# Patient Record
Sex: FEMALE | Race: WHITE | NOT HISPANIC OR LATINO | Employment: OTHER | URBAN - METROPOLITAN AREA
[De-identification: names, ages, dates, MRNs, and addresses within clinical notes are randomized per-mention and may not be internally consistent; named-entity substitution may affect disease eponyms.]

---

## 2017-01-05 ENCOUNTER — LAB (OUTPATIENT)
Dept: LAB | Facility: HOSPITAL | Age: 82
End: 2017-01-05

## 2017-01-05 ENCOUNTER — INFUSION (OUTPATIENT)
Dept: ONCOLOGY | Facility: HOSPITAL | Age: 82
End: 2017-01-05

## 2017-01-05 VITALS — SYSTOLIC BLOOD PRESSURE: 172 MMHG | DIASTOLIC BLOOD PRESSURE: 68 MMHG

## 2017-01-05 DIAGNOSIS — D63.1 ANEMIA OF CHRONIC RENAL FAILURE, STAGE 4 (SEVERE) (HCC): ICD-10-CM

## 2017-01-05 DIAGNOSIS — N18.4 ANEMIA OF CHRONIC RENAL FAILURE, STAGE 4 (SEVERE) (HCC): ICD-10-CM

## 2017-01-05 DIAGNOSIS — D69.6 THROMBOCYTOPENIA (HCC): ICD-10-CM

## 2017-01-05 DIAGNOSIS — D70.8 OTHER NEUTROPENIA (HCC): ICD-10-CM

## 2017-01-05 DIAGNOSIS — N18.4 CHRONIC RENAL DISEASE, STAGE 4, SEVERELY DECREASED GLOMERULAR FILTRATION RATE BETWEEN 15-29 ML/MIN/1.73 SQUARE METER (HCC): ICD-10-CM

## 2017-01-05 DIAGNOSIS — D73.9 SPLENIC PANCYTOPENIA SYNDROME (HCC): ICD-10-CM

## 2017-01-05 DIAGNOSIS — D61.818 SPLENIC PANCYTOPENIA SYNDROME (HCC): ICD-10-CM

## 2017-01-05 DIAGNOSIS — N17.9 ACUTE RENAL FAILURE, UNSPECIFIED ACUTE RENAL FAILURE TYPE (HCC): Primary | ICD-10-CM

## 2017-01-05 DIAGNOSIS — K74.60 CIRRHOSIS OF LIVER WITHOUT ASCITES, UNSPECIFIED HEPATIC CIRRHOSIS TYPE (HCC): ICD-10-CM

## 2017-01-05 DIAGNOSIS — D73.1 HYPERSPLENISM: ICD-10-CM

## 2017-01-05 LAB
BASOPHILS # BLD AUTO: 0.02 10*3/MM3 (ref 0–0.1)
BASOPHILS NFR BLD AUTO: 0.4 % (ref 0–1.1)
DEPRECATED RDW RBC AUTO: 48.4 FL (ref 37–49)
EOSINOPHIL # BLD AUTO: 0.18 10*3/MM3 (ref 0–0.36)
EOSINOPHIL NFR BLD AUTO: 3.8 % (ref 1–5)
ERYTHROCYTE [DISTWIDTH] IN BLOOD BY AUTOMATED COUNT: 14.5 % (ref 11.7–14.5)
HCT VFR BLD AUTO: 29.4 % (ref 34–45)
HGB BLD-MCNC: 9.8 G/DL (ref 11.5–14.9)
IMM GRANULOCYTES # BLD: 0.01 10*3/MM3 (ref 0–0.03)
IMM GRANULOCYTES NFR BLD: 0.2 % (ref 0–0.5)
LYMPHOCYTES # BLD AUTO: 1.36 10*3/MM3 (ref 1–3.5)
LYMPHOCYTES NFR BLD AUTO: 28.8 % (ref 20–49)
MCH RBC QN AUTO: 31.1 PG (ref 27–33)
MCHC RBC AUTO-ENTMCNC: 33.3 G/DL (ref 32–35)
MCV RBC AUTO: 93.3 FL (ref 83–97)
MONOCYTES # BLD AUTO: 0.54 10*3/MM3 (ref 0.25–0.8)
MONOCYTES NFR BLD AUTO: 11.4 % (ref 4–12)
NEUTROPHILS # BLD AUTO: 2.61 10*3/MM3 (ref 1.5–7)
NEUTROPHILS NFR BLD AUTO: 55.4 % (ref 39–75)
NRBC BLD MANUAL-RTO: 0 /100 WBC (ref 0–0)
PLATELET # BLD AUTO: 87 10*3/MM3 (ref 150–375)
PMV BLD AUTO: 10.3 FL (ref 8.9–12.1)
RBC # BLD AUTO: 3.15 10*6/MM3 (ref 3.9–5)
WBC NRBC COR # BLD: 4.72 10*3/MM3 (ref 4–10)

## 2017-01-05 PROCEDURE — 96372 THER/PROPH/DIAG INJ SC/IM: CPT

## 2017-01-05 PROCEDURE — 36416 COLLJ CAPILLARY BLOOD SPEC: CPT

## 2017-01-05 PROCEDURE — 25010000002 EPOETIN ALFA PER 1000 UNITS: Performed by: INTERNAL MEDICINE

## 2017-01-05 PROCEDURE — 85025 COMPLETE CBC W/AUTO DIFF WBC: CPT

## 2017-01-05 RX ADMIN — ERYTHROPOIETIN 20000 UNITS: 20000 INJECTION, SOLUTION INTRAVENOUS; SUBCUTANEOUS at 15:00

## 2017-01-05 NOTE — PROGRESS NOTES
Pt here for procrit. HGB 9.8 today. Pt will receive 20k units. Pt does c/o of dizziness, especially when getting up in the mornings. Checked patient's BP. 172/68. Pt says she takes BP and water pills. Pt states her PCP monitors her BP. I reviewed with Ileana MORALES. I instructed patient to call their PCP and follow up with them about the dizziness she is having. Instructed them to call us if they have any other issues. Copy of labs given to patient's . They v/u

## 2017-01-13 ENCOUNTER — RESULTS ENCOUNTER (OUTPATIENT)
Dept: ONCOLOGY | Facility: CLINIC | Age: 82
End: 2017-01-13

## 2017-01-13 DIAGNOSIS — D61.818 SPLENIC PANCYTOPENIA SYNDROME (HCC): ICD-10-CM

## 2017-01-13 DIAGNOSIS — N18.4 ANEMIA OF CHRONIC RENAL FAILURE, STAGE 4 (SEVERE) (HCC): ICD-10-CM

## 2017-01-13 DIAGNOSIS — K74.60 CIRRHOSIS OF LIVER WITHOUT ASCITES, UNSPECIFIED HEPATIC CIRRHOSIS TYPE (HCC): ICD-10-CM

## 2017-01-13 DIAGNOSIS — N18.4 CHRONIC RENAL DISEASE, STAGE 4, SEVERELY DECREASED GLOMERULAR FILTRATION RATE BETWEEN 15-29 ML/MIN/1.73 SQUARE METER (HCC): ICD-10-CM

## 2017-01-13 DIAGNOSIS — D63.1 ANEMIA OF CHRONIC RENAL FAILURE, STAGE 4 (SEVERE) (HCC): ICD-10-CM

## 2017-01-13 DIAGNOSIS — D73.1 HYPERSPLENISM: ICD-10-CM

## 2017-01-13 DIAGNOSIS — D73.9 SPLENIC PANCYTOPENIA SYNDROME (HCC): ICD-10-CM

## 2017-01-13 DIAGNOSIS — D70.8 OTHER NEUTROPENIA (HCC): ICD-10-CM

## 2017-01-13 DIAGNOSIS — D69.6 THROMBOCYTOPENIA (HCC): ICD-10-CM

## 2017-01-19 ENCOUNTER — LAB (OUTPATIENT)
Dept: LAB | Facility: HOSPITAL | Age: 82
End: 2017-01-19

## 2017-01-19 ENCOUNTER — INFUSION (OUTPATIENT)
Dept: ONCOLOGY | Facility: HOSPITAL | Age: 82
End: 2017-01-19

## 2017-01-19 DIAGNOSIS — D70.8 OTHER NEUTROPENIA (HCC): ICD-10-CM

## 2017-01-19 DIAGNOSIS — K74.60 CIRRHOSIS OF LIVER WITHOUT ASCITES, UNSPECIFIED HEPATIC CIRRHOSIS TYPE (HCC): ICD-10-CM

## 2017-01-19 DIAGNOSIS — D61.818 SPLENIC PANCYTOPENIA SYNDROME (HCC): ICD-10-CM

## 2017-01-19 DIAGNOSIS — D73.1 HYPERSPLENISM: ICD-10-CM

## 2017-01-19 DIAGNOSIS — D73.9 SPLENIC PANCYTOPENIA SYNDROME (HCC): ICD-10-CM

## 2017-01-19 DIAGNOSIS — N18.4 CHRONIC RENAL DISEASE, STAGE 4, SEVERELY DECREASED GLOMERULAR FILTRATION RATE BETWEEN 15-29 ML/MIN/1.73 SQUARE METER (HCC): ICD-10-CM

## 2017-01-19 DIAGNOSIS — D63.1 ANEMIA OF CHRONIC RENAL FAILURE, STAGE 4 (SEVERE) (HCC): ICD-10-CM

## 2017-01-19 DIAGNOSIS — N17.9 ACUTE RENAL FAILURE, UNSPECIFIED ACUTE RENAL FAILURE TYPE (HCC): Primary | ICD-10-CM

## 2017-01-19 DIAGNOSIS — D69.6 THROMBOCYTOPENIA (HCC): ICD-10-CM

## 2017-01-19 DIAGNOSIS — N18.4 ANEMIA OF CHRONIC RENAL FAILURE, STAGE 4 (SEVERE) (HCC): ICD-10-CM

## 2017-01-19 LAB
BASOPHILS # BLD AUTO: 0.01 10*3/MM3 (ref 0–0.1)
BASOPHILS NFR BLD AUTO: 0.2 % (ref 0–1.1)
DEPRECATED RDW RBC AUTO: 50 FL (ref 37–49)
EOSINOPHIL # BLD AUTO: 0.14 10*3/MM3 (ref 0–0.36)
EOSINOPHIL NFR BLD AUTO: 3.3 % (ref 1–5)
ERYTHROCYTE [DISTWIDTH] IN BLOOD BY AUTOMATED COUNT: 14.6 % (ref 11.7–14.5)
HCT VFR BLD AUTO: 28.9 % (ref 34–45)
HGB BLD-MCNC: 9.2 G/DL (ref 11.5–14.9)
IMM GRANULOCYTES # BLD: 0.02 10*3/MM3 (ref 0–0.03)
IMM GRANULOCYTES NFR BLD: 0.5 % (ref 0–0.5)
LYMPHOCYTES # BLD AUTO: 1.17 10*3/MM3 (ref 1–3.5)
LYMPHOCYTES NFR BLD AUTO: 27.5 % (ref 20–49)
MCH RBC QN AUTO: 30.3 PG (ref 27–33)
MCHC RBC AUTO-ENTMCNC: 31.8 G/DL (ref 32–35)
MCV RBC AUTO: 95.1 FL (ref 83–97)
MONOCYTES # BLD AUTO: 0.44 10*3/MM3 (ref 0.25–0.8)
MONOCYTES NFR BLD AUTO: 10.3 % (ref 4–12)
NEUTROPHILS # BLD AUTO: 2.48 10*3/MM3 (ref 1.5–7)
NEUTROPHILS NFR BLD AUTO: 58.2 % (ref 39–75)
NRBC BLD MANUAL-RTO: 0 /100 WBC (ref 0–0)
PLATELET # BLD AUTO: 72 10*3/MM3 (ref 150–375)
PMV BLD AUTO: 9.9 FL (ref 8.9–12.1)
RBC # BLD AUTO: 3.04 10*6/MM3 (ref 3.9–5)
WBC NRBC COR # BLD: 4.26 10*3/MM3 (ref 4–10)

## 2017-01-19 PROCEDURE — 36416 COLLJ CAPILLARY BLOOD SPEC: CPT

## 2017-01-19 PROCEDURE — 25010000002 EPOETIN ALFA PER 1000 UNITS: Performed by: INTERNAL MEDICINE

## 2017-01-19 PROCEDURE — 96372 THER/PROPH/DIAG INJ SC/IM: CPT | Performed by: INTERNAL MEDICINE

## 2017-01-19 PROCEDURE — 85025 COMPLETE CBC W/AUTO DIFF WBC: CPT

## 2017-01-19 RX ADMIN — ERYTHROPOIETIN 20000 UNITS: 20000 INJECTION, SOLUTION INTRAVENOUS; SUBCUTANEOUS at 14:49

## 2017-01-27 ENCOUNTER — RESULTS ENCOUNTER (OUTPATIENT)
Dept: ONCOLOGY | Facility: CLINIC | Age: 82
End: 2017-01-27

## 2017-01-27 DIAGNOSIS — K74.60 CIRRHOSIS OF LIVER WITHOUT ASCITES, UNSPECIFIED HEPATIC CIRRHOSIS TYPE (HCC): ICD-10-CM

## 2017-01-27 DIAGNOSIS — D63.1 ANEMIA OF CHRONIC RENAL FAILURE, STAGE 4 (SEVERE) (HCC): ICD-10-CM

## 2017-01-27 DIAGNOSIS — D73.9 SPLENIC PANCYTOPENIA SYNDROME (HCC): ICD-10-CM

## 2017-01-27 DIAGNOSIS — N18.4 CHRONIC RENAL DISEASE, STAGE 4, SEVERELY DECREASED GLOMERULAR FILTRATION RATE BETWEEN 15-29 ML/MIN/1.73 SQUARE METER (HCC): ICD-10-CM

## 2017-01-27 DIAGNOSIS — D69.6 THROMBOCYTOPENIA (HCC): ICD-10-CM

## 2017-01-27 DIAGNOSIS — D61.818 SPLENIC PANCYTOPENIA SYNDROME (HCC): ICD-10-CM

## 2017-01-27 DIAGNOSIS — N18.4 ANEMIA OF CHRONIC RENAL FAILURE, STAGE 4 (SEVERE) (HCC): ICD-10-CM

## 2017-01-27 DIAGNOSIS — D70.8 OTHER NEUTROPENIA (HCC): ICD-10-CM

## 2017-01-27 DIAGNOSIS — D73.1 HYPERSPLENISM: ICD-10-CM

## 2017-02-02 ENCOUNTER — OFFICE VISIT (OUTPATIENT)
Dept: ONCOLOGY | Facility: CLINIC | Age: 82
End: 2017-02-02

## 2017-02-02 ENCOUNTER — LAB (OUTPATIENT)
Dept: LAB | Facility: HOSPITAL | Age: 82
End: 2017-02-02

## 2017-02-02 ENCOUNTER — INFUSION (OUTPATIENT)
Dept: ONCOLOGY | Facility: HOSPITAL | Age: 82
End: 2017-02-02

## 2017-02-02 VITALS
RESPIRATION RATE: 16 BRPM | BODY MASS INDEX: 32.11 KG/M2 | HEART RATE: 92 BPM | WEIGHT: 181.2 LBS | HEIGHT: 63 IN | SYSTOLIC BLOOD PRESSURE: 132 MMHG | TEMPERATURE: 97.9 F | DIASTOLIC BLOOD PRESSURE: 80 MMHG

## 2017-02-02 DIAGNOSIS — D69.6 THROMBOCYTOPENIA (HCC): ICD-10-CM

## 2017-02-02 DIAGNOSIS — E83.110 HEREDITARY HEMOCHROMATOSIS (HCC): ICD-10-CM

## 2017-02-02 DIAGNOSIS — K74.60 CIRRHOSIS OF LIVER WITHOUT ASCITES, UNSPECIFIED HEPATIC CIRRHOSIS TYPE (HCC): ICD-10-CM

## 2017-02-02 DIAGNOSIS — D73.1 HYPERSPLENISM: ICD-10-CM

## 2017-02-02 DIAGNOSIS — D73.9 SPLENIC PANCYTOPENIA SYNDROME (HCC): ICD-10-CM

## 2017-02-02 DIAGNOSIS — N18.4 CHRONIC RENAL DISEASE, STAGE 4, SEVERELY DECREASED GLOMERULAR FILTRATION RATE BETWEEN 15-29 ML/MIN/1.73 SQUARE METER (HCC): ICD-10-CM

## 2017-02-02 DIAGNOSIS — N17.9 ACUTE RENAL FAILURE, UNSPECIFIED ACUTE RENAL FAILURE TYPE (HCC): Primary | ICD-10-CM

## 2017-02-02 DIAGNOSIS — N18.4 ANEMIA OF CHRONIC RENAL FAILURE, STAGE 4 (SEVERE) (HCC): ICD-10-CM

## 2017-02-02 DIAGNOSIS — D70.8 OTHER NEUTROPENIA (HCC): ICD-10-CM

## 2017-02-02 DIAGNOSIS — D72.818 OTHER DECREASED WHITE BLOOD CELL (WBC) COUNT: ICD-10-CM

## 2017-02-02 DIAGNOSIS — D63.1 ANEMIA OF CHRONIC RENAL FAILURE, STAGE 4 (SEVERE) (HCC): ICD-10-CM

## 2017-02-02 DIAGNOSIS — D61.818 SPLENIC PANCYTOPENIA SYNDROME (HCC): ICD-10-CM

## 2017-02-02 DIAGNOSIS — K74.60 CIRRHOSIS OF LIVER WITHOUT ASCITES, UNSPECIFIED HEPATIC CIRRHOSIS TYPE (HCC): Primary | ICD-10-CM

## 2017-02-02 LAB
BASOPHILS # BLD AUTO: 0.03 10*3/MM3 (ref 0–0.1)
BASOPHILS NFR BLD AUTO: 0.7 % (ref 0–1.1)
DEPRECATED RDW RBC AUTO: 49.5 FL (ref 37–49)
EOSINOPHIL # BLD AUTO: 0.2 10*3/MM3 (ref 0–0.36)
EOSINOPHIL NFR BLD AUTO: 4.4 % (ref 1–5)
ERYTHROCYTE [DISTWIDTH] IN BLOOD BY AUTOMATED COUNT: 14.6 % (ref 11.7–14.5)
HCT VFR BLD AUTO: 29.8 % (ref 34–45)
HGB BLD-MCNC: 9.6 G/DL (ref 11.5–14.9)
IMM GRANULOCYTES # BLD: 0.02 10*3/MM3 (ref 0–0.03)
IMM GRANULOCYTES NFR BLD: 0.4 % (ref 0–0.5)
LYMPHOCYTES # BLD AUTO: 1.3 10*3/MM3 (ref 1–3.5)
LYMPHOCYTES NFR BLD AUTO: 28.5 % (ref 20–49)
MCH RBC QN AUTO: 29.7 PG (ref 27–33)
MCHC RBC AUTO-ENTMCNC: 32.2 G/DL (ref 32–35)
MCV RBC AUTO: 92.3 FL (ref 83–97)
MONOCYTES # BLD AUTO: 0.54 10*3/MM3 (ref 0.25–0.8)
MONOCYTES NFR BLD AUTO: 11.8 % (ref 4–12)
NEUTROPHILS # BLD AUTO: 2.47 10*3/MM3 (ref 1.5–7)
NEUTROPHILS NFR BLD AUTO: 54.2 % (ref 39–75)
NRBC BLD MANUAL-RTO: 0 /100 WBC (ref 0–0)
PLATELET # BLD AUTO: 79 10*3/MM3 (ref 150–375)
PMV BLD AUTO: 10.5 FL (ref 8.9–12.1)
RBC # BLD AUTO: 3.23 10*6/MM3 (ref 3.9–5)
WBC NRBC COR # BLD: 4.56 10*3/MM3 (ref 4–10)

## 2017-02-02 PROCEDURE — 25010000002 EPOETIN ALFA PER 1000 UNITS: Performed by: INTERNAL MEDICINE

## 2017-02-02 PROCEDURE — 96372 THER/PROPH/DIAG INJ SC/IM: CPT | Performed by: INTERNAL MEDICINE

## 2017-02-02 PROCEDURE — 99213 OFFICE O/P EST LOW 20 MIN: CPT | Performed by: INTERNAL MEDICINE

## 2017-02-02 PROCEDURE — 36416 COLLJ CAPILLARY BLOOD SPEC: CPT | Performed by: INTERNAL MEDICINE

## 2017-02-02 PROCEDURE — 85025 COMPLETE CBC W/AUTO DIFF WBC: CPT | Performed by: INTERNAL MEDICINE

## 2017-02-02 RX ORDER — QUETIAPINE FUMARATE 25 MG/1
TABLET, FILM COATED ORAL
COMMUNITY
Start: 2017-01-26 | End: 2017-02-02

## 2017-02-02 RX ORDER — POTASSIUM CHLORIDE 1500 MG/1
10 TABLET, EXTENDED RELEASE ORAL 2 TIMES DAILY
COMMUNITY
Start: 2017-01-03 | End: 2018-01-17 | Stop reason: SDUPTHER

## 2017-02-02 RX ORDER — BUMETANIDE 2 MG/1
3 TABLET ORAL 2 TIMES DAILY
Status: ON HOLD | COMMUNITY
Start: 2017-01-25 | End: 2018-01-01

## 2017-02-02 RX ORDER — CYCLOBENZAPRINE HCL 5 MG
5 TABLET ORAL NIGHTLY
COMMUNITY
Start: 2017-01-31 | End: 2019-01-01

## 2017-02-02 RX ADMIN — ERYTHROPOIETIN 20000 UNITS: 20000 INJECTION, SOLUTION INTRAVENOUS; SUBCUTANEOUS at 15:15

## 2017-02-02 NOTE — PROGRESS NOTES
Subjective     REASON FOR FOLLOW UP  1. Anemia of chronic kidney disease  2. 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    HISTORY OF PRESENT ILLNESS:  The patient is a 82 y.o. year old female who was seen in consultation few weeks ago for the above noted issues.  She had previously been evaluated in our practice several years ago for this same issue.  She had become more anemic which was felt to be in part related to anemia of chronic kidney disease and she was started on Procrit therapy every 2 weeks at a dose of 20,000 units subcutaneous.  She and her  both report that since starting the Procrit she's had more energy.  Her hemoglobin has improved from 8 up to 9.6 currently.  Her white cells and platelets are stable she's not had any obvious bleeding or signs of thrombosis.  Her blood pressure is stable also.      She also is homozygous for the H63D mutation.  Most people with this mutation do not have clinical iron overload and given the patient's degree of anemia and pancytopenia she would not be a candidate for therapeutic phlebotomy in any case.  Her serum ferritin and iron saturation are within normal limits.    History of Present Illness     Past Medical History   Diagnosis Date   • Anxiety    • C. 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 30 tablet 0   • Cholecalciferol (VITAMIN D-3 PO) Take 1,000 Units by mouth daily.     • Cyanocobalamin (VITAMIN B-12 PO) Take 1,250 mcg by mouth daily.     • insulin NPH (humuLIN N,novoLIN N) 100 UNIT/ML injection Inject 25 Units under the skin 2 (Two) Times a Day Before Meals.     • insulin NPH-insulin regular (Novolin 70/30) (70-30) 100 UNIT/ML injection Inject  under the skin 2 (two) times a day with meals.     • isosorbide mononitrate (IMDUR) 30 MG 24 hr tablet Take 1 tablet by mouth Daily. 30 tablet 0   • Magnesium 500 MG tablet Take 1 tablet/day by mouth.     • METOPROLOL TARTRATE PO Take 12.5 mg by mouth 2 (two) times a day.     • Multiple Vitamin (MULTI-VITAMIN PO) Take  by mouth daily.     • Probiotic Product (PROBIOTIC PO) Take  by mouth.     • QUEtiapine Fumarate (SEROQUEL PO) Take 25 mg by mouth daily.     • rifaximin (XIFAXAN) 550 MG tablet Take 550 mg by mouth Every 12 (Twelve) Hours.     • sennosides-docusate sodium (SENOKOT-S) 8.6-50 MG tablet Take 2 tablets by mouth At Night As Needed for constipation. 60 tablet 0   • Sertraline HCl (ZOLOFT PO) Take 25 mg by mouth daily.     • SIMETHICONE PO Take  by mouth as needed.     • temazepam (RESTORIL) 7.5 MG capsule      • TRAMADOL HCL PO Take 25 mg by mouth daily.     • Unable to find 1 each 1 (one) time. ZIFAXAN 550 MG 2 X A DAY       No current facility-administered medications on file prior to visit.         ALLERGIES:    Allergies   Allergen Reactions   • Ciprofloxacin      Itching and red streaks   • Levaquin [Levofloxacin]    • Promethazine Hcl      Itching, red streaks when given IV        Social History     Social History   • Marital status:      Spouse name: Kirby   • Number of children: N/A   • Years of education: N/A     Occupational History   • Retired      Social History Main  "Topics   • Smoking status: Former Smoker     Types: Cigarettes     Quit date: 11/17/1985   • Smokeless tobacco: Never Used      Comment: Heavy in past, but quit   • Alcohol use No   • Drug use: No   • Sexual activity: Defer     Other Topics Concern   • None     Social History Narrative        No family history on file.     Review of Systems   Constitutional: Positive for fatigue. Negative for activity change, appetite change, fever and unexpected weight change.   HENT: Negative for hearing loss, nosebleeds, trouble swallowing and voice change.    Eyes: Negative for visual disturbance.   Respiratory: Negative for cough, shortness of breath and wheezing.    Cardiovascular: Negative for chest pain and palpitations.   Gastrointestinal: Negative for abdominal pain, diarrhea, nausea and vomiting.   Genitourinary: Negative for difficulty urinating, frequency, hematuria and urgency.   Musculoskeletal: Negative for back pain and neck pain.   Skin: Positive for color change. Negative for rash.        Sun damaged skin and senile purpura.   Neurological: Negative for dizziness, seizures, syncope and headaches.   Hematological: Negative for adenopathy. Bruises/bleeds easily.   Psychiatric/Behavioral: Positive for confusion. Negative for behavioral problems. The patient is not nervous/anxious.         Objective     Vitals:    02/02/17 1448   BP: 132/80   Pulse: 92   Resp: 16   Temp: 97.9 °F (36.6 °C)   Weight: 181 lb 3.2 oz (82.2 kg)   Height: 62.99\" (160 cm)   PainSc: 0-No pain     Current Status 2/2/2017   ECOG score 0       Physical Exam   Constitutional: She is oriented to person, place, and time. She appears well-developed and well-nourished. No distress.   HENT:   Head: Normocephalic.   Eyes: Conjunctivae and EOM are normal. Pupils are equal, round, and reactive to light. No scleral icterus.   Neck: Normal range of motion. Neck supple. No JVD present. No thyromegaly present.   Cardiovascular: Normal rate and regular rhythm. "  Exam reveals no gallop and no friction rub.    No murmur heard.  Pulmonary/Chest: Effort normal and breath sounds normal. She has no wheezes. She has no rales.   Abdominal: Soft. She exhibits no distension and no mass. There is no tenderness.   Musculoskeletal: Normal range of motion. She exhibits no edema or deformity.   Lymphadenopathy:     She has no cervical adenopathy.   Neurological: She is alert and oriented to person, place, and time. She has normal reflexes. No cranial nerve deficit.   Skin: Skin is warm and dry. No rash noted. No erythema.   Sun damaged skin and senile purpura   Psychiatric: She has a normal mood and affect. Her behavior is normal. Judgment normal.       RECENT LABS:  Hematology WBC   Date Value Ref Range Status   02/02/2017 4.56 4.00 - 10.00 10*3/mm3 Final   02/26/2016 2.88 (L) 4.50 - 10.70 K/Cumm Final     RBC   Date Value Ref Range Status   02/02/2017 3.23 (L) 3.90 - 5.00 10*6/mm3 Final   02/26/2016 2.81 (L) 3.90 - 5.20 Million Final     HEMOGLOBIN   Date Value Ref Range Status   02/02/2017 9.6 (L) 11.5 - 14.9 g/dL Final   02/26/2016 9.0 (L) 11.9 - 15.5 g/dL Final     HEMATOCRIT   Date Value Ref Range Status   02/02/2017 29.8 (L) 34.0 - 45.0 % Final   02/26/2016 28.1 (L) 35.6 - 45.5 % Final     PLATELETS   Date Value Ref Range Status   02/02/2017 79 (L) 150 - 375 10*3/mm3 Final   02/26/2016 49 (L) 140 - 500 K/Cumm Final              Assessment/Plan     1.Pancytopenia due to underlying cirrhosis of the liver and hypersplenism. This is a chronic process and has been relatively stable for several years.  As noted above, she had a normal appearing bone marrow examination in 2011.  2.  Component of anemia of chronic renal insufficiency stage IV.    3.  Severe fatigue.  Improved after initiation of Procrit therapy.  4.  Homozygous for H63D hemochromatosis gene mutation.  No evidence of iron overload on her laboratory results.    Plan    1.  Procrit therapy initially at a dose of 20,000 units  subcutaneous every 2 weeks for anemia of chronic kidney disease stage IV.  2.  She'll see a doctor again in 12 weeks.   3.  We will continue to monitor her iron studies every 3 months.

## 2017-02-10 ENCOUNTER — RESULTS ENCOUNTER (OUTPATIENT)
Dept: ONCOLOGY | Facility: CLINIC | Age: 82
End: 2017-02-10

## 2017-02-10 DIAGNOSIS — D63.1 ANEMIA OF CHRONIC RENAL FAILURE, STAGE 4 (SEVERE) (HCC): ICD-10-CM

## 2017-02-10 DIAGNOSIS — D73.1 HYPERSPLENISM: ICD-10-CM

## 2017-02-10 DIAGNOSIS — N18.4 CHRONIC RENAL DISEASE, STAGE 4, SEVERELY DECREASED GLOMERULAR FILTRATION RATE BETWEEN 15-29 ML/MIN/1.73 SQUARE METER (HCC): ICD-10-CM

## 2017-02-10 DIAGNOSIS — D61.818 SPLENIC PANCYTOPENIA SYNDROME (HCC): ICD-10-CM

## 2017-02-10 DIAGNOSIS — D69.6 THROMBOCYTOPENIA (HCC): ICD-10-CM

## 2017-02-10 DIAGNOSIS — K74.60 CIRRHOSIS OF LIVER WITHOUT ASCITES, UNSPECIFIED HEPATIC CIRRHOSIS TYPE (HCC): ICD-10-CM

## 2017-02-10 DIAGNOSIS — E83.110 HEREDITARY HEMOCHROMATOSIS (HCC): ICD-10-CM

## 2017-02-10 DIAGNOSIS — N18.4 ANEMIA OF CHRONIC RENAL FAILURE, STAGE 4 (SEVERE) (HCC): ICD-10-CM

## 2017-02-10 DIAGNOSIS — D73.9 SPLENIC PANCYTOPENIA SYNDROME (HCC): ICD-10-CM

## 2017-02-10 DIAGNOSIS — D72.818 OTHER DECREASED WHITE BLOOD CELL (WBC) COUNT: ICD-10-CM

## 2017-02-16 ENCOUNTER — LAB (OUTPATIENT)
Dept: LAB | Facility: HOSPITAL | Age: 82
End: 2017-02-16

## 2017-02-16 ENCOUNTER — INFUSION (OUTPATIENT)
Dept: ONCOLOGY | Facility: HOSPITAL | Age: 82
End: 2017-02-16

## 2017-02-16 DIAGNOSIS — D63.1 ANEMIA OF CHRONIC RENAL FAILURE, STAGE 4 (SEVERE) (HCC): ICD-10-CM

## 2017-02-16 DIAGNOSIS — D72.819 LEUKOPENIA, UNSPECIFIED TYPE: ICD-10-CM

## 2017-02-16 DIAGNOSIS — N17.9 ACUTE RENAL FAILURE, UNSPECIFIED ACUTE RENAL FAILURE TYPE (HCC): Primary | ICD-10-CM

## 2017-02-16 DIAGNOSIS — N18.4 ANEMIA OF CHRONIC RENAL FAILURE, STAGE 4 (SEVERE) (HCC): ICD-10-CM

## 2017-02-16 DIAGNOSIS — D69.6 THROMBOCYTOPENIA (HCC): ICD-10-CM

## 2017-02-16 DIAGNOSIS — E83.119 HEMOCHROMATOSIS, UNSPECIFIED HEMOCHROMATOSIS TYPE: Primary | ICD-10-CM

## 2017-02-16 LAB
BASOPHILS # BLD AUTO: 0.02 10*3/MM3 (ref 0–0.1)
BASOPHILS NFR BLD AUTO: 0.6 % (ref 0–1.1)
DEPRECATED RDW RBC AUTO: 48.4 FL (ref 37–49)
EOSINOPHIL # BLD AUTO: 0.18 10*3/MM3 (ref 0–0.36)
EOSINOPHIL NFR BLD AUTO: 5.5 % (ref 1–5)
ERYTHROCYTE [DISTWIDTH] IN BLOOD BY AUTOMATED COUNT: 14.4 % (ref 11.7–14.5)
HCT VFR BLD AUTO: 29.9 % (ref 34–45)
HGB BLD-MCNC: 9.5 G/DL (ref 11.5–14.9)
IMM GRANULOCYTES # BLD: 0.03 10*3/MM3 (ref 0–0.03)
IMM GRANULOCYTES NFR BLD: 0.9 % (ref 0–0.5)
LYMPHOCYTES # BLD AUTO: 0.86 10*3/MM3 (ref 1–3.5)
LYMPHOCYTES NFR BLD AUTO: 26.3 % (ref 20–49)
MCH RBC QN AUTO: 29.1 PG (ref 27–33)
MCHC RBC AUTO-ENTMCNC: 31.8 G/DL (ref 32–35)
MCV RBC AUTO: 91.4 FL (ref 83–97)
MONOCYTES # BLD AUTO: 0.46 10*3/MM3 (ref 0.25–0.8)
MONOCYTES NFR BLD AUTO: 14.1 % (ref 4–12)
NEUTROPHILS # BLD AUTO: 1.72 10*3/MM3 (ref 1.5–7)
NEUTROPHILS NFR BLD AUTO: 52.6 % (ref 39–75)
NRBC BLD MANUAL-RTO: 0 /100 WBC (ref 0–0)
PLATELET # BLD AUTO: 65 10*3/MM3 (ref 150–375)
PMV BLD AUTO: 10.8 FL (ref 8.9–12.1)
RBC # BLD AUTO: 3.27 10*6/MM3 (ref 3.9–5)
WBC NRBC COR # BLD: 3.27 10*3/MM3 (ref 4–10)

## 2017-02-16 PROCEDURE — 96372 THER/PROPH/DIAG INJ SC/IM: CPT

## 2017-02-16 PROCEDURE — 63510000001 EPOETIN ALFA PER 1000 UNITS: Performed by: INTERNAL MEDICINE

## 2017-02-16 PROCEDURE — 85025 COMPLETE CBC W/AUTO DIFF WBC: CPT | Performed by: INTERNAL MEDICINE

## 2017-02-16 PROCEDURE — 36416 COLLJ CAPILLARY BLOOD SPEC: CPT | Performed by: INTERNAL MEDICINE

## 2017-02-16 RX ADMIN — ERYTHROPOIETIN 20000 UNITS: 20000 INJECTION, SOLUTION INTRAVENOUS; SUBCUTANEOUS at 14:25

## 2017-02-24 ENCOUNTER — RESULTS ENCOUNTER (OUTPATIENT)
Dept: ONCOLOGY | Facility: CLINIC | Age: 82
End: 2017-02-24

## 2017-02-24 DIAGNOSIS — K74.60 CIRRHOSIS OF LIVER WITHOUT ASCITES, UNSPECIFIED HEPATIC CIRRHOSIS TYPE (HCC): ICD-10-CM

## 2017-02-24 DIAGNOSIS — D72.818 OTHER DECREASED WHITE BLOOD CELL (WBC) COUNT: ICD-10-CM

## 2017-02-24 DIAGNOSIS — N18.4 CHRONIC RENAL DISEASE, STAGE 4, SEVERELY DECREASED GLOMERULAR FILTRATION RATE BETWEEN 15-29 ML/MIN/1.73 SQUARE METER (HCC): ICD-10-CM

## 2017-02-24 DIAGNOSIS — D61.818 SPLENIC PANCYTOPENIA SYNDROME (HCC): ICD-10-CM

## 2017-02-24 DIAGNOSIS — N18.4 ANEMIA OF CHRONIC RENAL FAILURE, STAGE 4 (SEVERE) (HCC): ICD-10-CM

## 2017-02-24 DIAGNOSIS — D73.9 SPLENIC PANCYTOPENIA SYNDROME (HCC): ICD-10-CM

## 2017-02-24 DIAGNOSIS — D63.1 ANEMIA OF CHRONIC RENAL FAILURE, STAGE 4 (SEVERE) (HCC): ICD-10-CM

## 2017-02-24 DIAGNOSIS — D73.1 HYPERSPLENISM: ICD-10-CM

## 2017-02-24 DIAGNOSIS — E83.110 HEREDITARY HEMOCHROMATOSIS (HCC): ICD-10-CM

## 2017-02-24 DIAGNOSIS — D69.6 THROMBOCYTOPENIA (HCC): ICD-10-CM

## 2017-03-02 ENCOUNTER — INFUSION (OUTPATIENT)
Dept: ONCOLOGY | Facility: HOSPITAL | Age: 82
End: 2017-03-02

## 2017-03-02 ENCOUNTER — LAB (OUTPATIENT)
Dept: LAB | Facility: HOSPITAL | Age: 82
End: 2017-03-02

## 2017-03-02 DIAGNOSIS — D69.6 THROMBOCYTOPENIA (HCC): ICD-10-CM

## 2017-03-02 DIAGNOSIS — K74.60 CIRRHOSIS OF LIVER WITHOUT ASCITES, UNSPECIFIED HEPATIC CIRRHOSIS TYPE (HCC): ICD-10-CM

## 2017-03-02 DIAGNOSIS — K74.69 OTHER CIRRHOSIS OF LIVER (HCC): Primary | ICD-10-CM

## 2017-03-02 DIAGNOSIS — E83.110 HEREDITARY HEMOCHROMATOSIS (HCC): ICD-10-CM

## 2017-03-02 DIAGNOSIS — D73.9 SPLENIC PANCYTOPENIA SYNDROME (HCC): ICD-10-CM

## 2017-03-02 DIAGNOSIS — N17.9 ACUTE RENAL FAILURE, UNSPECIFIED ACUTE RENAL FAILURE TYPE (HCC): Primary | ICD-10-CM

## 2017-03-02 DIAGNOSIS — D72.818 OTHER DECREASED WHITE BLOOD CELL (WBC) COUNT: ICD-10-CM

## 2017-03-02 DIAGNOSIS — D63.1 ANEMIA OF CHRONIC RENAL FAILURE, STAGE 4 (SEVERE) (HCC): ICD-10-CM

## 2017-03-02 DIAGNOSIS — N18.4 CHRONIC RENAL DISEASE, STAGE 4, SEVERELY DECREASED GLOMERULAR FILTRATION RATE BETWEEN 15-29 ML/MIN/1.73 SQUARE METER (HCC): ICD-10-CM

## 2017-03-02 DIAGNOSIS — D73.1 HYPERSPLENISM: ICD-10-CM

## 2017-03-02 DIAGNOSIS — N18.4 ANEMIA OF CHRONIC RENAL FAILURE, STAGE 4 (SEVERE) (HCC): ICD-10-CM

## 2017-03-02 DIAGNOSIS — D61.818 SPLENIC PANCYTOPENIA SYNDROME (HCC): ICD-10-CM

## 2017-03-02 LAB
BASOPHILS # BLD AUTO: 0.01 10*3/MM3 (ref 0–0.1)
BASOPHILS NFR BLD AUTO: 0.3 % (ref 0–1.1)
DEPRECATED RDW RBC AUTO: 48.9 FL (ref 37–49)
EOSINOPHIL # BLD AUTO: 0.21 10*3/MM3 (ref 0–0.36)
EOSINOPHIL NFR BLD AUTO: 6.1 % (ref 1–5)
ERYTHROCYTE [DISTWIDTH] IN BLOOD BY AUTOMATED COUNT: 14.5 % (ref 11.7–14.5)
FERRITIN SERPL-MCNC: 26.8 NG/ML
HCT VFR BLD AUTO: 27.5 % (ref 34–45)
HGB BLD-MCNC: 8.5 G/DL (ref 11.5–14.9)
IMM GRANULOCYTES # BLD: 0.02 10*3/MM3 (ref 0–0.03)
IMM GRANULOCYTES NFR BLD: 0.6 % (ref 0–0.5)
IRON 24H UR-MRATE: 80 MCG/DL (ref 37–145)
IRON SATN MFR SERPL: 19 % (ref 14–48)
LYMPHOCYTES # BLD AUTO: 0.75 10*3/MM3 (ref 1–3.5)
LYMPHOCYTES NFR BLD AUTO: 21.7 % (ref 20–49)
MCH RBC QN AUTO: 29 PG (ref 27–33)
MCHC RBC AUTO-ENTMCNC: 30.9 G/DL (ref 32–35)
MCV RBC AUTO: 93.9 FL (ref 83–97)
MONOCYTES # BLD AUTO: 0.41 10*3/MM3 (ref 0.25–0.8)
MONOCYTES NFR BLD AUTO: 11.9 % (ref 4–12)
NEUTROPHILS # BLD AUTO: 2.05 10*3/MM3 (ref 1.5–7)
NEUTROPHILS NFR BLD AUTO: 59.4 % (ref 39–75)
NRBC BLD MANUAL-RTO: 0 /100 WBC (ref 0–0)
PLATELET # BLD AUTO: 74 10*3/MM3 (ref 150–375)
PMV BLD AUTO: 10.4 FL (ref 8.9–12.1)
RBC # BLD AUTO: 2.93 10*6/MM3 (ref 3.9–5)
TIBC SERPL-MCNC: 420 MCG/DL (ref 249–505)
TRANSFERRIN SERPL-MCNC: 300 MG/DL (ref 200–360)
WBC NRBC COR # BLD: 3.45 10*3/MM3 (ref 4–10)

## 2017-03-02 PROCEDURE — 84466 ASSAY OF TRANSFERRIN: CPT | Performed by: INTERNAL MEDICINE

## 2017-03-02 PROCEDURE — 83540 ASSAY OF IRON: CPT | Performed by: INTERNAL MEDICINE

## 2017-03-02 PROCEDURE — 82728 ASSAY OF FERRITIN: CPT | Performed by: INTERNAL MEDICINE

## 2017-03-02 PROCEDURE — 96372 THER/PROPH/DIAG INJ SC/IM: CPT

## 2017-03-02 PROCEDURE — 63510000001 EPOETIN ALFA PER 1000 UNITS: Performed by: INTERNAL MEDICINE

## 2017-03-02 PROCEDURE — 85025 COMPLETE CBC W/AUTO DIFF WBC: CPT | Performed by: INTERNAL MEDICINE

## 2017-03-02 PROCEDURE — 36415 COLL VENOUS BLD VENIPUNCTURE: CPT | Performed by: INTERNAL MEDICINE

## 2017-03-02 RX ADMIN — ERYTHROPOIETIN 25000 UNITS: 20000 INJECTION, SOLUTION INTRAVENOUS; SUBCUTANEOUS at 14:48

## 2017-03-10 ENCOUNTER — RESULTS ENCOUNTER (OUTPATIENT)
Dept: ONCOLOGY | Facility: CLINIC | Age: 82
End: 2017-03-10

## 2017-03-10 DIAGNOSIS — D73.9 SPLENIC PANCYTOPENIA SYNDROME (HCC): ICD-10-CM

## 2017-03-10 DIAGNOSIS — D61.818 SPLENIC PANCYTOPENIA SYNDROME (HCC): ICD-10-CM

## 2017-03-10 DIAGNOSIS — N18.4 ANEMIA OF CHRONIC RENAL FAILURE, STAGE 4 (SEVERE) (HCC): ICD-10-CM

## 2017-03-10 DIAGNOSIS — E83.110 HEREDITARY HEMOCHROMATOSIS (HCC): ICD-10-CM

## 2017-03-10 DIAGNOSIS — K74.60 CIRRHOSIS OF LIVER WITHOUT ASCITES, UNSPECIFIED HEPATIC CIRRHOSIS TYPE (HCC): ICD-10-CM

## 2017-03-10 DIAGNOSIS — D73.1 HYPERSPLENISM: ICD-10-CM

## 2017-03-10 DIAGNOSIS — D63.1 ANEMIA OF CHRONIC RENAL FAILURE, STAGE 4 (SEVERE) (HCC): ICD-10-CM

## 2017-03-10 DIAGNOSIS — N18.4 CHRONIC RENAL DISEASE, STAGE 4, SEVERELY DECREASED GLOMERULAR FILTRATION RATE BETWEEN 15-29 ML/MIN/1.73 SQUARE METER (HCC): ICD-10-CM

## 2017-03-10 DIAGNOSIS — D72.818 OTHER DECREASED WHITE BLOOD CELL (WBC) COUNT: ICD-10-CM

## 2017-03-10 DIAGNOSIS — D69.6 THROMBOCYTOPENIA (HCC): ICD-10-CM

## 2017-03-16 ENCOUNTER — LAB (OUTPATIENT)
Dept: LAB | Facility: HOSPITAL | Age: 82
End: 2017-03-16

## 2017-03-16 ENCOUNTER — INFUSION (OUTPATIENT)
Dept: ONCOLOGY | Facility: HOSPITAL | Age: 82
End: 2017-03-16

## 2017-03-16 DIAGNOSIS — N18.4 ANEMIA OF CHRONIC RENAL FAILURE, STAGE 4 (SEVERE) (HCC): ICD-10-CM

## 2017-03-16 DIAGNOSIS — D63.1 ANEMIA OF CHRONIC RENAL FAILURE, STAGE 4 (SEVERE) (HCC): ICD-10-CM

## 2017-03-16 DIAGNOSIS — N18.4 CHRONIC RENAL DISEASE, STAGE 4, SEVERELY DECREASED GLOMERULAR FILTRATION RATE BETWEEN 15-29 ML/MIN/1.73 SQUARE METER (HCC): Primary | ICD-10-CM

## 2017-03-16 DIAGNOSIS — N17.9 ACUTE RENAL FAILURE, UNSPECIFIED ACUTE RENAL FAILURE TYPE (HCC): Primary | ICD-10-CM

## 2017-03-16 LAB
BASOPHILS # BLD AUTO: 0.01 10*3/MM3 (ref 0–0.1)
BASOPHILS NFR BLD AUTO: 0.2 % (ref 0–1.1)
DEPRECATED RDW RBC AUTO: 48.5 FL (ref 37–49)
EOSINOPHIL # BLD AUTO: 0.23 10*3/MM3 (ref 0–0.36)
EOSINOPHIL NFR BLD AUTO: 5.2 % (ref 1–5)
ERYTHROCYTE [DISTWIDTH] IN BLOOD BY AUTOMATED COUNT: 15.2 % (ref 11.7–14.5)
HCT VFR BLD AUTO: 29.5 % (ref 34–45)
HGB BLD-MCNC: 9.3 G/DL (ref 11.5–14.9)
IMM GRANULOCYTES # BLD: 0.01 10*3/MM3 (ref 0–0.03)
IMM GRANULOCYTES NFR BLD: 0.2 % (ref 0–0.5)
LYMPHOCYTES # BLD AUTO: 1.2 10*3/MM3 (ref 1–3.5)
LYMPHOCYTES NFR BLD AUTO: 27 % (ref 20–49)
MCH RBC QN AUTO: 28.4 PG (ref 27–33)
MCHC RBC AUTO-ENTMCNC: 31.5 G/DL (ref 32–35)
MCV RBC AUTO: 89.9 FL (ref 83–97)
MONOCYTES # BLD AUTO: 0.44 10*3/MM3 (ref 0.25–0.8)
MONOCYTES NFR BLD AUTO: 9.9 % (ref 4–12)
NEUTROPHILS # BLD AUTO: 2.55 10*3/MM3 (ref 1.5–7)
NEUTROPHILS NFR BLD AUTO: 57.5 % (ref 39–75)
NRBC BLD MANUAL-RTO: 0 /100 WBC (ref 0–0)
PLATELET # BLD AUTO: 79 10*3/MM3 (ref 150–375)
PMV BLD AUTO: 10.6 FL (ref 8.9–12.1)
RBC # BLD AUTO: 3.28 10*6/MM3 (ref 3.9–5)
WBC NRBC COR # BLD: 4.44 10*3/MM3 (ref 4–10)

## 2017-03-16 PROCEDURE — 96372 THER/PROPH/DIAG INJ SC/IM: CPT

## 2017-03-16 PROCEDURE — 36415 COLL VENOUS BLD VENIPUNCTURE: CPT | Performed by: INTERNAL MEDICINE

## 2017-03-16 PROCEDURE — 63510000001 EPOETIN ALFA PER 1000 UNITS: Performed by: INTERNAL MEDICINE

## 2017-03-16 PROCEDURE — 85025 COMPLETE CBC W/AUTO DIFF WBC: CPT | Performed by: INTERNAL MEDICINE

## 2017-03-16 RX ADMIN — ERYTHROPOIETIN 25000 UNITS: 20000 INJECTION, SOLUTION INTRAVENOUS; SUBCUTANEOUS at 14:17

## 2017-03-24 ENCOUNTER — RESULTS ENCOUNTER (OUTPATIENT)
Dept: ONCOLOGY | Facility: CLINIC | Age: 82
End: 2017-03-24

## 2017-03-24 DIAGNOSIS — N18.4 CHRONIC RENAL DISEASE, STAGE 4, SEVERELY DECREASED GLOMERULAR FILTRATION RATE BETWEEN 15-29 ML/MIN/1.73 SQUARE METER (HCC): ICD-10-CM

## 2017-03-24 DIAGNOSIS — D61.818 SPLENIC PANCYTOPENIA SYNDROME (HCC): ICD-10-CM

## 2017-03-24 DIAGNOSIS — D63.1 ANEMIA OF CHRONIC RENAL FAILURE, STAGE 4 (SEVERE) (HCC): ICD-10-CM

## 2017-03-24 DIAGNOSIS — K74.60 CIRRHOSIS OF LIVER WITHOUT ASCITES, UNSPECIFIED HEPATIC CIRRHOSIS TYPE (HCC): ICD-10-CM

## 2017-03-24 DIAGNOSIS — D73.9 SPLENIC PANCYTOPENIA SYNDROME (HCC): ICD-10-CM

## 2017-03-24 DIAGNOSIS — N18.4 ANEMIA OF CHRONIC RENAL FAILURE, STAGE 4 (SEVERE) (HCC): ICD-10-CM

## 2017-03-24 DIAGNOSIS — E83.110 HEREDITARY HEMOCHROMATOSIS (HCC): ICD-10-CM

## 2017-03-24 DIAGNOSIS — D73.1 HYPERSPLENISM: ICD-10-CM

## 2017-03-24 DIAGNOSIS — D69.6 THROMBOCYTOPENIA (HCC): ICD-10-CM

## 2017-03-24 DIAGNOSIS — D72.818 OTHER DECREASED WHITE BLOOD CELL (WBC) COUNT: ICD-10-CM

## 2017-03-30 ENCOUNTER — INFUSION (OUTPATIENT)
Dept: ONCOLOGY | Facility: HOSPITAL | Age: 82
End: 2017-03-30

## 2017-03-30 ENCOUNTER — LAB (OUTPATIENT)
Dept: LAB | Facility: HOSPITAL | Age: 82
End: 2017-03-30

## 2017-03-30 DIAGNOSIS — D61.818 SPLENIC PANCYTOPENIA SYNDROME (HCC): ICD-10-CM

## 2017-03-30 DIAGNOSIS — N17.9 ACUTE RENAL FAILURE, UNSPECIFIED ACUTE RENAL FAILURE TYPE (HCC): Primary | ICD-10-CM

## 2017-03-30 DIAGNOSIS — D73.9 SPLENIC PANCYTOPENIA SYNDROME (HCC): ICD-10-CM

## 2017-03-30 DIAGNOSIS — E83.119 HEMOCHROMATOSIS, UNSPECIFIED HEMOCHROMATOSIS TYPE: Primary | ICD-10-CM

## 2017-03-30 PROBLEM — E61.1 IRON DEFICIENCY: Status: ACTIVE | Noted: 2017-03-30

## 2017-03-30 LAB
BASOPHILS # BLD AUTO: 0.01 10*3/MM3 (ref 0–0.1)
BASOPHILS NFR BLD AUTO: 0.3 % (ref 0–1.1)
DEPRECATED RDW RBC AUTO: 52.2 FL (ref 37–49)
EOSINOPHIL # BLD AUTO: 0.12 10*3/MM3 (ref 0–0.36)
EOSINOPHIL NFR BLD AUTO: 3.8 % (ref 1–5)
ERYTHROCYTE [DISTWIDTH] IN BLOOD BY AUTOMATED COUNT: 15.9 % (ref 11.7–14.5)
FERRITIN SERPL-MCNC: 30.5 NG/ML
HCT VFR BLD AUTO: 27.3 % (ref 34–45)
HGB BLD-MCNC: 8.7 G/DL (ref 11.5–14.9)
IMM GRANULOCYTES # BLD: 0.01 10*3/MM3 (ref 0–0.03)
IMM GRANULOCYTES NFR BLD: 0.3 % (ref 0–0.5)
IRON 24H UR-MRATE: 38 MCG/DL (ref 37–145)
IRON SATN MFR SERPL: 9 % (ref 14–48)
LYMPHOCYTES # BLD AUTO: 0.81 10*3/MM3 (ref 1–3.5)
LYMPHOCYTES NFR BLD AUTO: 25.6 % (ref 20–49)
MCH RBC QN AUTO: 28.6 PG (ref 27–33)
MCHC RBC AUTO-ENTMCNC: 31.9 G/DL (ref 32–35)
MCV RBC AUTO: 89.8 FL (ref 83–97)
MONOCYTES # BLD AUTO: 0.49 10*3/MM3 (ref 0.25–0.8)
MONOCYTES NFR BLD AUTO: 15.5 % (ref 4–12)
NEUTROPHILS # BLD AUTO: 1.72 10*3/MM3 (ref 1.5–7)
NEUTROPHILS NFR BLD AUTO: 54.5 % (ref 39–75)
NRBC BLD MANUAL-RTO: 0 /100 WBC (ref 0–0)
PLATELET # BLD AUTO: 57 10*3/MM3 (ref 150–375)
PMV BLD AUTO: 11.3 FL (ref 8.9–12.1)
RBC # BLD AUTO: 3.04 10*6/MM3 (ref 3.9–5)
TIBC SERPL-MCNC: 407 MCG/DL (ref 249–505)
TRANSFERRIN SERPL-MCNC: 291 MG/DL (ref 200–360)
WBC NRBC COR # BLD: 3.16 10*3/MM3 (ref 4–10)

## 2017-03-30 PROCEDURE — 36415 COLL VENOUS BLD VENIPUNCTURE: CPT

## 2017-03-30 PROCEDURE — 83540 ASSAY OF IRON: CPT

## 2017-03-30 PROCEDURE — 36416 COLLJ CAPILLARY BLOOD SPEC: CPT

## 2017-03-30 PROCEDURE — 85025 COMPLETE CBC W/AUTO DIFF WBC: CPT

## 2017-03-30 PROCEDURE — 63510000001 EPOETIN ALFA PER 1000 UNITS: Performed by: INTERNAL MEDICINE

## 2017-03-30 PROCEDURE — 84466 ASSAY OF TRANSFERRIN: CPT

## 2017-03-30 PROCEDURE — 96372 THER/PROPH/DIAG INJ SC/IM: CPT | Performed by: INTERNAL MEDICINE

## 2017-03-30 PROCEDURE — 82728 ASSAY OF FERRITIN: CPT

## 2017-03-30 RX ORDER — SODIUM CHLORIDE 9 MG/ML
250 INJECTION, SOLUTION INTRAVENOUS ONCE
Status: CANCELLED | OUTPATIENT
Start: 2017-05-04

## 2017-03-30 RX ORDER — DIPHENHYDRAMINE HCL 25 MG
25 CAPSULE ORAL ONCE
Status: CANCELLED | OUTPATIENT
Start: 2017-04-27

## 2017-03-30 RX ORDER — SODIUM CHLORIDE 9 MG/ML
250 INJECTION, SOLUTION INTRAVENOUS ONCE
Status: CANCELLED | OUTPATIENT
Start: 2017-04-27

## 2017-03-30 RX ORDER — FAMOTIDINE 10 MG/ML
20 INJECTION, SOLUTION INTRAVENOUS ONCE
Status: CANCELLED | OUTPATIENT
Start: 2017-04-27

## 2017-03-30 RX ORDER — DIPHENHYDRAMINE HCL 25 MG
25 CAPSULE ORAL ONCE
Status: CANCELLED | OUTPATIENT
Start: 2017-05-04

## 2017-03-30 RX ADMIN — ERYTHROPOIETIN 25000 UNITS: 20000 INJECTION, SOLUTION INTRAVENOUS; SUBCUTANEOUS at 14:45

## 2017-03-31 ENCOUNTER — TELEPHONE (OUTPATIENT)
Dept: ONCOLOGY | Facility: CLINIC | Age: 82
End: 2017-03-31

## 2017-03-31 ENCOUNTER — TELEPHONE (OUTPATIENT)
Dept: ONCOLOGY | Facility: HOSPITAL | Age: 82
End: 2017-03-31

## 2017-03-31 NOTE — TELEPHONE ENCOUNTER
Patient's  has a lot of concerns about pt having Feraheme. He wants to cancel infusion appts and he will discuss with Dr Art at MD appt. He does not want her to have treatment til he sees him. Message sent to Dr Art and Scheduling. WIll add possible infusion day of MD appt.

## 2017-03-31 NOTE — TELEPHONE ENCOUNTER
----- Message from Vivi Ziegler sent at 3/31/2017 10:03 AM EDT -----  Contact: self   Pt is  is calling because he wants to cancel his wifes appt.      [phone]

## 2017-04-07 ENCOUNTER — RESULTS ENCOUNTER (OUTPATIENT)
Dept: ONCOLOGY | Facility: CLINIC | Age: 82
End: 2017-04-07

## 2017-04-07 DIAGNOSIS — D63.1 ANEMIA OF CHRONIC RENAL FAILURE, STAGE 4 (SEVERE) (HCC): ICD-10-CM

## 2017-04-07 DIAGNOSIS — D73.9 SPLENIC PANCYTOPENIA SYNDROME (HCC): ICD-10-CM

## 2017-04-07 DIAGNOSIS — N18.4 CHRONIC RENAL DISEASE, STAGE 4, SEVERELY DECREASED GLOMERULAR FILTRATION RATE BETWEEN 15-29 ML/MIN/1.73 SQUARE METER (HCC): ICD-10-CM

## 2017-04-07 DIAGNOSIS — D61.818 SPLENIC PANCYTOPENIA SYNDROME (HCC): ICD-10-CM

## 2017-04-07 DIAGNOSIS — E83.110 HEREDITARY HEMOCHROMATOSIS (HCC): ICD-10-CM

## 2017-04-07 DIAGNOSIS — D72.818 OTHER DECREASED WHITE BLOOD CELL (WBC) COUNT: ICD-10-CM

## 2017-04-07 DIAGNOSIS — D69.6 THROMBOCYTOPENIA (HCC): ICD-10-CM

## 2017-04-07 DIAGNOSIS — D73.1 HYPERSPLENISM: ICD-10-CM

## 2017-04-07 DIAGNOSIS — N18.4 ANEMIA OF CHRONIC RENAL FAILURE, STAGE 4 (SEVERE) (HCC): ICD-10-CM

## 2017-04-07 DIAGNOSIS — K74.60 CIRRHOSIS OF LIVER WITHOUT ASCITES, UNSPECIFIED HEPATIC CIRRHOSIS TYPE (HCC): ICD-10-CM

## 2017-04-13 ENCOUNTER — APPOINTMENT (OUTPATIENT)
Dept: LAB | Facility: HOSPITAL | Age: 82
End: 2017-04-13

## 2017-04-13 ENCOUNTER — APPOINTMENT (OUTPATIENT)
Dept: ONCOLOGY | Facility: HOSPITAL | Age: 82
End: 2017-04-13

## 2017-04-21 ENCOUNTER — RESULTS ENCOUNTER (OUTPATIENT)
Dept: ONCOLOGY | Facility: CLINIC | Age: 82
End: 2017-04-21

## 2017-04-21 DIAGNOSIS — E61.1 IRON DEFICIENCY: Primary | ICD-10-CM

## 2017-04-21 DIAGNOSIS — K74.60 CIRRHOSIS OF LIVER WITHOUT ASCITES, UNSPECIFIED HEPATIC CIRRHOSIS TYPE (HCC): ICD-10-CM

## 2017-04-21 DIAGNOSIS — D73.9 SPLENIC PANCYTOPENIA SYNDROME (HCC): ICD-10-CM

## 2017-04-21 DIAGNOSIS — E83.110 HEREDITARY HEMOCHROMATOSIS (HCC): ICD-10-CM

## 2017-04-21 DIAGNOSIS — D73.1 HYPERSPLENISM: ICD-10-CM

## 2017-04-21 DIAGNOSIS — D61.818 SPLENIC PANCYTOPENIA SYNDROME (HCC): ICD-10-CM

## 2017-04-21 DIAGNOSIS — D69.6 THROMBOCYTOPENIA (HCC): ICD-10-CM

## 2017-04-21 DIAGNOSIS — N18.4 ANEMIA OF CHRONIC RENAL FAILURE, STAGE 4 (SEVERE) (HCC): ICD-10-CM

## 2017-04-21 DIAGNOSIS — D72.818 OTHER DECREASED WHITE BLOOD CELL (WBC) COUNT: ICD-10-CM

## 2017-04-21 DIAGNOSIS — D63.1 ANEMIA OF CHRONIC RENAL FAILURE, STAGE 4 (SEVERE) (HCC): ICD-10-CM

## 2017-04-21 DIAGNOSIS — N18.4 CHRONIC RENAL DISEASE, STAGE 4, SEVERELY DECREASED GLOMERULAR FILTRATION RATE BETWEEN 15-29 ML/MIN/1.73 SQUARE METER (HCC): ICD-10-CM

## 2017-04-24 ENCOUNTER — OFFICE (OUTPATIENT)
Dept: URBAN - METROPOLITAN AREA OTHER 6 | Facility: OTHER | Age: 82
End: 2017-04-24
Payer: OTHER GOVERNMENT

## 2017-04-24 VITALS
HEIGHT: 63 IN | DIASTOLIC BLOOD PRESSURE: 72 MMHG | SYSTOLIC BLOOD PRESSURE: 112 MMHG | WEIGHT: 182 LBS | HEART RATE: 80 BPM

## 2017-04-24 DIAGNOSIS — K72.90 HEPATIC FAILURE, UNSPECIFIED WITHOUT COMA: ICD-10-CM

## 2017-04-24 DIAGNOSIS — Z80.9 FAMILY HISTORY OF MALIGNANT NEOPLASM, UNSPECIFIED: ICD-10-CM

## 2017-04-24 DIAGNOSIS — N18.9 CHRONIC KIDNEY DISEASE, UNSPECIFIED: ICD-10-CM

## 2017-04-24 DIAGNOSIS — K74.60 UNSPECIFIED CIRRHOSIS OF LIVER: ICD-10-CM

## 2017-04-24 PROCEDURE — 99212 OFFICE O/P EST SF 10 MIN: CPT | Performed by: INTERNAL MEDICINE

## 2017-04-24 RX ORDER — RIFAXIMIN 550 MG/1
TABLET ORAL
Qty: 60 | Refills: 11 | Status: ACTIVE

## 2017-04-27 ENCOUNTER — INFUSION (OUTPATIENT)
Dept: ONCOLOGY | Facility: HOSPITAL | Age: 82
End: 2017-04-27

## 2017-04-27 ENCOUNTER — LAB (OUTPATIENT)
Dept: LAB | Facility: HOSPITAL | Age: 82
End: 2017-04-27

## 2017-04-27 ENCOUNTER — OFFICE VISIT (OUTPATIENT)
Dept: ONCOLOGY | Facility: CLINIC | Age: 82
End: 2017-04-27

## 2017-04-27 VITALS
HEIGHT: 63 IN | TEMPERATURE: 97.7 F | DIASTOLIC BLOOD PRESSURE: 60 MMHG | BODY MASS INDEX: 34.3 KG/M2 | HEART RATE: 86 BPM | WEIGHT: 193.6 LBS | RESPIRATION RATE: 16 BRPM | SYSTOLIC BLOOD PRESSURE: 132 MMHG

## 2017-04-27 DIAGNOSIS — E61.1 IRON DEFICIENCY: Primary | ICD-10-CM

## 2017-04-27 DIAGNOSIS — D61.818 SPLENIC PANCYTOPENIA SYNDROME (HCC): ICD-10-CM

## 2017-04-27 DIAGNOSIS — E61.1 IRON DEFICIENCY: ICD-10-CM

## 2017-04-27 DIAGNOSIS — N18.4 CHRONIC RENAL DISEASE, STAGE 4, SEVERELY DECREASED GLOMERULAR FILTRATION RATE BETWEEN 15-29 ML/MIN/1.73 SQUARE METER (HCC): ICD-10-CM

## 2017-04-27 DIAGNOSIS — D73.9 SPLENIC PANCYTOPENIA SYNDROME (HCC): ICD-10-CM

## 2017-04-27 DIAGNOSIS — N18.4 ANEMIA OF CHRONIC RENAL FAILURE, STAGE 4 (SEVERE) (HCC): ICD-10-CM

## 2017-04-27 DIAGNOSIS — K74.60 CIRRHOSIS OF LIVER WITHOUT ASCITES, UNSPECIFIED HEPATIC CIRRHOSIS TYPE (HCC): Primary | ICD-10-CM

## 2017-04-27 DIAGNOSIS — D63.1 ANEMIA OF CHRONIC RENAL FAILURE, STAGE 4 (SEVERE) (HCC): ICD-10-CM

## 2017-04-27 LAB
BASOPHILS # BLD AUTO: 0.01 10*3/MM3 (ref 0–0.1)
BASOPHILS NFR BLD AUTO: 0.4 % (ref 0–1.1)
DEPRECATED RDW RBC AUTO: 60 FL (ref 37–49)
EOSINOPHIL # BLD AUTO: 0.09 10*3/MM3 (ref 0–0.36)
EOSINOPHIL NFR BLD AUTO: 3.8 % (ref 1–5)
ERYTHROCYTE [DISTWIDTH] IN BLOOD BY AUTOMATED COUNT: 17.4 % (ref 11.7–14.5)
HCT VFR BLD AUTO: 26.6 % (ref 34–45)
HGB BLD-MCNC: 8.4 G/DL (ref 11.5–14.9)
IMM GRANULOCYTES # BLD: 0.02 10*3/MM3 (ref 0–0.03)
IMM GRANULOCYTES NFR BLD: 0.8 % (ref 0–0.5)
LYMPHOCYTES # BLD AUTO: 0.65 10*3/MM3 (ref 1–3.5)
LYMPHOCYTES NFR BLD AUTO: 27.5 % (ref 20–49)
MCH RBC QN AUTO: 30.2 PG (ref 27–33)
MCHC RBC AUTO-ENTMCNC: 31.6 G/DL (ref 32–35)
MCV RBC AUTO: 95.7 FL (ref 83–97)
MONOCYTES # BLD AUTO: 0.31 10*3/MM3 (ref 0.25–0.8)
MONOCYTES NFR BLD AUTO: 13.1 % (ref 4–12)
NEUTROPHILS # BLD AUTO: 1.28 10*3/MM3 (ref 1.5–7)
NEUTROPHILS NFR BLD AUTO: 54.4 % (ref 39–75)
NRBC BLD MANUAL-RTO: 0 /100 WBC (ref 0–0)
PLATELET # BLD AUTO: 47 10*3/MM3 (ref 150–375)
PMV BLD AUTO: 10.5 FL (ref 8.9–12.1)
RBC # BLD AUTO: 2.78 10*6/MM3 (ref 3.9–5)
WBC NRBC COR # BLD: 2.36 10*3/MM3 (ref 4–10)

## 2017-04-27 PROCEDURE — 96375 TX/PRO/DX INJ NEW DRUG ADDON: CPT | Performed by: INTERNAL MEDICINE

## 2017-04-27 PROCEDURE — 85025 COMPLETE CBC W/AUTO DIFF WBC: CPT | Performed by: INTERNAL MEDICINE

## 2017-04-27 PROCEDURE — 36415 COLL VENOUS BLD VENIPUNCTURE: CPT | Performed by: INTERNAL MEDICINE

## 2017-04-27 PROCEDURE — 96374 THER/PROPH/DIAG INJ IV PUSH: CPT | Performed by: INTERNAL MEDICINE

## 2017-04-27 PROCEDURE — 63710000001 DIPHENHYDRAMINE PER 50 MG: Performed by: INTERNAL MEDICINE

## 2017-04-27 PROCEDURE — 99214 OFFICE O/P EST MOD 30 MIN: CPT | Performed by: INTERNAL MEDICINE

## 2017-04-27 PROCEDURE — 25010000002 FERUMOXYTOL 510 MG/17ML SOLUTION 510 MG VIAL: Performed by: INTERNAL MEDICINE

## 2017-04-27 RX ORDER — SODIUM CHLORIDE 9 MG/ML
250 INJECTION, SOLUTION INTRAVENOUS ONCE
Status: COMPLETED | OUTPATIENT
Start: 2017-04-27 | End: 2017-04-27

## 2017-04-27 RX ORDER — FAMOTIDINE 10 MG/ML
20 INJECTION, SOLUTION INTRAVENOUS ONCE
Status: COMPLETED | OUTPATIENT
Start: 2017-04-27 | End: 2017-04-27

## 2017-04-27 RX ORDER — DIPHENHYDRAMINE HCL 25 MG
25 CAPSULE ORAL ONCE
Status: COMPLETED | OUTPATIENT
Start: 2017-04-27 | End: 2017-04-27

## 2017-04-27 RX ADMIN — FERUMOXYTOL 510 MG: 510 INJECTION INTRAVENOUS at 12:44

## 2017-04-27 RX ADMIN — SODIUM CHLORIDE 250 ML: 900 INJECTION, SOLUTION INTRAVENOUS at 12:14

## 2017-04-27 RX ADMIN — DIPHENHYDRAMINE HYDROCHLORIDE 25 MG: 25 CAPSULE ORAL at 12:14

## 2017-04-27 RX ADMIN — FAMOTIDINE 20 MG: 10 INJECTION, SOLUTION INTRAVENOUS at 12:14

## 2017-04-27 NOTE — PROGRESS NOTES
Subjective     REASON FOR FOLLOW UP  1. Anemia of chronic kidney disease  2. Homozygous H63D hemochromatosis gene mutation.  3. 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    HISTORY OF PRESENT ILLNESS:  The patient is a 82 y.o. year old female who was seen in consultation few weeks ago for the above noted issues.  She had previously been evaluated in our practice several years ago for this same issue.  She had become more anemic which was felt to be in part related to anemia of chronic kidney disease and she was started on Procrit therapy every 2 weeks at a dose of 20,000 units subcutaneous.  She initially responded to Procrit therapy but no longer qualifies for treatment due to diminished iron stores.  We had contacted the patient's  and arranged for outpatient IV Feraheme treatment however he was reluctant for her to undergo ferriheme infusion and wanted to see us to discuss the situation.  Her hemoglobin today is lower at 8.4 g/dL.    She also is homozygous for the H63D mutation.  Most people with this mutation do not have clinical iron overload and given the patient's degree of anemia and pancytopenia she would not be a candidate for therapeutic phlebotomy in any case.     Mr. Bowens is concern regarding the IV iron was mainly due to this history of a hemochromatosis mutation however I did explain to him that she does not have clinical hemochromatosis and her iron stores are quite low with an iron saturation of less than 10% and ferritin less than 50.  I also explained that she will not be able to resume Procrit unless we replace her iron in the quickest way to do so would be with IV Feraheme injections.  At the end of discussion both the patient and her  were on board with the idea of proceeding with iron infusion.    History of Present Illness          Current Outpatient Prescriptions on File Prior to Visit   Medication Sig Dispense Refill   • al mag oxide-diphenhydramine-nystatin (MAGIC  MOUTHWASH) suspension 15 mL by Transmucosal route.     • amLODIPine (NORVASC) 10 MG tablet Take 1 tablet by mouth Daily. 30 tablet 0   • bumetanide (BUMEX) 2 MG tablet      • Cholecalciferol (VITAMIN D-3 PO) Take 1,000 Units by mouth daily.     • Cyanocobalamin (VITAMIN B-12 PO) Take 1,250 mcg by mouth daily.     • cyclobenzaprine (FLEXERIL) 5 MG tablet      • insulin NPH (humuLIN N,novoLIN N) 100 UNIT/ML injection Inject 25 Units under the skin 2 (Two) Times a Day Before Meals.     • insulin NPH-insulin regular (Novolin 70/30) (70-30) 100 UNIT/ML injection Inject  under the skin 2 (two) times a day with meals.     • isosorbide mononitrate (IMDUR) 30 MG 24 hr tablet Take 1 tablet by mouth Daily. 30 tablet 0   • KLOR-CON 20 MEQ CR tablet      • Magnesium 500 MG tablet Take 1 tablet/day by mouth.     • METOPROLOL TARTRATE PO Take 12.5 mg by mouth 2 (two) times a day.     • Multiple Vitamin (MULTI-VITAMIN PO) Take  by mouth daily.     • Probiotic Product (PROBIOTIC PO) Take  by mouth.     • QUEtiapine Fumarate (SEROQUEL PO) Take 25 mg by mouth daily.     • rifaximin (XIFAXAN) 550 MG tablet Take 550 mg by mouth Every 12 (Twelve) Hours.     • sennosides-docusate sodium (SENOKOT-S) 8.6-50 MG tablet Take 2 tablets by mouth At Night As Needed for constipation. 60 tablet 0   • Sertraline HCl (ZOLOFT PO) Take 25 mg by mouth daily.     • SIMETHICONE PO Take  by mouth as needed.     • temazepam (RESTORIL) 7.5 MG capsule      • TRAMADOL HCL PO Take 25 mg by mouth daily.     • Unable to find 1 each 1 (one) time. ZIFAXAN 550 MG 2 X A DAY       No current facility-administered medications on file prior to visit.         ALLERGIES:    Allergies   Allergen Reactions   • Ciprofloxacin      Itching and red streaks   • Levaquin [Levofloxacin]    • Promethazine Hcl      Itching, red streaks when given IV        Review of Systems   Constitutional: Positive for fatigue. Negative for activity change, appetite change, fever and unexpected  "weight change.   HENT: Negative for hearing loss, nosebleeds, trouble swallowing and voice change.    Eyes: Negative for visual disturbance.   Respiratory: Negative for cough, shortness of breath and wheezing.    Cardiovascular: Negative for chest pain and palpitations.   Gastrointestinal: Negative for abdominal pain, diarrhea, nausea and vomiting.   Genitourinary: Negative for difficulty urinating, frequency, hematuria and urgency.   Musculoskeletal: Negative for back pain and neck pain.   Skin: Positive for color change. Negative for rash.        Sun damaged skin and senile purpura.   Neurological: Negative for dizziness, seizures, syncope and headaches.   Hematological: Negative for adenopathy. Bruises/bleeds easily.   Psychiatric/Behavioral: Positive for confusion. Negative for behavioral problems. The patient is not nervous/anxious.         Objective     Vitals:    04/27/17 1113   BP: 132/60   Pulse: 86   Resp: 16   Temp: 97.7 °F (36.5 °C)   Weight: 193 lb 9.6 oz (87.8 kg)   Height: 62.99\" (160 cm)   PainSc: 0-No pain     Current Status 4/27/2017   ECOG score 0       Physical Exam   Constitutional: She is oriented to person, place, and time. She appears well-developed and well-nourished. No distress.   HENT:   Head: Normocephalic.   Eyes: Conjunctivae and EOM are normal. Pupils are equal, round, and reactive to light. No scleral icterus.   Neck: Normal range of motion. Neck supple. No JVD present. No thyromegaly present.   Cardiovascular: Normal rate and regular rhythm.  Exam reveals no gallop and no friction rub.    No murmur heard.  Pulmonary/Chest: Effort normal and breath sounds normal. She has no wheezes. She has no rales.   Abdominal: Soft. She exhibits no distension and no mass. There is no tenderness.   Musculoskeletal: Normal range of motion. She exhibits no edema or deformity.   Lymphadenopathy:     She has no cervical adenopathy.   Neurological: She is alert and oriented to person, place, and time. " She has normal reflexes. No cranial nerve deficit.   Skin: Skin is warm and dry. No rash noted. No erythema.   Sun damaged skin and senile purpura   Psychiatric: She has a normal mood and affect. Her behavior is normal. Judgment normal.       RECENT LABS:  Hematology WBC   Date Value Ref Range Status   04/27/2017 2.36 (L) 4.00 - 10.00 10*3/mm3 Final     RBC   Date Value Ref Range Status   04/27/2017 2.78 (L) 3.90 - 5.00 10*6/mm3 Final     Hemoglobin   Date Value Ref Range Status   04/27/2017 8.4 (L) 11.5 - 14.9 g/dL Final     Hematocrit   Date Value Ref Range Status   04/27/2017 26.6 (L) 34.0 - 45.0 % Final     Platelets   Date Value Ref Range Status   04/27/2017 47 (L) 150 - 375 10*3/mm3 Final          Lab Results   Component Value Date    IRON 38 03/30/2017    TIBC 407 03/30/2017    FERRITIN 30.50 03/30/2017     Iron Saturation 14 - 48 % 9 (L           Assessment/Plan     1.  Pancytopenia due to underlying cirrhosis of the liver and hypersplenism. This is a chronic process and has been relatively stable for several years.  As noted above, she had a normal appearing bone marrow examination in 2011.  2.  Component of anemia of chronic renal insufficiency stage IV.    3.  Severe fatigue.  Improved after initiation of Procrit therapy.  4.  Homozygous for H63D hemochromatosis gene mutation.  No evidence of iron overload on her laboratory results.  5. Iron deficiency with iron saturation less than 10%.  I certainly think this is contributing to her fatigue and also she will not qualify for further Procrit injections unless we replace her iron.  Her hemoglobin is worsening and she is more symptomatic.    Plan    1.  Proceed with 2 doses of IV Feraheme one week apart.  The first dose will be delivered in the office today with premedication.  She'll return next week for the second dose of IV Feraheme.  2.  We will schedule M.D. follow-up in 4 weeks with repeat labs including repeat iron studies and a CBC.  We most likely  will resume Procrit therapy at that time if her iron has been adequately replaced in her hemoglobin is still less than 10.

## 2017-05-04 ENCOUNTER — INFUSION (OUTPATIENT)
Dept: ONCOLOGY | Facility: HOSPITAL | Age: 82
End: 2017-05-04

## 2017-05-04 VITALS
HEART RATE: 82 BPM | WEIGHT: 197 LBS | SYSTOLIC BLOOD PRESSURE: 136 MMHG | BODY MASS INDEX: 34.91 KG/M2 | TEMPERATURE: 97.7 F | DIASTOLIC BLOOD PRESSURE: 68 MMHG

## 2017-05-04 DIAGNOSIS — E61.1 IRON DEFICIENCY: Primary | ICD-10-CM

## 2017-05-04 DIAGNOSIS — D63.1 ANEMIA OF CHRONIC RENAL FAILURE, STAGE 4 (SEVERE) (HCC): ICD-10-CM

## 2017-05-04 DIAGNOSIS — N18.4 ANEMIA OF CHRONIC RENAL FAILURE, STAGE 4 (SEVERE) (HCC): ICD-10-CM

## 2017-05-04 PROCEDURE — 25010000002 FERUMOXYTOL 510 MG/17ML SOLUTION 510 MG VIAL: Performed by: INTERNAL MEDICINE

## 2017-05-04 PROCEDURE — 63710000001 DIPHENHYDRAMINE PER 50 MG: Performed by: INTERNAL MEDICINE

## 2017-05-04 PROCEDURE — 96374 THER/PROPH/DIAG INJ IV PUSH: CPT

## 2017-05-04 RX ORDER — SODIUM CHLORIDE 9 MG/ML
250 INJECTION, SOLUTION INTRAVENOUS ONCE
Status: COMPLETED | OUTPATIENT
Start: 2017-05-04 | End: 2017-05-04

## 2017-05-04 RX ORDER — DIPHENHYDRAMINE HCL 25 MG
25 CAPSULE ORAL ONCE
Status: COMPLETED | OUTPATIENT
Start: 2017-05-04 | End: 2017-05-04

## 2017-05-04 RX ADMIN — DIPHENHYDRAMINE HYDROCHLORIDE 25 MG: 25 CAPSULE ORAL at 14:46

## 2017-05-04 RX ADMIN — SODIUM CHLORIDE 250 ML: 900 INJECTION, SOLUTION INTRAVENOUS at 14:45

## 2017-05-04 RX ADMIN — FERUMOXYTOL 510 MG: 510 INJECTION INTRAVENOUS at 15:07

## 2017-05-25 ENCOUNTER — OFFICE VISIT (OUTPATIENT)
Dept: ONCOLOGY | Facility: CLINIC | Age: 82
End: 2017-05-25

## 2017-05-25 ENCOUNTER — LAB (OUTPATIENT)
Dept: LAB | Facility: HOSPITAL | Age: 82
End: 2017-05-25

## 2017-05-25 VITALS
OXYGEN SATURATION: 95 % | WEIGHT: 195 LBS | HEART RATE: 74 BPM | DIASTOLIC BLOOD PRESSURE: 56 MMHG | TEMPERATURE: 97.8 F | BODY MASS INDEX: 34.55 KG/M2 | HEIGHT: 63 IN | RESPIRATION RATE: 18 BRPM | SYSTOLIC BLOOD PRESSURE: 140 MMHG

## 2017-05-25 DIAGNOSIS — D69.6 THROMBOCYTOPENIA (HCC): ICD-10-CM

## 2017-05-25 DIAGNOSIS — N18.4 CHRONIC RENAL DISEASE, STAGE 4, SEVERELY DECREASED GLOMERULAR FILTRATION RATE BETWEEN 15-29 ML/MIN/1.73 SQUARE METER (HCC): ICD-10-CM

## 2017-05-25 DIAGNOSIS — D61.818 SPLENIC PANCYTOPENIA SYNDROME (HCC): ICD-10-CM

## 2017-05-25 DIAGNOSIS — N18.4 CHRONIC RENAL DISEASE, STAGE 4, SEVERELY DECREASED GLOMERULAR FILTRATION RATE BETWEEN 15-29 ML/MIN/1.73 SQUARE METER (HCC): Primary | ICD-10-CM

## 2017-05-25 DIAGNOSIS — N18.4 ANEMIA OF CHRONIC RENAL FAILURE, STAGE 4 (SEVERE) (HCC): ICD-10-CM

## 2017-05-25 DIAGNOSIS — D63.1 ANEMIA OF CHRONIC RENAL FAILURE, STAGE 4 (SEVERE) (HCC): ICD-10-CM

## 2017-05-25 DIAGNOSIS — D73.9 SPLENIC PANCYTOPENIA SYNDROME (HCC): ICD-10-CM

## 2017-05-25 DIAGNOSIS — E61.1 IRON DEFICIENCY: ICD-10-CM

## 2017-05-25 DIAGNOSIS — E83.119 HEMOCHROMATOSIS, UNSPECIFIED HEMOCHROMATOSIS TYPE: ICD-10-CM

## 2017-05-25 DIAGNOSIS — D73.1 HYPERSPLENISM: ICD-10-CM

## 2017-05-25 DIAGNOSIS — N17.9 ACUTE RENAL FAILURE, UNSPECIFIED ACUTE RENAL FAILURE TYPE (HCC): ICD-10-CM

## 2017-05-25 DIAGNOSIS — K74.60 CIRRHOSIS OF LIVER WITHOUT ASCITES, UNSPECIFIED HEPATIC CIRRHOSIS TYPE (HCC): ICD-10-CM

## 2017-05-25 LAB
BASOPHILS # BLD AUTO: 0.01 10*3/MM3 (ref 0–0.1)
BASOPHILS NFR BLD AUTO: 0.3 % (ref 0–1.1)
DEPRECATED RDW RBC AUTO: 66.6 FL (ref 37–49)
EOSINOPHIL # BLD AUTO: 0.11 10*3/MM3 (ref 0–0.36)
EOSINOPHIL NFR BLD AUTO: 3 % (ref 1–5)
ERYTHROCYTE [DISTWIDTH] IN BLOOD BY AUTOMATED COUNT: 17.9 % (ref 11.7–14.5)
FERRITIN SERPL-MCNC: 295.4 NG/ML
HCT VFR BLD AUTO: 31 % (ref 34–45)
HGB BLD-MCNC: 9.6 G/DL (ref 11.5–14.9)
IMM GRANULOCYTES # BLD: 0.02 10*3/MM3 (ref 0–0.03)
IMM GRANULOCYTES NFR BLD: 0.5 % (ref 0–0.5)
IRON 24H UR-MRATE: 84 MCG/DL (ref 37–145)
IRON SATN MFR SERPL: 33 % (ref 14–48)
LYMPHOCYTES # BLD AUTO: 0.77 10*3/MM3 (ref 1–3.5)
LYMPHOCYTES NFR BLD AUTO: 21.2 % (ref 20–49)
MCH RBC QN AUTO: 31.9 PG (ref 27–33)
MCHC RBC AUTO-ENTMCNC: 31 G/DL (ref 32–35)
MCV RBC AUTO: 103 FL (ref 83–97)
MONOCYTES # BLD AUTO: 0.35 10*3/MM3 (ref 0.25–0.8)
MONOCYTES NFR BLD AUTO: 9.6 % (ref 4–12)
NEUTROPHILS # BLD AUTO: 2.38 10*3/MM3 (ref 1.5–7)
NEUTROPHILS NFR BLD AUTO: 65.4 % (ref 39–75)
NRBC BLD MANUAL-RTO: 0 /100 WBC (ref 0–0)
PLATELET # BLD AUTO: 92 10*3/MM3 (ref 150–375)
PMV BLD AUTO: 8.9 FL (ref 8.9–12.1)
RBC # BLD AUTO: 3.01 10*6/MM3 (ref 3.9–5)
TIBC SERPL-MCNC: 258 MCG/DL (ref 249–505)
TRANSFERRIN SERPL-MCNC: 184 MG/DL (ref 200–360)
WBC NRBC COR # BLD: 3.64 10*3/MM3 (ref 4–10)

## 2017-05-25 PROCEDURE — 84466 ASSAY OF TRANSFERRIN: CPT | Performed by: INTERNAL MEDICINE

## 2017-05-25 PROCEDURE — 83540 ASSAY OF IRON: CPT | Performed by: INTERNAL MEDICINE

## 2017-05-25 PROCEDURE — 99213 OFFICE O/P EST LOW 20 MIN: CPT | Performed by: INTERNAL MEDICINE

## 2017-05-25 PROCEDURE — 82728 ASSAY OF FERRITIN: CPT | Performed by: INTERNAL MEDICINE

## 2017-05-25 PROCEDURE — 85025 COMPLETE CBC W/AUTO DIFF WBC: CPT | Performed by: INTERNAL MEDICINE

## 2017-05-25 PROCEDURE — 36415 COLL VENOUS BLD VENIPUNCTURE: CPT | Performed by: INTERNAL MEDICINE

## 2017-06-02 ENCOUNTER — RESULTS ENCOUNTER (OUTPATIENT)
Dept: ONCOLOGY | Facility: CLINIC | Age: 82
End: 2017-06-02

## 2017-06-02 DIAGNOSIS — D63.1 ANEMIA OF CHRONIC RENAL FAILURE, STAGE 4 (SEVERE) (HCC): ICD-10-CM

## 2017-06-02 DIAGNOSIS — E61.1 IRON DEFICIENCY: ICD-10-CM

## 2017-06-02 DIAGNOSIS — D73.9 SPLENIC PANCYTOPENIA SYNDROME (HCC): ICD-10-CM

## 2017-06-02 DIAGNOSIS — N18.4 CHRONIC RENAL DISEASE, STAGE 4, SEVERELY DECREASED GLOMERULAR FILTRATION RATE BETWEEN 15-29 ML/MIN/1.73 SQUARE METER (HCC): ICD-10-CM

## 2017-06-02 DIAGNOSIS — D73.1 HYPERSPLENISM: ICD-10-CM

## 2017-06-02 DIAGNOSIS — N18.4 ANEMIA OF CHRONIC RENAL FAILURE, STAGE 4 (SEVERE) (HCC): ICD-10-CM

## 2017-06-02 DIAGNOSIS — D61.818 SPLENIC PANCYTOPENIA SYNDROME (HCC): ICD-10-CM

## 2017-06-02 DIAGNOSIS — D69.6 THROMBOCYTOPENIA (HCC): ICD-10-CM

## 2017-06-02 DIAGNOSIS — E83.119 HEMOCHROMATOSIS, UNSPECIFIED HEMOCHROMATOSIS TYPE: ICD-10-CM

## 2017-06-02 DIAGNOSIS — N17.9 ACUTE RENAL FAILURE, UNSPECIFIED ACUTE RENAL FAILURE TYPE (HCC): ICD-10-CM

## 2017-06-08 ENCOUNTER — INFUSION (OUTPATIENT)
Dept: ONCOLOGY | Facility: HOSPITAL | Age: 82
End: 2017-06-08

## 2017-06-08 ENCOUNTER — LAB (OUTPATIENT)
Dept: LAB | Facility: HOSPITAL | Age: 82
End: 2017-06-08

## 2017-06-08 DIAGNOSIS — D73.1 HYPERSPLENISM: ICD-10-CM

## 2017-06-08 DIAGNOSIS — N17.9 ACUTE RENAL FAILURE, UNSPECIFIED ACUTE RENAL FAILURE TYPE (HCC): ICD-10-CM

## 2017-06-08 DIAGNOSIS — D73.9 SPLENIC PANCYTOPENIA SYNDROME (HCC): ICD-10-CM

## 2017-06-08 DIAGNOSIS — N18.4 CHRONIC RENAL DISEASE, STAGE 4, SEVERELY DECREASED GLOMERULAR FILTRATION RATE BETWEEN 15-29 ML/MIN/1.73 SQUARE METER (HCC): ICD-10-CM

## 2017-06-08 DIAGNOSIS — N18.4 ANEMIA OF CHRONIC RENAL FAILURE, STAGE 4 (SEVERE) (HCC): ICD-10-CM

## 2017-06-08 DIAGNOSIS — E83.119 HEMOCHROMATOSIS, UNSPECIFIED HEMOCHROMATOSIS TYPE: ICD-10-CM

## 2017-06-08 DIAGNOSIS — D69.6 THROMBOCYTOPENIA (HCC): ICD-10-CM

## 2017-06-08 DIAGNOSIS — D61.818 SPLENIC PANCYTOPENIA SYNDROME (HCC): ICD-10-CM

## 2017-06-08 DIAGNOSIS — D63.1 ANEMIA OF CHRONIC RENAL FAILURE, STAGE 4 (SEVERE) (HCC): ICD-10-CM

## 2017-06-08 DIAGNOSIS — E61.1 IRON DEFICIENCY: ICD-10-CM

## 2017-06-08 LAB
BASOPHILS # BLD AUTO: 0.02 10*3/MM3 (ref 0–0.1)
BASOPHILS NFR BLD AUTO: 0.5 % (ref 0–1.1)
DEPRECATED RDW RBC AUTO: 57.9 FL (ref 37–49)
EOSINOPHIL # BLD AUTO: 0.13 10*3/MM3 (ref 0–0.36)
EOSINOPHIL NFR BLD AUTO: 3.2 % (ref 1–5)
ERYTHROCYTE [DISTWIDTH] IN BLOOD BY AUTOMATED COUNT: 16.1 % (ref 11.7–14.5)
HCT VFR BLD AUTO: 30.7 % (ref 34–45)
HGB BLD-MCNC: 10.4 G/DL (ref 11.5–14.9)
IMM GRANULOCYTES # BLD: 0.03 10*3/MM3 (ref 0–0.03)
IMM GRANULOCYTES NFR BLD: 0.7 % (ref 0–0.5)
LYMPHOCYTES # BLD AUTO: 0.99 10*3/MM3 (ref 1–3.5)
LYMPHOCYTES NFR BLD AUTO: 24.3 % (ref 20–49)
MCH RBC QN AUTO: 33.3 PG (ref 27–33)
MCHC RBC AUTO-ENTMCNC: 33.9 G/DL (ref 32–35)
MCV RBC AUTO: 98.4 FL (ref 83–97)
MONOCYTES # BLD AUTO: 0.46 10*3/MM3 (ref 0.25–0.8)
MONOCYTES NFR BLD AUTO: 11.3 % (ref 4–12)
NEUTROPHILS # BLD AUTO: 2.45 10*3/MM3 (ref 1.5–7)
NEUTROPHILS NFR BLD AUTO: 60 % (ref 39–75)
NRBC BLD MANUAL-RTO: 0 /100 WBC (ref 0–0)
PLATELET # BLD AUTO: 72 10*3/MM3 (ref 150–375)
PMV BLD AUTO: 10.4 FL (ref 8.9–12.1)
RBC # BLD AUTO: 3.12 10*6/MM3 (ref 3.9–5)
WBC NRBC COR # BLD: 4.08 10*3/MM3 (ref 4–10)

## 2017-06-08 PROCEDURE — 85025 COMPLETE CBC W/AUTO DIFF WBC: CPT | Performed by: INTERNAL MEDICINE

## 2017-06-08 PROCEDURE — 36416 COLLJ CAPILLARY BLOOD SPEC: CPT | Performed by: INTERNAL MEDICINE

## 2017-06-08 NOTE — PROGRESS NOTES
hgb 10.4  Procrit held per Dr Art note 5/25/17   No c/o noted pt to return in 2 weeks for CBC/poss Procrit    Copy of labs to pt

## 2017-06-16 ENCOUNTER — RESULTS ENCOUNTER (OUTPATIENT)
Dept: ONCOLOGY | Facility: CLINIC | Age: 82
End: 2017-06-16

## 2017-06-16 DIAGNOSIS — D73.1 HYPERSPLENISM: ICD-10-CM

## 2017-06-16 DIAGNOSIS — N17.9 ACUTE RENAL FAILURE, UNSPECIFIED ACUTE RENAL FAILURE TYPE (HCC): ICD-10-CM

## 2017-06-16 DIAGNOSIS — E83.119 HEMOCHROMATOSIS, UNSPECIFIED HEMOCHROMATOSIS TYPE: ICD-10-CM

## 2017-06-16 DIAGNOSIS — D69.6 THROMBOCYTOPENIA (HCC): ICD-10-CM

## 2017-06-16 DIAGNOSIS — D63.1 ANEMIA OF CHRONIC RENAL FAILURE, STAGE 4 (SEVERE) (HCC): ICD-10-CM

## 2017-06-16 DIAGNOSIS — E61.1 IRON DEFICIENCY: ICD-10-CM

## 2017-06-16 DIAGNOSIS — D61.818 SPLENIC PANCYTOPENIA SYNDROME (HCC): ICD-10-CM

## 2017-06-16 DIAGNOSIS — D73.9 SPLENIC PANCYTOPENIA SYNDROME (HCC): ICD-10-CM

## 2017-06-16 DIAGNOSIS — N18.4 ANEMIA OF CHRONIC RENAL FAILURE, STAGE 4 (SEVERE) (HCC): ICD-10-CM

## 2017-06-16 DIAGNOSIS — N18.4 CHRONIC RENAL DISEASE, STAGE 4, SEVERELY DECREASED GLOMERULAR FILTRATION RATE BETWEEN 15-29 ML/MIN/1.73 SQUARE METER (HCC): ICD-10-CM

## 2017-06-22 ENCOUNTER — INFUSION (OUTPATIENT)
Dept: ONCOLOGY | Facility: HOSPITAL | Age: 82
End: 2017-06-22

## 2017-06-22 ENCOUNTER — LAB (OUTPATIENT)
Dept: LAB | Facility: HOSPITAL | Age: 82
End: 2017-06-22

## 2017-06-22 VITALS — BODY MASS INDEX: 34.98 KG/M2 | WEIGHT: 197.4 LBS

## 2017-06-22 DIAGNOSIS — E83.119 HEMOCHROMATOSIS, UNSPECIFIED HEMOCHROMATOSIS TYPE: ICD-10-CM

## 2017-06-22 DIAGNOSIS — N18.4 CHRONIC RENAL DISEASE, STAGE 4, SEVERELY DECREASED GLOMERULAR FILTRATION RATE BETWEEN 15-29 ML/MIN/1.73 SQUARE METER (HCC): ICD-10-CM

## 2017-06-22 DIAGNOSIS — N18.4 ANEMIA OF CHRONIC RENAL FAILURE, STAGE 4 (SEVERE) (HCC): ICD-10-CM

## 2017-06-22 DIAGNOSIS — D69.6 THROMBOCYTOPENIA (HCC): ICD-10-CM

## 2017-06-22 DIAGNOSIS — D73.1 HYPERSPLENISM: ICD-10-CM

## 2017-06-22 DIAGNOSIS — D73.9 SPLENIC PANCYTOPENIA SYNDROME (HCC): ICD-10-CM

## 2017-06-22 DIAGNOSIS — D61.818 SPLENIC PANCYTOPENIA SYNDROME (HCC): ICD-10-CM

## 2017-06-22 DIAGNOSIS — E61.1 IRON DEFICIENCY: ICD-10-CM

## 2017-06-22 DIAGNOSIS — D63.1 ANEMIA OF CHRONIC RENAL FAILURE, STAGE 4 (SEVERE) (HCC): ICD-10-CM

## 2017-06-22 DIAGNOSIS — N17.9 ACUTE RENAL FAILURE, UNSPECIFIED ACUTE RENAL FAILURE TYPE (HCC): ICD-10-CM

## 2017-06-22 LAB
BASOPHILS # BLD AUTO: 0.01 10*3/MM3 (ref 0–0.1)
BASOPHILS NFR BLD AUTO: 0.2 % (ref 0–1.1)
DEPRECATED RDW RBC AUTO: 52.7 FL (ref 37–49)
EOSINOPHIL # BLD AUTO: 0.15 10*3/MM3 (ref 0–0.36)
EOSINOPHIL NFR BLD AUTO: 3.6 % (ref 1–5)
ERYTHROCYTE [DISTWIDTH] IN BLOOD BY AUTOMATED COUNT: 14.6 % (ref 11.7–14.5)
HCT VFR BLD AUTO: 30.9 % (ref 34–45)
HGB BLD-MCNC: 10.6 G/DL (ref 11.5–14.9)
IMM GRANULOCYTES # BLD: 0.02 10*3/MM3 (ref 0–0.03)
IMM GRANULOCYTES NFR BLD: 0.5 % (ref 0–0.5)
LYMPHOCYTES # BLD AUTO: 1.14 10*3/MM3 (ref 1–3.5)
LYMPHOCYTES NFR BLD AUTO: 27.7 % (ref 20–49)
MCH RBC QN AUTO: 33.8 PG (ref 27–33)
MCHC RBC AUTO-ENTMCNC: 34.3 G/DL (ref 32–35)
MCV RBC AUTO: 98.4 FL (ref 83–97)
MONOCYTES # BLD AUTO: 0.46 10*3/MM3 (ref 0.25–0.8)
MONOCYTES NFR BLD AUTO: 11.2 % (ref 4–12)
NEUTROPHILS # BLD AUTO: 2.33 10*3/MM3 (ref 1.5–7)
NEUTROPHILS NFR BLD AUTO: 56.8 % (ref 39–75)
NRBC BLD MANUAL-RTO: 0 /100 WBC (ref 0–0)
PLATELET # BLD AUTO: 67 10*3/MM3 (ref 150–375)
PMV BLD AUTO: 9.9 FL (ref 8.9–12.1)
RBC # BLD AUTO: 3.14 10*6/MM3 (ref 3.9–5)
WBC NRBC COR # BLD: 4.11 10*3/MM3 (ref 4–10)

## 2017-06-22 PROCEDURE — 85025 COMPLETE CBC W/AUTO DIFF WBC: CPT | Performed by: INTERNAL MEDICINE

## 2017-06-22 PROCEDURE — 36416 COLLJ CAPILLARY BLOOD SPEC: CPT | Performed by: INTERNAL MEDICINE

## 2017-06-22 NOTE — PROGRESS NOTES
CBC reviewed with pt and  and copy given to pt. Hgb today 10.6.  Per MD note, give procrit if hgb less than 10.  No procrit today. Pt voiced no complaints.  Pt has bruising but not new for pt and plts are 67 today.  Instructed pt and  on s/sx of bleeding and bleeding precautions and both v/u.  Reviewed next appt with pt and pt to return in 2 weeks.

## 2017-06-30 ENCOUNTER — RESULTS ENCOUNTER (OUTPATIENT)
Dept: ONCOLOGY | Facility: CLINIC | Age: 82
End: 2017-06-30

## 2017-06-30 DIAGNOSIS — N17.9 ACUTE RENAL FAILURE, UNSPECIFIED ACUTE RENAL FAILURE TYPE (HCC): ICD-10-CM

## 2017-06-30 DIAGNOSIS — E83.119 HEMOCHROMATOSIS, UNSPECIFIED HEMOCHROMATOSIS TYPE: ICD-10-CM

## 2017-06-30 DIAGNOSIS — N18.4 ANEMIA OF CHRONIC RENAL FAILURE, STAGE 4 (SEVERE) (HCC): ICD-10-CM

## 2017-06-30 DIAGNOSIS — N18.4 CHRONIC RENAL DISEASE, STAGE 4, SEVERELY DECREASED GLOMERULAR FILTRATION RATE BETWEEN 15-29 ML/MIN/1.73 SQUARE METER (HCC): ICD-10-CM

## 2017-06-30 DIAGNOSIS — D69.6 THROMBOCYTOPENIA (HCC): ICD-10-CM

## 2017-06-30 DIAGNOSIS — D63.1 ANEMIA OF CHRONIC RENAL FAILURE, STAGE 4 (SEVERE) (HCC): ICD-10-CM

## 2017-06-30 DIAGNOSIS — D73.9 SPLENIC PANCYTOPENIA SYNDROME (HCC): ICD-10-CM

## 2017-06-30 DIAGNOSIS — E61.1 IRON DEFICIENCY: ICD-10-CM

## 2017-06-30 DIAGNOSIS — D73.1 HYPERSPLENISM: ICD-10-CM

## 2017-06-30 DIAGNOSIS — D61.818 SPLENIC PANCYTOPENIA SYNDROME (HCC): ICD-10-CM

## 2017-07-06 ENCOUNTER — LAB (OUTPATIENT)
Dept: LAB | Facility: HOSPITAL | Age: 82
End: 2017-07-06

## 2017-07-06 ENCOUNTER — INFUSION (OUTPATIENT)
Dept: ONCOLOGY | Facility: HOSPITAL | Age: 82
End: 2017-07-06

## 2017-07-06 DIAGNOSIS — N18.4 CHRONIC RENAL DISEASE, STAGE 4, SEVERELY DECREASED GLOMERULAR FILTRATION RATE BETWEEN 15-29 ML/MIN/1.73 SQUARE METER (HCC): ICD-10-CM

## 2017-07-06 DIAGNOSIS — D73.1 HYPERSPLENISM: ICD-10-CM

## 2017-07-06 DIAGNOSIS — D63.1 ANEMIA OF CHRONIC RENAL FAILURE, STAGE 4 (SEVERE) (HCC): ICD-10-CM

## 2017-07-06 DIAGNOSIS — D61.818 SPLENIC PANCYTOPENIA SYNDROME (HCC): ICD-10-CM

## 2017-07-06 DIAGNOSIS — D73.9 SPLENIC PANCYTOPENIA SYNDROME (HCC): ICD-10-CM

## 2017-07-06 DIAGNOSIS — E83.119 HEMOCHROMATOSIS, UNSPECIFIED HEMOCHROMATOSIS TYPE: ICD-10-CM

## 2017-07-06 DIAGNOSIS — N18.4 ANEMIA OF CHRONIC RENAL FAILURE, STAGE 4 (SEVERE) (HCC): ICD-10-CM

## 2017-07-06 DIAGNOSIS — D69.6 THROMBOCYTOPENIA (HCC): ICD-10-CM

## 2017-07-06 DIAGNOSIS — N17.9 ACUTE RENAL FAILURE, UNSPECIFIED ACUTE RENAL FAILURE TYPE (HCC): ICD-10-CM

## 2017-07-06 DIAGNOSIS — E61.1 IRON DEFICIENCY: ICD-10-CM

## 2017-07-06 LAB
BASOPHILS # BLD AUTO: 0.01 10*3/MM3 (ref 0–0.1)
BASOPHILS NFR BLD AUTO: 0.3 % (ref 0–1.1)
DEPRECATED RDW RBC AUTO: 50.4 FL (ref 37–49)
EOSINOPHIL # BLD AUTO: 0.16 10*3/MM3 (ref 0–0.36)
EOSINOPHIL NFR BLD AUTO: 4.7 % (ref 1–5)
ERYTHROCYTE [DISTWIDTH] IN BLOOD BY AUTOMATED COUNT: 14 % (ref 11.7–14.5)
HCT VFR BLD AUTO: 30.7 % (ref 34–45)
HGB BLD-MCNC: 10.4 G/DL (ref 11.5–14.9)
IMM GRANULOCYTES # BLD: 0.02 10*3/MM3 (ref 0–0.03)
IMM GRANULOCYTES NFR BLD: 0.6 % (ref 0–0.5)
LYMPHOCYTES # BLD AUTO: 0.91 10*3/MM3 (ref 1–3.5)
LYMPHOCYTES NFR BLD AUTO: 26.6 % (ref 20–49)
MCH RBC QN AUTO: 33.4 PG (ref 27–33)
MCHC RBC AUTO-ENTMCNC: 33.9 G/DL (ref 32–35)
MCV RBC AUTO: 98.7 FL (ref 83–97)
MONOCYTES # BLD AUTO: 0.48 10*3/MM3 (ref 0.25–0.8)
MONOCYTES NFR BLD AUTO: 14 % (ref 4–12)
NEUTROPHILS # BLD AUTO: 1.84 10*3/MM3 (ref 1.5–7)
NEUTROPHILS NFR BLD AUTO: 53.8 % (ref 39–75)
NRBC BLD MANUAL-RTO: 0 /100 WBC (ref 0–0)
PLATELET # BLD AUTO: 63 10*3/MM3 (ref 150–375)
PMV BLD AUTO: 9.9 FL (ref 8.9–12.1)
RBC # BLD AUTO: 3.11 10*6/MM3 (ref 3.9–5)
WBC NRBC COR # BLD: 3.42 10*3/MM3 (ref 4–10)

## 2017-07-06 PROCEDURE — 85025 COMPLETE CBC W/AUTO DIFF WBC: CPT

## 2017-07-06 PROCEDURE — 36416 COLLJ CAPILLARY BLOOD SPEC: CPT

## 2017-07-06 NOTE — PROGRESS NOTES
Procrit held today; hgb 10.4. Pt denies complaints. Pt to return in two weeks. Will restart procrit when hgb drops below 10.

## 2017-07-14 ENCOUNTER — RESULTS ENCOUNTER (OUTPATIENT)
Dept: ONCOLOGY | Facility: CLINIC | Age: 82
End: 2017-07-14

## 2017-07-14 DIAGNOSIS — N18.4 CHRONIC RENAL DISEASE, STAGE 4, SEVERELY DECREASED GLOMERULAR FILTRATION RATE BETWEEN 15-29 ML/MIN/1.73 SQUARE METER (HCC): ICD-10-CM

## 2017-07-14 DIAGNOSIS — D63.1 ANEMIA OF CHRONIC RENAL FAILURE, STAGE 4 (SEVERE) (HCC): ICD-10-CM

## 2017-07-14 DIAGNOSIS — N18.4 ANEMIA OF CHRONIC RENAL FAILURE, STAGE 4 (SEVERE) (HCC): ICD-10-CM

## 2017-07-14 DIAGNOSIS — D73.1 HYPERSPLENISM: ICD-10-CM

## 2017-07-14 DIAGNOSIS — D61.818 SPLENIC PANCYTOPENIA SYNDROME (HCC): ICD-10-CM

## 2017-07-14 DIAGNOSIS — E61.1 IRON DEFICIENCY: ICD-10-CM

## 2017-07-14 DIAGNOSIS — D73.9 SPLENIC PANCYTOPENIA SYNDROME (HCC): ICD-10-CM

## 2017-07-14 DIAGNOSIS — N17.9 ACUTE RENAL FAILURE, UNSPECIFIED ACUTE RENAL FAILURE TYPE (HCC): ICD-10-CM

## 2017-07-14 DIAGNOSIS — E83.119 HEMOCHROMATOSIS, UNSPECIFIED HEMOCHROMATOSIS TYPE: ICD-10-CM

## 2017-07-14 DIAGNOSIS — D69.6 THROMBOCYTOPENIA (HCC): ICD-10-CM

## 2017-07-20 ENCOUNTER — LAB (OUTPATIENT)
Dept: LAB | Facility: HOSPITAL | Age: 82
End: 2017-07-20

## 2017-07-20 ENCOUNTER — INFUSION (OUTPATIENT)
Dept: ONCOLOGY | Facility: HOSPITAL | Age: 82
End: 2017-07-20

## 2017-07-20 DIAGNOSIS — D63.1 ANEMIA OF CHRONIC RENAL FAILURE, STAGE 4 (SEVERE) (HCC): ICD-10-CM

## 2017-07-20 DIAGNOSIS — N17.9 ACUTE RENAL FAILURE, UNSPECIFIED ACUTE RENAL FAILURE TYPE (HCC): ICD-10-CM

## 2017-07-20 DIAGNOSIS — E61.1 IRON DEFICIENCY: ICD-10-CM

## 2017-07-20 DIAGNOSIS — D73.1 HYPERSPLENISM: ICD-10-CM

## 2017-07-20 DIAGNOSIS — D69.6 THROMBOCYTOPENIA (HCC): ICD-10-CM

## 2017-07-20 DIAGNOSIS — D73.9 SPLENIC PANCYTOPENIA SYNDROME (HCC): ICD-10-CM

## 2017-07-20 DIAGNOSIS — N18.4 ANEMIA OF CHRONIC RENAL FAILURE, STAGE 4 (SEVERE) (HCC): ICD-10-CM

## 2017-07-20 DIAGNOSIS — N18.4 CHRONIC RENAL DISEASE, STAGE 4, SEVERELY DECREASED GLOMERULAR FILTRATION RATE BETWEEN 15-29 ML/MIN/1.73 SQUARE METER (HCC): ICD-10-CM

## 2017-07-20 DIAGNOSIS — D61.818 SPLENIC PANCYTOPENIA SYNDROME (HCC): ICD-10-CM

## 2017-07-20 DIAGNOSIS — E83.119 HEMOCHROMATOSIS, UNSPECIFIED HEMOCHROMATOSIS TYPE: ICD-10-CM

## 2017-07-20 LAB
BASOPHILS # BLD AUTO: 0.02 10*3/MM3 (ref 0–0.1)
BASOPHILS NFR BLD AUTO: 0.5 % (ref 0–1.1)
DEPRECATED RDW RBC AUTO: 49.1 FL (ref 37–49)
EOSINOPHIL # BLD AUTO: 0.17 10*3/MM3 (ref 0–0.36)
EOSINOPHIL NFR BLD AUTO: 4.2 % (ref 1–5)
ERYTHROCYTE [DISTWIDTH] IN BLOOD BY AUTOMATED COUNT: 13.8 % (ref 11.7–14.5)
HCT VFR BLD AUTO: 32.1 % (ref 34–45)
HGB BLD-MCNC: 10.9 G/DL (ref 11.5–14.9)
IMM GRANULOCYTES # BLD: 0.02 10*3/MM3 (ref 0–0.03)
IMM GRANULOCYTES NFR BLD: 0.5 % (ref 0–0.5)
LYMPHOCYTES # BLD AUTO: 1.09 10*3/MM3 (ref 1–3.5)
LYMPHOCYTES NFR BLD AUTO: 26.8 % (ref 20–49)
MCH RBC QN AUTO: 33.9 PG (ref 27–33)
MCHC RBC AUTO-ENTMCNC: 34 G/DL (ref 32–35)
MCV RBC AUTO: 99.7 FL (ref 83–97)
MONOCYTES # BLD AUTO: 0.55 10*3/MM3 (ref 0.25–0.8)
MONOCYTES NFR BLD AUTO: 13.5 % (ref 4–12)
NEUTROPHILS # BLD AUTO: 2.22 10*3/MM3 (ref 1.5–7)
NEUTROPHILS NFR BLD AUTO: 54.5 % (ref 39–75)
NRBC BLD MANUAL-RTO: 0 /100 WBC (ref 0–0)
PLATELET # BLD AUTO: 66 10*3/MM3 (ref 150–375)
PMV BLD AUTO: 9.8 FL (ref 8.9–12.1)
RBC # BLD AUTO: 3.22 10*6/MM3 (ref 3.9–5)
WBC NRBC COR # BLD: 4.07 10*3/MM3 (ref 4–10)

## 2017-07-20 PROCEDURE — 85025 COMPLETE CBC W/AUTO DIFF WBC: CPT | Performed by: INTERNAL MEDICINE

## 2017-07-20 PROCEDURE — 36416 COLLJ CAPILLARY BLOOD SPEC: CPT | Performed by: INTERNAL MEDICINE

## 2017-07-26 ENCOUNTER — HOSPITAL ENCOUNTER (OUTPATIENT)
Dept: HOSPITAL 23 - SRAD | Age: 82
Discharge: HOME | End: 2017-07-26
Attending: INTERNAL MEDICINE
Payer: MEDICARE

## 2017-07-26 DIAGNOSIS — M25.571: Primary | ICD-10-CM

## 2017-07-26 DIAGNOSIS — M77.31: ICD-10-CM

## 2017-07-26 DIAGNOSIS — M79.671: ICD-10-CM

## 2017-07-26 DIAGNOSIS — M19.071: ICD-10-CM

## 2017-07-28 ENCOUNTER — RESULTS ENCOUNTER (OUTPATIENT)
Dept: ONCOLOGY | Facility: CLINIC | Age: 82
End: 2017-07-28

## 2017-07-28 DIAGNOSIS — D63.1 ANEMIA OF CHRONIC RENAL FAILURE, STAGE 4 (SEVERE) (HCC): ICD-10-CM

## 2017-07-28 DIAGNOSIS — N18.4 CHRONIC RENAL DISEASE, STAGE 4, SEVERELY DECREASED GLOMERULAR FILTRATION RATE BETWEEN 15-29 ML/MIN/1.73 SQUARE METER (HCC): ICD-10-CM

## 2017-07-28 DIAGNOSIS — D73.1 HYPERSPLENISM: ICD-10-CM

## 2017-07-28 DIAGNOSIS — E83.119 HEMOCHROMATOSIS, UNSPECIFIED HEMOCHROMATOSIS TYPE: ICD-10-CM

## 2017-07-28 DIAGNOSIS — N17.9 ACUTE RENAL FAILURE, UNSPECIFIED ACUTE RENAL FAILURE TYPE (HCC): ICD-10-CM

## 2017-07-28 DIAGNOSIS — E61.1 IRON DEFICIENCY: ICD-10-CM

## 2017-07-28 DIAGNOSIS — D73.9 SPLENIC PANCYTOPENIA SYNDROME (HCC): ICD-10-CM

## 2017-07-28 DIAGNOSIS — D61.818 SPLENIC PANCYTOPENIA SYNDROME (HCC): ICD-10-CM

## 2017-07-28 DIAGNOSIS — D69.6 THROMBOCYTOPENIA (HCC): ICD-10-CM

## 2017-07-28 DIAGNOSIS — N18.4 ANEMIA OF CHRONIC RENAL FAILURE, STAGE 4 (SEVERE) (HCC): ICD-10-CM

## 2017-08-03 ENCOUNTER — INFUSION (OUTPATIENT)
Dept: ONCOLOGY | Facility: HOSPITAL | Age: 82
End: 2017-08-03

## 2017-08-03 ENCOUNTER — LAB (OUTPATIENT)
Dept: LAB | Facility: HOSPITAL | Age: 82
End: 2017-08-03

## 2017-08-03 DIAGNOSIS — D73.1 HYPERSPLENISM: ICD-10-CM

## 2017-08-03 DIAGNOSIS — D61.818 SPLENIC PANCYTOPENIA SYNDROME (HCC): ICD-10-CM

## 2017-08-03 DIAGNOSIS — D69.6 THROMBOCYTOPENIA (HCC): ICD-10-CM

## 2017-08-03 DIAGNOSIS — N18.4 ANEMIA OF CHRONIC RENAL FAILURE, STAGE 4 (SEVERE) (HCC): ICD-10-CM

## 2017-08-03 DIAGNOSIS — E83.119 HEMOCHROMATOSIS, UNSPECIFIED HEMOCHROMATOSIS TYPE: ICD-10-CM

## 2017-08-03 DIAGNOSIS — N18.4 CHRONIC RENAL DISEASE, STAGE 4, SEVERELY DECREASED GLOMERULAR FILTRATION RATE BETWEEN 15-29 ML/MIN/1.73 SQUARE METER (HCC): ICD-10-CM

## 2017-08-03 DIAGNOSIS — D63.1 ANEMIA OF CHRONIC RENAL FAILURE, STAGE 4 (SEVERE) (HCC): ICD-10-CM

## 2017-08-03 DIAGNOSIS — N17.9 ACUTE RENAL FAILURE, UNSPECIFIED ACUTE RENAL FAILURE TYPE (HCC): ICD-10-CM

## 2017-08-03 DIAGNOSIS — D73.9 SPLENIC PANCYTOPENIA SYNDROME (HCC): ICD-10-CM

## 2017-08-03 DIAGNOSIS — E61.1 IRON DEFICIENCY: ICD-10-CM

## 2017-08-03 LAB
ALBUMIN SERPL-MCNC: 3.1 G/DL (ref 3.5–5.2)
ALBUMIN/GLOB SERPL: 0.7 G/DL (ref 1.1–2.4)
ALP SERPL-CCNC: 182 U/L (ref 38–116)
ALT SERPL W P-5'-P-CCNC: 17 U/L (ref 0–33)
ANION GAP SERPL CALCULATED.3IONS-SCNC: 16 MMOL/L
AST SERPL-CCNC: 41 U/L (ref 0–32)
BASOPHILS # BLD AUTO: 0.01 10*3/MM3 (ref 0–0.1)
BASOPHILS NFR BLD AUTO: 0.3 % (ref 0–1.1)
BILIRUB SERPL-MCNC: 1 MG/DL (ref 0.1–1.2)
BUN BLD-MCNC: 51 MG/DL (ref 6–20)
BUN/CREAT SERPL: 21.8 (ref 7.3–30)
CALCIUM SPEC-SCNC: 9 MG/DL (ref 8.5–10.2)
CHLORIDE SERPL-SCNC: 99 MMOL/L (ref 98–107)
CO2 SERPL-SCNC: 26 MMOL/L (ref 22–29)
CREAT BLD-MCNC: 2.34 MG/DL (ref 0.6–1.1)
DEPRECATED RDW RBC AUTO: 50.4 FL (ref 37–49)
EOSINOPHIL # BLD AUTO: 0.16 10*3/MM3 (ref 0–0.36)
EOSINOPHIL NFR BLD AUTO: 5.2 % (ref 1–5)
ERYTHROCYTE [DISTWIDTH] IN BLOOD BY AUTOMATED COUNT: 13.6 % (ref 11.7–14.5)
FERRITIN SERPL-MCNC: 112.5 NG/ML
GFR SERPL CREATININE-BSD FRML MDRD: 20 ML/MIN/1.73
GLOBULIN UR ELPH-MCNC: 4.2 GM/DL (ref 1.8–3.5)
GLUCOSE BLD-MCNC: 256 MG/DL (ref 74–124)
HCT VFR BLD AUTO: 33.2 % (ref 34–45)
HGB BLD-MCNC: 11 G/DL (ref 11.5–14.9)
IMM GRANULOCYTES # BLD: 0.01 10*3/MM3 (ref 0–0.03)
IMM GRANULOCYTES NFR BLD: 0.3 % (ref 0–0.5)
IRON 24H UR-MRATE: 117 MCG/DL (ref 37–145)
IRON SATN MFR SERPL: 34 % (ref 14–48)
LYMPHOCYTES # BLD AUTO: 0.76 10*3/MM3 (ref 1–3.5)
LYMPHOCYTES NFR BLD AUTO: 24.7 % (ref 20–49)
MCH RBC QN AUTO: 33.4 PG (ref 27–33)
MCHC RBC AUTO-ENTMCNC: 33.1 G/DL (ref 32–35)
MCV RBC AUTO: 100.9 FL (ref 83–97)
MONOCYTES # BLD AUTO: 0.31 10*3/MM3 (ref 0.25–0.8)
MONOCYTES NFR BLD AUTO: 10.1 % (ref 4–12)
NEUTROPHILS # BLD AUTO: 1.83 10*3/MM3 (ref 1.5–7)
NEUTROPHILS NFR BLD AUTO: 59.4 % (ref 39–75)
NRBC BLD MANUAL-RTO: 0 /100 WBC (ref 0–0)
PLATELET # BLD AUTO: 69 10*3/MM3 (ref 150–375)
PMV BLD AUTO: 9.5 FL (ref 8.9–12.1)
POTASSIUM BLD-SCNC: 3.8 MMOL/L (ref 3.5–4.7)
PROT SERPL-MCNC: 7.3 G/DL (ref 6.3–8)
RBC # BLD AUTO: 3.29 10*6/MM3 (ref 3.9–5)
SODIUM BLD-SCNC: 141 MMOL/L (ref 134–145)
TIBC SERPL-MCNC: 342 MCG/DL (ref 249–505)
TRANSFERRIN SERPL-MCNC: 244 MG/DL (ref 200–360)
WBC NRBC COR # BLD: 3.08 10*3/MM3 (ref 4–10)

## 2017-08-03 PROCEDURE — 82728 ASSAY OF FERRITIN: CPT | Performed by: INTERNAL MEDICINE

## 2017-08-03 PROCEDURE — 36415 COLL VENOUS BLD VENIPUNCTURE: CPT | Performed by: INTERNAL MEDICINE

## 2017-08-03 PROCEDURE — 83540 ASSAY OF IRON: CPT | Performed by: INTERNAL MEDICINE

## 2017-08-03 PROCEDURE — 85025 COMPLETE CBC W/AUTO DIFF WBC: CPT | Performed by: INTERNAL MEDICINE

## 2017-08-03 PROCEDURE — 84466 ASSAY OF TRANSFERRIN: CPT | Performed by: INTERNAL MEDICINE

## 2017-08-03 PROCEDURE — 80053 COMPREHEN METABOLIC PANEL: CPT | Performed by: INTERNAL MEDICINE

## 2017-08-07 ENCOUNTER — OFFICE (OUTPATIENT)
Dept: URBAN - METROPOLITAN AREA OTHER 6 | Facility: OTHER | Age: 82
End: 2017-08-07
Payer: OTHER GOVERNMENT

## 2017-08-07 VITALS
DIASTOLIC BLOOD PRESSURE: 60 MMHG | HEART RATE: 80 BPM | SYSTOLIC BLOOD PRESSURE: 110 MMHG | WEIGHT: 217 LBS | HEIGHT: 63 IN

## 2017-08-07 DIAGNOSIS — Z80.9 FAMILY HISTORY OF MALIGNANT NEOPLASM, UNSPECIFIED: ICD-10-CM

## 2017-08-07 DIAGNOSIS — K74.60 UNSPECIFIED CIRRHOSIS OF LIVER: ICD-10-CM

## 2017-08-07 DIAGNOSIS — K72.90 HEPATIC FAILURE, UNSPECIFIED WITHOUT COMA: ICD-10-CM

## 2017-08-07 DIAGNOSIS — N18.9 CHRONIC KIDNEY DISEASE, UNSPECIFIED: ICD-10-CM

## 2017-08-07 PROCEDURE — 99213 OFFICE O/P EST LOW 20 MIN: CPT | Performed by: INTERNAL MEDICINE

## 2017-08-11 ENCOUNTER — RESULTS ENCOUNTER (OUTPATIENT)
Dept: ONCOLOGY | Facility: CLINIC | Age: 82
End: 2017-08-11

## 2017-08-11 DIAGNOSIS — N17.9 ACUTE RENAL FAILURE, UNSPECIFIED ACUTE RENAL FAILURE TYPE (HCC): ICD-10-CM

## 2017-08-11 DIAGNOSIS — D63.1 ANEMIA OF CHRONIC RENAL FAILURE, STAGE 4 (SEVERE) (HCC): ICD-10-CM

## 2017-08-11 DIAGNOSIS — D69.6 THROMBOCYTOPENIA (HCC): ICD-10-CM

## 2017-08-11 DIAGNOSIS — N18.4 CHRONIC RENAL DISEASE, STAGE 4, SEVERELY DECREASED GLOMERULAR FILTRATION RATE BETWEEN 15-29 ML/MIN/1.73 SQUARE METER (HCC): ICD-10-CM

## 2017-08-11 DIAGNOSIS — E61.1 IRON DEFICIENCY: ICD-10-CM

## 2017-08-11 DIAGNOSIS — D61.818 SPLENIC PANCYTOPENIA SYNDROME (HCC): ICD-10-CM

## 2017-08-11 DIAGNOSIS — N18.4 ANEMIA OF CHRONIC RENAL FAILURE, STAGE 4 (SEVERE) (HCC): ICD-10-CM

## 2017-08-11 DIAGNOSIS — D73.1 HYPERSPLENISM: ICD-10-CM

## 2017-08-11 DIAGNOSIS — E83.119 HEMOCHROMATOSIS, UNSPECIFIED HEMOCHROMATOSIS TYPE: ICD-10-CM

## 2017-08-11 DIAGNOSIS — D73.9 SPLENIC PANCYTOPENIA SYNDROME (HCC): ICD-10-CM

## 2017-08-17 ENCOUNTER — LAB (OUTPATIENT)
Dept: LAB | Facility: HOSPITAL | Age: 82
End: 2017-08-17

## 2017-08-17 ENCOUNTER — INFUSION (OUTPATIENT)
Dept: ONCOLOGY | Facility: HOSPITAL | Age: 82
End: 2017-08-17

## 2017-08-17 VITALS — HEART RATE: 66 BPM | SYSTOLIC BLOOD PRESSURE: 143 MMHG | DIASTOLIC BLOOD PRESSURE: 68 MMHG

## 2017-08-17 DIAGNOSIS — D63.1 ANEMIA OF CHRONIC RENAL FAILURE, STAGE 4 (SEVERE) (HCC): ICD-10-CM

## 2017-08-17 DIAGNOSIS — E83.119 HEMOCHROMATOSIS, UNSPECIFIED HEMOCHROMATOSIS TYPE: ICD-10-CM

## 2017-08-17 DIAGNOSIS — N18.4 CHRONIC RENAL DISEASE, STAGE 4, SEVERELY DECREASED GLOMERULAR FILTRATION RATE BETWEEN 15-29 ML/MIN/1.73 SQUARE METER (HCC): ICD-10-CM

## 2017-08-17 DIAGNOSIS — E61.1 IRON DEFICIENCY: ICD-10-CM

## 2017-08-17 DIAGNOSIS — D73.1 HYPERSPLENISM: ICD-10-CM

## 2017-08-17 DIAGNOSIS — N17.9 ACUTE RENAL FAILURE, UNSPECIFIED ACUTE RENAL FAILURE TYPE (HCC): ICD-10-CM

## 2017-08-17 DIAGNOSIS — D69.6 THROMBOCYTOPENIA (HCC): ICD-10-CM

## 2017-08-17 DIAGNOSIS — D73.9 SPLENIC PANCYTOPENIA SYNDROME (HCC): ICD-10-CM

## 2017-08-17 DIAGNOSIS — D61.818 SPLENIC PANCYTOPENIA SYNDROME (HCC): ICD-10-CM

## 2017-08-17 DIAGNOSIS — N18.4 ANEMIA OF CHRONIC RENAL FAILURE, STAGE 4 (SEVERE) (HCC): ICD-10-CM

## 2017-08-17 LAB
BASOPHILS # BLD AUTO: 0.02 10*3/MM3 (ref 0–0.1)
BASOPHILS NFR BLD AUTO: 0.4 % (ref 0–1.1)
DEPRECATED RDW RBC AUTO: 48.7 FL (ref 37–49)
EOSINOPHIL # BLD AUTO: 0.17 10*3/MM3 (ref 0–0.36)
EOSINOPHIL NFR BLD AUTO: 3.5 % (ref 1–5)
ERYTHROCYTE [DISTWIDTH] IN BLOOD BY AUTOMATED COUNT: 13.7 % (ref 11.7–14.5)
HCT VFR BLD AUTO: 33.9 % (ref 34–45)
HGB BLD-MCNC: 11.4 G/DL (ref 11.5–14.9)
IMM GRANULOCYTES # BLD: 0.03 10*3/MM3 (ref 0–0.03)
IMM GRANULOCYTES NFR BLD: 0.6 % (ref 0–0.5)
LYMPHOCYTES # BLD AUTO: 1.5 10*3/MM3 (ref 1–3.5)
LYMPHOCYTES NFR BLD AUTO: 30.8 % (ref 20–49)
MCH RBC QN AUTO: 32.8 PG (ref 27–33)
MCHC RBC AUTO-ENTMCNC: 33.6 G/DL (ref 32–35)
MCV RBC AUTO: 97.4 FL (ref 83–97)
MONOCYTES # BLD AUTO: 0.63 10*3/MM3 (ref 0.25–0.8)
MONOCYTES NFR BLD AUTO: 12.9 % (ref 4–12)
NEUTROPHILS # BLD AUTO: 2.52 10*3/MM3 (ref 1.5–7)
NEUTROPHILS NFR BLD AUTO: 51.8 % (ref 39–75)
NRBC BLD MANUAL-RTO: 0 /100 WBC (ref 0–0)
PLATELET # BLD AUTO: 77 10*3/MM3 (ref 150–375)
PMV BLD AUTO: 9.6 FL (ref 8.9–12.1)
RBC # BLD AUTO: 3.48 10*6/MM3 (ref 3.9–5)
WBC NRBC COR # BLD: 4.87 10*3/MM3 (ref 4–10)

## 2017-08-17 PROCEDURE — 85025 COMPLETE CBC W/AUTO DIFF WBC: CPT | Performed by: INTERNAL MEDICINE

## 2017-08-17 PROCEDURE — 36416 COLLJ CAPILLARY BLOOD SPEC: CPT | Performed by: INTERNAL MEDICINE

## 2017-08-17 NOTE — PROGRESS NOTES
Procrit held today due to hgb 11.4. Pt denies complaints. Pt to return in two weeks as scheduled to see MD.

## 2017-08-30 DIAGNOSIS — N18.4 ANEMIA OF CHRONIC RENAL FAILURE, STAGE 4 (SEVERE) (HCC): Primary | ICD-10-CM

## 2017-08-30 DIAGNOSIS — D63.1 ANEMIA OF CHRONIC RENAL FAILURE, STAGE 4 (SEVERE) (HCC): Primary | ICD-10-CM

## 2017-09-01 ENCOUNTER — LAB (OUTPATIENT)
Dept: LAB | Facility: HOSPITAL | Age: 82
End: 2017-09-01

## 2017-09-01 ENCOUNTER — APPOINTMENT (OUTPATIENT)
Dept: ONCOLOGY | Facility: HOSPITAL | Age: 82
End: 2017-09-01

## 2017-09-01 ENCOUNTER — OFFICE VISIT (OUTPATIENT)
Dept: ONCOLOGY | Facility: CLINIC | Age: 82
End: 2017-09-01

## 2017-09-01 VITALS
BODY MASS INDEX: 38.05 KG/M2 | HEART RATE: 54 BPM | HEIGHT: 62 IN | WEIGHT: 206.8 LBS | TEMPERATURE: 98.2 F | DIASTOLIC BLOOD PRESSURE: 72 MMHG | OXYGEN SATURATION: 95 % | SYSTOLIC BLOOD PRESSURE: 156 MMHG | RESPIRATION RATE: 16 BRPM

## 2017-09-01 DIAGNOSIS — E61.1 IRON DEFICIENCY: ICD-10-CM

## 2017-09-01 DIAGNOSIS — N18.4 ANEMIA OF CHRONIC RENAL FAILURE, STAGE 4 (SEVERE) (HCC): ICD-10-CM

## 2017-09-01 DIAGNOSIS — D73.1 HYPERSPLENISM: Primary | ICD-10-CM

## 2017-09-01 DIAGNOSIS — D69.6 THROMBOCYTOPENIA (HCC): ICD-10-CM

## 2017-09-01 DIAGNOSIS — D63.1 ANEMIA OF CHRONIC RENAL FAILURE, STAGE 4 (SEVERE) (HCC): ICD-10-CM

## 2017-09-01 LAB
BASOPHILS # BLD AUTO: 0.02 10*3/MM3 (ref 0–0.1)
BASOPHILS NFR BLD AUTO: 0.4 % (ref 0–1.1)
DEPRECATED RDW RBC AUTO: 48 FL (ref 37–49)
EOSINOPHIL # BLD AUTO: 0.19 10*3/MM3 (ref 0–0.36)
EOSINOPHIL NFR BLD AUTO: 3.7 % (ref 1–5)
ERYTHROCYTE [DISTWIDTH] IN BLOOD BY AUTOMATED COUNT: 13.6 % (ref 11.7–14.5)
HCT VFR BLD AUTO: 35 % (ref 34–45)
HGB BLD-MCNC: 12.1 G/DL (ref 11.5–14.9)
IMM GRANULOCYTES # BLD: 0.03 10*3/MM3 (ref 0–0.03)
IMM GRANULOCYTES NFR BLD: 0.6 % (ref 0–0.5)
LYMPHOCYTES # BLD AUTO: 1.37 10*3/MM3 (ref 1–3.5)
LYMPHOCYTES NFR BLD AUTO: 26.5 % (ref 20–49)
MCH RBC QN AUTO: 33.5 PG (ref 27–33)
MCHC RBC AUTO-ENTMCNC: 34.6 G/DL (ref 32–35)
MCV RBC AUTO: 97 FL (ref 83–97)
MONOCYTES # BLD AUTO: 0.69 10*3/MM3 (ref 0.25–0.8)
MONOCYTES NFR BLD AUTO: 13.3 % (ref 4–12)
NEUTROPHILS # BLD AUTO: 2.87 10*3/MM3 (ref 1.5–7)
NEUTROPHILS NFR BLD AUTO: 55.5 % (ref 39–75)
NRBC BLD MANUAL-RTO: 0 /100 WBC (ref 0–0)
PLATELET # BLD AUTO: 81 10*3/MM3 (ref 150–375)
PMV BLD AUTO: 9.8 FL (ref 8.9–12.1)
RBC # BLD AUTO: 3.61 10*6/MM3 (ref 3.9–5)
WBC NRBC COR # BLD: 5.17 10*3/MM3 (ref 4–10)

## 2017-09-01 PROCEDURE — 99213 OFFICE O/P EST LOW 20 MIN: CPT | Performed by: INTERNAL MEDICINE

## 2017-09-01 PROCEDURE — 36416 COLLJ CAPILLARY BLOOD SPEC: CPT | Performed by: INTERNAL MEDICINE

## 2017-09-01 PROCEDURE — 85025 COMPLETE CBC W/AUTO DIFF WBC: CPT | Performed by: INTERNAL MEDICINE

## 2017-09-01 NOTE — PROGRESS NOTES
Subjective     REASON FOR FOLLOW UP  1. Anemia of chronic kidney disease  2. Homozygous H63D hemochromatosis gene mutation.  3. 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    HISTORY OF PRESENT ILLNESS:  The patient is a 82 y.o. year old female who was seen in consultation  for the above noted issues.  She had previously been evaluated in our practice several years ago for this same issue.  She had become more anemic which was felt to be in part related to anemia of chronic kidney disease and she was started on Procrit therapy every 2 weeks at a dose of 20,000 units subcutaneous.  She initially responded to Procrit therapy but no longer qualifies for treatment due to diminished iron stores.  We had contacted the patient's  and arranged for outpatient IV Feraheme treatment however he was reluctant for her to undergo ferriheme infusion and wanted to see us to discuss the situation.  Her hemoglobin today is lower at 8.4 g/dL.    She also is homozygous for the H63D mutation.  Most people with this mutation do not have clinical iron overload and given the patient's degree of anemia and pancytopenia she would not be a candidate for therapeutic phlebotomy in any case.     She received 2 doses of IV Feraheme as an outpatient 510 mg each on 4/27/2017 and 5/4/2017..  She is here today to assess response and possibly resume Procrit.  Her hemoglobin has improved to 12.1 g/dL.  She reports she is feeling better.  Her repeat iron studies from 8/3/2017 were normal.     History of Present Illness          Current Outpatient Prescriptions on File Prior to Visit   Medication Sig Dispense Refill   • acetaminophen (TYLENOL) 650 MG 8 hr tablet Take 650 mg by mouth Every 8 (Eight) Hours As Needed for Mild Pain (1-3).     • bumetanide (BUMEX) 2 MG tablet      • Cholecalciferol (VITAMIN D-3 PO) Take 1,000 Units by mouth daily.     • Cyanocobalamin (VITAMIN B-12 PO) Take 1,250 mcg by mouth daily.     • cyclobenzaprine  (FLEXERIL) 5 MG tablet      • insulin NPH-insulin regular (Novolin 70/30) (70-30) 100 UNIT/ML injection Inject  under the skin 2 (two) times a day with meals.     • KLOR-CON 20 MEQ CR tablet      • Lactulose powder      • Magnesium 500 MG tablet Take 1 tablet/day by mouth.     • METOPROLOL TARTRATE PO Take 12.5 mg by mouth Daily.     • Multiple Vitamin (MULTI-VITAMIN PO) Take  by mouth daily.     • Probiotic Product (PROBIOTIC PO) Take  by mouth.     • QUEtiapine Fumarate (SEROQUEL PO) Take 25 mg by mouth daily.     • rifaximin (XIFAXAN) 550 MG tablet Take 550 mg by mouth Every 12 (Twelve) Hours.     • SIMETHICONE PO Take  by mouth as needed.     • temazepam (RESTORIL) 7.5 MG capsule      • TRAMADOL HCL PO Take 25 mg by mouth daily.       No current facility-administered medications on file prior to visit.         ALLERGIES:    Allergies   Allergen Reactions   • Ciprofloxacin      Itching and red streaks   • Levaquin [Levofloxacin]    • Promethazine Hcl      Itching, red streaks when given IV        Review of Systems   Constitutional: Positive for fatigue. Negative for activity change, appetite change, fever and unexpected weight change.   HENT: Negative for hearing loss, nosebleeds, trouble swallowing and voice change.    Eyes: Negative for visual disturbance.   Respiratory: Negative for cough, shortness of breath and wheezing.    Cardiovascular: Negative for chest pain and palpitations.   Gastrointestinal: Negative for abdominal pain, diarrhea, nausea and vomiting.   Genitourinary: Negative for difficulty urinating, frequency, hematuria and urgency.   Musculoskeletal: Negative for back pain and neck pain.   Skin: Positive for color change. Negative for rash.        Sun damaged skin and senile purpura.   Neurological: Negative for dizziness, seizures, syncope and headaches.   Hematological: Negative for adenopathy. Bruises/bleeds easily.   Psychiatric/Behavioral: Positive for confusion. Negative for behavioral  "problems. The patient is not nervous/anxious.         Objective     Vitals:    09/01/17 1301   BP: 156/72   Pulse: 54   Resp: 16   Temp: 98.2 °F (36.8 °C)   TempSrc: Oral   SpO2: 95%   Weight: 206 lb 12.8 oz (93.8 kg)   Height: 62.4\" (158.5 cm)  Comment: new height   PainSc: 0-No pain     Current Status 9/1/2017   ECOG score 1       Physical Exam   Constitutional: She is oriented to person, place, and time. She appears well-developed and well-nourished. No distress.   HENT:   Head: Normocephalic.   Eyes: Conjunctivae and EOM are normal. Pupils are equal, round, and reactive to light. No scleral icterus.   Neck: Normal range of motion. Neck supple. No JVD present. No thyromegaly present.   Cardiovascular: Normal rate and regular rhythm.  Exam reveals no gallop and no friction rub.    No murmur heard.  Pulmonary/Chest: Effort normal and breath sounds normal. She has no wheezes. She has no rales.   Abdominal: Soft. She exhibits no distension and no mass. There is no tenderness.   Musculoskeletal: Normal range of motion. She exhibits no edema or deformity.   Lymphadenopathy:     She has no cervical adenopathy.   Neurological: She is alert and oriented to person, place, and time. She has normal reflexes. No cranial nerve deficit.   Skin: Skin is warm and dry. No rash noted. No erythema.   Sun damaged skin and senile purpura   Psychiatric: She has a normal mood and affect. Her behavior is normal. Judgment normal.       RECENT LABS:  Hematology WBC   Date Value Ref Range Status   09/01/2017 5.17 4.00 - 10.00 10*3/mm3 Final     RBC   Date Value Ref Range Status   09/01/2017 3.61 (L) 3.90 - 5.00 10*6/mm3 Final     Hemoglobin   Date Value Ref Range Status   09/01/2017 12.1 11.5 - 14.9 g/dL Final     Hematocrit   Date Value Ref Range Status   09/01/2017 35.0 34.0 - 45.0 % Final     Platelets   Date Value Ref Range Status   09/01/2017 81 (L) 150 - 375 10*3/mm3 Final          Lab Results   Component Value Date    IRON 117 " 08/03/2017    TIBC 342 08/03/2017    FERRITIN 112.50 08/03/2017       Assessment/Plan     1.  Pancytopenia due to underlying cirrhosis of the liver and hypersplenism. This is a chronic process and has been relatively stable for several years.  As noted above, she had a normal appearing bone marrow examination in 2011.  2.  Component of anemia of chronic renal insufficiency stage IV.    3.  Iron deficiency with iron saturation less than 10%. She received 2 doses of IV Feraheme first on 4/27/2017 and the second on 5/4/2017 with no adverse reactions.    Plan    1.  At this point the patient is doing very well following her iron infusions and it appears that she will not require any Procrit.  2.  We therefore we will see the patient back for routine follow-up in 4 months and at that time repeat labs to be performed including a CBC and repeat iron studies.

## 2017-12-11 ENCOUNTER — APPOINTMENT (OUTPATIENT)
Dept: GENERAL RADIOLOGY | Facility: HOSPITAL | Age: 82
End: 2017-12-11

## 2017-12-11 ENCOUNTER — HOSPITAL ENCOUNTER (EMERGENCY)
Facility: HOSPITAL | Age: 82
Discharge: HOME | End: 2017-12-11
Attending: EMERGENCY MEDICINE | Admitting: EMERGENCY MEDICINE

## 2017-12-11 VITALS
WEIGHT: 198 LBS | TEMPERATURE: 98.1 F | HEIGHT: 63 IN | DIASTOLIC BLOOD PRESSURE: 55 MMHG | RESPIRATION RATE: 16 BRPM | SYSTOLIC BLOOD PRESSURE: 169 MMHG | BODY MASS INDEX: 35.08 KG/M2 | OXYGEN SATURATION: 95 % | HEART RATE: 76 BPM

## 2017-12-11 DIAGNOSIS — S93.492A SPRAIN OF ANTERIOR TALOFIBULAR LIGAMENT OF LEFT ANKLE, INITIAL ENCOUNTER: Primary | ICD-10-CM

## 2017-12-11 PROCEDURE — 99283 EMERGENCY DEPT VISIT LOW MDM: CPT

## 2017-12-11 PROCEDURE — 73610 X-RAY EXAM OF ANKLE: CPT

## 2017-12-11 RX ORDER — LIDOCAINE 50 MG/G
1 PATCH TOPICAL AS NEEDED
COMMUNITY

## 2017-12-11 RX ORDER — GUAIFENESIN 600 MG/1
600 TABLET, EXTENDED RELEASE ORAL NIGHTLY
COMMUNITY

## 2017-12-11 NOTE — ED PROVIDER NOTES
EMERGENCY DEPARTMENT ENCOUNTER    CHIEF COMPLAINT  Chief Complaint: Ankle pain  History given by: patient   History limited by: n/a  Room Number: 37/37  PMD: Chayito Paredes MD      HPI:  Pt is a 82 y.o. female who presents complaining of left ankle pain that began 3 days ago. Pt states that pain is present with movement, and makes ambulation difficult. She denies know injury or swelling. She denies fever, CP, or SOA. Pt states that she recently had similar pain in her right ankle, but the pain has now resolved.     Duration:  3 days   Onset: gradual  Timing: constant   Location: left ankle  Intensity/Severity: moderate  Progression: worsening   Associated Symptoms: none  Aggravating Factors: movement, ambulation  Alleviating Factors: none  Previous Episodes: recently has similar sx in the right ankle with unknown etiology.   Treatment before arrival: none    PAST MEDICAL HISTORY  Active Ambulatory Problems     Diagnosis Date Noted   • Cirrhosis of liver 08/15/2016   • Hypersplenism 08/15/2016   • Splenic pancytopenia syndrome 08/15/2016   • Chronic renal disease, stage 4, severely decreased glomerular filtration rate between 15-29 mL/min/1.73 square meter 08/15/2016   • Anemia of chronic renal failure, stage 4 (severe) 09/08/2016   • Hepatic encephalopathy 11/16/2016   • Thrombocytopenia 11/17/2016   • Leukopenia 11/17/2016   • Acute kidney failure 11/17/2016   • Acute hepatic encephalopathy 11/28/2016   • DM2 (diabetes mellitus, type 2) 11/29/2016   • Iron deficiency 03/30/2017     Resolved Ambulatory Problems     Diagnosis Date Noted   • Hemochromatosis 08/15/2016   • CKD (chronic kidney disease) stage 3, GFR 30-59 ml/min 11/17/2016     Past Medical History:   Diagnosis Date   • Anxiety    • Arthritis    • C. difficile colitis    • CHF (congestive heart failure)    • Chronic kidney disease    • Dementia    • Diabetes mellitus    • Diskitis 11/2015   • Hemochromatosis    • History of anemia    • History of blood  transfusion 2015   • History of pneumonia 2016   • History of sepsis 2015   • Hypertension    • Liver disease    • HOANG (nonalcoholic steatohepatitis)    • Pancytopenia    • Spinal stenosis    • Splenomegaly    • Thrombocytopenia, chronic    • TIA (transient ischemic attack)        PAST SURGICAL HISTORY  Past Surgical History:   Procedure Laterality Date   • ABDOMINAL SURGERY     • KNEE SURGERY     • RENAL BIOPSY  10/2015   • TONSILLECTOMY     • WISDOM TOOTH EXTRACTION         FAMILY HISTORY  Family History   Problem Relation Age of Onset   • Heart disease Mother    • Diabetes Mother    • Heart disease Father    • Breast cancer Sister 45   • Hypertension Sister    • Diabetes Sister    • Colon cancer Brother 50   • Diabetes Brother        SOCIAL HISTORY  Social History     Social History   • Marital status:      Spouse name: Kirby   • Number of children: 2   • Years of education: High School     Occupational History   •  Retired     Social History Main Topics   • Smoking status: Former Smoker     Types: Cigarettes     Quit date: 11/17/1985   • Smokeless tobacco: Never Used      Comment: Heavy in past, but quit   • Alcohol use No   • Drug use: No   • Sexual activity: Defer     Other Topics Concern   • Not on file     Social History Narrative       ALLERGIES  Ciprofloxacin; Levaquin [levofloxacin]; and Promethazine hcl    REVIEW OF SYSTEMS  Review of Systems   Constitutional: Negative for chills and fever.   HENT: Negative for sore throat.    Respiratory: Negative for cough.    Gastrointestinal: Negative for nausea and vomiting.   Genitourinary: Negative for dysuria.   Musculoskeletal: Positive for arthralgias (left ankle). Negative for back pain.       PHYSICAL EXAM  ED Triage Vitals   Temp Heart Rate Resp BP SpO2   -- 12/11/17 0532 12/11/17 0532 12/11/17 0532 12/11/17 0532    75 16 192/60 97 %      Temp src Heart Rate Source Patient Position BP Location FiO2 (%)   -- 12/11/17 0532 12/11/17 0532 12/11/17 0532  --    Monitor Sitting Right arm        Physical Exam   Constitutional: She is oriented to person, place, and time and well-developed, well-nourished, and in no distress.   HENT:   Head: Normocephalic and atraumatic.   Eyes: EOM are normal.   Neck: Normal range of motion.   Cardiovascular: Normal rate and regular rhythm.    Pulmonary/Chest: Effort normal and breath sounds normal. No respiratory distress.   Musculoskeletal: She exhibits edema (1+ edema to bilateral feet and ankles. ).   Left ankle: tenderness to palpation along the lateral malleolus. Pain is increased by eversion of the left ankle   Neurological: She is alert and oriented to person, place, and time. She has normal sensation and normal strength.   Skin: Skin is warm and dry.   Psychiatric: Affect normal.   Nursing note and vitals reviewed.    RADIOLOGY  XR Ankle 3+ View Left   Final Result   Tibiotalar alignment and joint width within normal limits. No  fracture or dislocation nor bone lesion. There is minimal amount of  periarticular bone hypertrophy along the posterior aspect of the ankle  joint. There is soft tissue swelling about the ankle. Hypertrophic bone  formation at the Achilles insertion, there is an inferior calcaneal  spur. There is talonavicular joint degeneration of a moderate degree.  The remainder is unremarkable.      I ordered the above noted radiological studies. Interpreted by radiologist.  Reviewed by me in PACS.       PROCEDURES  Procedures      PROGRESS AND CONSULTS  ED Course     05;45  BP- (!) 192/60 HR- 75 O2 sat- 97%  Advised pt of the plan for XR. Pt understands and agrees with the plan, all questions answered.    05:48  XR left ankle ordered.     06:16  BP- 169/55 HR- 76 Temp- 98.1 °F (36.7 °C) (Tympanic) O2 sat- 95%  Rechecked the patient who is in NAD and is resting comfortably. Advised pt that the XR shows arthritis, but no acute fracture. Plan to place pt in an air cast. Pt states that she has a walker and crutches at  home. Pt advised to weight bear as tolerated. Pt will be discharged and is to f/u with ortho. Pt understands and agrees with the plan, all questions answered.    MEDICAL DECISION MAKING  Results were reviewed/discussed with the patient and they were also made aware of online access. Pt also made aware that some labs, such as cultures, will not be resulted during ER visit and follow up with PMD is necessary.     MDM  Number of Diagnoses or Management Options     Amount and/or Complexity of Data Reviewed  Tests in the radiology section of CPT®: ordered and reviewed (XR left ankle shows Tibiotalar alignment and joint width within normal limits. No fracture or dislocation nor bone lesion. There is minimal amount of periarticular bone hypertrophy along the posterior aspect of the ankle joint. There is soft tissue swelling about the ankle. Hypertrophic bone formation at the Achilles insertion, there is an inferior calcaneal spur. There is talonavicular joint degeneration of a moderate degree. The remainder is unremarkable.)           DIAGNOSIS  Final diagnoses:   Sprain of anterior talofibular ligament of left ankle, initial encounter       DISPOSITION  DISCHARGE    Patient discharged in stable condition.    Reviewed implications of results, diagnosis, meds, responsibility to follow up, warning signs and symptoms of possible worsening, potential complications and reasons to return to ER.    Patient/Family voiced understanding of above instructions.    Discussed plan for discharge, as there is no emergent indication for admission.  Pt/family is agreeable and understands need for follow up and repeat testing.  Pt is aware that discharge does not mean that nothing is wrong but it indicates no emergency is present that requires admission and they must continue care with follow-up as given below or physician of their choice.     FOLLOW-UP  Madhu Wilson MD  [facility]  [facility]  [address]  [phone]      Call for Appointment         Medication List      Notice     No changes were made to your prescriptions during this visit.        Latest Documented Vital Signs:  As of 6:23 AM  BP- 169/55 HR- 76 Temp- 98.1 °F (36.7 °C) (Tympanic) O2 sat- 95%    --  Documentation assistance provided by colby Hussein for Dr Joradn.  Information recorded by the antoniettaibdane was done at my direction and has been verified and validated by me.        Leila Hussein  12/11/17 0623       Guillermo Jordan MD  12/11/17 0636

## 2017-12-21 ENCOUNTER — LAB (OUTPATIENT)
Dept: LAB | Facility: HOSPITAL | Age: 82
End: 2017-12-21

## 2017-12-21 ENCOUNTER — OFFICE VISIT (OUTPATIENT)
Dept: ONCOLOGY | Facility: CLINIC | Age: 82
End: 2017-12-21

## 2017-12-21 VITALS
DIASTOLIC BLOOD PRESSURE: 84 MMHG | HEIGHT: 62 IN | OXYGEN SATURATION: 98 % | SYSTOLIC BLOOD PRESSURE: 152 MMHG | HEART RATE: 72 BPM | WEIGHT: 209.4 LBS | TEMPERATURE: 97.7 F | BODY MASS INDEX: 38.53 KG/M2 | RESPIRATION RATE: 12 BRPM

## 2017-12-21 DIAGNOSIS — N18.4 ANEMIA OF CHRONIC RENAL FAILURE, STAGE 4 (SEVERE) (HCC): ICD-10-CM

## 2017-12-21 DIAGNOSIS — D61.818 SPLENIC PANCYTOPENIA SYNDROME (HCC): ICD-10-CM

## 2017-12-21 DIAGNOSIS — D63.1 ANEMIA OF CHRONIC RENAL FAILURE, STAGE 4 (SEVERE) (HCC): ICD-10-CM

## 2017-12-21 DIAGNOSIS — E61.1 IRON DEFICIENCY: ICD-10-CM

## 2017-12-21 DIAGNOSIS — D73.1 HYPERSPLENISM: ICD-10-CM

## 2017-12-21 DIAGNOSIS — E61.1 IRON DEFICIENCY: Primary | ICD-10-CM

## 2017-12-21 DIAGNOSIS — D73.9 SPLENIC PANCYTOPENIA SYNDROME (HCC): ICD-10-CM

## 2017-12-21 DIAGNOSIS — D69.6 THROMBOCYTOPENIA (HCC): ICD-10-CM

## 2017-12-21 LAB
BASOPHILS # BLD AUTO: 0.01 10*3/MM3 (ref 0–0.1)
BASOPHILS NFR BLD AUTO: 0.2 % (ref 0–1.1)
DEPRECATED RDW RBC AUTO: 51.6 FL (ref 37–49)
EOSINOPHIL # BLD AUTO: 0.15 10*3/MM3 (ref 0–0.36)
EOSINOPHIL NFR BLD AUTO: 3.6 % (ref 1–5)
ERYTHROCYTE [DISTWIDTH] IN BLOOD BY AUTOMATED COUNT: 13.6 % (ref 11.7–14.5)
FERRITIN SERPL-MCNC: 81.9 NG/ML
HCT VFR BLD AUTO: 31 % (ref 34–45)
HGB BLD-MCNC: 10.2 G/DL (ref 11.5–14.9)
IMM GRANULOCYTES # BLD: 0.01 10*3/MM3 (ref 0–0.03)
IMM GRANULOCYTES NFR BLD: 0.2 % (ref 0–0.5)
IRON 24H UR-MRATE: 105 MCG/DL (ref 37–145)
IRON SATN MFR SERPL: 31 % (ref 14–48)
LYMPHOCYTES # BLD AUTO: 0.89 10*3/MM3 (ref 1–3.5)
LYMPHOCYTES NFR BLD AUTO: 21.4 % (ref 20–49)
MCH RBC QN AUTO: 33.7 PG (ref 27–33)
MCHC RBC AUTO-ENTMCNC: 32.9 G/DL (ref 32–35)
MCV RBC AUTO: 102.3 FL (ref 83–97)
MONOCYTES # BLD AUTO: 0.47 10*3/MM3 (ref 0.25–0.8)
MONOCYTES NFR BLD AUTO: 11.3 % (ref 4–12)
NEUTROPHILS # BLD AUTO: 2.63 10*3/MM3 (ref 1.5–7)
NEUTROPHILS NFR BLD AUTO: 63.3 % (ref 39–75)
NRBC BLD MANUAL-RTO: 0 /100 WBC (ref 0–0)
PLATELET # BLD AUTO: 76 10*3/MM3 (ref 150–375)
PMV BLD AUTO: 9.8 FL (ref 8.9–12.1)
RBC # BLD AUTO: 3.03 10*6/MM3 (ref 3.9–5)
TIBC SERPL-MCNC: 340 MCG/DL (ref 249–505)
TRANSFERRIN SERPL-MCNC: 243 MG/DL (ref 200–360)
WBC NRBC COR # BLD: 4.16 10*3/MM3 (ref 4–10)

## 2017-12-21 PROCEDURE — 84466 ASSAY OF TRANSFERRIN: CPT | Performed by: INTERNAL MEDICINE

## 2017-12-21 PROCEDURE — 99213 OFFICE O/P EST LOW 20 MIN: CPT | Performed by: INTERNAL MEDICINE

## 2017-12-21 PROCEDURE — 82728 ASSAY OF FERRITIN: CPT | Performed by: INTERNAL MEDICINE

## 2017-12-21 PROCEDURE — 85025 COMPLETE CBC W/AUTO DIFF WBC: CPT | Performed by: INTERNAL MEDICINE

## 2017-12-21 PROCEDURE — 36415 COLL VENOUS BLD VENIPUNCTURE: CPT | Performed by: INTERNAL MEDICINE

## 2017-12-21 PROCEDURE — 83540 ASSAY OF IRON: CPT | Performed by: INTERNAL MEDICINE

## 2017-12-21 NOTE — PROGRESS NOTES
Subjective     REASON FOR FOLLOW UP  1. Anemia of chronic kidney disease  2. Homozygous H63D hemochromatosis gene mutation.  3. 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    HISTORY OF PRESENT ILLNESS:  The patient is a 82 y.o. year old female who was seen in consultation  for the above noted issues.  She had previously been evaluated in our practice several years ago for this same issue.  She had become more anemic which was felt to be in part related to anemia of chronic kidney disease and she was started on Procrit therapy every 2 weeks at a dose of 20,000 units subcutaneous.  She initially responded to Procrit therapy but no longer qualifies for treatment due to diminished iron stores.     She also is homozygous for the H63D mutation.  Most people with this mutation do not have clinical iron overload and given the patient's degree of anemia and pancytopenia she would not be a candidate for therapeutic phlebotomy in any case.     She received 2 doses of IV Feraheme as an outpatient 510 mg each on 4/27/2017 and 5/4/2017..  She is here today for four-month follow-up visit.  Her hemoglobin has dropped from 12-10.2.  Her repeat iron studies are pending.  She reports that she is feeling more tired and I suspect she may need some additional IV iron scheduled.  History of Present Illness          Current Outpatient Prescriptions on File Prior to Visit   Medication Sig Dispense Refill   • acetaminophen (TYLENOL) 650 MG 8 hr tablet Take 650 mg by mouth Every 8 (Eight) Hours As Needed for Mild Pain (1-3).     • bumetanide (BUMEX) 2 MG tablet Daily With Breakfast & Lunch.     • Cholecalciferol (VITAMIN D-3 PO) Take 1,000 Units by mouth daily.     • Cyanocobalamin (VITAMIN B-12 PO) Take 1,250 mcg by mouth daily.     • cyclobenzaprine (FLEXERIL) 5 MG tablet Take 5 mg by mouth Every Night.     • guaiFENesin (MUCINEX) 600 MG 12 hr tablet Take 1,200 mg by mouth Every Night.     • insulin NPH-insulin regular (Novolin  70/30) (70-30) 100 UNIT/ML injection Inject 45 Units under the skin Daily With Breakfast & Lunch.     • KLOR-CON 20 MEQ CR tablet Take 10 mEq by mouth 2 (Two) Times a Day.     • Lactulose powder Take 40 mL by mouth Daily.     • lidocaine (LIDODERM) 5 % Place 1 patch on the skin Daily. Remove & Discard patch within 12 hours or as directed by MD     • Magnesium 500 MG tablet Take 1 tablet/day by mouth.     • METOPROLOL TARTRATE PO Take 12.5 mg by mouth Every Night.     • Multiple Vitamin (MULTI-VITAMIN PO) Take  by mouth daily.     • Probiotic Product (PROBIOTIC PO) Take 1 tablet by mouth Daily With Lunch & Dinner.     • QUEtiapine Fumarate (SEROQUEL PO) Take 25 mg by mouth Every Night.     • rifaximin (XIFAXAN) 550 MG tablet Take 550 mg by mouth Daily With Lunch & Dinner.     • SIMETHICONE PO Take  by mouth as needed.     • temazepam (RESTORIL) 7.5 MG capsule Take 7.5 mg by mouth Every Night.     • TRAMADOL HCL PO Take 50 mg by mouth Every Night.       No current facility-administered medications on file prior to visit.         ALLERGIES:    Allergies   Allergen Reactions   • Ciprofloxacin      Itching and red streaks   • Levaquin [Levofloxacin]    • Promethazine Hcl      Itching, red streaks when given IV        Review of Systems   Constitutional: Positive for fatigue. Negative for activity change, appetite change, fever and unexpected weight change.   HENT: Negative for hearing loss, nosebleeds, trouble swallowing and voice change.    Eyes: Negative for visual disturbance.   Respiratory: Negative for cough, shortness of breath and wheezing.    Cardiovascular: Negative for chest pain and palpitations.   Gastrointestinal: Negative for abdominal pain, diarrhea, nausea and vomiting.   Genitourinary: Negative for difficulty urinating, frequency, hematuria and urgency.   Musculoskeletal: Negative for back pain and neck pain.   Skin: Positive for color change. Negative for rash.        Sun damaged skin and senile purpura.  "  Neurological: Negative for dizziness, seizures, syncope and headaches.   Hematological: Negative for adenopathy. Bruises/bleeds easily.   Psychiatric/Behavioral: Positive for confusion. Negative for behavioral problems. The patient is not nervous/anxious.         Objective     Vitals:    12/21/17 1037   BP: 152/84   Pulse: 72   Resp: 12   Temp: 97.7 °F (36.5 °C)   TempSrc: Oral   SpO2: 98%   Weight: 95 kg (209 lb 6.4 oz)   Height: 158.5 cm (62.4\")   PainSc: 0-No pain     Current Status 12/21/2017   ECOG score 1       Physical Exam   Constitutional: She is oriented to person, place, and time. She appears well-developed and well-nourished. No distress.   HENT:   Head: Normocephalic.   Eyes: Conjunctivae and EOM are normal. Pupils are equal, round, and reactive to light. No scleral icterus.   Neck: Normal range of motion. Neck supple. No JVD present. No thyromegaly present.   Cardiovascular: Normal rate and regular rhythm.  Exam reveals no gallop and no friction rub.    No murmur heard.  Pulmonary/Chest: Effort normal and breath sounds normal. She has no wheezes. She has no rales.   Abdominal: Soft. She exhibits no distension and no mass. There is no tenderness.   Musculoskeletal: Normal range of motion. She exhibits no edema or deformity.   Lymphadenopathy:     She has no cervical adenopathy.   Neurological: She is alert and oriented to person, place, and time. She has normal reflexes. No cranial nerve deficit.   Skin: Skin is warm and dry. No rash noted. No erythema.   Sun damaged skin and senile purpura   Psychiatric: She has a normal mood and affect. Her behavior is normal. Judgment normal.       RECENT LABS:  Hematology WBC   Date Value Ref Range Status   12/21/2017 4.16 4.00 - 10.00 10*3/mm3 Final     RBC   Date Value Ref Range Status   12/21/2017 3.03 (L) 3.90 - 5.00 10*6/mm3 Final     Hemoglobin   Date Value Ref Range Status   12/21/2017 10.2 (L) 11.5 - 14.9 g/dL Final     Hematocrit   Date Value Ref Range " Status   12/21/2017 31.0 (L) 34.0 - 45.0 % Final     Platelets   Date Value Ref Range Status   12/21/2017 76 (L) 150 - 375 10*3/mm3 Final          Lab Results   Component Value Date    IRON 117 08/03/2017    TIBC 342 08/03/2017    FERRITIN 112.50 08/03/2017       Assessment/Plan     1.  Pancytopenia due to underlying cirrhosis of the liver and hypersplenism. This is a chronic process and has been relatively stable for several years.  As noted above, she had a normal appearing bone marrow examination in 2011.  2.  Component of anemia of chronic renal insufficiency stage IV.    3.  Iron deficiency Previously with excellent response to IV Feraheme.  Her hemoglobin is down a bit today and we suspect she may need more iron but her iron studies from today are still pending.    Plan  1.  We will review the results of her iron panel and ferritin when they become available.  If she is again becoming iron deficient, we will contact her to schedule to doses of IV Feraheme as an outpatient.  2.  We will schedule an M.D. follow-up in 4 months and we'll ask that she return for labs one week prior to that visit to include a CBC and iron studies.

## 2018-01-01 ENCOUNTER — OFFICE VISIT (OUTPATIENT)
Dept: ONCOLOGY | Facility: CLINIC | Age: 83
End: 2018-01-01

## 2018-01-01 ENCOUNTER — HOSPITAL ENCOUNTER (EMERGENCY)
Facility: HOSPITAL | Age: 83
Discharge: HOME | End: 2018-11-04
Attending: EMERGENCY MEDICINE | Admitting: EMERGENCY MEDICINE

## 2018-01-01 ENCOUNTER — OFFICE (OUTPATIENT)
Dept: URBAN - METROPOLITAN AREA CLINIC 66 | Facility: CLINIC | Age: 83
End: 2018-01-01
Payer: OTHER GOVERNMENT

## 2018-01-01 ENCOUNTER — HOSPITAL ENCOUNTER (INPATIENT)
Facility: HOSPITAL | Age: 83
LOS: 4 days | Discharge: HOME HEALTH | End: 2018-08-06
Attending: EMERGENCY MEDICINE | Admitting: INTERNAL MEDICINE

## 2018-01-01 ENCOUNTER — READMISSION MANAGEMENT (OUTPATIENT)
Dept: CALL CENTER | Facility: HOSPITAL | Age: 83
End: 2018-01-01

## 2018-01-01 ENCOUNTER — APPOINTMENT (OUTPATIENT)
Dept: CT IMAGING | Facility: HOSPITAL | Age: 83
End: 2018-01-01

## 2018-01-01 ENCOUNTER — EPISODE CHANGES (OUTPATIENT)
Dept: CASE MANAGEMENT | Facility: OTHER | Age: 83
End: 2018-01-01

## 2018-01-01 ENCOUNTER — INPATIENT HOSPITAL (OUTPATIENT)
Dept: URBAN - METROPOLITAN AREA HOSPITAL 113 | Facility: HOSPITAL | Age: 83
End: 2018-01-01
Payer: OTHER GOVERNMENT

## 2018-01-01 ENCOUNTER — LAB (OUTPATIENT)
Dept: LAB | Facility: HOSPITAL | Age: 83
End: 2018-01-01

## 2018-01-01 ENCOUNTER — APPOINTMENT (OUTPATIENT)
Dept: LAB | Facility: HOSPITAL | Age: 83
End: 2018-01-01

## 2018-01-01 ENCOUNTER — APPOINTMENT (OUTPATIENT)
Dept: GENERAL RADIOLOGY | Facility: HOSPITAL | Age: 83
End: 2018-01-01

## 2018-01-01 ENCOUNTER — HOSPITAL ENCOUNTER (EMERGENCY)
Facility: HOSPITAL | Age: 83
Discharge: LEFT BEFORE DISCHARGE | End: 2018-07-26
Attending: EMERGENCY MEDICINE | Admitting: EMERGENCY MEDICINE

## 2018-01-01 VITALS
SYSTOLIC BLOOD PRESSURE: 165 MMHG | HEIGHT: 63 IN | HEART RATE: 83 BPM | WEIGHT: 211.9 LBS | TEMPERATURE: 98.7 F | DIASTOLIC BLOOD PRESSURE: 84 MMHG | BODY MASS INDEX: 37.55 KG/M2 | RESPIRATION RATE: 17 BRPM | OXYGEN SATURATION: 93 %

## 2018-01-01 VITALS
SYSTOLIC BLOOD PRESSURE: 120 MMHG | HEIGHT: 63 IN | DIASTOLIC BLOOD PRESSURE: 68 MMHG | WEIGHT: 214 LBS | HEART RATE: 88 BPM

## 2018-01-01 VITALS
HEART RATE: 76 BPM | BODY MASS INDEX: 35.44 KG/M2 | RESPIRATION RATE: 16 BRPM | DIASTOLIC BLOOD PRESSURE: 56 MMHG | SYSTOLIC BLOOD PRESSURE: 160 MMHG | WEIGHT: 200 LBS | TEMPERATURE: 98.5 F | OXYGEN SATURATION: 96 % | HEIGHT: 63 IN

## 2018-01-01 VITALS
DIASTOLIC BLOOD PRESSURE: 64 MMHG | TEMPERATURE: 98.2 F | HEIGHT: 63 IN | SYSTOLIC BLOOD PRESSURE: 156 MMHG | RESPIRATION RATE: 14 BRPM | BODY MASS INDEX: 37.99 KG/M2 | HEART RATE: 80 BPM | OXYGEN SATURATION: 97 % | WEIGHT: 214.4 LBS

## 2018-01-01 VITALS
SYSTOLIC BLOOD PRESSURE: 132 MMHG | OXYGEN SATURATION: 99 % | TEMPERATURE: 98 F | HEART RATE: 72 BPM | HEIGHT: 63 IN | DIASTOLIC BLOOD PRESSURE: 58 MMHG | RESPIRATION RATE: 12 BRPM

## 2018-01-01 VITALS
HEIGHT: 65 IN | OXYGEN SATURATION: 93 % | TEMPERATURE: 98.2 F | DIASTOLIC BLOOD PRESSURE: 84 MMHG | BODY MASS INDEX: 36.25 KG/M2 | HEART RATE: 86 BPM | RESPIRATION RATE: 18 BRPM | WEIGHT: 217.59 LBS | SYSTOLIC BLOOD PRESSURE: 147 MMHG

## 2018-01-01 DIAGNOSIS — K74.60 CIRRHOSIS OF LIVER WITHOUT ASCITES, UNSPECIFIED HEPATIC CIRRHOSIS TYPE (HCC): ICD-10-CM

## 2018-01-01 DIAGNOSIS — D73.9 SPLENIC PANCYTOPENIA SYNDROME (HCC): ICD-10-CM

## 2018-01-01 DIAGNOSIS — D72.819 LEUKOPENIA, UNSPECIFIED TYPE: ICD-10-CM

## 2018-01-01 DIAGNOSIS — E61.1 IRON DEFICIENCY: ICD-10-CM

## 2018-01-01 DIAGNOSIS — N18.4 ANEMIA OF CHRONIC RENAL FAILURE, STAGE 4 (SEVERE) (HCC): ICD-10-CM

## 2018-01-01 DIAGNOSIS — K74.60 UNSPECIFIED CIRRHOSIS OF LIVER: ICD-10-CM

## 2018-01-01 DIAGNOSIS — K76.82 HEPATIC ENCEPHALOPATHY (HCC): Primary | ICD-10-CM

## 2018-01-01 DIAGNOSIS — D61.818 SPLENIC PANCYTOPENIA SYNDROME (HCC): ICD-10-CM

## 2018-01-01 DIAGNOSIS — N17.9 AKI (ACUTE KIDNEY INJURY) (HCC): ICD-10-CM

## 2018-01-01 DIAGNOSIS — F03.90 DEMENTIA WITHOUT BEHAVIORAL DISTURBANCE, UNSPECIFIED DEMENTIA TYPE: Primary | ICD-10-CM

## 2018-01-01 DIAGNOSIS — S91.302A OPEN WOUND OF LEFT HEEL, INITIAL ENCOUNTER: ICD-10-CM

## 2018-01-01 DIAGNOSIS — E87.20 LACTIC ACIDOSIS: ICD-10-CM

## 2018-01-01 DIAGNOSIS — D63.1 ANEMIA OF CHRONIC RENAL FAILURE, STAGE 4 (SEVERE) (HCC): ICD-10-CM

## 2018-01-01 DIAGNOSIS — N18.9 CHRONIC KIDNEY DISEASE, UNSPECIFIED CKD STAGE: ICD-10-CM

## 2018-01-01 DIAGNOSIS — N18.4 ANEMIA OF CHRONIC RENAL FAILURE, STAGE 4 (SEVERE) (HCC): Primary | ICD-10-CM

## 2018-01-01 DIAGNOSIS — K74.69 OTHER CIRRHOSIS OF LIVER: ICD-10-CM

## 2018-01-01 DIAGNOSIS — D69.6 THROMBOCYTOPENIA (HCC): ICD-10-CM

## 2018-01-01 DIAGNOSIS — K75.81 NONALCOHOLIC STEATOHEPATITIS (NASH): ICD-10-CM

## 2018-01-01 DIAGNOSIS — D63.1 ANEMIA OF CHRONIC RENAL FAILURE, STAGE 4 (SEVERE) (HCC): Primary | ICD-10-CM

## 2018-01-01 DIAGNOSIS — D69.6 THROMBOCYTOPENIA (HCC): Primary | ICD-10-CM

## 2018-01-01 DIAGNOSIS — K72.90 HEPATIC FAILURE, UNSPECIFIED WITHOUT COMA: ICD-10-CM

## 2018-01-01 DIAGNOSIS — K76.82 HEPATIC ENCEPHALOPATHY (HCC): ICD-10-CM

## 2018-01-01 DIAGNOSIS — Z74.09 IMPAIRED FUNCTIONAL MOBILITY AND ACTIVITY TOLERANCE: ICD-10-CM

## 2018-01-01 DIAGNOSIS — R41.0 CONFUSION: ICD-10-CM

## 2018-01-01 DIAGNOSIS — N39.0 ACUTE UTI: Primary | ICD-10-CM

## 2018-01-01 DIAGNOSIS — E72.20 HYPERAMMONEMIA (HCC): ICD-10-CM

## 2018-01-01 LAB
ALBUMIN SERPL-MCNC: 2.7 G/DL (ref 3.5–5.2)
ALBUMIN SERPL-MCNC: 2.9 G/DL (ref 3.5–5.2)
ALBUMIN SERPL-MCNC: 3.2 G/DL (ref 3.5–5.2)
ALBUMIN/GLOB SERPL: 0.6 G/DL
ALBUMIN/GLOB SERPL: 0.8 G/DL
ALBUMIN/GLOB SERPL: 0.9 G/DL
ALP SERPL-CCNC: 104 U/L (ref 39–117)
ALP SERPL-CCNC: 133 U/L (ref 39–117)
ALP SERPL-CCNC: 141 U/L (ref 39–117)
ALP SERPL-CCNC: 188 U/L (ref 39–117)
ALP SERPL-CCNC: 90 U/L (ref 39–117)
ALT SERPL W P-5'-P-CCNC: 17 U/L (ref 1–33)
ALT SERPL W P-5'-P-CCNC: 18 U/L (ref 1–33)
ALT SERPL W P-5'-P-CCNC: 20 U/L (ref 1–33)
ALT SERPL W P-5'-P-CCNC: 20 U/L (ref 1–33)
ALT SERPL W P-5'-P-CCNC: 22 U/L (ref 1–33)
AMMONIA BLD-SCNC: 106 UMOL/L (ref 11–51)
AMMONIA BLD-SCNC: 114 UMOL/L (ref 11–51)
AMMONIA BLD-SCNC: 45 UMOL/L (ref 11–51)
AMMONIA BLD-SCNC: 75 UMOL/L (ref 11–51)
AMMONIA BLD-SCNC: 77 UMOL/L (ref 11–51)
ANION GAP SERPL CALCULATED.3IONS-SCNC: 12.7 MMOL/L
ANION GAP SERPL CALCULATED.3IONS-SCNC: 14.3 MMOL/L
ANION GAP SERPL CALCULATED.3IONS-SCNC: 14.4 MMOL/L
ANION GAP SERPL CALCULATED.3IONS-SCNC: 14.6 MMOL/L
ANION GAP SERPL CALCULATED.3IONS-SCNC: 14.7 MMOL/L
ANION GAP SERPL CALCULATED.3IONS-SCNC: 15.8 MMOL/L
ANION GAP SERPL CALCULATED.3IONS-SCNC: 17 MMOL/L
ANION GAP SERPL CALCULATED.3IONS-SCNC: 18.2 MMOL/L
APTT PPP: 34 SECONDS (ref 22.7–35.4)
AST SERPL-CCNC: 33 U/L (ref 1–32)
AST SERPL-CCNC: 38 U/L (ref 1–32)
AST SERPL-CCNC: 39 U/L (ref 1–32)
AST SERPL-CCNC: 41 U/L (ref 1–32)
AST SERPL-CCNC: 42 U/L (ref 1–32)
BACTERIA SPEC AEROBE CULT: ABNORMAL
BACTERIA SPEC AEROBE CULT: NO GROWTH
BACTERIA SPEC AEROBE CULT: NO GROWTH
BACTERIA UR QL AUTO: ABNORMAL /HPF
BACTERIA UR QL AUTO: NORMAL /HPF
BACTERIA UR QL AUTO: NORMAL /HPF
BASOPHILS # BLD AUTO: 0.01 10*3/MM3 (ref 0–0.2)
BASOPHILS # BLD AUTO: 0.02 10*3/MM3 (ref 0–0.1)
BASOPHILS NFR BLD AUTO: 0.2 % (ref 0–1.5)
BASOPHILS NFR BLD AUTO: 0.2 % (ref 0–1.5)
BASOPHILS NFR BLD AUTO: 0.3 % (ref 0–1.5)
BASOPHILS NFR BLD AUTO: 0.3 % (ref 0–1.5)
BASOPHILS NFR BLD AUTO: 0.4 % (ref 0–1.1)
BILIRUB SERPL-MCNC: 1.1 MG/DL (ref 0.1–1.2)
BILIRUB SERPL-MCNC: 1.1 MG/DL (ref 0.1–1.2)
BILIRUB SERPL-MCNC: 1.2 MG/DL (ref 0.1–1.2)
BILIRUB SERPL-MCNC: 1.2 MG/DL (ref 0.1–1.2)
BILIRUB SERPL-MCNC: 1.3 MG/DL (ref 0.1–1.2)
BILIRUB UR QL STRIP: NEGATIVE
BUN BLD-MCNC: 33 MG/DL (ref 8–23)
BUN BLD-MCNC: 34 MG/DL (ref 8–23)
BUN BLD-MCNC: 37 MG/DL (ref 8–23)
BUN BLD-MCNC: 42 MG/DL (ref 8–23)
BUN BLD-MCNC: 53 MG/DL (ref 8–23)
BUN BLD-MCNC: 55 MG/DL (ref 8–23)
BUN BLD-MCNC: 58 MG/DL (ref 8–23)
BUN BLD-MCNC: 58 MG/DL (ref 8–23)
BUN/CREAT SERPL: 12.9 (ref 7–25)
BUN/CREAT SERPL: 13.8 (ref 7–25)
BUN/CREAT SERPL: 14.6 (ref 7–25)
BUN/CREAT SERPL: 14.6 (ref 7–25)
BUN/CREAT SERPL: 15 (ref 7–25)
BUN/CREAT SERPL: 15.3 (ref 7–25)
BUN/CREAT SERPL: 15.9 (ref 7–25)
BUN/CREAT SERPL: 9.5 (ref 7–25)
CALCIUM SPEC-SCNC: 8.1 MG/DL (ref 8.6–10.5)
CALCIUM SPEC-SCNC: 8.5 MG/DL (ref 8.6–10.5)
CALCIUM SPEC-SCNC: 8.6 MG/DL (ref 8.6–10.5)
CALCIUM SPEC-SCNC: 8.8 MG/DL (ref 8.6–10.5)
CALCIUM SPEC-SCNC: 8.9 MG/DL (ref 8.6–10.5)
CALCIUM SPEC-SCNC: 9.5 MG/DL (ref 8.6–10.5)
CALCIUM SPEC-SCNC: 9.5 MG/DL (ref 8.6–10.5)
CALCIUM SPEC-SCNC: 9.6 MG/DL (ref 8.6–10.5)
CHLORIDE SERPL-SCNC: 101 MMOL/L (ref 98–107)
CHLORIDE SERPL-SCNC: 103 MMOL/L (ref 98–107)
CHLORIDE SERPL-SCNC: 104 MMOL/L (ref 98–107)
CHLORIDE SERPL-SCNC: 105 MMOL/L (ref 98–107)
CHLORIDE SERPL-SCNC: 105 MMOL/L (ref 98–107)
CHLORIDE SERPL-SCNC: 106 MMOL/L (ref 98–107)
CHLORIDE SERPL-SCNC: 106 MMOL/L (ref 98–107)
CHLORIDE SERPL-SCNC: 112 MMOL/L (ref 98–107)
CHLORIDE UR-SCNC: 39 MMOL/L
CLARITY UR: ABNORMAL
CLARITY UR: CLEAR
CLARITY UR: CLEAR
CO2 SERPL-SCNC: 14.8 MMOL/L (ref 22–29)
CO2 SERPL-SCNC: 18.3 MMOL/L (ref 22–29)
CO2 SERPL-SCNC: 20 MMOL/L (ref 22–29)
CO2 SERPL-SCNC: 20.4 MMOL/L (ref 22–29)
CO2 SERPL-SCNC: 20.7 MMOL/L (ref 22–29)
CO2 SERPL-SCNC: 22.2 MMOL/L (ref 22–29)
CO2 SERPL-SCNC: 23.3 MMOL/L (ref 22–29)
CO2 SERPL-SCNC: 24.6 MMOL/L (ref 22–29)
COLOR UR: ABNORMAL
COLOR UR: YELLOW
COLOR UR: YELLOW
CREAT BLD-MCNC: 2.54 MG/DL (ref 0.57–1)
CREAT BLD-MCNC: 2.63 MG/DL (ref 0.57–1)
CREAT BLD-MCNC: 3.05 MG/DL (ref 0.57–1)
CREAT BLD-MCNC: 3.47 MG/DL (ref 0.57–1)
CREAT BLD-MCNC: 3.48 MG/DL (ref 0.57–1)
CREAT BLD-MCNC: 3.63 MG/DL (ref 0.57–1)
CREAT BLD-MCNC: 3.79 MG/DL (ref 0.57–1)
CREAT BLD-MCNC: 3.87 MG/DL (ref 0.57–1)
CREAT UR-MCNC: 58.3 MG/DL
D-LACTATE SERPL-SCNC: 1.8 MMOL/L (ref 0.5–2)
D-LACTATE SERPL-SCNC: 2.2 MMOL/L (ref 0.5–2)
D-LACTATE SERPL-SCNC: 2.4 MMOL/L (ref 0.5–2)
D-LACTATE SERPL-SCNC: 2.4 MMOL/L (ref 0.5–2)
D-LACTATE SERPL-SCNC: 2.5 MMOL/L (ref 0.5–2)
D-LACTATE SERPL-SCNC: 2.7 MMOL/L (ref 0.5–2)
D-LACTATE SERPL-SCNC: 2.7 MMOL/L (ref 0.5–2)
D-LACTATE SERPL-SCNC: 2.9 MMOL/L (ref 0.5–2)
DEPRECATED RDW RBC AUTO: 51.5 FL (ref 37–54)
DEPRECATED RDW RBC AUTO: 54.2 FL (ref 37–49)
DEPRECATED RDW RBC AUTO: 55.5 FL (ref 37–54)
DEPRECATED RDW RBC AUTO: 56.1 FL (ref 37–54)
DEPRECATED RDW RBC AUTO: 56.3 FL (ref 37–54)
DEPRECATED RDW RBC AUTO: 56.3 FL (ref 37–54)
DEPRECATED RDW RBC AUTO: 56.9 FL (ref 37–54)
DEPRECATED RDW RBC AUTO: 57.5 FL (ref 37–49)
EOSINOPHIL # BLD AUTO: 0.18 10*3/MM3 (ref 0–0.7)
EOSINOPHIL # BLD AUTO: 0.19 10*3/MM3 (ref 0–0.7)
EOSINOPHIL # BLD AUTO: 0.22 10*3/MM3 (ref 0–0.7)
EOSINOPHIL # BLD AUTO: 0.28 10*3/MM3 (ref 0–0.7)
EOSINOPHIL # BLD AUTO: 0.3 10*3/MM3 (ref 0–0.36)
EOSINOPHIL NFR BLD AUTO: 4.5 % (ref 0.3–6.2)
EOSINOPHIL NFR BLD AUTO: 4.7 % (ref 0.3–6.2)
EOSINOPHIL NFR BLD AUTO: 6.3 % (ref 1–5)
EOSINOPHIL NFR BLD AUTO: 7.2 % (ref 0.3–6.2)
EOSINOPHIL NFR BLD AUTO: 7.2 % (ref 0.3–6.2)
ERYTHROCYTE [DISTWIDTH] IN BLOOD BY AUTOMATED COUNT: 13.7 % (ref 11.7–13)
ERYTHROCYTE [DISTWIDTH] IN BLOOD BY AUTOMATED COUNT: 14.5 % (ref 11.7–14.5)
ERYTHROCYTE [DISTWIDTH] IN BLOOD BY AUTOMATED COUNT: 14.7 % (ref 11.7–13)
ERYTHROCYTE [DISTWIDTH] IN BLOOD BY AUTOMATED COUNT: 14.8 % (ref 11.7–13)
ERYTHROCYTE [DISTWIDTH] IN BLOOD BY AUTOMATED COUNT: 15 % (ref 11.7–13)
ERYTHROCYTE [DISTWIDTH] IN BLOOD BY AUTOMATED COUNT: 15.1 % (ref 11.7–14.5)
FERRITIN SERPL-MCNC: 136 NG/ML
FERRITIN SERPL-MCNC: 76.4 NG/ML
GFR SERPL CREATININE-BSD FRML MDRD: 11 ML/MIN/1.73
GFR SERPL CREATININE-BSD FRML MDRD: 11 ML/MIN/1.73
GFR SERPL CREATININE-BSD FRML MDRD: 12 ML/MIN/1.73
GFR SERPL CREATININE-BSD FRML MDRD: 13 ML/MIN/1.73
GFR SERPL CREATININE-BSD FRML MDRD: 13 ML/MIN/1.73
GFR SERPL CREATININE-BSD FRML MDRD: 15 ML/MIN/1.73
GFR SERPL CREATININE-BSD FRML MDRD: 17 ML/MIN/1.73
GFR SERPL CREATININE-BSD FRML MDRD: 18 ML/MIN/1.73
GFR SERPL CREATININE-BSD FRML MDRD: ABNORMAL ML/MIN/1.73
GLOBULIN UR ELPH-MCNC: 3.2 GM/DL
GLOBULIN UR ELPH-MCNC: 3.4 GM/DL
GLOBULIN UR ELPH-MCNC: 3.6 GM/DL
GLOBULIN UR ELPH-MCNC: 3.8 GM/DL
GLOBULIN UR ELPH-MCNC: 4.6 GM/DL
GLUCOSE BLD-MCNC: 190 MG/DL (ref 65–99)
GLUCOSE BLD-MCNC: 204 MG/DL (ref 65–99)
GLUCOSE BLD-MCNC: 209 MG/DL (ref 65–99)
GLUCOSE BLD-MCNC: 218 MG/DL (ref 65–99)
GLUCOSE BLD-MCNC: 223 MG/DL (ref 65–99)
GLUCOSE BLD-MCNC: 231 MG/DL (ref 65–99)
GLUCOSE BLD-MCNC: 243 MG/DL (ref 65–99)
GLUCOSE BLD-MCNC: 270 MG/DL (ref 65–99)
GLUCOSE BLDC GLUCOMTR-MCNC: 210 MG/DL (ref 70–130)
GLUCOSE BLDC GLUCOMTR-MCNC: 214 MG/DL (ref 70–130)
GLUCOSE BLDC GLUCOMTR-MCNC: 227 MG/DL (ref 70–130)
GLUCOSE BLDC GLUCOMTR-MCNC: 233 MG/DL (ref 70–130)
GLUCOSE BLDC GLUCOMTR-MCNC: 239 MG/DL (ref 70–130)
GLUCOSE BLDC GLUCOMTR-MCNC: 243 MG/DL (ref 70–130)
GLUCOSE BLDC GLUCOMTR-MCNC: 247 MG/DL (ref 70–130)
GLUCOSE BLDC GLUCOMTR-MCNC: 247 MG/DL (ref 70–130)
GLUCOSE BLDC GLUCOMTR-MCNC: 249 MG/DL (ref 70–130)
GLUCOSE BLDC GLUCOMTR-MCNC: 256 MG/DL (ref 70–130)
GLUCOSE BLDC GLUCOMTR-MCNC: 259 MG/DL (ref 70–130)
GLUCOSE BLDC GLUCOMTR-MCNC: 267 MG/DL (ref 70–130)
GLUCOSE BLDC GLUCOMTR-MCNC: 286 MG/DL (ref 70–130)
GLUCOSE BLDC GLUCOMTR-MCNC: 301 MG/DL (ref 70–130)
GLUCOSE BLDC GLUCOMTR-MCNC: 302 MG/DL (ref 70–130)
GLUCOSE BLDC GLUCOMTR-MCNC: 302 MG/DL (ref 70–130)
GLUCOSE BLDC GLUCOMTR-MCNC: 310 MG/DL (ref 70–130)
GLUCOSE BLDC GLUCOMTR-MCNC: 336 MG/DL (ref 70–130)
GLUCOSE BLDC GLUCOMTR-MCNC: 352 MG/DL (ref 70–130)
GLUCOSE BLDC GLUCOMTR-MCNC: 354 MG/DL (ref 70–130)
GLUCOSE UR STRIP-MCNC: ABNORMAL MG/DL
GLUCOSE UR STRIP-MCNC: NEGATIVE MG/DL
GLUCOSE UR STRIP-MCNC: NEGATIVE MG/DL
HBA1C MFR BLD: 8.6 % (ref 4.8–5.6)
HCT VFR BLD AUTO: 24.9 % (ref 35.6–45.5)
HCT VFR BLD AUTO: 25.4 % (ref 35.6–45.5)
HCT VFR BLD AUTO: 28.2 % (ref 34–45)
HCT VFR BLD AUTO: 28.7 % (ref 34–45)
HCT VFR BLD AUTO: 29.4 % (ref 35.6–45.5)
HCT VFR BLD AUTO: 30.5 % (ref 35.6–45.5)
HCT VFR BLD AUTO: 31.1 % (ref 35.6–45.5)
HCT VFR BLD AUTO: 32.2 % (ref 35.6–45.5)
HGB BLD-MCNC: 10.4 G/DL (ref 11.9–15.5)
HGB BLD-MCNC: 10.5 G/DL (ref 11.9–15.5)
HGB BLD-MCNC: 10.8 G/DL (ref 11.9–15.5)
HGB BLD-MCNC: 8.2 G/DL (ref 11.9–15.5)
HGB BLD-MCNC: 8.3 G/DL (ref 11.9–15.5)
HGB BLD-MCNC: 9.1 G/DL (ref 11.5–14.9)
HGB BLD-MCNC: 9.3 G/DL (ref 11.5–14.9)
HGB BLD-MCNC: 9.6 G/DL (ref 11.9–15.5)
HGB UR QL STRIP.AUTO: ABNORMAL
HOLD SPECIMEN: NORMAL
HYALINE CASTS UR QL AUTO: ABNORMAL /LPF
HYALINE CASTS UR QL AUTO: NORMAL /LPF
HYALINE CASTS UR QL AUTO: NORMAL /LPF
IMM GRANULOCYTES # BLD: 0.01 10*3/MM3 (ref 0–0.03)
IMM GRANULOCYTES # BLD: 0.02 10*3/MM3 (ref 0–0.03)
IMM GRANULOCYTES NFR BLD: 0.2 % (ref 0–0.5)
IMM GRANULOCYTES NFR BLD: 0.2 % (ref 0–0.5)
IMM GRANULOCYTES NFR BLD: 0.3 % (ref 0–0.5)
IMM GRANULOCYTES NFR BLD: 0.3 % (ref 0–0.5)
IMM GRANULOCYTES NFR BLD: 0.4 % (ref 0–0.5)
INR PPP: 1.27 (ref 0.9–1.1)
INR PPP: 1.41 (ref 0.9–1.1)
IRON 24H UR-MRATE: 74 MCG/DL (ref 37–145)
IRON 24H UR-MRATE: 94 MCG/DL (ref 37–145)
IRON SATN MFR SERPL: 23 % (ref 14–48)
IRON SATN MFR SERPL: 32 % (ref 14–48)
KETONES UR QL STRIP: NEGATIVE
LEUKOCYTE ESTERASE UR QL STRIP.AUTO: ABNORMAL
LEUKOCYTE ESTERASE UR QL STRIP.AUTO: NEGATIVE
LEUKOCYTE ESTERASE UR QL STRIP.AUTO: NEGATIVE
LYMPHOCYTES # BLD AUTO: 0.52 10*3/MM3 (ref 0.9–4.8)
LYMPHOCYTES # BLD AUTO: 0.98 10*3/MM3 (ref 0.9–4.8)
LYMPHOCYTES # BLD AUTO: 1 10*3/MM3 (ref 0.9–4.8)
LYMPHOCYTES # BLD AUTO: 1 10*3/MM3 (ref 1–3.5)
LYMPHOCYTES # BLD AUTO: 1.11 10*3/MM3 (ref 0.9–4.8)
LYMPHOCYTES NFR BLD AUTO: 17 % (ref 19.6–45.3)
LYMPHOCYTES NFR BLD AUTO: 21.1 % (ref 20–49)
LYMPHOCYTES NFR BLD AUTO: 24.3 % (ref 19.6–45.3)
LYMPHOCYTES NFR BLD AUTO: 25.8 % (ref 19.6–45.3)
LYMPHOCYTES NFR BLD AUTO: 27.3 % (ref 19.6–45.3)
MAGNESIUM SERPL-MCNC: 2.4 MG/DL (ref 1.6–2.4)
MAGNESIUM SERPL-MCNC: 2.7 MG/DL (ref 1.6–2.4)
MCH RBC QN AUTO: 33.2 PG (ref 27–33)
MCH RBC QN AUTO: 34.3 PG (ref 26.9–32)
MCH RBC QN AUTO: 34.3 PG (ref 27–33)
MCH RBC QN AUTO: 34.4 PG (ref 26.9–32)
MCH RBC QN AUTO: 34.5 PG (ref 26.9–32)
MCH RBC QN AUTO: 34.5 PG (ref 26.9–32)
MCH RBC QN AUTO: 35.1 PG (ref 26.9–32)
MCH RBC QN AUTO: 35.5 PG (ref 26.9–32)
MCHC RBC AUTO-ENTMCNC: 32.3 G/DL (ref 32–35)
MCHC RBC AUTO-ENTMCNC: 32.4 G/DL (ref 32–35)
MCHC RBC AUTO-ENTMCNC: 32.7 G/DL (ref 32.4–36.3)
MCHC RBC AUTO-ENTMCNC: 32.7 G/DL (ref 32.4–36.3)
MCHC RBC AUTO-ENTMCNC: 32.9 G/DL (ref 32.4–36.3)
MCHC RBC AUTO-ENTMCNC: 33.5 G/DL (ref 32.4–36.3)
MCHC RBC AUTO-ENTMCNC: 33.8 G/DL (ref 32.4–36.3)
MCHC RBC AUTO-ENTMCNC: 34.1 G/DL (ref 32.4–36.3)
MCV RBC AUTO: 102.3 FL (ref 80.5–98.2)
MCV RBC AUTO: 102.9 FL (ref 83–97)
MCV RBC AUTO: 104.1 FL (ref 80.5–98.2)
MCV RBC AUTO: 104.5 FL (ref 80.5–98.2)
MCV RBC AUTO: 104.6 FL (ref 80.5–98.2)
MCV RBC AUTO: 105 FL (ref 80.5–98.2)
MCV RBC AUTO: 105.4 FL (ref 80.5–98.2)
MCV RBC AUTO: 105.9 FL (ref 83–97)
MONOCYTES # BLD AUTO: 0.31 10*3/MM3 (ref 0.2–1.2)
MONOCYTES # BLD AUTO: 0.4 10*3/MM3 (ref 0.2–1.2)
MONOCYTES # BLD AUTO: 0.43 10*3/MM3 (ref 0.2–1.2)
MONOCYTES # BLD AUTO: 0.45 10*3/MM3 (ref 0.2–1.2)
MONOCYTES # BLD AUTO: 0.47 10*3/MM3 (ref 0.25–0.8)
MONOCYTES NFR BLD AUTO: 10.3 % (ref 5–12)
MONOCYTES NFR BLD AUTO: 11.1 % (ref 5–12)
MONOCYTES NFR BLD AUTO: 14.1 % (ref 5–12)
MONOCYTES NFR BLD AUTO: 7.7 % (ref 5–12)
MONOCYTES NFR BLD AUTO: 9.9 % (ref 4–12)
NEUTROPHILS # BLD AUTO: 1.87 10*3/MM3 (ref 1.9–8.1)
NEUTROPHILS # BLD AUTO: 2.19 10*3/MM3 (ref 1.9–8.1)
NEUTROPHILS # BLD AUTO: 2.3 10*3/MM3 (ref 1.9–8.1)
NEUTROPHILS # BLD AUTO: 2.56 10*3/MM3 (ref 1.9–8.1)
NEUTROPHILS # BLD AUTO: 2.93 10*3/MM3 (ref 1.5–7)
NEUTROPHILS NFR BLD AUTO: 56.4 % (ref 42.7–76)
NEUTROPHILS NFR BLD AUTO: 56.7 % (ref 42.7–76)
NEUTROPHILS NFR BLD AUTO: 61.4 % (ref 42.7–76)
NEUTROPHILS NFR BLD AUTO: 61.9 % (ref 39–75)
NEUTROPHILS NFR BLD AUTO: 63.3 % (ref 42.7–76)
NITRITE UR QL STRIP: NEGATIVE
NRBC BLD MANUAL-RTO: 0 /100 WBC (ref 0–0)
NT-PROBNP SERPL-MCNC: 691.4 PG/ML (ref 0–1800)
PH UR STRIP.AUTO: 5.5 [PH] (ref 5–8)
PH UR STRIP.AUTO: 6.5 [PH] (ref 5–8)
PH UR STRIP.AUTO: 7.5 [PH] (ref 5–8)
PHOSPHATE SERPL-MCNC: 3 MG/DL (ref 2.5–4.5)
PLATELET # BLD AUTO: 48 10*3/MM3 (ref 140–500)
PLATELET # BLD AUTO: 52 10*3/MM3 (ref 140–500)
PLATELET # BLD AUTO: 56 10*3/MM3 (ref 140–500)
PLATELET # BLD AUTO: 58 10*3/MM3 (ref 140–500)
PLATELET # BLD AUTO: 63 10*3/MM3 (ref 150–375)
PLATELET # BLD AUTO: 64 10*3/MM3 (ref 140–500)
PLATELET # BLD AUTO: 65 10*3/MM3 (ref 150–375)
PLATELET # BLD AUTO: 87 10*3/MM3 (ref 140–500)
PMV BLD AUTO: 10 FL (ref 6–12)
PMV BLD AUTO: 10 FL (ref 6–12)
PMV BLD AUTO: 10 FL (ref 8.9–12.1)
PMV BLD AUTO: 10.3 FL (ref 6–12)
PMV BLD AUTO: 10.3 FL (ref 6–12)
PMV BLD AUTO: 10.6 FL (ref 6–12)
PMV BLD AUTO: 10.9 FL (ref 6–12)
PMV BLD AUTO: 9.1 FL (ref 8.9–12.1)
POTASSIUM BLD-SCNC: 3.5 MMOL/L (ref 3.5–5.2)
POTASSIUM BLD-SCNC: 3.6 MMOL/L (ref 3.5–5.2)
POTASSIUM BLD-SCNC: 3.8 MMOL/L (ref 3.5–5.2)
POTASSIUM BLD-SCNC: 4.2 MMOL/L (ref 3.5–5.2)
POTASSIUM BLD-SCNC: 4.2 MMOL/L (ref 3.5–5.2)
POTASSIUM BLD-SCNC: 4.4 MMOL/L (ref 3.5–5.2)
POTASSIUM BLD-SCNC: 4.8 MMOL/L (ref 3.5–5.2)
POTASSIUM BLD-SCNC: 5 MMOL/L (ref 3.5–5.2)
PROCALCITONIN SERPL-MCNC: 0.24 NG/ML (ref 0.1–0.25)
PROT SERPL-MCNC: 5.9 G/DL (ref 6–8.5)
PROT SERPL-MCNC: 6.3 G/DL (ref 6–8.5)
PROT SERPL-MCNC: 6.5 G/DL (ref 6–8.5)
PROT SERPL-MCNC: 7 G/DL (ref 6–8.5)
PROT SERPL-MCNC: 7.5 G/DL (ref 6–8.5)
PROT UR QL STRIP: ABNORMAL
PROT UR-MCNC: 72 MG/DL
PROT/CREAT UR: 1235 MG/G CREA (ref 0–200)
PROTHROMBIN TIME: 15.7 SECONDS (ref 11.7–14.2)
PROTHROMBIN TIME: 17 SECONDS (ref 11.7–14.2)
RBC # BLD AUTO: 2.38 10*6/MM3 (ref 3.9–5.2)
RBC # BLD AUTO: 2.42 10*6/MM3 (ref 3.9–5.2)
RBC # BLD AUTO: 2.71 10*6/MM3 (ref 3.9–5)
RBC # BLD AUTO: 2.74 10*6/MM3 (ref 3.9–5)
RBC # BLD AUTO: 2.79 10*6/MM3 (ref 3.9–5.2)
RBC # BLD AUTO: 2.93 10*6/MM3 (ref 3.9–5.2)
RBC # BLD AUTO: 3.04 10*6/MM3 (ref 3.9–5.2)
RBC # BLD AUTO: 3.08 10*6/MM3 (ref 3.9–5.2)
RBC # UR: ABNORMAL /HPF
RBC # UR: NORMAL /HPF
RBC # UR: NORMAL /HPF
REF LAB TEST METHOD: ABNORMAL
REF LAB TEST METHOD: NORMAL
REF LAB TEST METHOD: NORMAL
SODIUM BLD-SCNC: 139 MMOL/L (ref 136–145)
SODIUM BLD-SCNC: 140 MMOL/L (ref 136–145)
SODIUM BLD-SCNC: 140 MMOL/L (ref 136–145)
SODIUM BLD-SCNC: 141 MMOL/L (ref 136–145)
SODIUM BLD-SCNC: 141 MMOL/L (ref 136–145)
SODIUM BLD-SCNC: 142 MMOL/L (ref 136–145)
SODIUM BLD-SCNC: 142 MMOL/L (ref 136–145)
SODIUM BLD-SCNC: 143 MMOL/L (ref 136–145)
SODIUM UR-SCNC: 54 MMOL/L
SP GR UR STRIP: 1.01 (ref 1–1.03)
SQUAMOUS #/AREA URNS HPF: ABNORMAL /HPF
SQUAMOUS #/AREA URNS HPF: NORMAL /HPF
SQUAMOUS #/AREA URNS HPF: NORMAL /HPF
TIBC SERPL-MCNC: 290 MCG/DL (ref 249–505)
TIBC SERPL-MCNC: 328 MCG/DL (ref 249–505)
TRANSFERRIN SERPL-MCNC: 207 MG/DL (ref 200–360)
TRANSFERRIN SERPL-MCNC: 234 MG/DL (ref 200–360)
TROPONIN T SERPL-MCNC: 0.08 NG/ML (ref 0–0.03)
TROPONIN T SERPL-MCNC: 0.12 NG/ML (ref 0–0.03)
URATE SERPL-MCNC: 5.2 MG/DL (ref 2.4–5.7)
UROBILINOGEN UR QL STRIP: ABNORMAL
WBC NRBC COR # BLD: 2.41 10*3/MM3 (ref 4.5–10.7)
WBC NRBC COR # BLD: 3.05 10*3/MM3 (ref 4.5–10.7)
WBC NRBC COR # BLD: 3.29 10*3/MM3 (ref 4.5–10.7)
WBC NRBC COR # BLD: 3.88 10*3/MM3 (ref 4.5–10.7)
WBC NRBC COR # BLD: 4.04 10*3/MM3 (ref 4.5–10.7)
WBC NRBC COR # BLD: 4.06 10*3/MM3 (ref 4.5–10.7)
WBC NRBC COR # BLD: 4.09 10*3/MM3 (ref 4–10)
WBC NRBC COR # BLD: 4.74 10*3/MM3 (ref 4–10)
WBC UR QL AUTO: ABNORMAL /HPF
WBC UR QL AUTO: NORMAL /HPF
WBC UR QL AUTO: NORMAL /HPF
WHOLE BLOOD HOLD SPECIMEN: NORMAL
WHOLE BLOOD HOLD SPECIMEN: NORMAL

## 2018-01-01 PROCEDURE — 25010000002 CEFTRIAXONE PER 250 MG: Performed by: INTERNAL MEDICINE

## 2018-01-01 PROCEDURE — 85610 PROTHROMBIN TIME: CPT | Performed by: NURSE PRACTITIONER

## 2018-01-01 PROCEDURE — 83540 ASSAY OF IRON: CPT | Performed by: INTERNAL MEDICINE

## 2018-01-01 PROCEDURE — 83605 ASSAY OF LACTIC ACID: CPT | Performed by: INTERNAL MEDICINE

## 2018-01-01 PROCEDURE — 82962 GLUCOSE BLOOD TEST: CPT

## 2018-01-01 PROCEDURE — 85730 THROMBOPLASTIN TIME PARTIAL: CPT | Performed by: NURSE PRACTITIONER

## 2018-01-01 PROCEDURE — 93010 ELECTROCARDIOGRAM REPORT: CPT | Performed by: INTERNAL MEDICINE

## 2018-01-01 PROCEDURE — 80053 COMPREHEN METABOLIC PANEL: CPT | Performed by: NURSE PRACTITIONER

## 2018-01-01 PROCEDURE — 80053 COMPREHEN METABOLIC PANEL: CPT | Performed by: EMERGENCY MEDICINE

## 2018-01-01 PROCEDURE — 70450 CT HEAD/BRAIN W/O DYE: CPT

## 2018-01-01 PROCEDURE — 82140 ASSAY OF AMMONIA: CPT | Performed by: EMERGENCY MEDICINE

## 2018-01-01 PROCEDURE — 85025 COMPLETE CBC W/AUTO DIFF WBC: CPT | Performed by: INTERNAL MEDICINE

## 2018-01-01 PROCEDURE — 99285 EMERGENCY DEPT VISIT HI MDM: CPT

## 2018-01-01 PROCEDURE — 87086 URINE CULTURE/COLONY COUNT: CPT | Performed by: EMERGENCY MEDICINE

## 2018-01-01 PROCEDURE — 80048 BASIC METABOLIC PNL TOTAL CA: CPT | Performed by: INTERNAL MEDICINE

## 2018-01-01 PROCEDURE — 82140 ASSAY OF AMMONIA: CPT | Performed by: INTERNAL MEDICINE

## 2018-01-01 PROCEDURE — 85027 COMPLETE CBC AUTOMATED: CPT | Performed by: INTERNAL MEDICINE

## 2018-01-01 PROCEDURE — 83605 ASSAY OF LACTIC ACID: CPT | Performed by: EMERGENCY MEDICINE

## 2018-01-01 PROCEDURE — 84100 ASSAY OF PHOSPHORUS: CPT | Performed by: INTERNAL MEDICINE

## 2018-01-01 PROCEDURE — 84484 ASSAY OF TROPONIN QUANT: CPT | Performed by: EMERGENCY MEDICINE

## 2018-01-01 PROCEDURE — 97161 PT EVAL LOW COMPLEX 20 MIN: CPT

## 2018-01-01 PROCEDURE — 36415 COLL VENOUS BLD VENIPUNCTURE: CPT | Performed by: INTERNAL MEDICINE

## 2018-01-01 PROCEDURE — 97110 THERAPEUTIC EXERCISES: CPT

## 2018-01-01 PROCEDURE — 97166 OT EVAL MOD COMPLEX 45 MIN: CPT

## 2018-01-01 PROCEDURE — 99213 OFFICE O/P EST LOW 20 MIN: CPT | Performed by: INTERNAL MEDICINE

## 2018-01-01 PROCEDURE — 82570 ASSAY OF URINE CREATININE: CPT | Performed by: INTERNAL MEDICINE

## 2018-01-01 PROCEDURE — 83605 ASSAY OF LACTIC ACID: CPT | Performed by: NURSE PRACTITIONER

## 2018-01-01 PROCEDURE — 84156 ASSAY OF PROTEIN URINE: CPT | Performed by: INTERNAL MEDICINE

## 2018-01-01 PROCEDURE — 85025 COMPLETE CBC W/AUTO DIFF WBC: CPT | Performed by: EMERGENCY MEDICINE

## 2018-01-01 PROCEDURE — 99284 EMERGENCY DEPT VISIT MOD MDM: CPT

## 2018-01-01 PROCEDURE — 94799 UNLISTED PULMONARY SVC/PX: CPT

## 2018-01-01 PROCEDURE — 87077 CULTURE AEROBIC IDENTIFY: CPT | Performed by: INTERNAL MEDICINE

## 2018-01-01 PROCEDURE — 82728 ASSAY OF FERRITIN: CPT | Performed by: INTERNAL MEDICINE

## 2018-01-01 PROCEDURE — 97530 THERAPEUTIC ACTIVITIES: CPT

## 2018-01-01 PROCEDURE — 63710000001 INSULIN ASPART PER 5 UNITS: Performed by: INTERNAL MEDICINE

## 2018-01-01 PROCEDURE — 85610 PROTHROMBIN TIME: CPT | Performed by: INTERNAL MEDICINE

## 2018-01-01 PROCEDURE — 83735 ASSAY OF MAGNESIUM: CPT | Performed by: NURSE PRACTITIONER

## 2018-01-01 PROCEDURE — 84145 PROCALCITONIN (PCT): CPT | Performed by: NURSE PRACTITIONER

## 2018-01-01 PROCEDURE — 93005 ELECTROCARDIOGRAM TRACING: CPT | Performed by: EMERGENCY MEDICINE

## 2018-01-01 PROCEDURE — 82436 ASSAY OF URINE CHLORIDE: CPT | Performed by: INTERNAL MEDICINE

## 2018-01-01 PROCEDURE — 80053 COMPREHEN METABOLIC PANEL: CPT | Performed by: INTERNAL MEDICINE

## 2018-01-01 PROCEDURE — 83880 ASSAY OF NATRIURETIC PEPTIDE: CPT | Performed by: NURSE PRACTITIONER

## 2018-01-01 PROCEDURE — 84466 ASSAY OF TRANSFERRIN: CPT | Performed by: INTERNAL MEDICINE

## 2018-01-01 PROCEDURE — 87186 SC STD MICRODIL/AGAR DIL: CPT | Performed by: INTERNAL MEDICINE

## 2018-01-01 PROCEDURE — 83036 HEMOGLOBIN GLYCOSYLATED A1C: CPT | Performed by: INTERNAL MEDICINE

## 2018-01-01 PROCEDURE — G0463 HOSPITAL OUTPT CLINIC VISIT: HCPCS | Performed by: INTERNAL MEDICINE

## 2018-01-01 PROCEDURE — 83735 ASSAY OF MAGNESIUM: CPT | Performed by: INTERNAL MEDICINE

## 2018-01-01 PROCEDURE — 84300 ASSAY OF URINE SODIUM: CPT | Performed by: INTERNAL MEDICINE

## 2018-01-01 PROCEDURE — 93005 ELECTROCARDIOGRAM TRACING: CPT | Performed by: NURSE PRACTITIONER

## 2018-01-01 PROCEDURE — 51702 INSERT TEMP BLADDER CATH: CPT

## 2018-01-01 PROCEDURE — 99222 1ST HOSP IP/OBS MODERATE 55: CPT | Performed by: INTERNAL MEDICINE

## 2018-01-01 PROCEDURE — 85025 COMPLETE CBC W/AUTO DIFF WBC: CPT | Performed by: NURSE PRACTITIONER

## 2018-01-01 PROCEDURE — 81001 URINALYSIS AUTO W/SCOPE: CPT | Performed by: EMERGENCY MEDICINE

## 2018-01-01 PROCEDURE — 87086 URINE CULTURE/COLONY COUNT: CPT | Performed by: INTERNAL MEDICINE

## 2018-01-01 PROCEDURE — 82140 ASSAY OF AMMONIA: CPT | Performed by: NURSE PRACTITIONER

## 2018-01-01 PROCEDURE — 99214 OFFICE O/P EST MOD 30 MIN: CPT | Performed by: INTERNAL MEDICINE

## 2018-01-01 PROCEDURE — 81001 URINALYSIS AUTO W/SCOPE: CPT | Performed by: NURSE PRACTITIONER

## 2018-01-01 PROCEDURE — 71045 X-RAY EXAM CHEST 1 VIEW: CPT

## 2018-01-01 PROCEDURE — 84550 ASSAY OF BLOOD/URIC ACID: CPT | Performed by: INTERNAL MEDICINE

## 2018-01-01 PROCEDURE — 84484 ASSAY OF TROPONIN QUANT: CPT | Performed by: NURSE PRACTITIONER

## 2018-01-01 PROCEDURE — 36415 COLL VENOUS BLD VENIPUNCTURE: CPT | Performed by: EMERGENCY MEDICINE

## 2018-01-01 PROCEDURE — 99212 OFFICE O/P EST SF 10 MIN: CPT | Performed by: INTERNAL MEDICINE

## 2018-01-01 PROCEDURE — 25010000002 CEFTRIAXONE PER 250 MG: Performed by: EMERGENCY MEDICINE

## 2018-01-01 RX ORDER — SULFAMETHOXAZOLE AND TRIMETHOPRIM 800; 160 MG/1; MG/1
1 TABLET ORAL EVERY 12 HOURS SCHEDULED
Status: DISCONTINUED | OUTPATIENT
Start: 2018-01-01 | End: 2018-01-01 | Stop reason: HOSPADM

## 2018-01-01 RX ORDER — ACETAMINOPHEN 325 MG/1
325 TABLET ORAL EVERY 8 HOURS PRN
Status: DISCONTINUED | OUTPATIENT
Start: 2018-01-01 | End: 2018-01-01 | Stop reason: HOSPADM

## 2018-01-01 RX ORDER — FERROUS SULFATE 325(65) MG
325 TABLET ORAL
Status: DISCONTINUED | OUTPATIENT
Start: 2018-01-01 | End: 2018-01-01 | Stop reason: HOSPADM

## 2018-01-01 RX ORDER — CYCLOBENZAPRINE HCL 10 MG
5 TABLET ORAL NIGHTLY
Status: DISCONTINUED | OUTPATIENT
Start: 2018-01-01 | End: 2018-01-01 | Stop reason: HOSPADM

## 2018-01-01 RX ORDER — ONDANSETRON 4 MG/1
4 TABLET, FILM COATED ORAL EVERY 4 HOURS PRN
Status: DISCONTINUED | OUTPATIENT
Start: 2018-01-01 | End: 2018-01-01 | Stop reason: HOSPADM

## 2018-01-01 RX ORDER — QUETIAPINE FUMARATE 50 MG/1
50 TABLET, FILM COATED ORAL NIGHTLY
Status: DISCONTINUED | OUTPATIENT
Start: 2018-01-01 | End: 2018-01-01 | Stop reason: HOSPADM

## 2018-01-01 RX ORDER — LACTULOSE 10 G/15ML
30 SOLUTION ORAL DAILY
Status: DISCONTINUED | OUTPATIENT
Start: 2018-01-01 | End: 2018-01-01

## 2018-01-01 RX ORDER — LANOLIN ALCOHOL/MO/W.PET/CERES
325 CREAM (GRAM) TOPICAL
Status: COMPLETED | OUTPATIENT
Start: 2018-01-01 | End: 2018-01-01

## 2018-01-01 RX ORDER — LACTULOSE 10 G/15ML
30 SOLUTION ORAL 2 TIMES DAILY
Status: DISCONTINUED | OUTPATIENT
Start: 2018-01-01 | End: 2018-01-01 | Stop reason: HOSPADM

## 2018-01-01 RX ORDER — DIPHENOXYLATE HYDROCHLORIDE AND ATROPINE SULFATE 2.5; .025 MG/1; MG/1
1 TABLET ORAL DAILY
Status: DISCONTINUED | OUTPATIENT
Start: 2018-01-01 | End: 2018-01-01 | Stop reason: HOSPADM

## 2018-01-01 RX ORDER — CEFTRIAXONE SODIUM 1 G/50ML
1 INJECTION, SOLUTION INTRAVENOUS ONCE
Status: COMPLETED | OUTPATIENT
Start: 2018-01-01 | End: 2018-01-01

## 2018-01-01 RX ORDER — SODIUM CHLORIDE 0.9 % (FLUSH) 0.9 %
10 SYRINGE (ML) INJECTION AS NEEDED
Status: DISCONTINUED | OUTPATIENT
Start: 2018-01-01 | End: 2018-01-01 | Stop reason: HOSPADM

## 2018-01-01 RX ORDER — DEXTROSE MONOHYDRATE 25 G/50ML
25 INJECTION, SOLUTION INTRAVENOUS
Status: DISCONTINUED | OUTPATIENT
Start: 2018-01-01 | End: 2018-01-01 | Stop reason: HOSPADM

## 2018-01-01 RX ORDER — ALLOPURINOL 300 MG/1
150 TABLET ORAL DAILY
Qty: 15 TABLET | Refills: 0 | Status: SHIPPED | OUTPATIENT
Start: 2018-01-01 | End: 2019-01-01 | Stop reason: DRUGHIGH

## 2018-01-01 RX ORDER — SODIUM CHLORIDE 9 MG/ML
125 INJECTION, SOLUTION INTRAVENOUS CONTINUOUS
Status: DISCONTINUED | OUTPATIENT
Start: 2018-01-01 | End: 2018-01-01

## 2018-01-01 RX ORDER — NICOTINE POLACRILEX 4 MG
15 LOZENGE BUCCAL
Status: DISCONTINUED | OUTPATIENT
Start: 2018-01-01 | End: 2018-01-01 | Stop reason: HOSPADM

## 2018-01-01 RX ORDER — CEFTRIAXONE SODIUM 1 G/50ML
1 INJECTION, SOLUTION INTRAVENOUS EVERY 24 HOURS
Status: DISCONTINUED | OUTPATIENT
Start: 2018-01-01 | End: 2018-01-01

## 2018-01-01 RX ORDER — PNV NO.95/FERROUS FUM/FOLIC AC 28MG-0.8MG
TABLET ORAL DAILY
COMMUNITY
End: 2019-01-01

## 2018-01-01 RX ORDER — MELATONIN
1000 DAILY
Status: DISCONTINUED | OUTPATIENT
Start: 2018-01-01 | End: 2018-01-01 | Stop reason: HOSPADM

## 2018-01-01 RX ORDER — BUMETANIDE 2 MG/1
2 TABLET ORAL 2 TIMES DAILY
Status: DISCONTINUED | OUTPATIENT
Start: 2018-01-01 | End: 2018-01-01 | Stop reason: HOSPADM

## 2018-01-01 RX ORDER — SODIUM CHLORIDE 0.9 % (FLUSH) 0.9 %
1-10 SYRINGE (ML) INJECTION AS NEEDED
Status: DISCONTINUED | OUTPATIENT
Start: 2018-01-01 | End: 2018-01-01 | Stop reason: HOSPADM

## 2018-01-01 RX ORDER — ALLOPURINOL 300 MG/1
150 TABLET ORAL DAILY
Status: DISCONTINUED | OUTPATIENT
Start: 2018-01-01 | End: 2018-01-01 | Stop reason: HOSPADM

## 2018-01-01 RX ORDER — QUETIAPINE FUMARATE 25 MG/1
25 TABLET, FILM COATED ORAL NIGHTLY
Status: DISCONTINUED | OUTPATIENT
Start: 2018-01-01 | End: 2018-01-01

## 2018-01-01 RX ORDER — POTASSIUM CHLORIDE 750 MG/1
20 CAPSULE, EXTENDED RELEASE ORAL ONCE
Status: COMPLETED | OUTPATIENT
Start: 2018-01-01 | End: 2018-01-01

## 2018-01-01 RX ORDER — ONDANSETRON 4 MG/1
4 TABLET, FILM COATED ORAL AS NEEDED
COMMUNITY
End: 2019-01-01

## 2018-01-01 RX ORDER — OLANZAPINE 5 MG/1
5 TABLET ORAL 2 TIMES DAILY PRN
Status: DISCONTINUED | OUTPATIENT
Start: 2018-01-01 | End: 2018-01-01

## 2018-01-01 RX ORDER — POTASSIUM CHLORIDE 750 MG/1
10 CAPSULE, EXTENDED RELEASE ORAL
Status: DISCONTINUED | OUTPATIENT
Start: 2018-01-01 | End: 2018-01-01 | Stop reason: HOSPADM

## 2018-01-01 RX ORDER — ALLOPURINOL 100 MG/1
100 TABLET ORAL 2 TIMES DAILY
Status: ON HOLD | COMMUNITY
End: 2018-01-01

## 2018-01-01 RX ORDER — SULFAMETHOXAZOLE AND TRIMETHOPRIM 800; 160 MG/1; MG/1
1 TABLET ORAL EVERY 12 HOURS SCHEDULED
Qty: 6 TABLET | Refills: 0 | Status: SHIPPED | OUTPATIENT
Start: 2018-01-01 | End: 2018-01-01

## 2018-01-01 RX ORDER — BUMETANIDE 2 MG/1
2 TABLET ORAL 2 TIMES DAILY
Qty: 60 TABLET | Refills: 0 | Status: SHIPPED | OUTPATIENT
Start: 2018-01-01

## 2018-01-01 RX ORDER — LORAZEPAM 1 MG/1
1 TABLET ORAL EVERY 6 HOURS PRN
Status: DISCONTINUED | OUTPATIENT
Start: 2018-01-01 | End: 2018-01-01 | Stop reason: HOSPADM

## 2018-01-01 RX ORDER — FERROUS SULFATE 325(65) MG
325 TABLET ORAL
Status: DISCONTINUED | OUTPATIENT
Start: 2018-01-01 | End: 2018-01-01

## 2018-01-01 RX ADMIN — SODIUM CHLORIDE 500 ML: 9 INJECTION, SOLUTION INTRAVENOUS at 13:32

## 2018-01-01 RX ADMIN — QUETIAPINE FUMARATE 50 MG: 50 TABLET, FILM COATED ORAL at 21:20

## 2018-01-01 RX ADMIN — BUMETANIDE 2 MG: 2 TABLET ORAL at 09:04

## 2018-01-01 RX ADMIN — CEFTRIAXONE SODIUM 1 G: 1 INJECTION, SOLUTION INTRAVENOUS at 11:40

## 2018-01-01 RX ADMIN — METOPROLOL TARTRATE 12.5 MG: 25 TABLET ORAL at 21:43

## 2018-01-01 RX ADMIN — FERROUS SULFATE TAB EC 325 MG (65 MG FE EQUIVALENT) 325 MG: 325 (65 FE) TABLET DELAYED RESPONSE at 08:50

## 2018-01-01 RX ADMIN — RIFAXIMIN 550 MG: 550 TABLET ORAL at 11:52

## 2018-01-01 RX ADMIN — CEFTRIAXONE SODIUM 1 G: 1 INJECTION, SOLUTION INTRAVENOUS at 11:39

## 2018-01-01 RX ADMIN — ALLOPURINOL 150 MG: 300 TABLET ORAL at 17:00

## 2018-01-01 RX ADMIN — CYCLOBENZAPRINE 5 MG: 10 TABLET, FILM COATED ORAL at 20:54

## 2018-01-01 RX ADMIN — INSULIN ASPART 4 UNITS: 100 INJECTION, SOLUTION INTRAVENOUS; SUBCUTANEOUS at 11:40

## 2018-01-01 RX ADMIN — ALLOPURINOL 150 MG: 300 TABLET ORAL at 09:05

## 2018-01-01 RX ADMIN — INSULIN ASPART 5 UNITS: 100 INJECTION, SOLUTION INTRAVENOUS; SUBCUTANEOUS at 11:52

## 2018-01-01 RX ADMIN — Medication 1 TABLET: at 08:20

## 2018-01-01 RX ADMIN — INSULIN ASPART 6 UNITS: 100 INJECTION, SOLUTION INTRAVENOUS; SUBCUTANEOUS at 12:24

## 2018-01-01 RX ADMIN — ALLOPURINOL 150 MG: 300 TABLET ORAL at 08:50

## 2018-01-01 RX ADMIN — POTASSIUM CHLORIDE 10 MEQ: 750 CAPSULE, EXTENDED RELEASE ORAL at 13:00

## 2018-01-01 RX ADMIN — CEFTRIAXONE SODIUM 1 G: 1 INJECTION, SOLUTION INTRAVENOUS at 12:13

## 2018-01-01 RX ADMIN — INSULIN ASPART 4 UNITS: 100 INJECTION, SOLUTION INTRAVENOUS; SUBCUTANEOUS at 09:04

## 2018-01-01 RX ADMIN — POTASSIUM CHLORIDE 20 MEQ: 750 CAPSULE, EXTENDED RELEASE ORAL at 12:35

## 2018-01-01 RX ADMIN — RIFAXIMIN 550 MG: 550 TABLET ORAL at 11:40

## 2018-01-01 RX ADMIN — ALLOPURINOL 150 MG: 300 TABLET ORAL at 08:19

## 2018-01-01 RX ADMIN — METOPROLOL TARTRATE 12.5 MG: 25 TABLET ORAL at 20:54

## 2018-01-01 RX ADMIN — POTASSIUM CHLORIDE 10 MEQ: 750 CAPSULE, EXTENDED RELEASE ORAL at 17:38

## 2018-01-01 RX ADMIN — SULFAMETHOXAZOLE AND TRIMETHOPRIM 160 MG: 800; 160 TABLET ORAL at 21:17

## 2018-01-01 RX ADMIN — VITAMIN D, TAB 1000IU (100/BT) 1000 UNITS: 25 TAB at 08:50

## 2018-01-01 RX ADMIN — CYCLOBENZAPRINE 5 MG: 10 TABLET, FILM COATED ORAL at 21:20

## 2018-01-01 RX ADMIN — QUETIAPINE FUMARATE 25 MG: 25 TABLET, FILM COATED ORAL at 21:43

## 2018-01-01 RX ADMIN — SODIUM CHLORIDE 500 ML: 9 INJECTION, SOLUTION INTRAVENOUS at 19:37

## 2018-01-01 RX ADMIN — LACTULOSE 30 G: 10 SOLUTION ORAL at 21:20

## 2018-01-01 RX ADMIN — BUMETANIDE 2 MG: 2 TABLET ORAL at 21:18

## 2018-01-01 RX ADMIN — INSULIN ASPART 4 UNITS: 100 INJECTION, SOLUTION INTRAVENOUS; SUBCUTANEOUS at 17:23

## 2018-01-01 RX ADMIN — CEFTRIAXONE SODIUM 1 G: 1 INJECTION, SOLUTION INTRAVENOUS at 11:52

## 2018-01-01 RX ADMIN — RIFAXIMIN 550 MG: 550 TABLET ORAL at 17:38

## 2018-01-01 RX ADMIN — LACTULOSE 30 G: 10 SOLUTION ORAL at 08:50

## 2018-01-01 RX ADMIN — ALLOPURINOL 150 MG: 300 TABLET ORAL at 08:21

## 2018-01-01 RX ADMIN — VITAMIN D, TAB 1000IU (100/BT) 1000 UNITS: 25 TAB at 08:20

## 2018-01-01 RX ADMIN — FERROUS SULFATE TAB EC 325 MG (65 MG FE EQUIVALENT) 325 MG: 325 (65 FE) TABLET DELAYED RESPONSE at 17:23

## 2018-01-01 RX ADMIN — LACTULOSE 30 G: 10 SOLUTION ORAL at 08:20

## 2018-01-01 RX ADMIN — METOPROLOL TARTRATE 12.5 MG: 25 TABLET ORAL at 21:18

## 2018-01-01 RX ADMIN — BUMETANIDE 2 MG: 2 TABLET ORAL at 13:00

## 2018-01-01 RX ADMIN — POTASSIUM CHLORIDE 10 MEQ: 750 CAPSULE, EXTENDED RELEASE ORAL at 09:05

## 2018-01-01 RX ADMIN — RIFAXIMIN 550 MG: 550 TABLET ORAL at 12:24

## 2018-01-01 RX ADMIN — INSULIN ASPART 3 UNITS: 100 INJECTION, SOLUTION INTRAVENOUS; SUBCUTANEOUS at 08:21

## 2018-01-01 RX ADMIN — INSULIN ASPART 3 UNITS: 100 INJECTION, SOLUTION INTRAVENOUS; SUBCUTANEOUS at 17:00

## 2018-01-01 RX ADMIN — INSULIN ASPART 3 UNITS: 100 INJECTION, SOLUTION INTRAVENOUS; SUBCUTANEOUS at 21:20

## 2018-01-01 RX ADMIN — RIFAXIMIN 550 MG: 550 TABLET ORAL at 17:23

## 2018-01-01 RX ADMIN — INSULIN ASPART 3 UNITS: 100 INJECTION, SOLUTION INTRAVENOUS; SUBCUTANEOUS at 08:19

## 2018-01-01 RX ADMIN — INSULIN ASPART 3 UNITS: 100 INJECTION, SOLUTION INTRAVENOUS; SUBCUTANEOUS at 17:57

## 2018-01-01 RX ADMIN — INSULIN ASPART 6 UNITS: 100 INJECTION, SOLUTION INTRAVENOUS; SUBCUTANEOUS at 21:17

## 2018-01-01 RX ADMIN — SODIUM CHLORIDE 100 ML/HR: 9 INJECTION, SOLUTION INTRAVENOUS at 16:59

## 2018-01-01 RX ADMIN — VITAMIN D, TAB 1000IU (100/BT) 1000 UNITS: 25 TAB at 09:04

## 2018-01-01 RX ADMIN — INSULIN ASPART 3 UNITS: 100 INJECTION, SOLUTION INTRAVENOUS; SUBCUTANEOUS at 21:32

## 2018-01-01 RX ADMIN — SODIUM CHLORIDE 1000 ML: 9 INJECTION, SOLUTION INTRAVENOUS at 12:11

## 2018-01-01 RX ADMIN — METOPROLOL TARTRATE 12.5 MG: 25 TABLET ORAL at 21:20

## 2018-01-01 RX ADMIN — Medication 1 TABLET: at 08:50

## 2018-01-01 RX ADMIN — SODIUM CHLORIDE 500 ML: 9 INJECTION, SOLUTION INTRAVENOUS at 13:31

## 2018-01-01 RX ADMIN — INSULIN ASPART 5 UNITS: 100 INJECTION, SOLUTION INTRAVENOUS; SUBCUTANEOUS at 21:44

## 2018-01-01 RX ADMIN — INSULIN ASPART 3 UNITS: 100 INJECTION, SOLUTION INTRAVENOUS; SUBCUTANEOUS at 08:50

## 2018-01-01 RX ADMIN — CYCLOBENZAPRINE 5 MG: 10 TABLET, FILM COATED ORAL at 21:17

## 2018-01-01 RX ADMIN — QUETIAPINE FUMARATE 50 MG: 50 TABLET, FILM COATED ORAL at 21:18

## 2018-01-01 RX ADMIN — LACTULOSE 30 G: 10 SOLUTION ORAL at 09:04

## 2018-01-01 RX ADMIN — SULFAMETHOXAZOLE AND TRIMETHOPRIM 160 MG: 800; 160 TABLET ORAL at 09:04

## 2018-01-01 RX ADMIN — LACTULOSE 30 G: 10 SOLUTION ORAL at 16:59

## 2018-01-01 RX ADMIN — CYCLOBENZAPRINE 5 MG: 10 TABLET, FILM COATED ORAL at 21:43

## 2018-01-01 RX ADMIN — POTASSIUM CHLORIDE 10 MEQ: 750 CAPSULE, EXTENDED RELEASE ORAL at 12:24

## 2018-01-01 RX ADMIN — LACTULOSE 30 G: 10 SOLUTION ORAL at 08:19

## 2018-01-01 RX ADMIN — Medication 1 TABLET: at 09:04

## 2018-01-01 RX ADMIN — RIFAXIMIN 550 MG: 550 TABLET ORAL at 11:39

## 2018-01-01 RX ADMIN — RIFAXIMIN 550 MG: 550 TABLET ORAL at 17:57

## 2018-01-01 RX ADMIN — VITAMIN D, TAB 1000IU (100/BT) 1000 UNITS: 25 TAB at 11:52

## 2018-01-01 RX ADMIN — INSULIN ASPART 5 UNITS: 100 INJECTION, SOLUTION INTRAVENOUS; SUBCUTANEOUS at 11:39

## 2018-01-01 RX ADMIN — LORAZEPAM 1 MG: 1 TABLET ORAL at 15:57

## 2018-01-01 RX ADMIN — QUETIAPINE FUMARATE 25 MG: 25 TABLET, FILM COATED ORAL at 20:54

## 2018-01-01 RX ADMIN — FERROUS SULFATE TAB 325 MG (65 MG ELEMENTAL FE) 325 MG: 325 (65 FE) TAB at 09:05

## 2018-01-01 RX ADMIN — INSULIN ASPART 5 UNITS: 100 INJECTION, SOLUTION INTRAVENOUS; SUBCUTANEOUS at 17:38

## 2018-01-01 RX ADMIN — FERROUS SULFATE TAB 325 MG (65 MG ELEMENTAL FE) 325 MG: 325 (65 FE) TAB at 08:00

## 2018-01-01 RX ADMIN — RIFAXIMIN 550 MG: 550 TABLET ORAL at 17:00

## 2018-01-01 RX ADMIN — Medication 1 TABLET: at 08:19

## 2018-01-01 RX ADMIN — LACTULOSE 30 G: 10 SOLUTION ORAL at 21:17

## 2018-01-17 ENCOUNTER — APPOINTMENT (OUTPATIENT)
Dept: GENERAL RADIOLOGY | Facility: HOSPITAL | Age: 83
End: 2018-01-17

## 2018-01-17 ENCOUNTER — HOSPITAL ENCOUNTER (OUTPATIENT)
Facility: HOSPITAL | Age: 83
Setting detail: OBSERVATION
LOS: 1 days | Discharge: HOME | End: 2018-01-18
Attending: FAMILY MEDICINE | Admitting: INTERNAL MEDICINE

## 2018-01-17 DIAGNOSIS — M10.071 ACUTE IDIOPATHIC GOUT OF RIGHT ANKLE: Primary | ICD-10-CM

## 2018-01-17 DIAGNOSIS — R52 INTRACTABLE PAIN: ICD-10-CM

## 2018-01-17 PROBLEM — R26.89 DECREASED MOBILITY: Status: ACTIVE | Noted: 2018-01-17
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
## 2018-01-17 LAB
ALBUMIN SERPL-MCNC: 3.2 G/DL (ref 3.5–5.2)
ALBUMIN/GLOB SERPL: 0.6 G/DL
ALP SERPL-CCNC: 124 U/L (ref 39–117)
ALT SERPL W P-5'-P-CCNC: 22 U/L (ref 1–33)
AMMONIA BLD-SCNC: 80 UMOL/L (ref 11–51)
ANION GAP SERPL CALCULATED.3IONS-SCNC: 15.4 MMOL/L
AST SERPL-CCNC: 49 U/L (ref 1–32)
BASOPHILS # BLD AUTO: 0.01 10*3/MM3 (ref 0–0.2)
BASOPHILS NFR BLD AUTO: 0.2 % (ref 0–1.5)
BILIRUB SERPL-MCNC: 2 MG/DL (ref 0.1–1.2)
BUN BLD-MCNC: 37 MG/DL (ref 8–23)
BUN/CREAT SERPL: 14.2 (ref 7–25)
CALCIUM SPEC-SCNC: 9.2 MG/DL (ref 8.6–10.5)
CHLORIDE SERPL-SCNC: 102 MMOL/L (ref 98–107)
CO2 SERPL-SCNC: 24.6 MMOL/L (ref 22–29)
CREAT BLD-MCNC: 2.61 MG/DL (ref 0.57–1)
CRP SERPL-MCNC: 4.21 MG/DL (ref 0–0.5)
DEPRECATED RDW RBC AUTO: 51.8 FL (ref 37–54)
EOSINOPHIL # BLD AUTO: 0.09 10*3/MM3 (ref 0–0.7)
EOSINOPHIL NFR BLD AUTO: 1.5 % (ref 0.3–6.2)
ERYTHROCYTE [DISTWIDTH] IN BLOOD BY AUTOMATED COUNT: 14 % (ref 11.7–13)
ERYTHROCYTE [SEDIMENTATION RATE] IN BLOOD: 49 MM/HR (ref 0–30)
GFR SERPL CREATININE-BSD FRML MDRD: 18 ML/MIN/1.73
GLOBULIN UR ELPH-MCNC: 5 GM/DL
GLUCOSE BLD-MCNC: 169 MG/DL (ref 65–99)
GLUCOSE BLDC GLUCOMTR-MCNC: 143 MG/DL (ref 70–130)
GLUCOSE BLDC GLUCOMTR-MCNC: 345 MG/DL (ref 70–130)
HCT VFR BLD AUTO: 37.7 % (ref 35.6–45.5)
HGB BLD-MCNC: 12.4 G/DL (ref 11.9–15.5)
IMM GRANULOCYTES # BLD: 0 10*3/MM3 (ref 0–0.03)
IMM GRANULOCYTES NFR BLD: 0 % (ref 0–0.5)
LYMPHOCYTES # BLD AUTO: 0.89 10*3/MM3 (ref 0.9–4.8)
LYMPHOCYTES NFR BLD AUTO: 14.8 % (ref 19.6–45.3)
MCH RBC QN AUTO: 33.4 PG (ref 26.9–32)
MCHC RBC AUTO-ENTMCNC: 32.9 G/DL (ref 32.4–36.3)
MCV RBC AUTO: 101.6 FL (ref 80.5–98.2)
MONOCYTES # BLD AUTO: 0.95 10*3/MM3 (ref 0.2–1.2)
MONOCYTES NFR BLD AUTO: 15.8 % (ref 5–12)
NEUTROPHILS # BLD AUTO: 4.07 10*3/MM3 (ref 1.9–8.1)
NEUTROPHILS NFR BLD AUTO: 67.7 % (ref 42.7–76)
PLATELET # BLD AUTO: 56 10*3/MM3 (ref 140–500)
PMV BLD AUTO: 10.7 FL (ref 6–12)
POTASSIUM BLD-SCNC: 4.2 MMOL/L (ref 3.5–5.2)
PROCALCITONIN SERPL-MCNC: 0.17 NG/ML (ref 0.1–0.25)
PROT SERPL-MCNC: 8.2 G/DL (ref 6–8.5)
RBC # BLD AUTO: 3.71 10*6/MM3 (ref 3.9–5.2)
SODIUM BLD-SCNC: 142 MMOL/L (ref 136–145)
URATE SERPL-MCNC: 12.8 MG/DL (ref 2.4–5.7)
WBC NRBC COR # BLD: 6.01 10*3/MM3 (ref 4.5–10.7)

## 2018-01-17 PROCEDURE — 82962 GLUCOSE BLOOD TEST: CPT

## 2018-01-17 PROCEDURE — 84550 ASSAY OF BLOOD/URIC ACID: CPT | Performed by: FAMILY MEDICINE

## 2018-01-17 PROCEDURE — 85025 COMPLETE CBC W/AUTO DIFF WBC: CPT | Performed by: FAMILY MEDICINE

## 2018-01-17 PROCEDURE — 63710000001 INSULIN ASPART PER 5 UNITS: Performed by: INTERNAL MEDICINE

## 2018-01-17 PROCEDURE — 80053 COMPREHEN METABOLIC PANEL: CPT | Performed by: FAMILY MEDICINE

## 2018-01-17 PROCEDURE — 25010000002 METHYLPREDNISOLONE PER 125 MG: Performed by: FAMILY MEDICINE

## 2018-01-17 PROCEDURE — 85652 RBC SED RATE AUTOMATED: CPT | Performed by: FAMILY MEDICINE

## 2018-01-17 PROCEDURE — 84145 PROCALCITONIN (PCT): CPT | Performed by: FAMILY MEDICINE

## 2018-01-17 PROCEDURE — 25010000002 ENOXAPARIN PER 10 MG: Performed by: INTERNAL MEDICINE

## 2018-01-17 PROCEDURE — 96374 THER/PROPH/DIAG INJ IV PUSH: CPT

## 2018-01-17 PROCEDURE — 96372 THER/PROPH/DIAG INJ SC/IM: CPT

## 2018-01-17 PROCEDURE — 86140 C-REACTIVE PROTEIN: CPT | Performed by: FAMILY MEDICINE

## 2018-01-17 PROCEDURE — 82140 ASSAY OF AMMONIA: CPT | Performed by: FAMILY MEDICINE

## 2018-01-17 PROCEDURE — 73610 X-RAY EXAM OF ANKLE: CPT

## 2018-01-17 PROCEDURE — 99283 EMERGENCY DEPT VISIT LOW MDM: CPT

## 2018-01-17 RX ORDER — MELATONIN
1000 DAILY
COMMUNITY

## 2018-01-17 RX ORDER — TEMAZEPAM 7.5 MG/1
7.5 CAPSULE ORAL NIGHTLY
Status: DISCONTINUED | OUTPATIENT
Start: 2018-01-17 | End: 2018-01-18 | Stop reason: HOSPADM

## 2018-01-17 RX ORDER — DEXTROSE MONOHYDRATE 25 G/50ML
25 INJECTION, SOLUTION INTRAVENOUS
Status: DISCONTINUED | OUTPATIENT
Start: 2018-01-17 | End: 2018-01-18 | Stop reason: HOSPADM

## 2018-01-17 RX ORDER — INSULIN ASPART 100 [IU]/ML
45 INJECTION, SUSPENSION SUBCUTANEOUS 2 TIMES DAILY WITH MEALS
Status: DISCONTINUED | OUTPATIENT
Start: 2018-01-17 | End: 2018-01-18 | Stop reason: HOSPADM

## 2018-01-17 RX ORDER — METHYLPREDNISOLONE SODIUM SUCCINATE 125 MG/2ML
80 INJECTION, POWDER, LYOPHILIZED, FOR SOLUTION INTRAMUSCULAR; INTRAVENOUS ONCE
Status: COMPLETED | OUTPATIENT
Start: 2018-01-17 | End: 2018-01-17

## 2018-01-17 RX ORDER — PREDNISONE 50 MG/1
50 TABLET ORAL
Status: DISCONTINUED | OUTPATIENT
Start: 2018-01-18 | End: 2018-01-18 | Stop reason: HOSPADM

## 2018-01-17 RX ORDER — NICOTINE POLACRILEX 4 MG
15 LOZENGE BUCCAL
Status: DISCONTINUED | OUTPATIENT
Start: 2018-01-17 | End: 2018-01-18 | Stop reason: HOSPADM

## 2018-01-17 RX ORDER — LACTULOSE 10 G/15ML
30 SOLUTION ORAL DAILY
Status: DISCONTINUED | OUTPATIENT
Start: 2018-01-17 | End: 2018-01-18 | Stop reason: HOSPADM

## 2018-01-17 RX ORDER — LIDOCAINE 50 MG/G
1 PATCH TOPICAL EVERY 24 HOURS
Status: DISCONTINUED | OUTPATIENT
Start: 2018-01-17 | End: 2018-01-18 | Stop reason: HOSPADM

## 2018-01-17 RX ORDER — ONDANSETRON 2 MG/ML
4 INJECTION INTRAMUSCULAR; INTRAVENOUS EVERY 6 HOURS PRN
Status: DISCONTINUED | OUTPATIENT
Start: 2018-01-17 | End: 2018-01-18 | Stop reason: HOSPADM

## 2018-01-17 RX ORDER — ACETAMINOPHEN 325 MG/1
650 TABLET ORAL EVERY 6 HOURS PRN
Status: DISCONTINUED | OUTPATIENT
Start: 2018-01-17 | End: 2018-01-18 | Stop reason: HOSPADM

## 2018-01-17 RX ORDER — CYCLOBENZAPRINE HCL 5 MG
5 TABLET ORAL NIGHTLY
Status: DISCONTINUED | OUTPATIENT
Start: 2018-01-17 | End: 2018-01-18 | Stop reason: HOSPADM

## 2018-01-17 RX ORDER — DIPHENOXYLATE HYDROCHLORIDE AND ATROPINE SULFATE 2.5; .025 MG/1; MG/1
1 TABLET ORAL DAILY
Status: DISCONTINUED | OUTPATIENT
Start: 2018-01-17 | End: 2018-01-18 | Stop reason: HOSPADM

## 2018-01-17 RX ORDER — NALOXONE HCL 0.4 MG/ML
0.4 VIAL (ML) INJECTION
Status: DISCONTINUED | OUTPATIENT
Start: 2018-01-17 | End: 2018-01-18 | Stop reason: HOSPADM

## 2018-01-17 RX ORDER — TRAMADOL HYDROCHLORIDE 50 MG/1
50 TABLET ORAL NIGHTLY
COMMUNITY

## 2018-01-17 RX ORDER — SODIUM CHLORIDE 0.9 % (FLUSH) 0.9 %
1-10 SYRINGE (ML) INJECTION AS NEEDED
Status: DISCONTINUED | OUTPATIENT
Start: 2018-01-17 | End: 2018-01-18 | Stop reason: HOSPADM

## 2018-01-17 RX ORDER — SIMETHICONE 125 MG
125 TABLET,CHEWABLE ORAL AS NEEDED
COMMUNITY
End: 2019-01-01

## 2018-01-17 RX ORDER — BUMETANIDE 2 MG/1
2 TABLET ORAL
Status: DISCONTINUED | OUTPATIENT
Start: 2018-01-18 | End: 2018-01-18 | Stop reason: HOSPADM

## 2018-01-17 RX ORDER — CALCIUM CARBONATE 200(500)MG
1 TABLET,CHEWABLE ORAL 2 TIMES DAILY PRN
Status: DISCONTINUED | OUTPATIENT
Start: 2018-01-17 | End: 2018-01-18 | Stop reason: HOSPADM

## 2018-01-17 RX ORDER — L.ACID,PARA/B.BIFIDUM/S.THERM 8B CELL
1 CAPSULE ORAL
Status: DISCONTINUED | OUTPATIENT
Start: 2018-01-17 | End: 2018-01-18 | Stop reason: HOSPADM

## 2018-01-17 RX ORDER — MELATONIN
1000 DAILY
Status: DISCONTINUED | OUTPATIENT
Start: 2018-01-17 | End: 2018-01-18 | Stop reason: HOSPADM

## 2018-01-17 RX ORDER — TRAMADOL HYDROCHLORIDE 50 MG/1
50 TABLET ORAL NIGHTLY
Status: DISCONTINUED | OUTPATIENT
Start: 2018-01-17 | End: 2018-01-18 | Stop reason: HOSPADM

## 2018-01-17 RX ORDER — THIAMINE HCL 100 MG
1000 TABLET ORAL DAILY
COMMUNITY
End: 2019-01-01

## 2018-01-17 RX ORDER — COLCHICINE 0.6 MG/1
0.3 TABLET ORAL EVERY 12 HOURS SCHEDULED
Status: DISCONTINUED | OUTPATIENT
Start: 2018-01-17 | End: 2018-01-18 | Stop reason: HOSPADM

## 2018-01-17 RX ORDER — BISACODYL 10 MG
10 SUPPOSITORY, RECTAL RECTAL DAILY PRN
Status: DISCONTINUED | OUTPATIENT
Start: 2018-01-17 | End: 2018-01-18 | Stop reason: HOSPADM

## 2018-01-17 RX ORDER — CHOLECALCIFEROL (VITAMIN D3) 125 MCG
1000 CAPSULE ORAL DAILY
Status: DISCONTINUED | OUTPATIENT
Start: 2018-01-17 | End: 2018-01-18 | Stop reason: HOSPADM

## 2018-01-17 RX ORDER — POTASSIUM CHLORIDE 20 MEQ/1
10 TABLET, EXTENDED RELEASE ORAL DAILY
COMMUNITY
End: 2019-01-01

## 2018-01-17 RX ORDER — GUAIFENESIN 600 MG/1
600 TABLET, EXTENDED RELEASE ORAL NIGHTLY
Status: DISCONTINUED | OUTPATIENT
Start: 2018-01-17 | End: 2018-01-18 | Stop reason: HOSPADM

## 2018-01-17 RX ORDER — LACTULOSE 10 G/15ML
30 SOLUTION ORAL DAILY
COMMUNITY

## 2018-01-17 RX ORDER — SIMETHICONE 125 MG
125 TABLET,CHEWABLE ORAL AS NEEDED
Status: DISCONTINUED | OUTPATIENT
Start: 2018-01-17 | End: 2018-01-18 | Stop reason: HOSPADM

## 2018-01-17 RX ORDER — HYDROMORPHONE HYDROCHLORIDE 1 MG/ML
0.25 INJECTION, SOLUTION INTRAMUSCULAR; INTRAVENOUS; SUBCUTANEOUS
Status: DISCONTINUED | OUTPATIENT
Start: 2018-01-17 | End: 2018-01-18 | Stop reason: HOSPADM

## 2018-01-17 RX ORDER — POTASSIUM CHLORIDE 750 MG/1
10 CAPSULE, EXTENDED RELEASE ORAL DAILY
Status: DISCONTINUED | OUTPATIENT
Start: 2018-01-17 | End: 2018-01-18 | Stop reason: HOSPADM

## 2018-01-17 RX ORDER — QUETIAPINE FUMARATE 25 MG/1
25 TABLET, FILM COATED ORAL NIGHTLY
Status: DISCONTINUED | OUTPATIENT
Start: 2018-01-17 | End: 2018-01-18 | Stop reason: HOSPADM

## 2018-01-17 RX ORDER — QUETIAPINE FUMARATE 25 MG/1
50 TABLET, FILM COATED ORAL NIGHTLY
COMMUNITY
End: 2019-01-01

## 2018-01-17 RX ADMIN — Medication 1 CAPSULE: at 18:34

## 2018-01-17 RX ADMIN — COLCHICINE 0.3 MG: 0.6 TABLET, FILM COATED ORAL at 15:21

## 2018-01-17 RX ADMIN — INSULIN ASPART 7 UNITS: 100 INJECTION, SOLUTION INTRAVENOUS; SUBCUTANEOUS at 21:51

## 2018-01-17 RX ADMIN — METOPROLOL TARTRATE 12.5 MG: 25 TABLET ORAL at 21:57

## 2018-01-17 RX ADMIN — RIFAXIMIN 550 MG: 550 TABLET ORAL at 18:34

## 2018-01-17 RX ADMIN — COLCHICINE 0.3 MG: 0.6 TABLET, FILM COATED ORAL at 21:59

## 2018-01-17 RX ADMIN — TEMAZEPAM 7.5 MG: 7.5 CAPSULE ORAL at 21:50

## 2018-01-17 RX ADMIN — GUAIFENESIN 600 MG: 600 TABLET, EXTENDED RELEASE ORAL at 21:57

## 2018-01-17 RX ADMIN — QUETIAPINE FUMARATE 25 MG: 25 TABLET, FILM COATED ORAL at 21:58

## 2018-01-17 RX ADMIN — POTASSIUM CHLORIDE 10 MEQ: 750 CAPSULE, EXTENDED RELEASE ORAL at 17:36

## 2018-01-17 RX ADMIN — INSULIN ASPART 45 UNITS: 100 INJECTION, SUSPENSION SUBCUTANEOUS at 21:51

## 2018-01-17 RX ADMIN — METHYLPREDNISOLONE SODIUM SUCCINATE 80 MG: 125 INJECTION, POWDER, FOR SOLUTION INTRAMUSCULAR; INTRAVENOUS at 14:41

## 2018-01-17 RX ADMIN — TRAMADOL HYDROCHLORIDE 50 MG: 50 TABLET, FILM COATED ORAL at 21:51

## 2018-01-17 RX ADMIN — ENOXAPARIN SODIUM 30 MG: 30 INJECTION SUBCUTANEOUS at 17:36

## 2018-01-17 NOTE — PROGRESS NOTES
Clinical Pharmacy Services: Medication History    Charu Bowens is a 82 y.o. female presenting to [facility] for   Chief Complaint   Patient presents with   • Ankle Pain     fall sunday       She  has a past medical history of Anxiety; Arthritis; C. difficile colitis; CHF (congestive heart failure); Chronic kidney disease; Dementia; Diabetes mellitus; Diskitis (11/2015); Hemochromatosis; History of anemia; History of blood transfusion (2015); History of pneumonia (2016); History of sepsis (2015); Hypertension; Liver disease; HOANG (nonalcoholic steatohepatitis); Pancytopenia; Spinal stenosis; Splenomegaly; Thrombocytopenia, chronic; and TIA (transient ischemic attack).    Allergies as of 01/17/2018 - Deondre as Reviewed 01/17/2018   Allergen Reaction Noted   • Ciprofloxacin  11/28/2016   • Levaquin [levofloxacin]  11/16/2016   • Promethazine hcl  11/28/2016       Medication information was obtained from: Spouse, medication list  Pharmacy and Phone Number: [facility] [phone]    Prior to Admission Medications     Prescriptions Last Dose Informant Patient Reported? Taking?    acetaminophen (TYLENOL) 650 MG 8 hr tablet  Spouse/Significant Other Yes Yes    Take 650 mg by mouth Every 8 (Eight) Hours As Needed for Mild Pain (1-3).    bumetanide (BUMEX) 2 MG tablet  Spouse/Significant Other Yes Yes    Take 2 mg by mouth Daily With Breakfast & Lunch.    cholecalciferol (VITAMIN D3) 1000 units tablet  Spouse/Significant Other Yes Yes    Take 1,000 Units by mouth Daily.    cyclobenzaprine (FLEXERIL) 5 MG tablet  Spouse/Significant Other Yes Yes    Take 5 mg by mouth Every Night.    guaiFENesin (MUCINEX) 600 MG 12 hr tablet  Spouse/Significant Other Yes Yes    Take 600 mg by mouth Every Night.    insulin NPH-insulin regular (Novolin 70/30) (70-30) 100 UNIT/ML injection  Spouse/Significant Other Yes Yes    Inject 45 Units under the skin Daily With Breakfast & Lunch.    lactulose (CHRONULAC) 10 GM/15ML solution   Spouse/Significant Other Yes Yes    Take 30 g by mouth Daily As Needed.    lidocaine (LIDODERM) 5 %  Spouse/Significant Other Yes Yes    Place 1 patch on the skin Daily. Remove & Discard patch within 12 hours or as directed by MD    metoprolol tartrate (LOPRESSOR) 25 MG tablet  Spouse/Significant Other Yes Yes    Take 12.5 mg by mouth Every Night.    Multiple Vitamin (MULTI-VITAMIN PO)  Spouse/Significant Other Yes Yes    Take 1 tablet by mouth Daily.    potassium chloride (KLOR-CON) 20 MEQ CR tablet  Spouse/Significant Other Yes Yes    Take 10 mEq by mouth 2 (Two) Times a Day.    Probiotic Product (PROBIOTIC PO)  Spouse/Significant Other Yes Yes    Take 1 tablet by mouth Daily With Lunch & Dinner.    QUEtiapine (SEROquel) 25 MG tablet  Spouse/Significant Other Yes Yes    Take 25 mg by mouth Every Night.    rifaximin (XIFAXAN) 550 MG tablet  Spouse/Significant Other Yes Yes    Take 550 mg by mouth Daily With Lunch & Dinner.    simethicone (MYLICON) 125 MG chewable tablet  Spouse/Significant Other Yes Yes    Chew 125 mg As Needed for Flatulence.    temazepam (RESTORIL) 7.5 MG capsule  Spouse/Significant Other Yes Yes    Take 7.5 mg by mouth Every Night.    traMADol (ULTRAM) 50 MG tablet  Spouse/Significant Other Yes Yes    Take 50 mg by mouth Every Night.    vitamin B-12 (CYANOCOBALAMIN) 2500 MCG sublingual tablet tablet  Spouse/Significant Other Yes Yes    Place 1,250 mcg under the tongue Daily.            Medication notes: Removed Magnesium, D/C by provider    This medication list is complete to the best of my knowledge as of 1/17/2018    Please call if questions.    Karyna Connelly, Medication History Technician  1/17/2018 3:31 PM

## 2018-01-17 NOTE — ED NOTES
Attempted to call report. Nurse will call back when availible     Imelda Lundberg, ROSALIE  01/17/18 9920

## 2018-01-17 NOTE — ED PROVIDER NOTES
EMERGENCY DEPARTMENT ENCOUNTER    CHIEF COMPLAINT  Chief Complaint: R ankle pain  History given by: pt  History limited by: nothing  Room Number: P680/1  PMD: Chayito Paredes MD      HPI:  Pt is a 82 y.o. female who presents complaining of R ankle pain which began about one week ago. The pt denies recent trauma. The pt states that her pain is worse with movement and that she in unable to ambulate. The pt denies history of gout. The pt has CKD and cirrhosis.    Duration:  One week  Onset: gradual  Timing: constant  Location: R ankle pain  Radiation: none  Quality: pain  Intensity/Severity: moderate  Progression: unchanged  Associated Symptoms: none  Aggravating Factors: movement and ambulation  Alleviating Factors: none  Previous Episodes: none  Treatment before arrival: none    PAST MEDICAL HISTORY  Active Ambulatory Problems     Diagnosis Date Noted   • Cirrhosis of liver 08/15/2016   • Hypersplenism 08/15/2016   • Splenic pancytopenia syndrome 08/15/2016   • Chronic renal disease, stage 4, severely decreased glomerular filtration rate between 15-29 mL/min/1.73 square meter 08/15/2016   • Anemia of chronic renal failure, stage 4 (severe) 09/08/2016   • Hepatic encephalopathy 11/16/2016   • Thrombocytopenia 11/17/2016   • Leukopenia 11/17/2016   • Acute kidney failure 11/17/2016   • Acute hepatic encephalopathy 11/28/2016   • DM2 (diabetes mellitus, type 2) 11/29/2016   • Iron deficiency 03/30/2017     Resolved Ambulatory Problems     Diagnosis Date Noted   • Hemochromatosis 08/15/2016   • CKD (chronic kidney disease) stage 3, GFR 30-59 ml/min 11/17/2016     Past Medical History:   Diagnosis Date   • Anxiety    • Arthritis    • C. difficile colitis    • CHF (congestive heart failure)    • Chronic kidney disease    • Dementia    • Diabetes mellitus    • Diskitis 11/2015   • Hemochromatosis    • History of anemia    • History of blood transfusion 2015   • History of pneumonia 2016   • History of sepsis 2015   •  Hypertension    • Liver disease    • HOANG (nonalcoholic steatohepatitis)    • Pancytopenia    • Spinal stenosis    • Splenomegaly    • Thrombocytopenia, chronic    • TIA (transient ischemic attack)        PAST SURGICAL HISTORY  Past Surgical History:   Procedure Laterality Date   • ABDOMINAL SURGERY     • KNEE SURGERY     • RENAL BIOPSY  10/2015   • TONSILLECTOMY     • WISDOM TOOTH EXTRACTION         FAMILY HISTORY  Family History   Problem Relation Age of Onset   • Heart disease Mother    • Diabetes Mother    • Heart disease Father    • Breast cancer Sister 45   • Hypertension Sister    • Diabetes Sister    • Colon cancer Brother 50   • Diabetes Brother        SOCIAL HISTORY  Social History     Social History   • Marital status:      Spouse name: Kirby   • Number of children: 2   • Years of education: High School     Occupational History   •  Retired     Social History Main Topics   • Smoking status: Former Smoker     Types: Cigarettes     Quit date: 11/17/1985   • Smokeless tobacco: Never Used      Comment: Heavy in past, but quit   • Alcohol use No   • Drug use: No   • Sexual activity: Defer     Other Topics Concern   • Not on file     Social History Narrative       ALLERGIES  Ciprofloxacin; Levaquin [levofloxacin]; and Promethazine hcl    REVIEW OF SYSTEMS  Review of Systems   Constitutional: Negative for fever.   HENT: Negative for sore throat.    Eyes: Negative.    Respiratory: Negative for cough and shortness of breath.    Cardiovascular: Negative for chest pain.   Gastrointestinal: Negative for abdominal pain, diarrhea and vomiting.   Genitourinary: Negative for dysuria.   Musculoskeletal: Negative for neck pain.        R ankle pain   Skin: Negative for rash.   Allergic/Immunologic: Negative.    Neurological: Negative for weakness, numbness and headaches.   Hematological: Negative.    Psychiatric/Behavioral: Negative.    All other systems reviewed and are negative.      PHYSICAL EXAM  ED Triage  Vitals   Temp Heart Rate Resp BP SpO2   01/17/18 1128 01/17/18 1121 01/17/18 1121 01/17/18 1121 01/17/18 1128   99 °F (37.2 °C) 88 16 164/78 94 %      Temp src Heart Rate Source Patient Position BP Location FiO2 (%)   -- 01/17/18 1121 -- -- --    Monitor          Physical Exam   Constitutional: She is oriented to person, place, and time and well-developed, well-nourished, and in no distress.   HENT:   Head: Normocephalic and atraumatic.   Eyes: EOM are normal.   Neck: Normal range of motion.   Cardiovascular: Normal rate and regular rhythm.    Pulmonary/Chest: Effort normal and breath sounds normal. No respiratory distress. She has no wheezes. She has no rales.   Musculoskeletal:   Marked swelling of medial malleolus of R foot, with central redness and marked tenderness. Marked painful ROM of R ankle.    Neurological: She is alert and oriented to person, place, and time. She has normal sensation and normal strength.   Skin: Skin is warm and dry.   Psychiatric: Affect normal.   Nursing note and vitals reviewed.      LAB RESULTS  Lab Results (last 24 hours)     Procedure Component Value Units Date/Time    CBC & Differential [[id number]] Collected:  01/17/18 1215    Specimen:  Blood Updated:  01/17/18 1231    Narrative:       The following orders were created for panel order CBC & Differential.  Procedure                               Abnormality         Status                     ---------                               -----------         ------                     CBC Auto Differential[[id number]]        Abnormal            Final result                 Please view results for these tests on the individual orders.    Sedimentation Rate [[id number]]  (Abnormal) Collected:  01/17/18 1215    Specimen:  Blood Updated:  01/17/18 1319     Sed Rate 49 (H) mm/hr     CBC Auto Differential [[id number]]  (Abnormal) Collected:  01/17/18 1215    Specimen:  Blood Updated:  01/17/18 1231     WBC 6.01 10*3/mm3      RBC 3.71 (L) 10*6/mm3       Hemoglobin 12.4 g/dL      Hematocrit 37.7 %      .6 (H) fL      MCH 33.4 (H) pg      MCHC 32.9 g/dL      RDW 14.0 (H) %      RDW-SD 51.8 fl      MPV 10.7 fL      Platelets 56 (L) 10*3/mm3      Neutrophil % 67.7 %      Lymphocyte % 14.8 (L) %      Monocyte % 15.8 (H) %      Eosinophil % 1.5 %      Basophil % 0.2 %      Immature Grans % 0.0 %      Neutrophils, Absolute 4.07 10*3/mm3      Lymphocytes, Absolute 0.89 (L) 10*3/mm3      Monocytes, Absolute 0.95 10*3/mm3      Eosinophils, Absolute 0.09 10*3/mm3      Basophils, Absolute 0.01 10*3/mm3      Immature Grans, Absolute 0.00 10*3/mm3     Ammonia [[id number]]  (Abnormal) Collected:  01/17/18 1215    Specimen:  Blood Updated:  01/17/18 1239     Ammonia 80 (H) umol/L     Comprehensive Metabolic Panel [[id number]]  (Abnormal) Collected:  01/17/18 1234    Specimen:  Blood Updated:  01/17/18 1313     Glucose 169 (H) mg/dL      BUN 37 (H) mg/dL      Creatinine 2.61 (H) mg/dL      Sodium 142 mmol/L      Potassium 4.2 mmol/L      Chloride 102 mmol/L      CO2 24.6 mmol/L      Calcium 9.2 mg/dL      Total Protein 8.2 g/dL      Albumin 3.20 (L) g/dL      ALT (SGPT) 22 U/L      AST (SGOT) 49 (H) U/L      Alkaline Phosphatase 124 (H) U/L      Total Bilirubin 2.0 (H) mg/dL      eGFR Non African Amer 18 (L) mL/min/1.73      Globulin 5.0 gm/dL      A/G Ratio 0.6 g/dL      BUN/Creatinine Ratio 14.2     Anion Gap 15.4 mmol/L     Narrative:       The MDRD GFR formula is only valid for adults with stable renal function between ages 18 and 70.    Uric Acid [[id number]]  (Abnormal) Collected:  01/17/18 1234    Specimen:  Blood Updated:  01/17/18 1313     Uric Acid 12.8 (H) mg/dL     Procalcitonin [[id number]]  (Normal) Collected:  01/17/18 1234    Specimen:  Blood Updated:  01/17/18 1316     Procalcitonin 0.17 ng/mL     Narrative:       As a Marker for Sepsis (Non-Neonates):   1. <0.5 ng/mL represents a low risk of severe sepsis and/or septic shock.  1. >2 ng/mL represents a high  "risk of severe sepsis and/or septic shock.    As a Marker for Lower Respiratory Tract Infections that require antibiotic therapy:  PCT on Admission     Antibiotic Therapy             6-12 Hrs later  > 0.5                Strongly Recommended            >0.25 - <0.5         Recommended  0.1 - 0.25           Discouraged                   Remeasure/reassess PCT  <0.1                 Strongly Discouraged          Remeasure/reassess PCT      As 28 day mortality risk marker: \"Change in Procalcitonin Result\" (> 80 % or <=80 %) if Day 0 (or Day 1) and Day 4 values are available. Refer to http://www.[facility]-pct-calculator.com/   Change in PCT <=80 %   A decrease of PCT levels below or equal to 80 % defines a positive change in PCT test result representing a higher risk for 28-day all-cause mortality of patients diagnosed with severe sepsis or septic shock.  Change in PCT > 80 %   A decrease of PCT levels of more than 80 % defines a negative change in PCT result representing a lower risk for 28-day all-cause mortality of patients diagnosed with severe sepsis or septic shock.                C-reactive Protein [[id number]]  (Abnormal) Collected:  01/17/18 1234    Specimen:  Blood Updated:  01/17/18 1313     C-Reactive Protein 4.21 (H) mg/dL     POC Glucose Once [[id number]]  (Abnormal) Collected:  01/17/18 1729    Specimen:  Blood Updated:  01/17/18 1732     Glucose 143 (H) mg/dL     Narrative:       Meter: [id number] : [id number] Eusebia Tejeda          I ordered the above labs and reviewed the results    RADIOLOGY  XR Ankle 3+ View Right   Final Result       Soft tissue swelling. No acute fracture or erosion is identified. If   there is clinical suspicion for radiographically occult osteomyelitis,   further evaluation with bone scan or MRI could be considered.       This report was finalized on 1/17/2018 12:06 PM by Dr. Madhu Aguila MD.               I ordered the above noted radiological studies. Interpreted by " radiologist.  Reviewed by me in PACS.       PROCEDURES  Procedures      PROGRESS AND CONSULTS  ED Course   Value Comment By Time   Glucose: (!) 169 (Reviewed) Artemio Paz MD 01/17 1333     1148: Ordered XR R ankle, and labs for further evaluation.     1140: Ordered labs for further evaluation.     1347: Rechecked pt, she was resting comfortably. Discussed imaging results which showed nothing acute. Discussed possibility of the pt having gout. The pt states that she is a diabetic and is currently on insulin.  She is also on lactulose requiring regular trips to the  and today went to the floor (without apparent trauma) when trying to transfer to a  due to ankle pain.  The pt's  believes that the pt has recently received a steroid shot for similar L ankle pain a month ago with some relief. The pt is followed by Dr. Hung (nephrology) and Dr. Astorga (gastroenterologist) and Dr. Turner (podiartrist). Discussed plan to consult the pt's physicians.The pt agrees with plan.    1356: Placed call to A and nephrology.     1414: Discussed pt's case with Dr. Aguilar (A)     1415: Discussed pt's case with Dr. Hung (nephrology). With liver and kidney disease the pt will not be given antiinflammatories. Will give pt half dose of colchicine twice a day. Pt will also be given steroids. Will monitor pt's blood sugar.     1418: Ordered colchicine tablet for R ankle pain.     1420: Ordered solu-medrol.      MEDICAL DECISION MAKING  Results were reviewed/discussed with the patient and they were also made aware of online access. Pt also made aware that some labs, such as cultures, will not be resulted during ER visit and follow up with PMD is necessary.     MDM  Number of Diagnoses or Management Options     Amount and/or Complexity of Data Reviewed  Clinical lab tests: ordered and reviewed (BUN: 37, Creatinine: 2.61, AST: 49, uric acid: 12.8, C reactive protein 4.21)  Tests in the radiology section of CPT®: ordered and  reviewed (XR R ankle: no acute fracture)  Decide to obtain previous medical records or to obtain history from someone other than the patient: yes  Review and summarize past medical records: yes (The pt is followed for pancytopenia. History of cirrhosis and hypersplenism.)  Discuss the patient with other providers: yes (Dr. Hung ([facility]) and Dr. Aguilar ([facility]))    Patient Progress  Patient progress: stable         DIAGNOSIS  Final diagnoses:   Acute idiopathic gout of right ankle   Intractable pain       DISPOSITION  ADMISSION    Discussed treatment plan and reason for admission with pt/family and admitting physician.  Pt/family voiced understanding of the plan for admission for further testing/treatment as needed.         Latest Documented Vital Signs:  As of 5:45 PM  BP- 165/77 HR- 88 Temp- 97.5 °F (36.4 °C) (Oral) O2 sat- 97%    --  Documentation assistance provided by colby Contreras for Dr. Paz.  Information recorded by the scribe was done at my direction and has been verified and validated by me.     Lakia Contreras  01/17/18 0760       Artemio Paz MD  01/17/18 6905

## 2018-01-17 NOTE — ED NOTES
Pt c/o right foot/ankle pain that started on Sunday. Pt states that she got out of bed when she twisted it. Denies falling. Was able to ambulate on it on Sunday. Today she was getting out of bed when she twisted it again. States unable to bear weight today. Pt has swelling area. Pedal pulse present     Imelda Lundberg, ROSALIE  01/17/18 6023

## 2018-01-17 NOTE — CONSULTS
Referring Provider: Dr. Chanel Aguilar  Reason for Consultation: CKD4    Subjective     Chief complaint   Chief Complaint   Patient presents with   • Ankle Pain     fall sunday       History of present illness:  83 yo WF with stage 4 chronic kidney disease (baseline SCr ~2-2.5), whom I follow in clinic.  PMH is outlined below and is pertinent for chronic CHF, cirrhosis from HOANG, and periodic hepatic encephalopathy.  She has a history of long-standing diabetes mellitus, with renal bx (10/2015) finding primarily diabetic nephropathy.  Has had worsening pain in right foot for past few days.  Fell earlier today when no longer able to bear wt on it.  Had similar pain one month ago again in right foot that cleared after steroid injection.  Suspicion for gout on arrival today, with elevated uric acid and imaging negative for fx.  Pred and colchicine underway; pain with mvmnt or wt bearing.  No F; appetite ok; chr D from lactulose. No urinary c/o.      Past Medical History:   Diagnosis Date   • Anxiety    • Arthritis    • C. 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   • HOANG (nonalcoholic steatohepatitis)    • Pancytopenia    • Spinal stenosis    • Splenomegaly    • Thrombocytopenia, chronic    • TIA (transient ischemic attack)      Past Surgical History:   Procedure Laterality Date   • ABDOMINAL SURGERY     • KNEE SURGERY     • RENAL BIOPSY  10/2015   • TONSILLECTOMY     • WISDOM TOOTH EXTRACTION       Family History   Problem Relation Age of Onset   • Heart disease Mother    • Diabetes Mother    • Heart disease Father    • Breast cancer Sister 45   • Hypertension  Sister    • Diabetes Sister    • Colon cancer Brother 50   • Diabetes Brother      Social History   Substance Use Topics   • Smoking status: Former Smoker     Types: Cigarettes     Quit date: 11/17/1985   • Smokeless tobacco: Never Used      Comment: Heavy in past, but quit   • Alcohol use No     Prescriptions Prior to Admission   Medication Sig Dispense Refill Last Dose   • acetaminophen (TYLENOL) 650 MG 8 hr tablet Take 650 mg by mouth Every 8 (Eight) Hours As Needed for Mild Pain (1-3).   Taking   • bumetanide (BUMEX) 2 MG tablet Take 2 mg by mouth Daily With Breakfast & Lunch.   Taking   • cholecalciferol (VITAMIN D3) 1000 units tablet Take 1,000 Units by mouth Daily.      • cyclobenzaprine (FLEXERIL) 5 MG tablet Take 5 mg by mouth Every Night.   Taking   • guaiFENesin (MUCINEX) 600 MG 12 hr tablet Take 600 mg by mouth Every Night.   Taking   • insulin NPH-insulin regular (Novolin 70/30) (70-30) 100 UNIT/ML injection Inject 45 Units under the skin Daily With Breakfast & Lunch.   Taking   • lactulose (CHRONULAC) 10 GM/15ML solution Take 30 g by mouth Daily As Needed.      • lidocaine (LIDODERM) 5 % Place 1 patch on the skin Daily. Remove & Discard patch within 12 hours or as directed by MD   Taking   • metoprolol tartrate (LOPRESSOR) 25 MG tablet Take 12.5 mg by mouth Every Night.      • Multiple Vitamin (MULTI-VITAMIN PO) Take 1 tablet by mouth Daily.   Taking   • potassium chloride (KLOR-CON) 20 MEQ CR tablet Take 10 mEq by mouth 2 (Two) Times a Day.      • Probiotic Product (PROBIOTIC PO) Take 1 tablet by mouth Daily With Lunch & Dinner.   Taking   • QUEtiapine (SEROquel) 25 MG tablet Take 25 mg by mouth Every Night.      • rifaximin (XIFAXAN) 550 MG tablet Take 550 mg by mouth Daily With Lunch & Dinner.   Taking   • simethicone (MYLICON) 125 MG chewable tablet Chew 125 mg As Needed for Flatulence.      • temazepam (RESTORIL) 7.5 MG capsule Take 7.5 mg by mouth Every Night.   Taking   • traMADol (ULTRAM) 50 MG  "tablet Take 50 mg by mouth Every Night.      • vitamin B-12 (CYANOCOBALAMIN) 2500 MCG sublingual tablet tablet Place 1,250 mcg under the tongue Daily.        Allergies:  Ciprofloxacin; Levaquin [levofloxacin]; and Promethazine hcl    Review of Systems  14-point ROS performed and all negative except for pertinent +/-'s detailed in HPI.     Objective     Vital Signs  Temp:  [97.5 °F (36.4 °C)-99 °F (37.2 °C)] 97.5 °F (36.4 °C)  Heart Rate:  [88] 88  Resp:  [16-18] 18  BP: (164-176)/(64-78) 165/77    Flowsheet Rows         First Filed Value    Admission Height  162.6 cm (64\") Documented at 01/17/2018 1126    Admission Weight  94.8 kg (209 lb) Documented at 01/17/2018 1126                 No intake or output data in the 24 hours ending 01/17/18 1823    Physical Exam:  NAD; pleasant; oriented to month but not year; looks stated age  MMM; AT/NC  No eye d/c; no scleral icterus  No JVD; no carotid bruits  CTA bilat; not labored  RRR, no rub  Soft, NT, +D, BS+  Trace edema, right greater than left  No clubbing  No asterixis  Moves all extremities, tho decreased ROM right foot  Right foot with swelling, erythema, and tenderness even to light touch    Results Review:    Results from last 7 days  Lab Units 01/17/18  1234   SODIUM mmol/L 142   POTASSIUM mmol/L 4.2   CHLORIDE mmol/L 102   CO2 mmol/L 24.6   BUN mg/dL 37*   CREATININE mg/dL 2.61*   CALCIUM mg/dL 9.2   BILIRUBIN mg/dL 2.0*   ALK PHOS U/L 124*   ALT (SGPT) U/L 22   AST (SGOT) U/L 49*   GLUCOSE mg/dL 169*       Estimated Creatinine Clearance: 18.9 mL/min (by C-G formula based on Cr of 2.61).            Results from last 7 days  Lab Units 01/17/18  1215   WBC 10*3/mm3 6.01   HEMOGLOBIN g/dL 12.4   PLATELETS 10*3/mm3 56*             Active Medications    [START ON 1/18/2018] bumetanide 2 mg Oral Daily With Breakfast & Lunch   cholecalciferol 1,000 Units Oral Daily   colchicine 0.3 mg Oral Q12H   cyclobenzaprine 5 mg Oral Nightly   enoxaparin 30 mg Subcutaneous Q24H "   guaiFENesin 600 mg Oral Nightly   insulin aspart 0-9 Units Subcutaneous 4x Daily With Meals & Nightly   insulin aspart prot-insulin aspart 45 Units Subcutaneous BID With Meals   lactobacillus acidophilus 1 capsule Oral Daily With Lunch & Dinner   lactulose 30 g Oral Daily   lidocaine 1 patch Transdermal Q24H   metoprolol tartrate 12.5 mg Oral Nightly   multivitamin 1 tablet Oral Daily   potassium chloride 10 mEq Oral Daily   [START ON 1/18/2018] predniSONE 50 mg Oral Daily With Breakfast   QUEtiapine 25 mg Oral Nightly   rifaximin 550 mg Oral Daily With Lunch & Dinner   temazepam 7.5 mg Oral Nightly   traMADol 50 mg Oral Nightly   vitamin B-12 1,000 mcg Oral Daily          Assessment/Plan   Assessment  1.  CKD4, stable.  Central vol fine, and lytes reasonably well compensated  2.  Right foot pain and hyperuricemia  3.  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  HOANG cirrhosis  5.  Chronic CHF, compensated    Active Problems:    Acute idiopathic gout of right ankle      Plan  1.  Prednisone and modest-dose colchicine  2.  Once flare has subsided, will introduce low-dose allopurinol  3.  Lactulose  4.  Surveillance labs    I discussed the patient's findings and my recommendations with pt, her , and her son    Navarro Mejiasashley Hung MD  01/17/18  6:23 PM

## 2018-01-17 NOTE — PLAN OF CARE
Problem: Patient Care Overview (Adult)  Goal: Plan of Care Review  Outcome: Ongoing (interventions implemented as appropriate)   01/17/18 2510   Coping/Psychosocial Response Interventions   Plan Of Care Reviewed With patient;spouse   Patient Care Overview   Progress no change   Outcome Evaluation   Outcome Summary/Follow up Plan Patient admitted from ER with c/o right ankle pain following 2 falls at home. Right ankle is edematous and reddened. Consult placed for nephrology & orthopedic surgery. Patient A&O x3, disoriented to time. VSS. Resume home medications and started on NCS/renal diet. Incontinent, brief changed. Accu-check WNL, no sliding scale insulin given.     Goal: Adult Individualization and Mutuality  Outcome: Ongoing (interventions implemented as appropriate)    Goal: Discharge Needs Assessment  Outcome: Ongoing (interventions implemented as appropriate)      Problem: Pain, Acute (Adult)  Goal: Identify Related Risk Factors and Signs and Symptoms  Outcome: Outcome(s) achieved Date Met: 01/17/18    Goal: Acceptable Pain Control/Comfort Level  Outcome: Ongoing (interventions implemented as appropriate)      Problem: Fall Risk (Adult)  Goal: Identify Related Risk Factors and Signs and Symptoms  Outcome: Outcome(s) achieved Date Met: 01/17/18    Goal: Absence of Falls  Outcome: Ongoing (interventions implemented as appropriate)      Problem: Pressure Ulcer Risk (Se Scale) (Adult,Obstetrics,Pediatric)  Goal: Identify Related Risk Factors and Signs and Symptoms  Outcome: Outcome(s) achieved Date Met: 01/17/18    Goal: Skin Integrity  Outcome: Ongoing (interventions implemented as appropriate)

## 2018-01-18 VITALS
BODY MASS INDEX: 35.68 KG/M2 | RESPIRATION RATE: 16 BRPM | HEIGHT: 64 IN | DIASTOLIC BLOOD PRESSURE: 62 MMHG | HEART RATE: 80 BPM | WEIGHT: 209 LBS | OXYGEN SATURATION: 96 % | SYSTOLIC BLOOD PRESSURE: 145 MMHG | TEMPERATURE: 97 F

## 2018-01-18 LAB
ALBUMIN SERPL-MCNC: 2.9 G/DL (ref 3.5–5.2)
ANION GAP SERPL CALCULATED.3IONS-SCNC: 13.8 MMOL/L
ANISOCYTOSIS BLD QL: NORMAL
BASOPHILS # BLD AUTO: 0 10*3/MM3 (ref 0–0.2)
BASOPHILS NFR BLD AUTO: 0 % (ref 0–1.5)
BUN BLD-MCNC: 44 MG/DL (ref 8–23)
BUN/CREAT SERPL: 15.9 (ref 7–25)
CALCIUM SPEC-SCNC: 8.7 MG/DL (ref 8.6–10.5)
CHLORIDE SERPL-SCNC: 102 MMOL/L (ref 98–107)
CO2 SERPL-SCNC: 23.2 MMOL/L (ref 22–29)
CREAT BLD-MCNC: 2.76 MG/DL (ref 0.57–1)
DACRYOCYTES BLD QL SMEAR: NORMAL
DEPRECATED RDW RBC AUTO: 48.7 FL (ref 37–54)
EOSINOPHIL # BLD AUTO: 0 10*3/MM3 (ref 0–0.7)
EOSINOPHIL NFR BLD AUTO: 0 % (ref 0.3–6.2)
ERYTHROCYTE [DISTWIDTH] IN BLOOD BY AUTOMATED COUNT: 13.5 % (ref 11.7–13)
GFR SERPL CREATININE-BSD FRML MDRD: 16 ML/MIN/1.73
GLUCOSE BLD-MCNC: 234 MG/DL (ref 65–99)
GLUCOSE BLDC GLUCOMTR-MCNC: 222 MG/DL (ref 70–130)
HCT VFR BLD AUTO: 31 % (ref 35.6–45.5)
HGB BLD-MCNC: 10.2 G/DL (ref 11.9–15.5)
IMM GRANULOCYTES # BLD: 0 10*3/MM3 (ref 0–0.03)
IMM GRANULOCYTES NFR BLD: 0 % (ref 0–0.5)
LYMPHOCYTES # BLD AUTO: 0.54 10*3/MM3 (ref 0.9–4.8)
LYMPHOCYTES NFR BLD AUTO: 13.9 % (ref 19.6–45.3)
MCH RBC QN AUTO: 32.7 PG (ref 26.9–32)
MCHC RBC AUTO-ENTMCNC: 32.9 G/DL (ref 32.4–36.3)
MCV RBC AUTO: 99.4 FL (ref 80.5–98.2)
MONOCYTES # BLD AUTO: 0.41 10*3/MM3 (ref 0.2–1.2)
MONOCYTES NFR BLD AUTO: 10.5 % (ref 5–12)
NEUTROPHILS # BLD AUTO: 2.94 10*3/MM3 (ref 1.9–8.1)
NEUTROPHILS NFR BLD AUTO: 75.6 % (ref 42.7–76)
PHOSPHATE SERPL-MCNC: 2.3 MG/DL (ref 2.5–4.5)
PLAT MORPH BLD: NORMAL
PLATELET # BLD AUTO: 48 10*3/MM3 (ref 140–500)
PMV BLD AUTO: 10.6 FL (ref 6–12)
POTASSIUM BLD-SCNC: 4.3 MMOL/L (ref 3.5–5.2)
RBC # BLD AUTO: 3.12 10*6/MM3 (ref 3.9–5.2)
SODIUM BLD-SCNC: 139 MMOL/L (ref 136–145)
URATE SERPL-MCNC: 13.7 MG/DL (ref 2.4–5.7)
WBC MORPH BLD: NORMAL
WBC NRBC COR # BLD: 3.89 10*3/MM3 (ref 4.5–10.7)

## 2018-01-18 PROCEDURE — 63710000001 PREDNISONE PER 5 MG: Performed by: INTERNAL MEDICINE

## 2018-01-18 PROCEDURE — 80069 RENAL FUNCTION PANEL: CPT | Performed by: INTERNAL MEDICINE

## 2018-01-18 PROCEDURE — 85007 BL SMEAR W/DIFF WBC COUNT: CPT | Performed by: INTERNAL MEDICINE

## 2018-01-18 PROCEDURE — 84550 ASSAY OF BLOOD/URIC ACID: CPT | Performed by: INTERNAL MEDICINE

## 2018-01-18 PROCEDURE — 63710000001 INSULIN ASPART PER 5 UNITS: Performed by: INTERNAL MEDICINE

## 2018-01-18 PROCEDURE — G0378 HOSPITAL OBSERVATION PER HR: HCPCS

## 2018-01-18 PROCEDURE — 82962 GLUCOSE BLOOD TEST: CPT

## 2018-01-18 PROCEDURE — 85025 COMPLETE CBC W/AUTO DIFF WBC: CPT | Performed by: INTERNAL MEDICINE

## 2018-01-18 RX ORDER — PREDNISONE 20 MG/1
TABLET ORAL
Qty: 10 TABLET | Refills: 0 | Status: SHIPPED | OUTPATIENT
Start: 2018-01-18 | End: 2018-01-01 | Stop reason: HOSPADM

## 2018-01-18 RX ADMIN — BUMETANIDE 2 MG: 2 TABLET ORAL at 10:04

## 2018-01-18 RX ADMIN — INSULIN ASPART 45 UNITS: 100 INJECTION, SUSPENSION SUBCUTANEOUS at 10:05

## 2018-01-18 RX ADMIN — POTASSIUM CHLORIDE 10 MEQ: 750 CAPSULE, EXTENDED RELEASE ORAL at 10:04

## 2018-01-18 RX ADMIN — Medication 1 TABLET: at 10:04

## 2018-01-18 RX ADMIN — CYANOCOBALAMIN TAB 500 MCG 1000 MCG: 500 TAB at 10:04

## 2018-01-18 RX ADMIN — INSULIN ASPART 4 UNITS: 100 INJECTION, SOLUTION INTRAVENOUS; SUBCUTANEOUS at 10:05

## 2018-01-18 RX ADMIN — VITAMIN D, TAB 1000IU (100/BT) 1000 UNITS: 25 TAB at 10:04

## 2018-01-18 RX ADMIN — COLCHICINE 0.3 MG: 0.6 TABLET, FILM COATED ORAL at 10:05

## 2018-01-18 RX ADMIN — PREDNISONE 50 MG: 50 TABLET ORAL at 10:04

## 2018-01-18 NOTE — CONSULTS
Orthopedic Consult      Patient: Charu Bowens  YOB: 1935     Date of Admission: 1/17/2018 11:30 AM    Medical Record Number: [id number]    Attending Physician: Chanel Aguilar MD    Consulting Physician: Carissa Dhillon MD    Reason for Consult: RIGHT ankle pain and swelling, suspected gout, possible arthrocentesis, injection of the ankle    History of Present Illness: 82 y.o. female admitted to [facility] with Acute idiopathic gout of right ankle [M10.071].     The patient is a 82-year-old female who has been admitted to [facility] with complaints of RIGHT ankle pain and inability to walk.  She is accompanied by her  to the hospital visit.  Her history is significant for RIGHT ankle pain, worsening over the past several days.  Initially the pain started, but she also recalls some injury to the RIGHT ankle.  She has been evaluated with x-rays and her serum uric acid was found to be elevated on admission. As I Dr Dhillon was on call for the emergency room and was consulted for further evaluation and treatment.   The patient has a similar history of pain in the LEFT ankle 1 month back, which responded to the LEFT ankle injection according to the .  She denies any history of fevers.  She has a history of chronic kidney disease, dementia.  She has been started on IV and oral steroids on admission.    Patient denies any history of: DVT/PE, MRSA, COPD,  CAD, or Atrial fibrillation, .   Patient has a history of : Chronic kidney disease, congestive heart failure, diabetes mellitus type 2, gout, liver cirrhosis, TIA  Patient is not taking anticoagulants.     Past medical history, Past surgical history, family history, Social history, current medications, home medications Have been reviewed by me.    Past Medical History:   Diagnosis Date   • Anxiety    • Arthritis    • C. difficile colitis    • CHF (congestive heart failure)    • Chronic kidney disease     Stage III    • Dementia    • Diabetes mellitus     Type 2   • Diskitis 11/2015    L4-L5 w/abscess formation   • Hemochromatosis    • History of anemia    • History of blood transfusion 2015   • History of pneumonia 2016   • History of sepsis 2015   • Hypertension    • Liver disease     Cirrhosis likely secondary to HOANG   • HOANG (nonalcoholic steatohepatitis)    • Pancytopenia    • Spinal stenosis    • Splenomegaly    • Thrombocytopenia, chronic    • TIA (transient ischemic attack)      Past Surgical History:   Procedure Laterality Date   • ABDOMINAL SURGERY     • KNEE SURGERY     • RENAL BIOPSY  10/2015   • TONSILLECTOMY     • WISDOM TOOTH EXTRACTION       Social History     Occupational History   •  Retired     Social History Main Topics   • Smoking status: Former Smoker     Types: Cigarettes     Quit date: 11/17/1985   • Smokeless tobacco: Never Used      Comment: Heavy in past, but quit   • Alcohol use No   • Drug use: No   • Sexual activity: Defer    Social History     Social History Narrative     Family History   Problem Relation Age of Onset   • Heart disease Mother    • Diabetes Mother    • Heart disease Father    • Breast cancer Sister 45   • Hypertension Sister    • Diabetes Sister    • Colon cancer Brother 50   • Diabetes Brother           Allergies   Allergen Reactions   • Ciprofloxacin      Itching and red streaks   • Levaquin [Levofloxacin]    • Promethazine Hcl      Itching, red streaks when given IV        Home Medications:  Prescriptions Prior to Admission   Medication Sig Dispense Refill Last Dose   • acetaminophen (TYLENOL) 650 MG 8 hr tablet Take 650 mg by mouth Every 8 (Eight) Hours As Needed for Mild Pain (1-3).   Taking   • bumetanide (BUMEX) 2 MG tablet Take 2 mg by mouth Daily With Breakfast & Lunch.   Taking   • cholecalciferol (VITAMIN D3) 1000 units tablet Take 1,000 Units by mouth Daily.      • cyclobenzaprine (FLEXERIL) 5 MG tablet Take 5 mg by mouth Every Night.   Taking   • guaiFENesin (MUCINEX)  600 MG 12 hr tablet Take 600 mg by mouth Every Night.   Taking   • insulin NPH-insulin regular (Novolin 70/30) (70-30) 100 UNIT/ML injection Inject 45 Units under the skin Daily With Breakfast & Lunch.   Taking   • lactulose (CHRONULAC) 10 GM/15ML solution Take 30 g by mouth Daily As Needed.      • lidocaine (LIDODERM) 5 % Place 1 patch on the skin Daily. Remove & Discard patch within 12 hours or as directed by MD   Taking   • metoprolol tartrate (LOPRESSOR) 25 MG tablet Take 12.5 mg by mouth Every Night.      • Multiple Vitamin (MULTI-VITAMIN PO) Take 1 tablet by mouth Daily.   Taking   • potassium chloride (KLOR-CON) 20 MEQ CR tablet Take 10 mEq by mouth 2 (Two) Times a Day.      • Probiotic Product (PROBIOTIC PO) Take 1 tablet by mouth Daily With Lunch & Dinner.   Taking   • QUEtiapine (SEROquel) 25 MG tablet Take 25 mg by mouth Every Night.      • rifaximin (XIFAXAN) 550 MG tablet Take 550 mg by mouth Daily With Lunch & Dinner.   Taking   • simethicone (MYLICON) 125 MG chewable tablet Chew 125 mg As Needed for Flatulence.      • temazepam (RESTORIL) 7.5 MG capsule Take 7.5 mg by mouth Every Night.   Taking   • traMADol (ULTRAM) 50 MG tablet Take 50 mg by mouth Every Night.      • vitamin B-12 (CYANOCOBALAMIN) 2500 MCG sublingual tablet tablet Place 1,250 mcg under the tongue Daily.          Current Medications:  Scheduled Meds:  [START ON 1/18/2018] bumetanide 2 mg Oral Daily With Breakfast & Lunch   cholecalciferol 1,000 Units Oral Daily   colchicine 0.3 mg Oral Q12H   cyclobenzaprine 5 mg Oral Nightly   enoxaparin 30 mg Subcutaneous Q24H   guaiFENesin 600 mg Oral Nightly   insulin aspart 0-9 Units Subcutaneous 4x Daily With Meals & Nightly   insulin aspart prot-insulin aspart 45 Units Subcutaneous BID With Meals   lactobacillus acidophilus 1 capsule Oral Daily With Lunch & Dinner   lactulose 30 g Oral Daily   lidocaine 1 patch Transdermal Q24H   metoprolol tartrate 12.5 mg Oral Nightly   multivitamin 1 tablet  Oral Daily   potassium chloride 10 mEq Oral Daily   [START ON 1/18/2018] predniSONE 50 mg Oral Daily With Breakfast   QUEtiapine 25 mg Oral Nightly   rifaximin 550 mg Oral Daily With Lunch & Dinner   temazepam 7.5 mg Oral Nightly   traMADol 50 mg Oral Nightly   vitamin B-12 1,000 mcg Oral Daily     Continuous Infusions:   PRN Meds:.•  acetaminophen  •  bisacodyl  •  calcium carbonate  •  dextrose  •  dextrose  •  glucagon (human recombinant)  •  HYDROmorphone **AND** naloxone  •  magnesium hydroxide  •  ondansetron  •  simethicone  •  sodium chloride    Review of Systems:   A 12 point system review was reviewed with the patient and from the chart  and is negative except as mentioned in history of present illness.      Physical Exam: 82 y.o. female                    Vitals:    01/17/18 1530 01/17/18 1531 01/17/18 1621 01/17/18 1950   BP:  176/64 165/77 158/70   BP Location:   Right arm Right arm   Patient Position:   Lying Lying   Pulse: 88  88 91   Resp: 18 18 18   Temp:   97.5 °F (36.4 °C) 99.2 °F (37.3 °C)   TempSrc:   Oral Oral   SpO2: 97% 97% 97% 94%   Weight:       Height:            Gait: Not evaluated.     Mental/HEENT/cardio/skin: The patient's general appearance was well-nourished, well-hydrated, no acute distress.  Orientation was alert and oriented ×3.  The patient's mood was normal.   Pulmonary exam shows normal late exchange, no labored breathing, or shortness of breath.    The  skin exam showed normal temperature and  Mild redness in the area of examination.    Extremities: RIGHT ankle.  Positive mild swelling over the posteromedial aspect of the ankle.  Redness over the posteromedial aspect over the tibialis posterior tendon distribution.  Positive tenderness in this area.  She is able.  Excellent range of motion of the ankle and good to moments.  There is positive hypersensitivity over the skin.  There is  positive pitting edema over the skin.  No sign of infection, induration, or abscess  formation.    Pulses:  Pulses palpable and equal bilaterally    Diagnostic Tests:      Results from last 7 days  Lab Units 01/17/18  1215   WBC 10*3/mm3 6.01   HEMOGLOBIN g/dL 12.4   HEMATOCRIT % 37.7   PLATELETS 10*3/mm3 56*       Results from last 7 days  Lab Units 01/17/18  1234   SODIUM mmol/L 142   POTASSIUM mmol/L 4.2   CHLORIDE mmol/L 102   CO2 mmol/L 24.6   BUN mg/dL 37*   CREATININE mg/dL 2.61*   GLUCOSE mg/dL 169*   CALCIUM mg/dL 9.2         No results found for: CRYSTAL]  No results found for: CULTURE]  Uric Acid   Date Value Ref Range Status   01/17/2018 12.8 (H) 2.4 - 5.7 mg/dL Final   ]  )Xr Ankle 3+ View Right    Result Date: 1/17/2018  Narrative: XR ANKLE 3+ VW RIGHT-  INDICATIONS:     Right ankle pain and swelling, no injury  TECHNIQUE: 3 VIEWS OF THE RIGHT ANKLE  COMPARISON: None available  FINDINGS:   No acute fracture is identified. Moderate calcaneal spurring is seen. No erosions are noted. Mortise appears preserved. Soft tissue swelling is seen medially, could be evidence of formation, infection, injury.      Impression:  Soft tissue swelling. No acute fracture or erosion is identified. If there is clinical suspicion for radiographically occult osteomyelitis, further evaluation with bone scan or MRI could be considered.  This report was finalized on 1/17/2018 12:06 PM by Dr. Madhu Aguila MD.        Assessment: RIGHT ankle gout    Patient Active Problem List   Diagnosis   • Cirrhosis of liver   • Hypersplenism   • Splenic pancytopenia syndrome   • Chronic renal disease, stage 4, severely decreased glomerular filtration rate between 15-29 mL/min/1.73 square meter   • Anemia of chronic renal failure, stage 4 (severe)   • Hepatic encephalopathy   • Thrombocytopenia   • Leukopenia   • Acute kidney failure   • Acute hepatic encephalopathy   • DM2 (diabetes mellitus, type 2)   • Iron deficiency   • Acute idiopathic gout of right ankle   • Decreased mobility   • Fall       Plan:    Options and  alternatives were discussed in detail with the patient and   family. The patient is indicated for Medical management.   I have reviewed the x-rays.  There is no evidence of any fractures.  She has an elevated serum uric acid 12.8, CRP 4.21, ESR 49.  She can be permitted for out of bed for bathroom privileges with assistance.  She can be discharged once she is response to the steroid for managing that.  Acute ankle pain.  She will need management of her gout medically.  No orthopedic intervention needed for the ankle.  I will sign off but will be available.    Date: 1/17/2018    Carissa Dhillon MD     CC: Chayito Paredes MD; MD Chanel Jack MD

## 2018-01-18 NOTE — SIGNIFICANT NOTE
01/18/18 1139   Rehab Treatment   Discipline occupational therapist   Rehab Evaluation   Evaluation Not Performed other (see comments)  (Spoke with RN, pt d/c home and not requring skilled OT at this time. Will sign off, please f/u with any changes.)

## 2018-01-18 NOTE — H&P
PCP: Chayito Paredes MD    Chief complaint   Chief Complaint   Patient presents with   • Ankle Pain     fall sunday       HPI  Patient is a 82 y.o. female presents with Cirrhosis and CKD for who presents to the ER with right ankle swelling and pain.  Patient came to the ER about 1 month ago where she had left ankle pain and swelling and they went to a foot doctor and got an injection and it got better.  She now comes in with her right ankle being swollen and painful.  She says that her ankle was sore, but then she fell a few days ago and got wedged between a couch in a chair and it continued to get worse.  She got to the point now that she is unable to bear weight and she fell this morning.  The right ankle is swollen on the inside, slightly red, moderate pain, worse with movement, no radiation, tender to palpation.      PAST MEDICAL HISTORY  Past Medical History:   Diagnosis Date   • Anxiety    • Arthritis    • C. 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   • HOANG (nonalcoholic steatohepatitis)    • Pancytopenia    • Spinal stenosis    • Splenomegaly    • Thrombocytopenia, chronic    • TIA (transient ischemic attack)        PAST SURGICAL HISTORY  Past Surgical History:   Procedure Laterality Date   • ABDOMINAL SURGERY     • KNEE SURGERY     • RENAL BIOPSY  10/2015   • TONSILLECTOMY     • WISDOM TOOTH EXTRACTION         FAMILY HISTORY  Family History   Problem Relation Age of Onset   • Heart disease Mother    • Diabetes Mother    • Heart disease Father    • Breast cancer Sister 45   • Hypertension Sister    • Diabetes Sister    • Colon cancer Brother 50   •  Diabetes Brother        SOCIAL HISTORY  Social History   Substance Use Topics   • Smoking status: Former Smoker     Types: Cigarettes     Quit date: 11/17/1985   • Smokeless tobacco: Never Used      Comment: Heavy in past, but quit   • Alcohol use No       MEDICATIONS:  Prescriptions Prior to Admission   Medication Sig Dispense Refill Last Dose   • acetaminophen (TYLENOL) 650 MG 8 hr tablet Take 650 mg by mouth Every 8 (Eight) Hours As Needed for Mild Pain (1-3).   Taking   • bumetanide (BUMEX) 2 MG tablet Take 2 mg by mouth Daily With Breakfast & Lunch.   Taking   • cholecalciferol (VITAMIN D3) 1000 units tablet Take 1,000 Units by mouth Daily.      • cyclobenzaprine (FLEXERIL) 5 MG tablet Take 5 mg by mouth Every Night.   Taking   • guaiFENesin (MUCINEX) 600 MG 12 hr tablet Take 600 mg by mouth Every Night.   Taking   • insulin NPH-insulin regular (Novolin 70/30) (70-30) 100 UNIT/ML injection Inject 45 Units under the skin Daily With Breakfast & Lunch.   Taking   • lactulose (CHRONULAC) 10 GM/15ML solution Take 30 g by mouth Daily As Needed.      • lidocaine (LIDODERM) 5 % Place 1 patch on the skin Daily. Remove & Discard patch within 12 hours or as directed by MD   Taking   • metoprolol tartrate (LOPRESSOR) 25 MG tablet Take 12.5 mg by mouth Every Night.      • Multiple Vitamin (MULTI-VITAMIN PO) Take 1 tablet by mouth Daily.   Taking   • potassium chloride (KLOR-CON) 20 MEQ CR tablet Take 10 mEq by mouth 2 (Two) Times a Day.      • Probiotic Product (PROBIOTIC PO) Take 1 tablet by mouth Daily With Lunch & Dinner.   Taking   • QUEtiapine (SEROquel) 25 MG tablet Take 25 mg by mouth Every Night.      • rifaximin (XIFAXAN) 550 MG tablet Take 550 mg by mouth Daily With Lunch & Dinner.   Taking   • simethicone (MYLICON) 125 MG chewable tablet Chew 125 mg As Needed for Flatulence.      • temazepam (RESTORIL) 7.5 MG capsule Take 7.5 mg by mouth Every Night.   Taking   • traMADol (ULTRAM) 50 MG tablet Take 50 mg by mouth  "Every Night.      • vitamin B-12 (CYANOCOBALAMIN) 2500 MCG sublingual tablet tablet Place 1,250 mcg under the tongue Daily.          Allergies:  Ciprofloxacin; Levaquin [levofloxacin]; and Promethazine hcl    Review of Systems:  fatigue , generalized weakness, occasional bruising, a walker, occasional arthritis pain  Negative for following (except as per HPI):  Constitution: chills, fevers,   Eyes: change of vision, loss of vision and discharge  ENT: ear drainage, ear ringing and facial trauma  Respiratory: cough, pleuritic pain, shortness of air  Cardiovascular: chest pressure, pain, lower extremity edema, palpitations  Gastrointestinal: constipation, diarrhea, nausea, vomiting, pain    Integument: rash and wound  Hematologic / Lymphatic: excessive bleeding and easy bruising  Musculoskeletal:  and muscle pain  Neurological: headaches, numbness, seizures and tremors  Behavioral / Psych: anxiety, depression and hallucinations         Vital Signs  Temp:  [97.5 °F (36.4 °C)-99 °F (37.2 °C)] 97.5 °F (36.4 °C)  Heart Rate:  [88] 88  Resp:  [16-18] 18  BP: (164-176)/(64-78) 165/77  Flowsheet Rows         First Filed Value    Admission Height  162.6 cm (64\") Documented at 01/17/2018 1126    Admission Weight  94.8 kg (209 lb) Documented at 01/17/2018 1126           Physical Exam:  General Appearance:    Alert, cooperative, in no acute distress, elderly, chronically ill-appearing    Head:    Normocephalic, without obvious abnormality, atraumatic   Eyes:         conjunctivae and sclerae normal, no icterus, PERRLA   ENT:    Ears grossly intact, oral mucosa moist,   Neck:   No adenopathy, supple, trachea midline,    Back:     Normal to inspection, range of motion normal   Lungs:     Clear to auscultation,respirations regular, even and                   unlabored    Heart:    Regular rhythm and normal rate,  no murmur, normal S1 and S2,   Abdomen:     Normal bowel sounds, no masses,  soft non-tender, non-distended, Overweight  "   Extremities:   Moves all extremities well except right ankle, no cyanosis, +edema over medial malleolus on right, mild erythema, + tender to palpation or movement, warm to touch,             Pulses:   Pulses palpable and equal bilaterally   Skin:   No bleeding, rash,   Occasional bruising    Neurologic:    Psych:   Cranial nerves 2 - 12 grossly intact, sensation intact,     Moves all extremities well, equal bilateral strength    Alert , Normal Affect   LABS:  Admission on 01/17/2018   Component Date Value Ref Range Status   • Glucose 01/17/2018 169* 65 - 99 mg/dL Final   • BUN 01/17/2018 37* 8 - 23 mg/dL Final   • Creatinine 01/17/2018 2.61* 0.57 - 1.00 mg/dL Final   • Sodium 01/17/2018 142  136 - 145 mmol/L Final   • Potassium 01/17/2018 4.2  3.5 - 5.2 mmol/L Final   • Chloride 01/17/2018 102  98 - 107 mmol/L Final   • CO2 01/17/2018 24.6  22.0 - 29.0 mmol/L Final   • Calcium 01/17/2018 9.2  8.6 - 10.5 mg/dL Final   • Total Protein 01/17/2018 8.2  6.0 - 8.5 g/dL Final   • Albumin 01/17/2018 3.20* 3.50 - 5.20 g/dL Final   • ALT (SGPT) 01/17/2018 22  1 - 33 U/L Final   • AST (SGOT) 01/17/2018 49* 1 - 32 U/L Final   • Alkaline Phosphatase 01/17/2018 124* 39 - 117 U/L Final   • Total Bilirubin 01/17/2018 2.0* 0.1 - 1.2 mg/dL Final   • eGFR Non African Amer 01/17/2018 18* >60 mL/min/1.73 Final   • Globulin 01/17/2018 5.0  gm/dL Final   • A/G Ratio 01/17/2018 0.6  g/dL Final   • BUN/Creatinine Ratio 01/17/2018 14.2  7.0 - 25.0 Final   • Anion Gap 01/17/2018 15.4  mmol/L Final   • Uric Acid 01/17/2018 12.8* 2.4 - 5.7 mg/dL Final   • Procalcitonin 01/17/2018 0.17  0.10 - 0.25 ng/mL Final   • Sed Rate 01/17/2018 49* 0 - 30 mm/hr Final   • C-Reactive Protein 01/17/2018 4.21* 0.00 - 0.50 mg/dL Final   • WBC 01/17/2018 6.01  4.50 - 10.70 10*3/mm3 Final   • RBC 01/17/2018 3.71* 3.90 - 5.20 10*6/mm3 Final   • Hemoglobin 01/17/2018 12.4  11.9 - 15.5 g/dL Final   • Hematocrit 01/17/2018 37.7  35.6 - 45.5 % Final   • MCV  01/17/2018 101.6* 80.5 - 98.2 fL Final   • MCH 01/17/2018 33.4* 26.9 - 32.0 pg Final   • MCHC 01/17/2018 32.9  32.4 - 36.3 g/dL Final   • RDW 01/17/2018 14.0* 11.7 - 13.0 % Final   • RDW-SD 01/17/2018 51.8  37.0 - 54.0 fl Final   • MPV 01/17/2018 10.7  6.0 - 12.0 fL Final   • Platelets 01/17/2018 56* 140 - 500 10*3/mm3 Final   • Neutrophil % 01/17/2018 67.7  42.7 - 76.0 % Final   • Lymphocyte % 01/17/2018 14.8* 19.6 - 45.3 % Final   • Monocyte % 01/17/2018 15.8* 5.0 - 12.0 % Final   • Eosinophil % 01/17/2018 1.5  0.3 - 6.2 % Final   • Basophil % 01/17/2018 0.2  0.0 - 1.5 % Final   • Immature Grans % 01/17/2018 0.0  0.0 - 0.5 % Final   • Neutrophils, Absolute 01/17/2018 4.07  1.90 - 8.10 10*3/mm3 Final   • Lymphocytes, Absolute 01/17/2018 0.89* 0.90 - 4.80 10*3/mm3 Final   • Monocytes, Absolute 01/17/2018 0.95  0.20 - 1.20 10*3/mm3 Final   • Eosinophils, Absolute 01/17/2018 0.09  0.00 - 0.70 10*3/mm3 Final   • Basophils, Absolute 01/17/2018 0.01  0.00 - 0.20 10*3/mm3 Final   • Immature Grans, Absolute 01/17/2018 0.00  0.00 - 0.03 10*3/mm3 Final   • Ammonia 01/17/2018 80* 11 - 51 umol/L Final   • Glucose 01/17/2018 143* 70 - 130 mg/dL Final       DIAGNOSTICS:  Xr Ankle 3+ View Right    Result Date: 1/17/2018  Narrative: XR ANKLE 3+ VW RIGHT-  INDICATIONS:     Right ankle pain and swelling, no injury  TECHNIQUE: 3 VIEWS OF THE RIGHT ANKLE  COMPARISON: None available  FINDINGS:   No acute fracture is identified. Moderate calcaneal spurring is seen. No erosions are noted. Mortise appears preserved. Soft tissue swelling is seen medially, could be evidence of formation, infection, injury.      Impression:  Soft tissue swelling. No acute fracture or erosion is identified. If there is clinical suspicion for radiographically occult osteomyelitis, further evaluation with bone scan or MRI could be considered.  This report was finalized on 1/17/2018 12:06 PM by Dr. Madhu Aguila MD.           Results Review:   I reviewed  the patient's new clinical results.  Discussed with ER physician  I personally viewed and interpreted the patient's Telemetry data- sinus   Old records reviewed previous creatinine was 2.34 and gave her GFR 20    ASSESSMENT AND PLAN     + Acute idiopathic gout of right ankle / Decreased mobility  /  Fall  -iv pain med  -iv steroid and then PO- till symptoms subside  -started on lower dose colchicine due to CKD/cirrhosis  -We will consult orthopedics to see if can do an arthrocentesis to send off for crystals as well as since patient is a poor candidate for NSAIDs and steroids may make her diabetes significantly worse that they could do an intra-articular injection of steroids  -PT and OT to evaluate and treat due to decreased mobility, fall, generalized weakness    +  Cirrhosis of liver  -Stable, continue medical management  -Daily lactulose     + Chronic renal disease, stage 4,   - renal following  -Stable continue medical management    +  DM2 (diabetes mellitus, type 2)    -home Regimen is insulin 70/30   45 units twice a day   -We'll also add an sliding scale insulin to cover outliers since she will be on steroids- she may need to be on a increased home regimen while on steroids      + DVT prophylaxis Lovenox 30  + Medical decision maker  Ezra      I discussed the patients findings and my recommendations with the patient and/or family.  Please reference all orders placed.    Chanel Aguilar MD  01/17/18  7:08 PM

## 2018-01-18 NOTE — PLAN OF CARE
"Problem: Patient Care Overview (Adult)  Goal: Plan of Care Review  Outcome: Ongoing (interventions implemented as appropriate)   01/18/18 0651   Coping/Psychosocial Response Interventions   Plan Of Care Reviewed With patient   Patient Care Overview   Progress no change   Outcome Evaluation   Outcome Summary/Follow up Plan confused but cooperative till 0600, then yelling, threatening to call police and \"punch us in the face\" because we are holding her captive, called family-no response, sitter ordered. rt ankle red and swollen, accucheck high     Goal: Adult Individualization and Mutuality  Outcome: Ongoing (interventions implemented as appropriate)    Goal: Discharge Needs Assessment  Outcome: Ongoing (interventions implemented as appropriate)      Problem: Pain, Acute (Adult)  Goal: Acceptable Pain Control/Comfort Level  Outcome: Ongoing (interventions implemented as appropriate)      Problem: Fall Risk (Adult)  Goal: Absence of Falls  Outcome: Ongoing (interventions implemented as appropriate)      Problem: Pressure Ulcer Risk (Se Scale) (Adult,Obstetrics,Pediatric)  Goal: Skin Integrity  Outcome: Ongoing (interventions implemented as appropriate)        "

## 2018-01-18 NOTE — NURSING NOTE
"Pt became agitated, yelling, that we are holding her prisoner, trying to call police threatening to \"punch staff in the face\" called  who hung up phone and left it off the hook, called dtr and left message on machine.  Pt pulled SL half out and bleeding from site and refuses to let us correct situation.  States \"good, I'll die from bleeding and you'll be responsible\"  "

## 2018-01-18 NOTE — PROGRESS NOTES
Continued Stay Note  [facility]     Patient Name: Charu Bowens  MRN: [id number]  Today's Date: 1/18/2018    Admit Date: 1/17/2018          Discharge Plan       01/18/18 1050    Case Management/Social Work Plan    Additional Comments Pt has been changed to observation and discharged.Pt's nurse reported to me that pt is ambulating fine and wanted to be discharged.              Discharge Codes       01/18/18 1055    Discharge Codes    Discharge Codes 01  Discharge to home        Expected Discharge Date and Time     Expected Discharge Date Expected Discharge Time    Jan 18, 2018             Guerline Mckeon RN

## 2018-01-18 NOTE — DISCHARGE SUMMARY
"                                                                   PHYSICIAN DISCHARGE SUMMARY                                                                        [facility]    Patient Identification:  Name: Charu Bowens  Age: 82 y.o.  Sex: female  :  1935  MRN: [id number]  Primary Care Physician: Chayito Praedes MD    Admit date: 2018  Discharge date and time: 2018     Discharged Condition: good    Discharge Diagnoses:Principal Problem:    Acute idiopathic gout of right ankle  Active Problems:    Cirrhosis of liver    Chronic renal disease, stage 4, severely decreased glomerular filtration rate between 15-29 mL/min/1.73 square meter    DM2 (diabetes mellitus, type 2)    Decreased mobility    Fall         Hospital Course:  Pleasant 82-year-old female with a history of gout as well as multiple comorbidities.  She just had a flareup on her lt ankle which was treated with steroid injection.  She presents with a flare up on her right ankle.  Please H&P for full details.  No fever sweats or chills associated with this.  No leukocytosis.  Initially she could not weight-bear.  She was admitted started on colchicine and steroids.  She became very diffuse and agitated during the night which her  states is typical for her when she is in the hospital.  This morning her ankle is markedly improved and she is ambulatory again and not complaining of any pain.  Both the patient and the  would very much like to return home and I think it would be very reasonable at this point.  6 she has both significant impairment of her renal function and liver function am going to discontinue the colchicine.  I did discuss with them that the steroids will increase her blood glucose readings and she may need to touch base with her primary care physician for further advice if this gets out of hand.  She states she has an appointment with her \"foot doctor\" on Monday.  I presume this would be either " podiatrist or orthopedics.  This be very reasonable to keep that appointment.  She does have a very high uric acid level and with her comorbidities if she continues to have issues with gouty flares a rheumatologic referral would be very reasonable also.      Consults:     Consults     Date and Time Order Name Status Description    1/17/2018 1704 Inpatient Consult to Orthopedic Surgery Completed     1/17/2018 1704 Inpatient Consult to Nephrology Completed     1/17/2018 1356 IP General Consult (Use specialty-specific consult if known) Completed     1/17/2018 1356 [facility] (on-call MD unless specified) Completed             Discharge Exam:  Physical Exam   Afebrile vital signs stable.  Well-developed well-nourished female no apparent distress.  Lungs clear to auscultation good air movement.  Heart regular rate and rhythm.  Extremities with cyanosis or edema.  there are still some palpable swelling in the medial aspect of the right ankle and it is faintly erythematous.   On palpation it appears that this could just as easy be a tenosynovitis as the effusion however.  The area is nontender.  She can ambulate with no difficulty  Alert and oriented to person and place.  Conversant cooperative.       Disposition:  Home    Patient Instructions:    Charu Bowens   Home Medication Instructions RACQUEL:[id number]    Printed on:01/18/18 1028   Medication Information                      acetaminophen (TYLENOL) 650 MG 8 hr tablet  Take 650 mg by mouth Every 8 (Eight) Hours As Needed for Mild Pain (1-3).             bumetanide (BUMEX) 2 MG tablet  Take 2 mg by mouth Daily With Breakfast & Lunch.             cholecalciferol (VITAMIN D3) 1000 units tablet  Take 1,000 Units by mouth Daily.             cyclobenzaprine (FLEXERIL) 5 MG tablet  Take 5 mg by mouth Every Night.             guaiFENesin (MUCINEX) 600 MG 12 hr tablet  Take 600 mg by mouth Every Night.             insulin NPH-insulin regular (Novolin 70/30) (70-30) 100 UNIT/ML  injection  Inject 45 Units under the skin Daily With Breakfast & Lunch.             lactulose (CHRONULAC) 10 GM/15ML solution  Take 30 g by mouth Daily As Needed.             lidocaine (LIDODERM) 5 %  Place 1 patch on the skin Daily. Remove & Discard patch within 12 hours or as directed by MD             metoprolol tartrate (LOPRESSOR) 25 MG tablet  Take 12.5 mg by mouth Every Night.             Multiple Vitamin (MULTI-VITAMIN PO)  Take 1 tablet by mouth Daily.             potassium chloride (KLOR-CON) 20 MEQ CR tablet  Take 10 mEq by mouth 2 (Two) Times a Day.             predniSONE (DELTASONE) 20 MG tablet  2 po daily for  3 days the 1 po daily for 4 days             Probiotic Product (PROBIOTIC PO)  Take 1 tablet by mouth Daily With Lunch & Dinner.             QUEtiapine (SEROquel) 25 MG tablet  Take 25 mg by mouth Every Night.             rifaximin (XIFAXAN) 550 MG tablet  Take 550 mg by mouth Daily With Lunch & Dinner.             simethicone (MYLICON) 125 MG chewable tablet  Chew 125 mg As Needed for Flatulence.             temazepam (RESTORIL) 7.5 MG capsule  Take 7.5 mg by mouth Every Night.             traMADol (ULTRAM) 50 MG tablet  Take 50 mg by mouth Every Night.             vitamin B-12 (CYANOCOBALAMIN) 2500 MCG sublingual tablet tablet  Place 1,250 mcg under the tongue Daily.               Future Appointments  Date Time Provider Department Center   4/20/2018 9:30 AM LAB CHAIR 5 [facility]  [facility]   4/27/2018 10:00 AM VITALS ONLY [facility]  [facility]   4/27/2018 10:20 AM Dano Art MD [facility]     Additional Instructions for the Follow-ups that You Need to Schedule     Discharge Follow-up with PCP    As directed    Follow Up Details:  1 week                 Follow-up Information     Follow up with Chayito Paredes MD .    Specialty:  Internal Medicine    Why:  1 week    Contact information:    [address], [facility]. [address] [place] [id number] [phone]           Discharge Order     Start     Ordered    01/18/18 1023  Discharge patient  Once     Expected Discharge Date:  01/18/18    Discharge Disposition:  Home or Self Care        01/18/18 1026            Total time spent discharging patient including evaluation,post hospitalization follow up,  medication and post hospitalization instructions and education total time exceeds 30 minutes.    Signed:  Colby Malave MD  1/18/2018  10:28 AM    EMR Dragon/Transcription disclaimer:   Much of this encounter note is an electronic transcription/translation of spoken language to printed text. The electronic translation of spoken language may permit erroneous, or at times, nonsensical words or phrases to be inadvertently transcribed; Although I have reviewed the note for such errors, some may still exist.

## 2018-02-12 ENCOUNTER — OFFICE (OUTPATIENT)
Dept: URBAN - METROPOLITAN AREA OTHER 6 | Facility: OTHER | Age: 83
End: 2018-02-12
Payer: OTHER GOVERNMENT

## 2018-02-12 VITALS
SYSTOLIC BLOOD PRESSURE: 128 MMHG | WEIGHT: 216 LBS | HEIGHT: 63 IN | HEART RATE: 86 BPM | DIASTOLIC BLOOD PRESSURE: 60 MMHG

## 2018-02-12 DIAGNOSIS — K72.90 HEPATIC FAILURE, UNSPECIFIED WITHOUT COMA: ICD-10-CM

## 2018-02-12 DIAGNOSIS — N18.9 CHRONIC KIDNEY DISEASE, UNSPECIFIED: ICD-10-CM

## 2018-02-12 DIAGNOSIS — K74.60 UNSPECIFIED CIRRHOSIS OF LIVER: ICD-10-CM

## 2018-02-12 DIAGNOSIS — K59.01 SLOW TRANSIT CONSTIPATION: ICD-10-CM

## 2018-02-12 PROCEDURE — 99213 OFFICE O/P EST LOW 20 MIN: CPT | Performed by: INTERNAL MEDICINE

## 2018-04-25 NOTE — PROGRESS NOTES
Subjective     REASON FOR FOLLOW UP  1. Anemia of chronic kidney disease  2. Homozygous H63D hemochromatosis gene mutation.  3. 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  4.  History of iron deficiency    HISTORY OF PRESENT ILLNESS:  The patient is a 83 y.o. year old female who was seen in consultation  for the above noted issues.  She had previously been evaluated in our practice several years ago for this same issue.  She had become more anemic which was felt to be in part related to anemia of chronic kidney disease and she was started on Procrit therapy every 2 weeks at a dose of 20,000 units subcutaneous.  She initially responded to Procrit therapy but after iron replacement she has not been requiring Procrit routinely.    She also is homozygous for the H63D mutation.  Most people with this mutation do not have clinical iron overload and given the patient's degree of anemia and pancytopenia she would not be a candidate for therapeutic phlebotomy in any case.     She received 2 doses of IV Feraheme as an outpatient 510 mg each on 4/27/2017 and 5/4/2017..  She is here today for four-month follow-up visit.  She had labs performed last week on 4/20/2018 show her iron levels are normal including normal ferritin level.  In spite of this her hemoglobin was a little lower at 9.1 we suspect this is primarily anemia of chronic kidney disease at this point.  History of Present Illness        ALLERGIES:    Allergies   Allergen Reactions   • Ciprofloxacin      Itching and red streaks   • Levaquin [Levofloxacin]    • Promethazine Hcl      Itching, red streaks when given IV        Review of Systems   Constitutional: Positive for fatigue. Negative for activity change, appetite change, fever and unexpected weight change.   HENT: Negative for hearing loss, nosebleeds, trouble swallowing and voice change.    Eyes: Negative for visual disturbance.   Respiratory: Negative for cough, shortness of breath and wheezing.   "  Cardiovascular: Negative for chest pain and palpitations.   Gastrointestinal: Negative for abdominal pain, diarrhea, nausea and vomiting.   Genitourinary: Negative for difficulty urinating, frequency, hematuria and urgency.   Musculoskeletal: Negative for back pain and neck pain.   Skin: Positive for color change. Negative for rash.        Sun damaged skin and senile purpura.   Neurological: Negative for dizziness, seizures, syncope and headaches.   Hematological: Negative for adenopathy. Bruises/bleeds easily.   Psychiatric/Behavioral: Positive for confusion. Negative for behavioral problems. The patient is not nervous/anxious.         Objective     Vitals:    04/25/18 1007   BP: 156/64   Pulse: 80   Resp: 14   Temp: 98.2 °F (36.8 °C)   TempSrc: Oral   SpO2: 97%   Weight: 97.3 kg (214 lb 6.4 oz)   Height: 160.5 cm (63.19\")   PainSc: 1  Comment: back pain     Current Status 4/25/2018   ECOG score 1       Physical Exam   Constitutional: She is oriented to person, place, and time. She appears well-developed and well-nourished. No distress.   HENT:   Head: Normocephalic.   Eyes: Conjunctivae and EOM are normal. Pupils are equal, round, and reactive to light. No scleral icterus.   Neck: Normal range of motion. Neck supple. No JVD present. No thyromegaly present.   Cardiovascular: Normal rate and regular rhythm.  Exam reveals no gallop and no friction rub.    No murmur heard.  Pulmonary/Chest: Effort normal and breath sounds normal. She has no wheezes. She has no rales.   Abdominal: Soft. She exhibits no distension and no mass. There is no tenderness.   Musculoskeletal: Normal range of motion. She exhibits no edema or deformity.   Lymphadenopathy:     She has no cervical adenopathy.   Neurological: She is alert and oriented to person, place, and time. She has normal reflexes. No cranial nerve deficit.   Skin: Skin is warm and dry. No rash noted. No erythema.   Sun damaged skin and senile purpura   Psychiatric: She has " a normal mood and affect. Her behavior is normal. Judgment normal.       RECENT LABS:  Hematology WBC   Date Value Ref Range Status   04/20/2018 4.09 4.00 - 10.00 10*3/mm3 Final     RBC   Date Value Ref Range Status   04/20/2018 2.74 (L) 3.90 - 5.00 10*6/mm3 Final     Hemoglobin   Date Value Ref Range Status   04/20/2018 9.1 (L) 11.5 - 14.9 g/dL Final     Hematocrit   Date Value Ref Range Status   04/20/2018 28.2 (L) 34.0 - 45.0 % Final     Platelets   Date Value Ref Range Status   04/20/2018 63 (L) 150 - 375 10*3/mm3 Final          Lab Results   Component Value Date    IRON 74 04/20/2018    TIBC 328 04/20/2018    FERRITIN 76.40 04/20/2018       Assessment/Plan     1.  Pancytopenia due to underlying cirrhosis of the liver and hypersplenism. This is a chronic process and has been relatively stable for several years.  As noted above, she had a normal appearing bone marrow examination in 2011.  2.  Component of anemia of chronic renal insufficiency stage IV.    3.  Prior Iron deficiency Previously with excellent response to IV Feraheme.  Her hemoglobin is low at 9.1 g/dL but her iron studies are still within normal limits as noted above.   4.  Hemachromatosis mutation but no clinical iron overload.     Plan  1.  We will schedule an M.D. follow-up in 4 months and we'll ask that she return for labs one week prior to that visit to include a CBC and iron studies.  2.  We will continue to supply IV iron as needed and if the patient's anemia continues to worsen we could discuss resuming Procrit for anemia of chronic kidney disease.

## 2018-08-02 PROBLEM — N39.0 ACUTE UTI: Status: ACTIVE | Noted: 2018-01-01

## 2018-08-02 NOTE — ED NOTES
"Pt's  reports that pt has had increased confusion and generalized weakness. Pt diagnosed one week ago with hepatic encephalopathy. Pt's  states \"she hasn't had any Lactulose in two days\".      Adelaida Walker RN  08/02/18 9840    "

## 2018-08-02 NOTE — ED PROVIDER NOTES
EMERGENCY DEPARTMENT ENCOUNTER    Room Number:  29/29  Date seen:  8/2/2018  Time seen: 10:23 AM  PCP: Chayito Paredes MD  Historian: family, patient  History Limited By: altered mental status       HPI:  Chief Complaint: altered mental status   Context: Charu Bowens is a 83 y.o. female who has hx of HOANG, hepatic encephalopathy (on lactulose), CKD, and dementia. Per family, pt was seen at [facility] ED on 7/26/18 for increased confusion compared to baseline and decreased urination for which she had urinary catheter placed and was diagnosed with hepatic encephalopathy, COLEMAN, and lactic acidosis. Family states that pt was recommended admission at that time but she declined.     Pt presents to the ED with worsening of chronic confusion that started a few days ago. Pt states that she has also had increased drowsiness and generalized weakness. She denies chest pain, headache, and abd pain and family notes that pt has had vomiting since last night but no diarrhea or fever. Per family, pt has not taken her lactulose since yesterday and followed up with urology yesterday. There are no other complaints at this time.     Location: generalized  Radiation: N/A  Quality: increased confusion compared to baseline  Intensity/Severity: moderate  Duration: started a few days ago  Onset quality: gradual  Timing: constant  Progression: increased compared to baseline  Aggravating Factors: none mentioned  Alleviating Factors: none mentioned  Previous Episodes: Per family, pt was seen at [facility] ED on 7/26/18 for similar sx.   Treatment before arrival: Per family, pt was seen at [facility] ED on 7/26/18 for increased confusion compared to baseline and decreased urination for which she had urinary catheter placed and was diagnosed with hepatic encephalopathy, COLEMAN, and lactic acidosis.  Associated Symptoms: increased drowsiness, generalized weakness, vomiting      PAST MEDICAL HISTORY  Active Ambulatory Problems     Diagnosis Date Noted   • Cirrhosis of  liver (CMS/HCC) 08/15/2016   • Hypersplenism 08/15/2016   • Splenic pancytopenia syndrome (CMS/HCC) 08/15/2016   • Chronic renal disease, stage 4, severely decreased glomerular filtration rate between 15-29 mL/min/1.73 square meter (CMS/HCC) 08/15/2016   • Anemia of chronic renal failure, stage 4 (severe) (CMS/HCC) 09/08/2016   • Hepatic encephalopathy (CMS/HCC) 11/16/2016   • Thrombocytopenia (CMS/HCC) 11/17/2016   • Leukopenia 11/17/2016   • Acute kidney failure (CMS/HCC) 11/17/2016   • Acute hepatic encephalopathy 11/28/2016   • DM2 (diabetes mellitus, type 2) (CMS/HCC) 11/29/2016   • Iron deficiency 03/30/2017   • Acute idiopathic gout of right ankle 01/17/2018   • Decreased mobility 01/17/2018   • Fall 01/17/2018     Resolved Ambulatory Problems     Diagnosis Date Noted   • Hemochromatosis 08/15/2016   • CKD (chronic kidney disease) stage 3, GFR 30-59 ml/min 11/17/2016     Past Medical History:   Diagnosis Date   • Anxiety    • Arthritis    • C. difficile colitis    • CHF (congestive heart failure) (CMS/HCC)    • Chronic kidney disease    • Dementia    • Diabetes mellitus (CMS/HCC)    • Diskitis 11/2015   • Hemochromatosis    • History of anemia    • History of blood transfusion 2015   • History of pneumonia 2016   • History of sepsis 2015   • Hypertension    • Liver disease    • HOANG (nonalcoholic steatohepatitis)    • Pancytopenia (CMS/HCC)    • Spinal stenosis    • Splenomegaly    • Thrombocytopenia, chronic    • TIA (transient ischemic attack)          PAST SURGICAL HISTORY  Past Surgical History:   Procedure Laterality Date   • ABDOMINAL SURGERY     • KNEE SURGERY     • RENAL BIOPSY  10/2015   • TONSILLECTOMY     • WISDOM TOOTH EXTRACTION           FAMILY HISTORY  Family History   Problem Relation Age of Onset   • Heart disease Mother    • Diabetes Mother    • Heart disease Father    • Breast cancer Sister 45   • Hypertension Sister    • Diabetes Sister    • Colon cancer Brother 50   • Diabetes Brother           SOCIAL HISTORY  Social History     Social History   • Marital status:      Spouse name: Kirby   • Number of children: 2   • Years of education: High School     Occupational History   •  Retired     Social History Main Topics   • Smoking status: Former Smoker     Types: Cigarettes     Quit date: 11/17/1985   • Smokeless tobacco: Never Used      Comment: Heavy in past, but quit   • Alcohol use No   • Drug use: No   • Sexual activity: Defer     Other Topics Concern   • Not on file     Social History Narrative   • No narrative on file         ALLERGIES  Ciprofloxacin; Levaquin [levofloxacin]; and Promethazine hcl      REVIEW OF SYSTEMS  Review of Systems   Unable to perform ROS: Mental status change   Constitutional: Negative for fever.        Increased drowsiness   Cardiovascular: Negative for chest pain.   Gastrointestinal: Positive for vomiting. Negative for abdominal pain and diarrhea.   Neurological: Positive for weakness (generalized weakness, no focal weakness). Negative for headaches.   Psychiatric/Behavioral: Positive for confusion (worsening of chronic confusion).            PHYSICAL EXAM  ED Triage Vitals   Temp Heart Rate Resp BP SpO2   08/02/18 1025 08/02/18 1009 08/02/18 1009 08/02/18 1028 08/02/18 1009   97.9 °F (36.6 °C) 79 16 (!) 181/65 98 % WNL      Temp src Heart Rate Source Patient Position BP Location FiO2 (%)   08/02/18 1025 08/02/18 1128 -- -- --   Oral Monitor          Physical Exam   Constitutional: No distress.   HENT:   Head: Normocephalic.   Mouth/Throat: Mucous membranes are dry.   Eyes: Pupils are equal, round, and reactive to light. EOM are normal.   Neck: Normal range of motion. Neck supple.   Cardiovascular: Normal rate, regular rhythm and normal heart sounds.    Pulmonary/Chest: Effort normal and breath sounds normal. No respiratory distress. She has no decreased breath sounds. She has no wheezes. She has no rhonchi. She has no rales.   Abdominal: Soft. There is no  tenderness. There is no rebound and no guarding.   Jimenez catheter in place with dark yellow urine in bag   Musculoskeletal: Normal range of motion. She exhibits edema (1+ BLE edema).   Neurological: She is alert. She has normal motor skills, normal sensation and intact cranial nerves. She is disoriented (oriented to person and place, disoriented to time). She displays facial symmetry and normal speech.   Moves all extremities, nonfocal neuro exam   Skin: Skin is warm and dry.   Nursing note and vitals reviewed.          LAB RESULTS  Recent Results (from the past 24 hour(s))   Comprehensive Metabolic Panel    Collection Time: 08/02/18 10:41 AM   Result Value Ref Range    Glucose 231 (H) 65 - 99 mg/dL    BUN 58 (H) 8 - 23 mg/dL    Creatinine 3.87 (H) 0.57 - 1.00 mg/dL    Sodium 141 136 - 145 mmol/L    Potassium 4.8 3.5 - 5.2 mmol/L    Chloride 103 98 - 107 mmol/L    CO2 22.2 22.0 - 29.0 mmol/L    Calcium 9.5 8.6 - 10.5 mg/dL    Total Protein 7.0 6.0 - 8.5 g/dL    Albumin 3.20 (L) 3.50 - 5.20 g/dL    ALT (SGPT) 22 1 - 33 U/L    AST (SGOT) 42 (H) 1 - 32 U/L    Alkaline Phosphatase 141 (H) 39 - 117 U/L    Total Bilirubin 1.2 0.1 - 1.2 mg/dL    eGFR Non African Amer 11 (L) >60 mL/min/1.73    Globulin 3.8 gm/dL    A/G Ratio 0.8 g/dL    BUN/Creatinine Ratio 15.0 7.0 - 25.0    Anion Gap 15.8 mmol/L   Lactic Acid, Plasma    Collection Time: 08/02/18 10:41 AM   Result Value Ref Range    Lactate 2.9 (C) 0.5 - 2.0 mmol/L   CBC Auto Differential    Collection Time: 08/02/18 10:41 AM   Result Value Ref Range    WBC 4.04 (L) 4.50 - 10.70 10*3/mm3    RBC 3.08 (L) 3.90 - 5.20 10*6/mm3    Hemoglobin 10.8 (L) 11.9 - 15.5 g/dL    Hematocrit 32.2 (L) 35.6 - 45.5 %    .5 (H) 80.5 - 98.2 fL    MCH 35.1 (H) 26.9 - 32.0 pg    MCHC 33.5 32.4 - 36.3 g/dL    RDW 14.8 (H) 11.7 - 13.0 %    RDW-SD 55.5 (H) 37.0 - 54.0 fl    MPV 10.0 6.0 - 12.0 fL    Platelets 64 (L) 140 - 500 10*3/mm3    Neutrophil % 63.3 42.7 - 76.0 %    Lymphocyte % 24.3  19.6 - 45.3 %    Monocyte % 7.7 5.0 - 12.0 %    Eosinophil % 4.5 0.3 - 6.2 %    Basophil % 0.2 0.0 - 1.5 %    Immature Grans % 0.2 0.0 - 0.5 %    Neutrophils, Absolute 2.56 1.90 - 8.10 10*3/mm3    Lymphocytes, Absolute 0.98 0.90 - 4.80 10*3/mm3    Monocytes, Absolute 0.31 0.20 - 1.20 10*3/mm3    Eosinophils, Absolute 0.18 0.00 - 0.70 10*3/mm3    Basophils, Absolute 0.01 0.00 - 0.20 10*3/mm3    Immature Grans, Absolute 0.01 0.00 - 0.03 10*3/mm3   Lactic Acid, Reflex Timer (This will reflex a repeat order 3-3:15 hours after ordered.)    Collection Time: 08/02/18 10:41 AM   Result Value Ref Range    Extra Tube Hold for add-ons.    Urinalysis With Microscopic If Indicated (No Culture) - Urine, Catheter    Collection Time: 08/02/18 10:43 AM   Result Value Ref Range    Color, UA Red (A) Yellow, Straw    Appearance, UA Turbid (A) Clear    pH, UA 7.5 5.0 - 8.0    Specific Gravity, UA 1.015 1.005 - 1.030    Glucose, UA Negative Negative    Ketones, UA Negative Negative    Bilirubin, UA Negative Negative    Blood, UA Large (3+) (A) Negative    Protein,  mg/dL (2+) (A) Negative    Leuk Esterase, UA Large (3+) (A) Negative    Nitrite, UA Negative Negative    Urobilinogen, UA 0.2 E.U./dL 0.2 - 1.0 E.U./dL   Urinalysis, Microscopic Only - Urine, Clean Catch    Collection Time: 08/02/18 10:43 AM   Result Value Ref Range    RBC, UA Too Numerous to Count (A) None Seen, 0-2 /HPF    WBC, UA Too Numerous to Count (A) None Seen, 0-2 /HPF    Bacteria, UA 4+ (A) None Seen /HPF    Squamous Epithelial Cells, UA 7-12 (A) None Seen, 0-2 /HPF    Hyaline Casts, UA 3-6 None Seen /LPF    Methodology Automated Microscopy    Ammonia    Collection Time: 08/02/18 11:28 AM   Result Value Ref Range    Ammonia 114 (H) 11 - 51 umol/L   Lactic Acid, Reflex    Collection Time: 08/02/18  3:05 PM   Result Value Ref Range    Lactate 2.7 (C) 0.5 - 2.0 mmol/L       Ordered the above labs and reviewed the  results.          PROCEDURES  Procedures        EKG:           EKG time: 1036  Rhythm/Rate: sinus arrhythmia, rate= 81   P waves and ND: normal, normal  QRS, axis: normal, normal   ST and T waves: nonspecific T wave changes   Artifact present     Interpreted Contemporaneously by me, independently viewed  unchanged compared to prior 7/26/18          PROGRESS AND CONSULTS  ED Course as of Aug 02 1548   Thu Aug 02, 2018   1131 stable Creatinine: (!) 3.87 []   1159 106 seven days ago Ammonia: (!) 114 []      ED Course User Index  [] Kirby Flores MD     1035- Ordered ammonia level, blood work, UA, lactic acid, and EKG for further evaluation.     1132- Ordered rocephin for UTI and IV fluids for hydration.     1201- Ammonia level is elevated at 114. Ordered lactulose for hepatic encephalopathy. Sent call out to [facility]. Admission decision time= 1201    1244- Discussed case with Dr Brown ([facility] hospitalist)  Reviewed history, exam, results and treatments.  Discussed concerns and plan of care. Dr Brown accepts pt to be admitted to telemetry.    1246- Rechecked pt. She is resting comfortably and is in no acute distress. Discussed with pt and family about all pertinent results including ammonia level of 114 that has increased from 7 days ago, UTI indicated on UA, and elevated lactic acid that has increased even further from 7 days ago. Informed them that pt will receive lactulose for hepatic encephalopathy and abx for UTI. Discussed pt admission for further care and observation. Pt and family verbalize understanding and agreement with plan.         MEDICAL DECISION MAKING      MDM  Number of Diagnoses or Management Options  Acute UTI:   Hepatic encephalopathy (CMS/HCC):   Diagnosis management comments: Patient's ammonia level was elevated.  According to her , she had not taken any lactulose for the past 2 days.  She was also found to have a UTI.  Patient was confused but her neuro exam was otherwise  normal.  Patient was given IV fluids and IV Rocephin.  Lactulose was also ordered.  Case was discussed with Dr. Brown and he agreed to admit the patient.       Amount and/or Complexity of Data Reviewed  Clinical lab tests: reviewed and ordered (UA, WBC= 4.04, ammonia level= 114, BUN= 58, creatinine= 3.87, lactic acid= 2.9)  Tests in the radiology section of CPT®: reviewed and ordered  Tests in the medicine section of CPT®: reviewed and ordered (EKG)  Decide to obtain previous medical records or to obtain history from someone other than the patient: yes  Review and summarize past medical records: yes (Pt was seen at [facility] ED on 7/26/18 complaining of fatigue, hyperglycemia, and increased confusion. At the time, CT head showed nothing acute, CXR showed increased density in right mid lung felt to represent scarring vs minimal infiltrate, ammonia level was 106, BUN was 55, creatinine was 3.47, lactic acid was 2.4, and UA was unremarkable. Because bladder scan showed 819ml urine in bladder, pt had meneses catheter placed. )  Discuss the patient with other providers: yes (Dr Brown ([facility] hospitalist))  Independent visualization of images, tracings, or specimens: yes    Patient Progress  Patient progress: stable             DIAGNOSIS  Final diagnoses:   Acute UTI   Hepatic encephalopathy (CMS/HCC)         DISPOSITION  Admitted- telemetry    Discussed treatment plan and reason for admission with pt/family and admitting physician.  Pt/family voiced understanding of the plan for admission for further testing/treatment as needed.         Latest Documented Vital Signs:  As of 1:41 PM  BP- 162/63 HR- 82 Temp- 97.9 °F (36.6 °C) (Oral) O2 sat- 95%        --  Documentation assistance provided by colby Kenyon for Dr. Mark MD.  Information recorded by the scribe was done at my direction and has been verified and validated by me.       Chelle Kenyon  08/02/18 2620       Kirby Flores MD  08/02/18 [id number]

## 2018-08-02 NOTE — H&P
Name: Charu Bowens ADMIT: 2018   : 1935  PCP: Chayito Paredes MD    MRN: [id number] LOS: 0 days   AGE/SEX: 83 y.o. female  ROOM: [facility]/     Chief Complaint   Patient presents with   • Altered Mental Status       History of present illness  Ms. Bowens is a 83 y.o. 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  Per family, pt was seen at [facility] ED on 18 for increased confusion compared to baseline and decreased urination for which she had urinary catheter placed and was diagnosed with hepatic encephalopathy, COLEMAN, and lactic acidosis. Family states that pt was recommended admission at that time but she declined.     She again presented to the hospital today with a history of having confusion and has not taken lactulose for 2 days.  She also has a indwelling Jimenez catheter and found to have UTI.  She has chronic kidney disease stage IV and now her creatinine has bumped up under ammonia also has increased.  She lives with her .  I talked to the  that if he is not able to take care of her he may have to put her in a nursing home        Past Medical History:   Diagnosis Date   • Anxiety    • Arthritis    • C. difficile colitis    • CHF (congestive heart failure) (CMS/HCC)    • Chronic kidney disease     Stage III   • Dementia    • Diabetes mellitus (CMS/HCC)     Type 2   • Diskitis 2015    L4-L5 w/abscess formation   • Hemochromatosis    • History of anemia    • History of blood transfusion    • History of pneumonia    • History of sepsis    • Hypertension    • Liver disease     Cirrhosis likely secondary to ORDONEZ   • ORDONEZ (nonalcoholic steatohepatitis)    • Pancytopenia (CMS/HCC)    • Spinal stenosis    • Splenomegaly    • Thrombocytopenia, chronic    • TIA (transient ischemic attack)      Past Surgical History:   Procedure Laterality Date   • ABDOMINAL SURGERY     • KNEE SURGERY     • RENAL BIOPSY  10/2015   • TONSILLECTOMY     • WISDOM  TOOTH EXTRACTION       Family History   Problem Relation Age of Onset   • Heart disease Mother    • Diabetes Mother    • Heart disease Father    • Breast cancer Sister 45   • Hypertension Sister    • Diabetes Sister    • Colon cancer Brother 50   • Diabetes Brother      Social History   Substance Use Topics   • Smoking status: Former Smoker     Types: Cigarettes     Quit date: 11/17/1985   • Smokeless tobacco: Never Used      Comment: Heavy in past, but quit   • Alcohol use No     Prescriptions Prior to Admission   Medication Sig Dispense Refill Last Dose   • allopurinol (ZYLOPRIM) 100 MG tablet Take 100 mg by mouth 2 (Two) Times a Day.   8/2/2018 at Unknown time   • bumetanide (BUMEX) 2 MG tablet Take 3 mg by mouth 2 (Two) Times a Day.   8/2/2018 at Unknown time   • cholecalciferol (VITAMIN D3) 1000 units tablet Take 1,000 Units by mouth Daily.   8/2/2018 at Unknown time   • cyclobenzaprine (FLEXERIL) 5 MG tablet Take 5 mg by mouth Every Night.   8/1/2018 at Unknown time   • Ferrous Sulfate (IRON) 325 (65 Fe) MG tablet Take  by mouth Daily.      • guaiFENesin (MUCINEX) 600 MG 12 hr tablet Take 600 mg by mouth Every Night.   8/1/2018 at Unknown time   • insulin NPH-insulin regular (Novolin 70/30) (70-30) 100 UNIT/ML injection Inject 40 Units under the skin into the appropriate area as directed Daily With Breakfast & Lunch.   8/1/2018 at Unknown time   • lactulose (CHRONULAC) 10 GM/15ML solution Take 30 g by mouth Daily.   Past Week at Unknown time   • lidocaine (LIDODERM) 5 % Place 1 patch on the skin as directed by provider As Needed. Remove & Discard patch within 12 hours or as directed by MD    8/1/2018 at Unknown time   • metoprolol tartrate (LOPRESSOR) 25 MG tablet Take 12.5 mg by mouth Every Night.   8/1/2018 at Unknown time   • Multiple Vitamin (MULTI-VITAMIN PO) Take 1 tablet by mouth Daily.   8/2/2018 at Unknown time   • ondansetron (ZOFRAN) 4 MG tablet Take 4 mg by mouth As Needed for Nausea or Vomiting.    8/1/2018 at Unknown time   • potassium chloride (KLOR-CON) 20 MEQ CR tablet Take 10 mEq by mouth 3 (Three) Times a Day.   8/2/2018 at Unknown time   • Probiotic Product (PROBIOTIC PO) Take 1 tablet by mouth Daily With Lunch & Dinner.   8/1/2018 at Unknown time   • QUEtiapine (SEROquel) 25 MG tablet Take 25 mg by mouth Every Night.   8/1/2018 at Unknown time   • rifaximin (XIFAXAN) 550 MG tablet Take 550 mg by mouth Daily With Lunch & Dinner.   8/1/2018 at Unknown time   • traMADol (ULTRAM) 50 MG tablet Take 50 mg by mouth Every Night.   8/1/2018 at Unknown time   • vitamin B-12 (CYANOCOBALAMIN) 2500 MCG sublingual tablet tablet Place 1,000 mcg under the tongue Daily.   8/2/2018 at Unknown time   • acetaminophen (TYLENOL) 650 MG 8 hr tablet Take 650 mg by mouth Every 8 (Eight) Hours As Needed for Mild Pain (1-3).   Taking   • predniSONE (DELTASONE) 20 MG tablet 2 po daily for  3 days the 1 po daily for 4 days 10 tablet 0    • simethicone (MYLICON) 125 MG chewable tablet Chew 125 mg As Needed for Flatulence.      • temazepam (RESTORIL) 7.5 MG capsule Take 7.5 mg by mouth Every Night.   Taking     Allergies:    Allergies   Allergen Reactions   • Ciprofloxacin      Itching and red streaks   • Levaquin [Levofloxacin]    • Promethazine Hcl      Itching, red streaks when given IV       Review of Systems   Unable to perform ROS: Acuity of condition          Objective    Vital Signs  Temp:  [97.6 °F (36.4 °C)-97.9 °F (36.6 °C)] 97.6 °F (36.4 °C)  Heart Rate:  [72-87] 87  Resp:  [14-16] 16  BP: (151-181)/(58-66) 172/66  SpO2:  [94 %-98 %] 98 %  on   ;   Device (Oxygen Therapy): room air  Body mass index is 36.21 kg/m².    Physical Exam   Constitutional: She appears well-developed and well-nourished. She appears lethargic. No distress.   Confused   HENT:   Head: Normocephalic and atraumatic.   Nose: No epistaxis.   Mouth/Throat: Oropharynx is clear and moist.   Eyes: Pupils are equal, round, and reactive to light. Conjunctivae  and EOM are normal.   Neck: Normal range of motion. Neck supple. No JVD present. No tracheal deviation and no edema present. No thyroid mass and no thyromegaly present.   Cardiovascular: Normal rate, regular rhythm and normal heart sounds.    No murmur heard.  Pulmonary/Chest: Breath sounds normal. She has no decreased breath sounds. She has no wheezes.   Abdominal: Soft. Normal appearance and bowel sounds are normal. She exhibits no ascites. There is no hepatosplenomegaly.   Musculoskeletal: Normal range of motion. She exhibits no edema.   Neurological: She appears lethargic. She is disoriented.   Skin: Skin is warm, dry and intact. No rash noted. No cyanosis or erythema. No pallor. Nails show no clubbing.   Vitals reviewed.      Results Review:   I reviewed the patient's new clinical results.    Lab Results (last 24 hours)     Procedure Component Value Units Date/Time    CBC & Differential [[id number]] Collected:  08/02/18 1041    Specimen:  Blood Updated:  08/02/18 1104    Narrative:       The following orders were created for panel order CBC & Differential.  Procedure                               Abnormality         Status                     ---------                               -----------         ------                     CBC Auto Differential[[id number]]        Abnormal            Final result                 Please view results for these tests on the individual orders.    Comprehensive Metabolic Panel [[id number]]  (Abnormal) Collected:  08/02/18 1041    Specimen:  Blood Updated:  08/02/18 1128     Glucose 231 (H) mg/dL      BUN 58 (H) mg/dL      Creatinine 3.87 (H) mg/dL      Sodium 141 mmol/L      Potassium 4.8 mmol/L      Chloride 103 mmol/L      CO2 22.2 mmol/L      Calcium 9.5 mg/dL      Total Protein 7.0 g/dL      Albumin 3.20 (L) g/dL      ALT (SGPT) 22 U/L      AST (SGOT) 42 (H) U/L      Alkaline Phosphatase 141 (H) U/L      Total Bilirubin 1.2 mg/dL      eGFR Non African Amer 11 (L) mL/min/1.73       Globulin 3.8 gm/dL      A/G Ratio 0.8 g/dL      BUN/Creatinine Ratio 15.0     Anion Gap 15.8 mmol/L     Narrative:       The MDRD GFR formula is only valid for adults with stable renal function between ages 18 and 70.    Lactic Acid, Plasma [[id number]]  (Abnormal) Collected:  08/02/18 1041    Specimen:  Blood Updated:  08/02/18 1131     Lactate 2.9 (C) mmol/L     CBC Auto Differential [[id number]]  (Abnormal) Collected:  08/02/18 1041    Specimen:  Blood Updated:  08/02/18 1104     WBC 4.04 (L) 10*3/mm3      RBC 3.08 (L) 10*6/mm3      Hemoglobin 10.8 (L) g/dL      Hematocrit 32.2 (L) %      .5 (H) fL      MCH 35.1 (H) pg      MCHC 33.5 g/dL      RDW 14.8 (H) %      RDW-SD 55.5 (H) fl      MPV 10.0 fL      Platelets 64 (L) 10*3/mm3      Neutrophil % 63.3 %      Lymphocyte % 24.3 %      Monocyte % 7.7 %      Eosinophil % 4.5 %      Basophil % 0.2 %      Immature Grans % 0.2 %      Neutrophils, Absolute 2.56 10*3/mm3      Lymphocytes, Absolute 0.98 10*3/mm3      Monocytes, Absolute 0.31 10*3/mm3      Eosinophils, Absolute 0.18 10*3/mm3      Basophils, Absolute 0.01 10*3/mm3      Immature Grans, Absolute 0.01 10*3/mm3     Lactic Acid, Reflex Timer (This will reflex a repeat order 3-3:15 hours after ordered.) [[id number]] Collected:  08/02/18 1041    Specimen:  Blood Updated:  08/02/18 1445     Extra Tube Hold for add-ons.     Comment: Auto resulted.       Urinalysis With Microscopic If Indicated (No Culture) - Urine, Catheter [[id number]]  (Abnormal) Collected:  08/02/18 1043    Specimen:  Urine from Urine, Catheter Updated:  08/02/18 1112     Color, UA Red (A)     Comment: Any Substance that causes an abnormal urine color can alter the accuracy of the chemical reactions.        Appearance, UA Turbid (A)     pH, UA 7.5     Specific Gravity, UA 1.015     Glucose, UA Negative     Ketones, UA Negative     Bilirubin, UA Negative     Blood, UA Large (3+) (A)     Protein,  mg/dL (2+) (A)     Leuk Esterase, UA  Large (3+) (A)     Nitrite, UA Negative     Urobilinogen, UA 0.2 E.U./dL    Urinalysis, Microscopic Only - Urine, Clean Catch [[id number]]  (Abnormal) Collected:  08/02/18 1043    Specimen:  Urine from Urine, Catheter Updated:  08/02/18 1112     RBC, UA Too Numerous to Count (A) /HPF      WBC, UA Too Numerous to Count (A) /HPF      Bacteria, UA 4+ (A) /HPF      Squamous Epithelial Cells, UA 7-12 (A) /HPF      Hyaline Casts, UA 3-6 /LPF      Methodology Automated Microscopy    Ammonia [[id number]]  (Abnormal) Collected:  08/02/18 1128    Specimen:  Blood Updated:  08/02/18 1153     Ammonia 114 (H) umol/L     Lactic Acid, Reflex [[id number]] Collected:  08/02/18 1505    Specimen:  Blood Updated:  08/02/18 1523          No orders to display     Assessment/Plan      Active Hospital Problems    Diagnosis Date Noted   • **Acute UTI [N39.0] 08/02/2018   • Acute hepatic encephalopathy [K72.00] 11/28/2016     Priority: High   • Acute kidney failure (CMS/HCC) [N17.9] 11/17/2016     Priority: Medium   • DM2 (diabetes mellitus, type 2) (CMS/HCC) [E11.9] 11/29/2016   • Thrombocytopenia (CMS/HCC) [D69.6] 11/17/2016   • Chronic renal disease, stage 4, severely decreased glomerular filtration rate between 15-29 mL/min/1.73 square meter (CMS/HCC) [N18.4] 08/15/2016   • Cirrhosis of liver (CMS/HCC) [K74.60] 08/15/2016      Resolved Hospital Problems    Diagnosis Date Noted Date Resolved   No resolved problems to display.         Ms. Bowens is a 83 y.o. female who With multiple medical problems.  She has acute confusion secondary to worsening of her hepatic encephalopathy due to noncompliance with lactulose on also complicated by urinary tract infection.  She has chronic thrombocytopenia and anemia along with cirrhosis of liver and diabetes mellitus.    Plan is to admit the patient and continue antibiotics for urinary tract infection and start her back on lactulose.  She needs to have ideally 2-3 bowel movements a day.  We will continue  to monitor blood sugars.        I discussed the patients findings and my recommendations with patient, family and nursing staff.      Chris Brown MD  [facility]  08/02/18

## 2018-08-02 NOTE — PLAN OF CARE
Problem: Patient Care Overview  Goal: Plan of Care Review  Outcome: Ongoing (interventions implemented as appropriate)   08/02/18 1800   Coping/Psychosocial   Plan of Care Reviewed With patient   OTHER   Outcome Summary pt admitted to unit, onl oriented to self, catheter switched from leg bag to bsd bag, passed nedside swallow screen, given ivf, vss, sr on monitor, will ctm.     Goal: Individualization and Mutuality  Outcome: Ongoing (interventions implemented as appropriate)    Goal: Discharge Needs Assessment  Outcome: Ongoing (interventions implemented as appropriate)    Goal: Interprofessional Rounds/Family Conf  Outcome: Ongoing (interventions implemented as appropriate)      Problem: Fall Risk (Adult)  Goal: Identify Related Risk Factors and Signs and Symptoms  Outcome: Ongoing (interventions implemented as appropriate)    Goal: Absence of Fall  Outcome: Ongoing (interventions implemented as appropriate)      Problem: Urinary Tract Infection (Adult)  Goal: Signs and Symptoms of Listed Potential Problems Will be Absent, Minimized or Managed (Urinary Tract Infection)  Outcome: Ongoing (interventions implemented as appropriate)      Problem: Pain, Acute (Adult)  Goal: Identify Related Risk Factors and Signs and Symptoms  Outcome: Ongoing (interventions implemented as appropriate)    Goal: Acceptable Pain Control/Comfort Level  Outcome: Ongoing (interventions implemented as appropriate)      Problem: Kidney Disease, Chronic/End Stage Renal Disease (Adult)  Goal: Signs and Symptoms of Listed Potential Problems Will be Absent, Minimized or Managed (Kidney Disease, Chronic/End Stage Renal Disease)  Outcome: Ongoing (interventions implemented as appropriate)      Problem: Confusion, Chronic (Adult)  Goal: Identify Related Risk Factors and Signs and Symptoms  Outcome: Ongoing (interventions implemented as appropriate)    Goal: Cognitive/Functional Impairments Minimized  Outcome: Ongoing (interventions implemented as  appropriate)    Goal: Free from Harm/Injuries  Outcome: Ongoing (interventions implemented as appropriate)

## 2018-08-03 PROBLEM — T83.511A URINARY TRACT INFECTION ASSOCIATED WITH INDWELLING URETHRAL CATHETER (HCC): Status: ACTIVE | Noted: 2018-01-01

## 2018-08-03 NOTE — PLAN OF CARE
Problem: Patient Care Overview  Goal: Plan of Care Review  Outcome: Ongoing (interventions implemented as appropriate)   08/03/18 1130   Coping/Psychosocial   Plan of Care Reviewed With patient   OTHER   Outcome Summary pt denies pain, disoriented x3, given ivf, and iv abx, meneses cath care done, given ns bolus, vss, will ctm.     Goal: Individualization and Mutuality  Outcome: Ongoing (interventions implemented as appropriate)    Goal: Discharge Needs Assessment  Outcome: Ongoing (interventions implemented as appropriate)    Goal: Interprofessional Rounds/Family Conf  Outcome: Ongoing (interventions implemented as appropriate)      Problem: Fall Risk (Adult)  Goal: Absence of Fall  Outcome: Ongoing (interventions implemented as appropriate)      Problem: Urinary Tract Infection (Adult)  Goal: Signs and Symptoms of Listed Potential Problems Will be Absent, Minimized or Managed (Urinary Tract Infection)  Outcome: Ongoing (interventions implemented as appropriate)      Problem: Pain, Acute (Adult)  Goal: Identify Related Risk Factors and Signs and Symptoms  Outcome: Ongoing (interventions implemented as appropriate)    Goal: Acceptable Pain Control/Comfort Level  Outcome: Ongoing (interventions implemented as appropriate)      Problem: Kidney Disease, Chronic/End Stage Renal Disease (Adult)  Goal: Signs and Symptoms of Listed Potential Problems Will be Absent, Minimized or Managed (Kidney Disease, Chronic/End Stage Renal Disease)  Outcome: Ongoing (interventions implemented as appropriate)      Problem: Confusion, Chronic (Adult)  Goal: Cognitive/Functional Impairments Minimized  Outcome: Ongoing (interventions implemented as appropriate)    Goal: Free from Harm/Injuries  Outcome: Ongoing (interventions implemented as appropriate)      Problem: Skin Injury Risk (Adult)  Goal: Skin Health and Integrity  Outcome: Ongoing (interventions implemented as appropriate)

## 2018-08-03 NOTE — PLAN OF CARE
Problem: Patient Care Overview  Goal: Plan of Care Review  Outcome: Ongoing (interventions implemented as appropriate)   08/03/18 1427   Coping/Psychosocial   Plan of Care Reviewed With patient;spouse   OTHER   Outcome Summary Pt admitted for confusion, weakness, and decreased urination. Pt lives at home w/ , who reports pt requires approx. min A w/ ADLs at PLOF; pt ambulates w/ RW. Pt currently presents w/ decreased balance and activity tolerance, impacting indep. and safety w/ ADLs. Pt required CGA for bed mobility and sit <> stand transfers. Pt required total A to doff socks. Pt will benefit from skilled OT services to address deficits and return to PLOF.   Plan of Care Review   Progress improving

## 2018-08-03 NOTE — PLAN OF CARE
Problem: Patient Care Overview  Goal: Plan of Care Review  Outcome: Ongoing (interventions implemented as appropriate)   08/03/18 3281   Coping/Psychosocial   Plan of Care Reviewed With patient;spouse   OTHER   Outcome Summary Patient became more confused during the night, no complaints of pain, VSS, IVF infusing, meneses to gravity with dark justen urine, will continue to monitor.   Plan of Care Review   Progress no change     Goal: Individualization and Mutuality  Outcome: Ongoing (interventions implemented as appropriate)    Goal: Discharge Needs Assessment  Outcome: Ongoing (interventions implemented as appropriate)      Problem: Fall Risk (Adult)  Goal: Identify Related Risk Factors and Signs and Symptoms  Outcome: Outcome(s) achieved Date Met: 08/03/18    Goal: Absence of Fall  Outcome: Ongoing (interventions implemented as appropriate)      Problem: Urinary Tract Infection (Adult)  Goal: Signs and Symptoms of Listed Potential Problems Will be Absent, Minimized or Managed (Urinary Tract Infection)  Outcome: Ongoing (interventions implemented as appropriate)      Problem: Kidney Disease, Chronic/End Stage Renal Disease (Adult)  Goal: Signs and Symptoms of Listed Potential Problems Will be Absent, Minimized or Managed (Kidney Disease, Chronic/End Stage Renal Disease)  Outcome: Ongoing (interventions implemented as appropriate)      Problem: Confusion, Chronic (Adult)  Goal: Identify Related Risk Factors and Signs and Symptoms  Outcome: Outcome(s) achieved Date Met: 08/03/18    Goal: Cognitive/Functional Impairments Minimized  Outcome: Ongoing (interventions implemented as appropriate)      Problem: Skin Injury Risk (Adult)  Goal: Identify Related Risk Factors and Signs and Symptoms  Outcome: Outcome(s) achieved Date Met: 08/03/18    Goal: Skin Health and Integrity  Outcome: Ongoing (interventions implemented as appropriate)

## 2018-08-03 NOTE — PROGRESS NOTES
Discharge Planning Assessment  [facility]     Patient Name: Charu Bowens  MRN: [id number]  Today's Date: 8/3/2018    Admit Date: 8/2/2018          Discharge Needs Assessment     Row Name 08/03/18 1043       Living Environment    Lives With spouse    Name(s) of Who Lives With Patient Spouse  (Kirby Bowens  [phone])    Current Living Arrangements home/apartment/condo    Primary Care Provided by self    Provides Primary Care For no one    Family Caregiver if Needed spouse    Family Caregiver Names Spouse (Kirby Bowens)    Able to Return to Prior Arrangements yes    Living Arrangement Comments Pt lives in a single story house with   (Kirby Bowens)       Resource/Environmental Concerns    Resource/Environmental Concerns none    Transportation Concerns car, none       Transition Planning    Patient/Family Anticipates Transition to home with family    Patient/Family Anticipated Services at Transition none    Transportation Anticipated family or friend will provide       Discharge Needs Assessment    Concerns to be Addressed denies needs/concerns at this time    Equipment Currently Used at Home glucometer;lift device;walker, rolling    Anticipated Changes Related to Illness none    Equipment Needed After Discharge glucometer;lift device;walker, rolling    Offered/Gave Vendor List no            Discharge Plan     Row Name 08/03/18 0029       Plan    Plan Plan home will follow for HH needs .  ROSALIE Cota    Patient/Family in Agreement with Plan yes    Plan Comments FACE SHEET VERIFIED/ IM LETTER SIGNED.  Spoke to pt and pt's   (Kirby Bowens [phone]) at bedside with pt's permission.  Pt's PCP is Dr. Chayito Paredes.   Pt lives in a single story house with .  Pt is independent with ADL's. Pt has a glucometer, tub lift, BSC, and rolling walker for home use.   Pt gets prescriptions at [facility] ([place]  Pt denies any issues affording medications.  Pt is not current with HH.  Pt has been in [place], [place] and [facility].  Pt's  states he does not want referral to SNF.  Pt 's  states pt has gone to a SNF and check herself out before she checked in.  Pt also has gone for one day and checked herself out.  Plan home will follow for HH needs.   ROSALIE Cota        Destination     No service coordination in this encounter.      Durable Medical Equipment     No service coordination in this encounter.      Dialysis/Infusion     No service coordination in this encounter.      Home Medical Care     No service coordination in this encounter.      Social Care     No service coordination in this encounter.                Demographic Summary     Row Name 08/03/18 1043       General Information    Admission Type inpatient    Arrived From emergency department    Required Notices Provided Important Message from Medicare    Referral Source admission list    Reason for Consult discharge planning    Preferred Language English     Used During This Interaction no            Functional Status     Row Name 08/03/18 1043       Functional Status    Usual Activity Tolerance good    Current Activity Tolerance moderate       Functional Status, IADL    Medications assistive person    Meal Preparation assistive person    Housekeeping assistive person    Laundry assistive person    Shopping assistive person       Mental Status    General Appearance WDL WDL       Mental Status Summary    Recent Changes in Mental Status/Cognitive Functioning no changes            Psychosocial    No documentation.           Abuse/Neglect    No documentation.           Legal    No documentation.           Substance Abuse    No documentation.           Patient Forms    No documentation.         Izzy Moon, RN

## 2018-08-03 NOTE — CONSULTS
Referring Provider: Navarro Lizarraga MD  Reason for Consultation: Patient with acute kidney injury on chronic kidney disease    Subjective     Chief complaint   Chief Complaint   Patient presents with   • Altered Mental Status       History of present illness:    This is a 83-year-old  female was admitted on 8/2/2018 confusion and noted to have increased creatinine up to 3.87, it's down to 3.63 today.  In January 2018 creatinine was 2.46.  The patient is known to have chronic kidney disease stage IV, she has history of cirrhosis associated with Ordonez, noncompliant with the lactulose.  She presented to the emergency department on 7/26/2018 with increasing confusion was found to have decreased urine output and urinary retention Jimenez catheter was placed and the patient was diagnosed with hepatic encephalopathy and lactic acidosis.  But she refused to be admitted and then she presented again yesterday and the family brought her in and she was subsequently admitted and was found to have evidence of UTI associated with indwelling Jimenez catheter  Medical history includes anxiety, degenerative joint disease, congestive heart failure, the diabetes mellitus type 2, history of prior discitis of L4 and L5, hemochromatosis, chronic anemia, history of splenomegaly and chronic thrombocytopenia also history of hypertension, she had a prior episode of TIA.  She had the renal biopsy in October 2015 revealed evidence of diabetic nephropathy also the patient has diabetic retinopathy    The patient is complaining of being weak, confused at the time, and denies any chest pain or shortness of air, no orthopnea or PND, no nausea or vomiting, no abdominal pain, she has Jimenez catheter anchored in place, she had lower extremity edema.  No arthralgias or myalgias no headache or syncope, no seizure activity.  She denies having melena or hematochezia.    Past Medical History:   Diagnosis Date   • Anxiety    • Arthritis    • C.  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   • HOANG (nonalcoholic steatohepatitis)    • Pancytopenia (CMS/HCC)    • Spinal stenosis    • Splenomegaly    • Thrombocytopenia, chronic    • TIA (transient ischemic attack)      Past Surgical History:   Procedure Laterality Date   • ABDOMINAL SURGERY     • KNEE SURGERY     • RENAL BIOPSY  10/2015   • TONSILLECTOMY     • WISDOM TOOTH EXTRACTION       Family History   Problem Relation Age of Onset   • Heart disease Mother    • Diabetes Mother    • Heart disease Father    • Breast cancer Sister 45   • Hypertension Sister    • Diabetes Sister    • Colon cancer Brother 50   • Diabetes Brother        Social History   Substance Use Topics   • Smoking status: Former Smoker     Types: Cigarettes     Quit date: 11/17/1985   • Smokeless tobacco: Never Used      Comment: Heavy in past, but quit   • Alcohol use No     Prescriptions Prior to Admission   Medication Sig Dispense Refill Last Dose   • allopurinol (ZYLOPRIM) 100 MG tablet Take 100 mg by mouth 2 (Two) Times a Day.   8/2/2018 at Unknown time   • bumetanide (BUMEX) 2 MG tablet Take 3 mg by mouth 2 (Two) Times a Day.   8/2/2018 at Unknown time   • cholecalciferol (VITAMIN D3) 1000 units tablet Take 1,000 Units by mouth Daily.   8/2/2018 at Unknown time   • cyclobenzaprine (FLEXERIL) 5 MG tablet Take 5 mg by mouth Every Night.   8/1/2018 at Unknown time   • Ferrous Sulfate (IRON) 325 (65 Fe) MG tablet Take  by mouth Daily.      • guaiFENesin (MUCINEX) 600 MG 12 hr tablet Take 600 mg by mouth Every Night.   8/1/2018 at Unknown time   • insulin  NPH-insulin regular (Novolin 70/30) (70-30) 100 UNIT/ML injection Inject 40 Units under the skin into the appropriate area as directed Daily With Breakfast & Lunch.   8/1/2018 at Unknown time   • lactulose (CHRONULAC) 10 GM/15ML solution Take 30 g by mouth Daily.   Past Week at Unknown time   • lidocaine (LIDODERM) 5 % Place 1 patch on the skin as directed by provider As Needed. Remove & Discard patch within 12 hours or as directed by MD    8/1/2018 at Unknown time   • metoprolol tartrate (LOPRESSOR) 25 MG tablet Take 12.5 mg by mouth Every Night.   8/1/2018 at Unknown time   • Multiple Vitamin (MULTI-VITAMIN PO) Take 1 tablet by mouth Daily.   8/2/2018 at Unknown time   • ondansetron (ZOFRAN) 4 MG tablet Take 4 mg by mouth As Needed for Nausea or Vomiting.   8/1/2018 at Unknown time   • potassium chloride (KLOR-CON) 20 MEQ CR tablet Take 10 mEq by mouth 3 (Three) Times a Day.   8/2/2018 at Unknown time   • Probiotic Product (PROBIOTIC PO) Take 1 tablet by mouth Daily With Lunch & Dinner.   8/1/2018 at Unknown time   • QUEtiapine (SEROquel) 25 MG tablet Take 25 mg by mouth Every Night.   8/1/2018 at Unknown time   • rifaximin (XIFAXAN) 550 MG tablet Take 550 mg by mouth Daily With Lunch & Dinner.   8/1/2018 at Unknown time   • traMADol (ULTRAM) 50 MG tablet Take 50 mg by mouth Every Night.   8/1/2018 at Unknown time   • vitamin B-12 (CYANOCOBALAMIN) 2500 MCG sublingual tablet tablet Place 1,000 mcg under the tongue Daily.   8/2/2018 at Unknown time   • acetaminophen (TYLENOL) 650 MG 8 hr tablet Take 650 mg by mouth Every 8 (Eight) Hours As Needed for Mild Pain (1-3).   Taking   • predniSONE (DELTASONE) 20 MG tablet 2 po daily for  3 days the 1 po daily for 4 days 10 tablet 0    • simethicone (MYLICON) 125 MG chewable tablet Chew 125 mg As Needed for Flatulence.      • temazepam (RESTORIL) 7.5 MG capsule Take 7.5 mg by mouth Every Night.   Taking     Allergies:  Ciprofloxacin; Levaquin [levofloxacin]; and  "Promethazine hcl    Review of Systems  14 points review of system was performed and it was negative other than what noted above in the history of present illness    Objective     Vital Signs  Temp:  [97.6 °F (36.4 °C)-98.8 °F (37.1 °C)] 97.8 °F (36.6 °C)  Heart Rate:  [82-93] 84  Resp:  [16-18] 18  BP: (138-192)/(51-78) 142/59    Flowsheet Rows      First Filed Value   Admission Height  165.1 cm (65\") Documented at 08/02/2018 1023   Admission Weight  97.1 kg (214 lb) Documented at 08/02/2018 1023           I/O this shift:  In: 210 [P.O.:210]  Out: 500 [Urine:500]  I/O last 3 completed shifts:  In: 1490 [P.O.:240; I.V.:1200; IV Piggyback:50]  Out: 2000 [Urine:2000]    Intake/Output Summary (Last 24 hours) at 08/03/18 1216  Last data filed at 08/03/18 1154   Gross per 24 hour   Intake             1700 ml   Output             2500 ml   Net             -800 ml       Physical Exam:  General Appearance: alert, oriented x 3, no acute distress, obese, chronically ill  Skin: warm and dry  HEENT: pupils round and reactive to light, oral mucosa normal,   Neck: supple, no JVD, trachea midline  Lungs: CTA, unlabored breathing effort  Heart: RRR, normal S1 and S2, no S3, no rub  Abdomen: soft, non-tender,  present bowel sounds to auscultation  : no palpable bladder, Jimenez catheter is anchored in  Extremities: 2+ pretibial edema, no cyanosis or clubbing  Joints: No significant deformities noted, no crepitation over the knees or the ankles.  Lymphatics: No cervical or supraclavicular lymphadenopathy  Neuro: normal speech and mental status    Results Review:  Results for orders placed or performed during the hospital encounter of 08/02/18   Comprehensive Metabolic Panel   Result Value Ref Range    Glucose 231 (H) 65 - 99 mg/dL    BUN 58 (H) 8 - 23 mg/dL    Creatinine 3.87 (H) 0.57 - 1.00 mg/dL    Sodium 141 136 - 145 mmol/L    Potassium 4.8 3.5 - 5.2 mmol/L    Chloride 103 98 - 107 mmol/L    CO2 22.2 22.0 - 29.0 mmol/L    Calcium " 9.5 8.6 - 10.5 mg/dL    Total Protein 7.0 6.0 - 8.5 g/dL    Albumin 3.20 (L) 3.50 - 5.20 g/dL    ALT (SGPT) 22 1 - 33 U/L    AST (SGOT) 42 (H) 1 - 32 U/L    Alkaline Phosphatase 141 (H) 39 - 117 U/L    Total Bilirubin 1.2 0.1 - 1.2 mg/dL    eGFR Non African Amer 11 (L) >60 mL/min/1.73    Globulin 3.8 gm/dL    A/G Ratio 0.8 g/dL    BUN/Creatinine Ratio 15.0 7.0 - 25.0    Anion Gap 15.8 mmol/L   Urinalysis With Microscopic If Indicated (No Culture) - Urine, Catheter   Result Value Ref Range    Color, UA Red (A) Yellow, Straw    Appearance, UA Turbid (A) Clear    pH, UA 7.5 5.0 - 8.0    Specific Gravity, UA 1.015 1.005 - 1.030    Glucose, UA Negative Negative    Ketones, UA Negative Negative    Bilirubin, UA Negative Negative    Blood, UA Large (3+) (A) Negative    Protein,  mg/dL (2+) (A) Negative    Leuk Esterase, UA Large (3+) (A) Negative    Nitrite, UA Negative Negative    Urobilinogen, UA 0.2 E.U./dL 0.2 - 1.0 E.U./dL   Lactic Acid, Plasma   Result Value Ref Range    Lactate 2.9 (C) 0.5 - 2.0 mmol/L   Ammonia   Result Value Ref Range    Ammonia 114 (H) 11 - 51 umol/L   CBC Auto Differential   Result Value Ref Range    WBC 4.04 (L) 4.50 - 10.70 10*3/mm3    RBC 3.08 (L) 3.90 - 5.20 10*6/mm3    Hemoglobin 10.8 (L) 11.9 - 15.5 g/dL    Hematocrit 32.2 (L) 35.6 - 45.5 %    .5 (H) 80.5 - 98.2 fL    MCH 35.1 (H) 26.9 - 32.0 pg    MCHC 33.5 32.4 - 36.3 g/dL    RDW 14.8 (H) 11.7 - 13.0 %    RDW-SD 55.5 (H) 37.0 - 54.0 fl    MPV 10.0 6.0 - 12.0 fL    Platelets 64 (L) 140 - 500 10*3/mm3    Neutrophil % 63.3 42.7 - 76.0 %    Lymphocyte % 24.3 19.6 - 45.3 %    Monocyte % 7.7 5.0 - 12.0 %    Eosinophil % 4.5 0.3 - 6.2 %    Basophil % 0.2 0.0 - 1.5 %    Immature Grans % 0.2 0.0 - 0.5 %    Neutrophils, Absolute 2.56 1.90 - 8.10 10*3/mm3    Lymphocytes, Absolute 0.98 0.90 - 4.80 10*3/mm3    Monocytes, Absolute 0.31 0.20 - 1.20 10*3/mm3    Eosinophils, Absolute 0.18 0.00 - 0.70 10*3/mm3    Basophils, Absolute 0.01 0.00  - 0.20 10*3/mm3    Immature Grans, Absolute 0.01 0.00 - 0.03 10*3/mm3   Urinalysis, Microscopic Only - Urine, Clean Catch   Result Value Ref Range    RBC, UA Too Numerous to Count (A) None Seen, 0-2 /HPF    WBC, UA Too Numerous to Count (A) None Seen, 0-2 /HPF    Bacteria, UA 4+ (A) None Seen /HPF    Squamous Epithelial Cells, UA 7-12 (A) None Seen, 0-2 /HPF    Hyaline Casts, UA 3-6 None Seen /LPF    Methodology Automated Microscopy    Lactic Acid, Reflex Timer (This will reflex a repeat order 3-3:15 hours after ordered.)   Result Value Ref Range    Extra Tube Hold for add-ons.    Lactic Acid, Reflex   Result Value Ref Range    Lactate 2.7 (C) 0.5 - 2.0 mmol/L   Basic Metabolic Panel   Result Value Ref Range    Glucose 190 (H) 65 - 99 mg/dL    BUN 58 (H) 8 - 23 mg/dL    Creatinine 3.79 (H) 0.57 - 1.00 mg/dL    Sodium 140 136 - 145 mmol/L    Potassium 4.4 3.5 - 5.2 mmol/L    Chloride 105 98 - 107 mmol/L    CO2 20.4 (L) 22.0 - 29.0 mmol/L    Calcium 8.6 8.6 - 10.5 mg/dL    eGFR Non African Amer 11 (L) >60 mL/min/1.73    BUN/Creatinine Ratio 15.3 7.0 - 25.0    Anion Gap 14.6 mmol/L   Hemoglobin A1c   Result Value Ref Range    Hemoglobin A1C 8.60 (H) 4.80 - 5.60 %   Basic Metabolic Panel   Result Value Ref Range    Glucose 270 (H) 65 - 99 mg/dL    BUN 53 (H) 8 - 23 mg/dL    Creatinine 3.63 (H) 0.57 - 1.00 mg/dL    Sodium 142 136 - 145 mmol/L    Potassium 4.2 3.5 - 5.2 mmol/L    Chloride 105 98 - 107 mmol/L    CO2 20.0 (L) 22.0 - 29.0 mmol/L    Calcium 8.1 (L) 8.6 - 10.5 mg/dL    eGFR Non African Amer 12 (L) >60 mL/min/1.73    BUN/Creatinine Ratio 14.6 7.0 - 25.0    Anion Gap 17.0 mmol/L   Ammonia   Result Value Ref Range    Ammonia 77 (H) 11 - 51 umol/L   Lactic Acid, Plasma   Result Value Ref Range    Lactate 2.4 (C) 0.5 - 2.0 mmol/L   POC Glucose Once   Result Value Ref Range    Glucose 210 (H) 70 - 130 mg/dL   POC Glucose Once   Result Value Ref Range    Glucose 247 (H) 70 - 130 mg/dL   POC Glucose Once   Result  Value Ref Range    Glucose 247 (H) 70 - 130 mg/dL   POC Glucose Once   Result Value Ref Range    Glucose 310 (H) 70 - 130 mg/dL     Imaging Results (last 72 hours)     ** No results found for the last 72 hours. **              allopurinol 150 mg Oral Daily   ceftriaxone 1 g Intravenous Q24H   cholecalciferol 1,000 Units Oral Daily   cyclobenzaprine 5 mg Oral Nightly   ferrous sulfate 325 mg Oral Daily With Breakfast   insulin aspart 0-7 Units Subcutaneous 4x Daily With Meals & Nightly   lactulose 30 g Oral BID   metoprolol tartrate 12.5 mg Oral Nightly   multivitamin 1 tablet Oral Daily   QUEtiapine 25 mg Oral Nightly   rifaximin 550 mg Oral Daily With Lunch & Dinner   sodium chloride 500 mL Intravenous Once       sodium chloride 100 mL/hr Last Rate: 100 mL/hr (08/03/18 0641)       Assessment/Plan   1.  Acute kidney injury on chronic kidney disease, multifactorial, improving since admission and related to poor oral intake  2.  Chronic kidney disease stage IV at baseline associated with diabetic nephropathy, biopsy-proven.  3.  Diabetes mellitus type 2 with known diabetic nephropathy and retinopathy  4.  Cirrhosis, associated with HOANG, even though there is a report in the past medical history stating that there is history of hemochromatosis.  The patient had the hepatic encephalopathy associated with noncompliance with lactulose  5.  Anemia with chronic kidney disease  6.  Thrombocytopenia with known history of chronic thrombocytopenia  7.  UTI, recent urinary retention patient had Jimenez catheter  8.  History of gout      Plan:  1.  I agree with the present treatment  2.  Check random urine for sodium, chloride and protein to creatinine ratio  3.  Surveillance labs         thank you Dr. Lizarraga for asking me to see this patient consultation    I discussed the patients findings and my recommendations with the patient and her  at the bedside     Mack Pruitt MD  08/03/18  12:16 PM

## 2018-08-03 NOTE — THERAPY EVALUATION
Acute Care - Occupational Therapy Initial Evaluation  [facility]     Patient Name: Charu Bowens  : 1935  MRN: [id number]  Today's Date: 8/3/2018  Onset of Illness/Injury or Date of Surgery: 18     Referring Physician: Navarro Lizarraga MD    Admit Date: 2018       ICD-10-CM ICD-9-CM   1. Acute UTI N39.0 599.0   2. Hepatic encephalopathy (CMS/HCC) K72.90 572.2   3. Impaired functional mobility and activity tolerance Z74.09 V49.89     Patient Active Problem List   Diagnosis   • Cirrhosis of liver (CMS/HCC)   • Hypersplenism   • Splenic pancytopenia syndrome (CMS/HCC)   • Chronic renal disease, stage 4, severely decreased glomerular filtration rate between 15-29 mL/min/1.73 square meter (CMS/HCC)   • Anemia of chronic renal failure, stage 4 (severe) (CMS/HCC)   • Hepatic encephalopathy (CMS/HCC)   • Thrombocytopenia (CMS/HCC)   • Leukopenia   • Acute kidney failure (CMS/HCC)   • Acute hepatic encephalopathy   • DM2 (diabetes mellitus, type 2) (CMS/HCC)   • Iron deficiency   • Acute idiopathic gout of right ankle   • Decreased mobility   • Fall   • Urinary tract infection associated with indwelling urethral catheter (CMS/HCC)     Past Medical History:   Diagnosis Date   • Anxiety    • Arthritis    • C. difficile colitis    • CHF (congestive heart failure) (CMS/HCC)    • Chronic kidney disease     Stage III   • Dementia    • Diabetes mellitus (CMS/HCC)     Type 2   • Diskitis 2015    L4-L5 w/abscess formation   • Hemochromatosis    • History of anemia    • History of blood transfusion    • History of pneumonia    • History of sepsis    • Hypertension    • Liver disease     Cirrhosis likely secondary to HOANG   • HOANG (nonalcoholic steatohepatitis)    • Pancytopenia (CMS/HCC)    • Spinal stenosis    • Splenomegaly    • Thrombocytopenia, chronic    • TIA (transient ischemic attack)      Past Surgical History:   Procedure Laterality Date   • ABDOMINAL SURGERY     • KNEE SURGERY     •  RENAL BIOPSY  10/2015   • TONSILLECTOMY     • WISDOM TOOTH EXTRACTION            OT ASSESSMENT FLOWSHEET (last 72 hours)      Occupational Therapy Evaluation     Row Name 08/03/18 1438 08/03/18 1437                OT Evaluation Time/Intention    Subjective Information --  -RP no complaints  -RP       Document Type --  -RP evaluation  -RP       Mode of Treatment --  -RP occupational therapy  -RP       Patient Effort  -- adequate  -RP       Symptoms Noted During/After Treatment  -- none  -RP       Comment  -- Pt demo confusion w/ PLOF and equipment used at home-  present for information gathing  -RP          General Information    Patient Profile Reviewed?  -- yes  -RP       Patient Observations  -- alert;cooperative;agree to therapy  -RP       Patient/Family Observations --  -RP Pt received supine in bed w/ no signs of acute distress; pt  present  -RP       Prior Level of Function  -- min assist:;ADL's   per  report  -RP       Equipment Currently Used at Home  -- walker, rolling;shower chair   lift chair  -RP       Existing Precautions/Restrictions  -- fall  -RP          Relationship/Environment    Lives With  -- spouse  -RP          Resource/Environmental Concerns    Current Living Arrangements  -- home/apartment/condo  -RP          Cognitive Assessment/Intervention- PT/OT    Orientation Status (Cognition)  -- oriented to;person;place  -RP       Follows Commands (Cognition)  -- follows one step commands;over 90% accuracy;verbal cues/prompting required  -RP       Safety Deficit (Cognitive)  -- mild deficit;insight into deficits/self awareness  -RP       Personal Safety Interventions  -- fall prevention program maintained;gait belt;nonskid shoes/slippers when out of bed  -RP          Bed Mobility Assessment/Treatment    Bed Mobility Assessment/Treatment  -- supine-sit;sit-supine  -RP       Supine-Sit Rachel (Bed Mobility)  -- contact guard;verbal cues  -RP       Sit-Supine Rachel (Bed  Mobility)  -- contact guard;verbal cues  -RP       Bed Mobility, Safety Issues  -- cognitive deficits limit understanding  -RP       Assistive Device (Bed Mobility)  -- bed rails;head of bed elevated  -RP          Transfer Assessment/Treatment    Transfer Assessment/Treatment  -- sit-stand transfer;stand-sit transfer  -RP          Sit-Stand Transfer    Sit-Stand [place] (Transfers)  -- contact guard;verbal cues  -RP          Stand-Sit Transfer    Stand-Sit [place] (Transfers)  -- contact guard;verbal cues  -RP          ADL Assessment/Intervention    BADL Assessment/Intervention  -- lower body dressing  -RP          Lower Body Dressing Assessment/Training    Lower Body Dressing [place] Level  -- doff;socks;dependent (less than 25% patient effort)  -RP       Lower Body Dressing Position  -- edge of bed sitting  -RP          General ROM    GENERAL ROM COMMENTS  -- B UE AROM WFL  -RP          General Assessment (Manual Muscle Testing)    Comment, General Manual Muscle Testing (MMT) Assessment  -- B UE MMT: grossly approx. 4/5  -RP          Motor Assessment/Interventions    Additional Documentation  -- Balance (Group)  -RP          Balance    Balance  -- static sitting balance;static standing balance  -RP          Static Sitting Balance    Level of [place] (Unsupported Sitting, Static Balance)  -- supervision  -RP       Sitting Position (Unsupported Sitting, Static Balance)  -- sitting on edge of bed  -RP       Time Able to Maintain Position (Unsupported Sitting, Static Balance)  -- more than 5 minutes  -RP          Static Standing Balance    Level of [place] (Supported Standing, Static Balance)  -- contact guard assist  -RP       Time Able to Maintain Position (Supported Standing, Static Balance)  -- 1 to 2 minutes  -RP          Sensory Assessment/Intervention    Sensory General Assessment  -- no sensation deficits identified  -RP          Positioning and Restraints    Pre-Treatment Position  --  in bed  -RP       Post Treatment Position  -- bed  -RP       In Bed  -- fowlers;call light within reach;encouraged to call for assist;with family/caregiver  -RP          Pain Assessment    Additional Documentation  -- Pain Scale: Numbers Pre/Post-Treatment (Group)  -RP          Pain Scale: Numbers Pre/Post-Treatment    Pain Scale: Numbers, Pretreatment  -- 0/10 - no pain  -RP       Pain Scale: Numbers, Post-Treatment  -- 0/10 - no pain  -RP          Coping    Observed Emotional State  -- accepting;calm;cooperative  -RP       Verbalized Emotional State  -- acceptance  -RP          Plan of Care Review    Plan of Care Reviewed With  -- patient  -RP          Clinical Impression (OT)    OT Diagnosis  -- Pt presents w/ decreased balance, activity tolerance and indep w/ self-care tasks  -RP       Criteria for Skilled Therapeutic Interventions Met (OT Eval)  -- yes;treatment indicated  -RP       Rehab Potential (OT Eval)  -- good, to achieve stated therapy goals  -RP       Therapy Frequency (OT Eval)  -- 5 times/wk  -RP       Care Plan Review (OT)  -- evaluation/treatment results reviewed;care plan/treatment goals reviewed;patient/other agree to care plan  -RP       Care Plan Review, Other Participant (OT Eval)  -- spouse  -RP       Anticipated Discharge Disposition (OT)  -- home with home health  -RP          Planned OT Interventions    Planned Therapy Interventions (OT Eval)  -- BADL retraining;activity tolerance training;functional balance retraining;transfer/mobility retraining;ROM/therapeutic exercise;patient/caregiver education/training  -RP          OT Goals    Transfer Goal Selection (OT)  -- transfer, OT goal 1  -RP       Bathing Goal Selection (OT)  -- bathing, OT goal 1  -RP       Dressing Goal Selection (OT)  -- dressing, OT goal 1  -RP          Transfer Goal 1 (OT)    Activity/Assistive Device (Transfer Goal 1, OT)  -- toilet  -RP       Time Frame (Transfer Goal 1, OT)  -- long term goal (LTG)  -RP        Progress/Outcome (Transfer Goal 1, OT)  -- goal ongoing  -RP          Bathing Goal 1 (OT)    Activity/Assistive Device (Bathing Goal 1, OT)  -- bathing skills, all  -RP       Time Frame (Bathing Goal 1, OT)  -- long term goal (LTG)  -RP       Progress/Outcomes (Bathing Goal 1, OT)  -- goal ongoing  -RP          Dressing Goal 1 (OT)    Activity/Assistive Device (Dressing Goal 1, OT)  -- lower body dressing  -RP       Dante/Cues Needed (Dressing Goal 1, OT)  -- minimum assist (75% or more patient effort)  -RP       Time Frame (Dressing Goal 1, OT)  -- long term goal (LTG)  -RP       Progress/Outcome (Dressing Goal 1, OT)  -- goal ongoing  -RP         User Key  (r) = Recorded By, (t) = Taken By, (c) = Cosigned By    Initials Name Effective Dates     Carrol Slade OT 05/03/18 -            Occupational Therapy Education     Title: PT OT SLP Therapies (Active)     Topic: Occupational Therapy (Done)     Point: ADL training (Done)     Description: Instruct learner(s) on proper safety adaptation and remediation techniques during self care or transfers.   Instruct in proper use of assistive devices.   Learning Progress Summary     Learner Status Readiness Method Response Comment Documented by    Patient Done Acceptance E,D VU OT educ on OT role in therapeutic process and pt's POC. Pt  verbalized understanding  08/03/18 1504    Significant Other Done Acceptance E,D KOLTON OT educ on OT role in therapeutic process and pt's POC. Pt  verbalized understanding RP 08/03/18 1504                      User Key     Initials Effective Dates Name Provider Type Discipline     05/03/18 -  Carrol Slade OT Occupational Therapist OT                  OT Recommendation and Plan  Outcome Summary/Treatment Plan (OT)  Anticipated Discharge Disposition (OT): home with home health  Planned Therapy Interventions (OT Eval): BADL retraining, activity tolerance training, functional balance retraining, transfer/mobility  retraining, ROM/therapeutic exercise, patient/caregiver education/training  Therapy Frequency (OT Eval): 5 times/wk  Plan of Care Review  Plan of Care Reviewed With: patient, spouse  Plan of Care Reviewed With: patient, spouse  Outcome Summary: Pt admitted for confusion, weakness, and decreased urination. Pt lives at home w/ , who reports pt requires approx. min A  w/ ADLs at PLOF; pt ambulates w/ RW. Pt currently presents w/ decreased balance and activity tolerance, impacting indep. and safety w/ ADLs. Pt required CGA for bed mobility and sit <> stand transfers. Pt required total A to doff socks. Pt will benefit from skilled OT services to address deficits and return to PLOF.          Outcome Measures     Row Name 08/03/18 1500 08/03/18 1400          How much help from another person do you currently need...    Turning from your back to your side while in flat bed without using bedrails?  -- 3  -CA     Moving from lying on back to sitting on the side of a flat bed without bedrails?  -- 3  -CA     Moving to and from a bed to a chair (including a wheelchair)?  -- 3  -CA     Standing up from a chair using your arms (e.g., wheelchair, bedside chair)?  -- 3  -CA     Climbing 3-5 steps with a railing?  -- 2  -CA     To walk in hospital room?  -- 3  -CA     AM-PAC 6 Clicks Score  -- 17  -CA        How much help from another is currently needed...    Putting on and taking off regular lower body clothing? 1  -RP  --     Bathing (including washing, rinsing, and drying) 2  -RP  --     Toileting (which includes using toilet bed pan or urinal) 2  -RP  --     Putting on and taking off regular upper body clothing 3  -RP  --     Taking care of personal grooming (such as brushing teeth) 3  -RP  --     Eating meals 3  -RP  --     Score 14  -RP  --        Functional Assessment    Outcome Measure Options AM-PAC 6 Clicks Daily Activity (OT)  -RP AM-PAC 6 Clicks Basic Mobility (PT)  -CA       User Key  (r) = Recorded By, (t) =  Taken By, (c) = Cosigned By    Initials Name Provider Type    RP Carrol Slade, OT Occupational Therapist    Yareli Vidales, PT Physical Therapist          Time Calculation:   OT Start Time: 1422  OT Stop Time: 1439  OT Time Calculation (min): 17 min  Therapy Suggested Charges     Code   Minutes Charges    None           Therapy Charges for Today     Code Description Service Date Service Provider Modifiers Qty    71593176323  OT EVAL MOD COMPLEXITY 2 8/3/2018 Carrol Slade, OT GO 1    [id number]  OT THERAPEUTIC ACT EA 15 MIN 8/3/2018 Carrol Slade, OT GO 1               Carrol Slade OT  8/3/2018

## 2018-08-03 NOTE — PROGRESS NOTES
Continued Stay Note  [facility]     Patient Name: Charu Bowens  MRN: [id number]  Today's Date: 8/3/2018    Admit Date: 8/2/2018          Discharge Plan     Row Name 08/03/18 1045       Plan    Plan Plan home will follow for HH needs .  ROSALIE Cota    Patient/Family in Agreement with Plan yes    Plan Comments FACE SHEET VERIFIED/ IM LETTER SIGNED.  Spoke to pt and pt's   (Kirby Bowens [phone]) at bedside with pt's permission.  Pt's PCP is Dr. Chayito Paredes.   Pt lives in a single story house with .  Pt is independent with ADL's. Pt has a glucometer, tub lift, BSC, and rolling walker for home use.   Pt gets prescriptions at [facility] ([place]  Pt denies any issues affording medications.  Pt is not current with HH.  Pt has been in [facility], [facility].  Pt's  states he does not want referral to SNF.  Pt 's  states pt has gone to a SNF and check herself out before she checked in.  Pt also has gone for one day and checked herself out.  Plan home will follow for HH needs.   ROSALIE Cota              Discharge Codes    No documentation.           Izzy Moon RN

## 2018-08-03 NOTE — THERAPY EVALUATION
Acute Care - Physical Therapy Initial Evaluation  [facility]     Patient Name: Charu Bowens  : 1935  MRN: [id number]  Today's Date: 8/3/2018   Onset of Illness/Injury or Date of Surgery: 18  Date of Referral to PT: 18  Referring Physician: Navarro Lizarraga MD      Admit Date: 2018    Visit Dx:     ICD-10-CM ICD-9-CM   1. Acute UTI N39.0 599.0   2. Hepatic encephalopathy (CMS/HCC) K72.90 572.2   3. Impaired functional mobility and activity tolerance Z74.09 V49.89     Patient Active Problem List   Diagnosis   • Cirrhosis of liver (CMS/HCC)   • Hypersplenism   • Splenic pancytopenia syndrome (CMS/HCC)   • Chronic renal disease, stage 4, severely decreased glomerular filtration rate between 15-29 mL/min/1.73 square meter (CMS/HCC)   • Anemia of chronic renal failure, stage 4 (severe) (CMS/HCC)   • Hepatic encephalopathy (CMS/HCC)   • Thrombocytopenia (CMS/HCC)   • Leukopenia   • Acute kidney failure (CMS/HCC)   • Acute hepatic encephalopathy   • DM2 (diabetes mellitus, type 2) (CMS/HCC)   • Iron deficiency   • Acute idiopathic gout of right ankle   • Decreased mobility   • Fall   • Urinary tract infection associated with indwelling urethral catheter (CMS/HCC)     Past Medical History:   Diagnosis Date   • Anxiety    • Arthritis    • C. difficile colitis    • CHF (congestive heart failure) (CMS/HCC)    • Chronic kidney disease     Stage III   • Dementia    • Diabetes mellitus (CMS/HCC)     Type 2   • Diskitis 2015    L4-L5 w/abscess formation   • Hemochromatosis    • History of anemia    • History of blood transfusion    • History of pneumonia    • History of sepsis    • Hypertension    • Liver disease     Cirrhosis likely secondary to HOANG   • HOANG (nonalcoholic steatohepatitis)    • Pancytopenia (CMS/HCC)    • Spinal stenosis    • Splenomegaly    • Thrombocytopenia, chronic    • TIA (transient ischemic attack)      Past Surgical History:   Procedure Laterality Date   •  ABDOMINAL SURGERY     • KNEE SURGERY     • RENAL BIOPSY  10/2015   • TONSILLECTOMY     • WISDOM TOOTH EXTRACTION          PT ASSESSMENT (last 12 hours)      Physical Therapy Evaluation     Row Name 08/03/18 1431          PT Evaluation Time/Intention    Subjective Information complains of;fatigue  -CA     Document Type evaluation  -CA     Mode of Treatment individual therapy;physical therapy  -CA     Patient Effort adequate  -CA     Symptoms Noted During/After Treatment shortness of breath  -CA     Comment Pt confused throughout, initially refusing PT eval, but eventually agreeable with encouragement from PT and spouse  -CA     Row Name 08/03/18 1431          General Information    Patient Profile Reviewed? yes  -CA     Onset of Illness/Injury or Date of Surgery 08/02/18  -CA     Referring Physician Navarro Lizarraga MD  -CA     Patient Observations alert;agree to therapy;poorly cooperative  -CA     Patient/Family Observations Pt supine in bed, no acute distress, vss  -CA     Prior Level of Function independent:  -CA     Equipment Currently Used at Home walker, rolling  -CA     Pertinent History of Current Functional Problem Pt admitted to [facility] with increased confusion, generalized weakness, and decreased urination. Work up reveals UTI and COLEMAN on CKD. PMH includes hepatic encephalopathy CKD, and dementia  -CA     Existing Precautions/Restrictions fall  -CA     Benefits Reviewed patient:;spouse/S.O.:;improve function;increase independence;increase strength;increase balance;decrease pain  -CA     Barriers to Rehab cognitive status  -CA     Row Name 08/03/18 1431          Relationship/Environment    Lives With spouse  -CA     Row Name 08/03/18 1431          Resource/Environmental Concerns    Current Living Arrangements home/apartment/condo  -CA     Row Name 08/03/18 1431          Home Main Entrance    Stairs Comment, Main Entrance 1+1 - can fit walker on each step  -CA     Row Name 08/03/18 1431          Cognitive  Assessment/Intervention- PT/OT    Orientation Status (Cognition) oriented to;person  -CA     Follows Commands (Cognition) follows one step commands;over 90% accuracy;repetition of directions required;verbal cues/prompting required  -CA     Personal Safety Interventions fall prevention program maintained;gait belt;nonskid shoes/slippers when out of bed  -CA     Row Name 08/03/18 1431          Bed Mobility Assessment/Treatment    Bed Mobility Assessment/Treatment supine-sit;sit-supine  -CA     Supine-Sit Tucker (Bed Mobility) verbal cues;nonverbal cues (demo/gesture);contact guard  -CA     Sit-Supine Tucker (Bed Mobility) verbal cues;nonverbal cues (demo/gesture);contact guard  -CA     Bed Mobility, Safety Issues cognitive deficits limit understanding  -CA     Assistive Device (Bed Mobility) bed rails;head of bed elevated  -CA     Row Name 08/03/18 1431          Transfer Assessment/Treatment    Transfer Assessment/Treatment sit-stand transfer;stand-sit transfer  -CA     Sit-Stand Tucker (Transfers) contact guard;verbal cues;nonverbal cues (demo/gesture)  -CA     Stand-Sit Tucker (Transfers) verbal cues;nonverbal cues (demo/gesture);contact guard  -CA     Row Name 08/03/18 1431          Sit-Stand Transfer    Assistive Device (Sit-Stand Transfers) walker, front-wheeled  -CA     Row Name 08/03/18 1431          Stand-Sit Transfer    Assistive Device (Stand-Sit Transfers) walker, front-wheeled  -CA     Row Name 08/03/18 1431          Gait/Stairs Assessment/Training    Tucker Level (Gait) contact guard  -CA     Assistive Device (Gait) walker, front-wheeled  -CA     Distance in Feet (Gait) 200  -CA     Pattern (Gait) step-through  -CA     Deviations/Abnormal Patterns (Gait) base of support, narrow  -CA     Comment (Gait/Stairs) Pt extremely fatigued and SOB follow ambulation, required once standing rest break. Mild balance deficits noted. Stair navigation deferred secondary to fatigue.  -CA      Row Name 08/03/18 1431          General ROM    GENERAL ROM COMMENTS grossly wfl  -CA     Row Name 08/03/18 1431          General Assessment (Manual Muscle Testing)    Comment, General Manual Muscle Testing (MMT) Assessment B LE grossly 4/5  -CA     Row Name 08/03/18 1431          Sensory Assessment/Intervention    Sensory General Assessment no sensation deficits identified  -CA     Row Name 08/03/18 1431          Pain Assessment    Additional Documentation Pain Scale: Numbers Pre/Post-Treatment (Group)  -CA     Row Name 08/03/18 1431          Pain Scale: Numbers Pre/Post-Treatment    Pain Scale: Numbers, Pretreatment 0/10 - no pain  -CA     Pain Scale: Numbers, Post-Treatment 0/10 - no pain  -CA     Row Name 08/03/18 1431          Physical Therapy Clinical Impression    Date of Referral to PT 08/03/18  -CA     PT Diagnosis (PT Clinical Impression) impaired functional mobility and activity tolerance  -CA     Prognosis (PT Clinical Impression) good  -CA     Functional Level at Time of Evaluation (PT Clinical Impression) CGA  -CA     Patient/Family Goals Statement (PT Clinical Impression) DC home  -CA     Criteria for Skilled Interventions Met (PT Clinical Impression) yes;treatment indicated  -CA     Pathology/Pathophysiology Noted (Describe Specifically for Each System) genitourinary  -CA     Impairments Found (describe specific impairments) aerobic capacity/endurance;gait, locomotion, and balance  -CA     Functional Limitations in Following Categories (Describe Specific Limitations) home management;community/leisure  -CA     Rehab Potential (PT Clinical Summary) good, to achieve stated therapy goals  -CA     Predicted Duration of Therapy (PT) 1 week  -CA     Care Plan Review (PT) evaluation/treatment results reviewed  -CA     Patient/Family Concerns, Anticipated Discharge Disposition (PT) Pt and spouse reporting they refuse to go to SNF at [place]  -CA     Row Name 08/03/18 1431          Physical Therapy Goals    Bed  Mobility Goal Selection (PT) bed mobility, PT goal 1  -CA     Transfer Goal Selection (PT) transfer, PT goal 1  -CA     Gait Training Goal Selection (PT) gait training, PT goal 1  -CA     Stairs Goal Selection (PT) stairs, PT goal 1  -CA     Additional Documentation Stairs Goal Selection (PT) (Row)  -CA     Row Name 08/03/18 1431          Bed Mobility Goal 1 (PT)    Activity/Assistive Device (Bed Mobility Goal 1, PT) bed mobility activities, all  -CA     Nitro Level/Cues Needed (Bed Mobility Goal 1, PT) independent  -CA     Time Frame (Bed Mobility Goal 1, PT) 1 week  -CA     Row Name 08/03/18 1431          Transfer Goal 1 (PT)    Activity/Assistive Device (Transfer Goal 1, PT) transfers, all  -CA     Nitro Level/Cues Needed (Transfer Goal 1, PT) independent  -CA     Time Frame (Transfer Goal 1, PT) 1 week  -CA     Row Name 08/03/18 1431          Gait Training Goal 1 (PT)    Activity/Assistive Device (Gait Training Goal 1, PT) gait (walking locomotion);improve balance and speed;increase endurance/gait distance  -CA     Nitro Level (Gait Training Goal 1, PT) conditional independence  -CA     Distance (Gait Goal 1, PT) 400  -CA     Time Frame (Gait Training Goal 1, PT) 1 week  -CA     Row Name 08/03/18 1431          Stairs Goal 1 (PT)    Activity/Assistive Device (Stairs Goal 1, PT) stairs, all skills  -CA     Nitro Level/Cues Needed (Stairs Goal 1, PT) contact guard assist  -CA     Number of Stairs (Stairs Goal 1, PT) 1+ 1  -CA     Time Frame (Stairs Goal 1, PT) 1 week  -CA     Barriers (Stairs Goal 1, PT) no rails, but platform step that wx can fit on at home  -CA     Row Name 08/03/18 1431          Positioning and Restraints    Pre-Treatment Position in bed  -CA     Post Treatment Position bed  -CA     In Bed supine;call light within reach;encouraged to call for assist;exit alarm on;with family/caregiver;side rails up x2  -CA     Row Name 08/03/18 1431          Living Environment    Home  Accessibility stairs to enter home  -CA       User Key  (r) = Recorded By, (t) = Taken By, (c) = Cosigned By    Initials Name Provider Type    Yareli Vidales PT Physical Therapist          Physical Therapy Education     Title: PT OT SLP Therapies (Active)     Topic: Physical Therapy (Active)     Point: Mobility training (Active)    Learning Progress Summary     Learner Status Readiness Method Response Comment Documented by    Patient Active Acceptance E NR  CA 08/03/18 1443          Point: Home exercise program (Active)    Learning Progress Summary     Learner Status Readiness Method Response Comment Documented by    Patient Active Acceptance E NR  CA 08/03/18 1443          Point: Body mechanics (Active)    Learning Progress Summary     Learner Status Readiness Method Response Comment Documented by    Patient Active Acceptance E NR  CA 08/03/18 1443          Point: Precautions (Active)    Learning Progress Summary     Learner Status Readiness Method Response Comment Documented by    Patient Active Acceptance E NR  CA 08/03/18 1443                      User Key     Initials Effective Dates Name Provider Type Discipline    CA 06/13/18 -  Yareli Hill PT Physical Therapist PT                PT Recommendation and Plan  Anticipated Discharge Disposition (PT): home with home health, home with assist, home with OP services  Planned Therapy Interventions (PT Eval): balance training, bed mobility training, gait training, home exercise program, manual therapy techniques, neuromuscular re-education, patient/family education, ROM (range of motion), stair training, strengthening, stretching, transfer training  Therapy Frequency (PT Clinical Impression): 5 times/wk  Outcome Summary/Treatment Plan (PT)  Anticipated Discharge Disposition (PT): home with home health, home with assist, home with OP services  Patient/Family Concerns, Anticipated Discharge Disposition (PT): Pt and spouse reporting they refuse to go to SNF at  [place]  Plan of Care Reviewed With: patient, spouse  Outcome Summary: Pt is a 83 y.o. female admitted to [facility] with c/o increased confusion, generalized weakness, and decreased urination on 8/2/2018. Work up reveals UTI and COLEMAN on CKD. PMH includes hepatic encephalopathy, CKE, and dementia. Pt presents today with confusion, generalized weakness, severely decreased activity tolerance, and decreased functional mobility. Pt required CGA for bed mobility, CGA for STS transfers, and CGA for ambulation with FWW for 200 ft, with one standing rest break and extremely fatigued and SOB following amb. Pt will benefit from skilled PT acutely to address the previous deficits, to address balance, activity tolerance, and stair navigation prior to DC home, and improve overall safety with functional mobility. Pt reports she is ind with ADLs and amb with FWW at baseline, although does have an extensive hx of falls. Discussed DC plans with pt and spouse, who report they do not want her to DC to SNF. They would like to participate in PT, however, uncertain of HH PT vs. OP PT at this time. Educated pt and spouse on difference between two and notified CCP.          Outcome Measures     Row Name 08/03/18 1400             How much help from another person do you currently need...    Turning from your back to your side while in flat bed without using bedrails? 3  -CA      Moving from lying on back to sitting on the side of a flat bed without bedrails? 3  -CA      Moving to and from a bed to a chair (including a wheelchair)? 3  -CA      Standing up from a chair using your arms (e.g., wheelchair, bedside chair)? 3  -CA      Climbing 3-5 steps with a railing? 2  -CA      To walk in hospital room? 3  -CA      AM-PAC 6 Clicks Score 17  -CA         Functional Assessment    Outcome Measure Options AM-PAC 6 Clicks Basic Mobility (PT)  -CA        User Key  (r) = Recorded By, (t) = Taken By, (c) = Cosigned By    Initials Name Provider Type    URI Hill  Yareli, NIXON Physical Therapist           Time Calculation:         PT Charges     Row Name 08/03/18 1448             Time Calculation    Start Time 1400  -CA      Stop Time 1425  -CA      Time Calculation (min) 25 min  -CA      PT Received On 08/03/18  -CA      PT - Next Appointment 08/06/18  -CA      PT Goal Re-Cert Due Date 08/10/18  -CA         Time Calculation- PT    Total Timed Code Minutes- PT 10 minute(s)  -CA        User Key  (r) = Recorded By, (t) = Taken By, (c) = Cosigned By    Initials Name Provider Type    Yareli Vidales, NIXON Physical Therapist        Therapy Suggested Charges     Code   Minutes Charges    None           Therapy Charges for Today     Code Description Service Date Service Provider Modifiers Qty    00431900447 HC PT EVAL LOW COMPLEXITY 1 8/3/2018 Yareli Hill, PT GP 1    95804737157 HC PT THER PROC EA 15 MIN 8/3/2018 Yareli Hill, PT GP 1          PT G-Codes  Outcome Measure Options: AM-PAC 6 Clicks Basic Mobility (PT)      Yareli Hill PT  8/3/2018

## 2018-08-03 NOTE — PLAN OF CARE
Problem: Patient Care Overview  Goal: Plan of Care Review   08/03/18 5197   Coping/Psychosocial   Plan of Care Reviewed With patient;spouse   OTHER   Outcome Summary Pt is a 83 y.o. female admitted to [facility] with c/o increased confusion, generalized weakness, and decreased urination on 8/2/2018. Work up reveals UTI and COLEMAN on CKD. PMH includes hepatic encephalopathy, CKE, and dementia. Pt presents today with confusion, generalized weakness, severely decreased activity tolerance, and decreased functional mobility. Pt required CGA for bed mobility, CGA for STS transfers, and CGA for ambulation with FWW for 200 ft, with one standing rest break and extremely fatigued and SOB following amb. Pt will benefit from skilled PT acutely to address the previous deficits, to address balance, activity tolerance, and stair navigation prior to DC home, and improve overall safety with functional mobility. Pt reports she is ind with ADLs and amb with FWW at baseline, although does have an extensive hx of falls. Discussed DC plans with pt and spouse, who report they do not want her to DC to SNF. They would like to participate in PT, however, uncertain of HH PT vs. OP PT at this time. Educated pt and spouse on difference between two and notified CCP.

## 2018-08-03 NOTE — PROGRESS NOTES
Name: Charu Bowens ADMIT: 2018   : 1935  PCP: Chayito Paredes MD    MRN: [id number] LOS: 1 days   AGE/SEX: 83 y.o. female  ROOM: [facility]/   Subjective   Reports had catheter placed in ER during previous visit for urinary retention. Having some discomfort with catheter but cannot tell if burning is present. No flank pain. She has had bowel movement and reports no abdominal pain or dyspnea. She has no CP or SOA. She is asking to leave but  at bedside stresses she needs to stay to workup her symptoms and confusion.    Objective   Vital Signs  Temp:  [97.6 °F (36.4 °C)-98.8 °F (37.1 °C)] 97.8 °F (36.6 °C)  Heart Rate:  [72-93] 84  Resp:  [14-18] 18  BP: (138-192)/(51-78) 142/59  SpO2:  [93 %-98 %] 97 %  on   ;   Device (Oxygen Therapy): room air  Body mass index is 36.21 kg/m².    Physical Exam   Constitutional: She appears well-developed. No distress.   HENT:   Head: Normocephalic and atraumatic.   Eyes: Conjunctivae and EOM are normal. No scleral icterus.   Neck: Neck supple.   Cardiovascular: Normal rate, regular rhythm and intact distal pulses.    Pulmonary/Chest: Effort normal. No stridor. She has decreased breath sounds. She has no wheezes. She has no rales.   Abdominal: Soft. She exhibits distension. There is no tenderness. There is no guarding.   Musculoskeletal: Normal range of motion. She exhibits edema (trace).   Neurological: She is alert. No cranial nerve deficit.   Oriented to person and place only. Not month, day, or year.   Skin: Skin is warm and dry. She is not diaphoretic.   Psychiatric: She has a normal mood and affect. Her behavior is normal.   Nursing note and vitals reviewed.      Results Review:       I reviewed the patient's new clinical results.     I reviewed imaging, agree with interpretation.     I reviewed telemetry/EKG results, sinus rhythm.        Results from last 7 days  Lab Units 18  1041   WBC 10*3/mm3 4.04*   HEMOGLOBIN g/dL 10.8*   PLATELETS 10*3/mm3 64*      Results from last 7 days  Lab Units 08/03/18  0544 08/02/18  1630 08/02/18  1041   SODIUM mmol/L 142 140 141   POTASSIUM mmol/L 4.2 4.4 4.8   CHLORIDE mmol/L 105 105 103   CO2 mmol/L 20.0* 20.4* 22.2   BUN mg/dL 53* 58* 58*   CREATININE mg/dL 3.63* 3.79* 3.87*   GLUCOSE mg/dL 270* 190* 231*   Estimated Creatinine Clearance: 13.9 mL/min (A) (by C-G formula based on SCr of 3.63 mg/dL (H)).  Results from last 7 days  Lab Units 08/03/18  0544 08/02/18  1630 08/02/18  1041   CALCIUM mg/dL 8.1* 8.6 9.5   ALBUMIN g/dL  --   --  3.20*         allopurinol 150 mg Oral Daily   ceftriaxone 1 g Intravenous Q24H   cholecalciferol 1,000 Units Oral Daily   cyclobenzaprine 5 mg Oral Nightly   ferrous sulfate 325 mg Oral Daily With Breakfast   insulin aspart 0-7 Units Subcutaneous 4x Daily With Meals & Nightly   lactulose 30 g Oral BID   metoprolol tartrate 12.5 mg Oral Nightly   multivitamin 1 tablet Oral Daily   QUEtiapine 25 mg Oral Nightly   rifaximin 550 mg Oral Daily With Lunch & Dinner       sodium chloride 100 mL/hr Last Rate: 100 mL/hr (08/03/18 0641)   Diet Regular; Consistent Carbohydrate      Assessment/Plan      Active Hospital Problems    Diagnosis Date Noted   • **Urinary tract infection associated with indwelling urethral catheter (CMS/[facility]) [T83.511A, N39.0] 08/02/2018   • DM2 (diabetes mellitus, type 2) (CMS/HCC) [E11.9] 11/29/2016   • Acute hepatic encephalopathy [K72.00] 11/28/2016   • Acute kidney failure (CMS/HCC) [N17.9] 11/17/2016   • Thrombocytopenia (CMS/HCC) [D69.6] 11/17/2016   • Chronic renal disease, stage 4, severely decreased glomerular filtration rate between 15-29 mL/min/1.73 square meter (CMS/HCC) [N18.4] 08/15/2016   • Cirrhosis of liver (CMS/HCC) [K74.60] 08/15/2016      Resolved Hospital Problems    Diagnosis Date Noted Date Resolved   No resolved problems to display.     - UTI: Catheter associated. Cx not drawn in ER. Will order from that specimen if able. Compared to previous UA, infection  is new. Continue Rocephin empirically. Will see how mentation progresses and order voiding trial in the morning.  - Cirrhosis: Hypersplenism noted. Lactulose, Xifaxan. Diuretic on hold for lactic acidosis and dehydration.. Consult GI.  - CKD4: Cr near baseline. Holding diuretic. Consult Nephrology.  - Lactic acidosis: Repeat lactic acid to monitor resuscitation.  - Metabolic Encephalopathy: 2/2 UTI and Hepatic Encephalopathy. Monitor.  - Disposition: TBD    >35 minutes time spent    Navarro Lizarraga MD  [facility]  08/03/18  10:34 AM

## 2018-08-03 NOTE — CONSULTS
Inpatient Gastroenterology Consult  Consult performed by: MUSTAPHA MARSH  Consult ordered by: NAVARRO RODRIGUEZ          Patient Care Team:  Chayito Paredes MD as PCP - General (Internal Medicine)  Chayito Paredes MD as PCP - Claims Attributed  Dano Art MD as Consulting Physician (Hematology and Oncology)  Navarro Hung MD as Referring Physician (Nephrology)    Chief complaint: hepatic encephalopathy    Subjective     History of Present Illness  Pleasant lady well known to me with Dx of HOANG, cirrhosis, presents with hepatic encephalopathy.  She has had a recent bout with urinary retention,  And now with UTI.  She has been taking her lactulose daily with yields several loose stools.    She does not particularly like this rx  And will skip it on days when she has to go somewhere which is understandable.  She denies any black or bloody bowel movements.  Today she appears at baseline regarding her mentation..  Giving her  a hard time as her usual!   Her last upper endoscopy in 12/2015 showed GAVE that was treated, colonoscopy at that time noting hemorrhoids. No varices noted .   Review of Systems   Constitutional: Positive for fatigue.   Gastrointestinal: Negative for blood in stool.   Neurological: Positive for weakness.   Psychiatric/Behavioral: Positive for confusion.        Past Medical History:   Diagnosis Date   • Anxiety    • Arthritis    • C. 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   • HOANG (nonalcoholic steatohepatitis)    • Pancytopenia (CMS/HCC)    • Spinal stenosis    • Splenomegaly    •  Thrombocytopenia, chronic    • TIA (transient ischemic attack)    ,   Past Surgical History:   Procedure Laterality Date   • ABDOMINAL SURGERY     • KNEE SURGERY     • RENAL BIOPSY  10/2015   • TONSILLECTOMY     • WISDOM TOOTH EXTRACTION     ,   Family History   Problem Relation Age of Onset   • Heart disease Mother    • Diabetes Mother    • Heart disease Father    • Breast cancer Sister 45   • Hypertension Sister    • Diabetes Sister    • Colon cancer Brother 50   • Diabetes Brother    ,   Social History   Substance Use Topics   • Smoking status: Former Smoker     Types: Cigarettes     Quit date: 11/17/1985   • Smokeless tobacco: Never Used      Comment: Heavy in past, but quit   • Alcohol use No   ,   Prescriptions Prior to Admission   Medication Sig Dispense Refill Last Dose   • allopurinol (ZYLOPRIM) 100 MG tablet Take 100 mg by mouth 2 (Two) Times a Day.   8/2/2018 at Unknown time   • bumetanide (BUMEX) 2 MG tablet Take 3 mg by mouth 2 (Two) Times a Day.   8/2/2018 at Unknown time   • cholecalciferol (VITAMIN D3) 1000 units tablet Take 1,000 Units by mouth Daily.   8/2/2018 at Unknown time   • cyclobenzaprine (FLEXERIL) 5 MG tablet Take 5 mg by mouth Every Night.   8/1/2018 at Unknown time   • Ferrous Sulfate (IRON) 325 (65 Fe) MG tablet Take  by mouth Daily.      • guaiFENesin (MUCINEX) 600 MG 12 hr tablet Take 600 mg by mouth Every Night.   8/1/2018 at Unknown time   • insulin NPH-insulin regular (Novolin 70/30) (70-30) 100 UNIT/ML injection Inject 40 Units under the skin into the appropriate area as directed Daily With Breakfast & Lunch.   8/1/2018 at Unknown time   • lactulose (CHRONULAC) 10 GM/15ML solution Take 30 g by mouth Daily.   Past Week at Unknown time   • lidocaine (LIDODERM) 5 % Place 1 patch on the skin as directed by provider As Needed. Remove & Discard patch within 12 hours or as directed by MD    8/1/2018 at Unknown time   • metoprolol tartrate (LOPRESSOR) 25 MG tablet Take 12.5 mg by mouth  Every Night.   8/1/2018 at Unknown time   • Multiple Vitamin (MULTI-VITAMIN PO) Take 1 tablet by mouth Daily.   8/2/2018 at Unknown time   • ondansetron (ZOFRAN) 4 MG tablet Take 4 mg by mouth As Needed for Nausea or Vomiting.   8/1/2018 at Unknown time   • potassium chloride (KLOR-CON) 20 MEQ CR tablet Take 10 mEq by mouth 3 (Three) Times a Day.   8/2/2018 at Unknown time   • Probiotic Product (PROBIOTIC PO) Take 1 tablet by mouth Daily With Lunch & Dinner.   8/1/2018 at Unknown time   • QUEtiapine (SEROquel) 25 MG tablet Take 25 mg by mouth Every Night.   8/1/2018 at Unknown time   • rifaximin (XIFAXAN) 550 MG tablet Take 550 mg by mouth Daily With Lunch & Dinner.   8/1/2018 at Unknown time   • traMADol (ULTRAM) 50 MG tablet Take 50 mg by mouth Every Night.   8/1/2018 at Unknown time   • vitamin B-12 (CYANOCOBALAMIN) 2500 MCG sublingual tablet tablet Place 1,000 mcg under the tongue Daily.   8/2/2018 at Unknown time   • acetaminophen (TYLENOL) 650 MG 8 hr tablet Take 650 mg by mouth Every 8 (Eight) Hours As Needed for Mild Pain (1-3).   Taking   • predniSONE (DELTASONE) 20 MG tablet 2 po daily for  3 days the 1 po daily for 4 days 10 tablet 0    • simethicone (MYLICON) 125 MG chewable tablet Chew 125 mg As Needed for Flatulence.      • temazepam (RESTORIL) 7.5 MG capsule Take 7.5 mg by mouth Every Night.   Taking   , Scheduled Meds:    allopurinol 150 mg Oral Daily   ceftriaxone 1 g Intravenous Q24H   cholecalciferol 1,000 Units Oral Daily   cyclobenzaprine 5 mg Oral Nightly   ferrous sulfate 325 mg Oral Daily With Breakfast   [START ON 8/5/2018] ferrous sulfate 325 mg Oral Daily With Breakfast   insulin aspart 0-7 Units Subcutaneous 4x Daily With Meals & Nightly   lactulose 30 g Oral BID   metoprolol tartrate 12.5 mg Oral Nightly   multivitamin 1 tablet Oral Daily   QUEtiapine 25 mg Oral Nightly   rifaximin 550 mg Oral Daily With Lunch & Dinner   , Continuous Infusions:    sodium chloride 100 mL/hr Last Rate: 100  mL/hr (08/03/18 0641)   , PRN Meds:  •  acetaminophen  •  dextrose  •  dextrose  •  glucagon (human recombinant)  •  ondansetron  •  sodium chloride  •  Insert peripheral IV **AND** sodium chloride and Allergies:  Ciprofloxacin; Levaquin [levofloxacin]; and Promethazine hcl    Objective      Vital Signs  Temp:  [97.6 °F (36.4 °C)-98.8 °F (37.1 °C)] 97.8 °F (36.6 °C)  Heart Rate:  [84-93] 84  Resp:  [16-18] 18  BP: (138-192)/(51-78) 142/59    Physical Exam   Constitutional: She appears well-developed.   HENT:   Mouth/Throat: Oropharynx is clear and moist.   Eyes: Conjunctivae are normal.   Neck: Neck supple.   Cardiovascular: Normal rate and regular rhythm.    Pulmonary/Chest: Effort normal and breath sounds normal.   Abdominal: Soft. Bowel sounds are normal.   Neurological: She is alert.   Skin: Skin is warm and dry.       Results Review:    I reviewed the patient's new clinical results.        Assessment/Plan     Principal Problem:    Urinary tract infection associated with indwelling urethral catheter (CMS/HCC)  Active Problems:    Cirrhosis of liver (CMS/HCC)    Chronic renal disease, stage 4, severely decreased glomerular filtration rate between 15-29 mL/min/1.73 square meter (CMS/HCC)    Thrombocytopenia (CMS/HCC)    Acute kidney failure (CMS/HCC)    Acute hepatic encephalopathy    DM2 (diabetes mellitus, type 2) (CMS/HCC)      Assessment:  (Hepatic encephalopathy  Suspect combination of the urinary infection and missing doses of the lactulose.  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 ).     Plan:   (Agree with current treatment that is in place.    No further recommendations.  Will see as needed.  Follow up as scheduled).       I discussed the patients findings and my recommendations with patient and nursing staff    Jez Astorga MD  08/03/18  1:21 PM    Time: Critical care 14 min

## 2018-08-04 NOTE — PLAN OF CARE
Problem: Patient Care Overview  Goal: Plan of Care Review  Outcome: Ongoing (interventions implemented as appropriate)   08/04/18 4788   Coping/Psychosocial   Plan of Care Reviewed With patient;spouse;daughter   OTHER   Outcome Summary No c/o pain. Alert to self/spouse and daughter only. Repetitively ask the same questions and states she needs to go home. Verbal redirection not efffective. New order for Ativan po r/t anxiety. Pt. attempting to transfer and ambulated without assistance repetitively. Spouse at bedside, then daughter. Ambulated in hallway with walker and assist x1 several times. IVF d/c. Cont on IV ABT. Voiding per toilet without difficulty.       Goal: Discharge Needs Assessment  Outcome: Ongoing (interventions implemented as appropriate)    Goal: Interprofessional Rounds/Family Conf  Outcome: Outcome(s) achieved Date Met: 08/04/18      Problem: Fall Risk (Adult)  Goal: Absence of Fall  Outcome: Ongoing (interventions implemented as appropriate)      Problem: Urinary Tract Infection (Adult)  Goal: Signs and Symptoms of Listed Potential Problems Will be Absent, Minimized or Managed (Urinary Tract Infection)  Outcome: Ongoing (interventions implemented as appropriate)      Problem: Pain, Acute (Adult)  Goal: Identify Related Risk Factors and Signs and Symptoms  Outcome: Ongoing (interventions implemented as appropriate)    Goal: Acceptable Pain Control/Comfort Level  Outcome: Ongoing (interventions implemented as appropriate)      Problem: Kidney Disease, Chronic/End Stage Renal Disease (Adult)  Goal: Signs and Symptoms of Listed Potential Problems Will be Absent, Minimized or Managed (Kidney Disease, Chronic/End Stage Renal Disease)  Outcome: Ongoing (interventions implemented as appropriate)      Problem: Confusion, Chronic (Adult)  Goal: Cognitive/Functional Impairments Minimized  Outcome: Ongoing (interventions implemented as appropriate)    Goal: Free from Harm/Injuries  Outcome: Ongoing  (interventions implemented as appropriate)      Problem: Skin Injury Risk (Adult)  Goal: Skin Health and Integrity  Outcome: Ongoing (interventions implemented as appropriate)

## 2018-08-04 NOTE — NURSING NOTE
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  Patient was standing at edge of bed refusing to go back to bed.  Code 5 was called, patient calmed down after security arrived, and security placed her back in bed, Dr. Malave was paged, waiting for orders.

## 2018-08-04 NOTE — PLAN OF CARE
Problem: Patient Care Overview  Goal: Plan of Care Review  Outcome: Ongoing (interventions implemented as appropriate)   08/04/18 0416   OTHER   Outcome Summary No complaints of pain, very confused overnight, became combative toward RN, Code 5 called, started calling out for  early this am, meneses will be dc/d at 0600 this am for voiding trial, Lactic acid 2.2, paged [facility], no one returned call for critical lab value, VSS, will continue to monitor.   Plan of Care Review   Progress no change     Goal: Individualization and Mutuality  Outcome: Outcome(s) achieved Date Met: 08/04/18    Goal: Discharge Needs Assessment  Outcome: Ongoing (interventions implemented as appropriate)      Problem: Fall Risk (Adult)  Goal: Absence of Fall  Outcome: Ongoing (interventions implemented as appropriate)      Problem: Urinary Tract Infection (Adult)  Goal: Signs and Symptoms of Listed Potential Problems Will be Absent, Minimized or Managed (Urinary Tract Infection)  Outcome: Ongoing (interventions implemented as appropriate)      Problem: Kidney Disease, Chronic/End Stage Renal Disease (Adult)  Goal: Signs and Symptoms of Listed Potential Problems Will be Absent, Minimized or Managed (Kidney Disease, Chronic/End Stage Renal Disease)  Outcome: Ongoing (interventions implemented as appropriate)      Problem: Confusion, Chronic (Adult)  Goal: Cognitive/Functional Impairments Minimized  Outcome: Ongoing (interventions implemented as appropriate)      Problem: Skin Injury Risk (Adult)  Goal: Skin Health and Integrity  Outcome: Ongoing (interventions implemented as appropriate)

## 2018-08-04 NOTE — ED PROVIDER NOTES
EMERGENCY DEPARTMENT ENCOUNTER    CHIEF COMPLAINT  Chief Complaint: Fatigue  History given by:patient     HPI:  Pt is a 83 y.o. female with a hx of DM2 and cirrhosis who presents with a cc of fatigue onset x 3 days prior.  reports she has been increasingly tired, weak, and had elevated blood sugars despite taking her diabetic medications. He also states she has been more confused for the last few days as well. She has a hx of cirrhosis and has been taking her medication as directed but he is concerned her ammonia level may be elevated as she has had similar symptoms in the past when it was elevated. No reported fever, vomiting, or urinary complaints. No chest pain or SOA.     History provided predominantly by .     MEDICAL RECORD REVIEW      PAST MEDICAL HISTORY  Active Ambulatory Problems     Diagnosis Date Noted   • Cirrhosis of liver (CMS/[facility]) 08/15/2016   • Hypersplenism 08/15/2016   • Splenic pancytopenia syndrome (CMS/[facility]) 08/15/2016   • Chronic renal disease, stage 4, severely decreased glomerular filtration rate between 15-29 mL/min/1.73 square meter (CMS/[facility]) 08/15/2016   • Anemia of chronic renal failure, stage 4 (severe) (CMS/HCC) 09/08/2016   • Hepatic encephalopathy (CMS/[facility]) 11/16/2016   • Thrombocytopenia (CMS/[facility]) 11/17/2016   • Leukopenia 11/17/2016   • Acute kidney failure (CMS/[facility]) 11/17/2016   • Acute hepatic encephalopathy 11/28/2016   • DM2 (diabetes mellitus, type 2) (CMS/[facility]) 11/29/2016   • Iron deficiency 03/30/2017   • Acute idiopathic gout of right ankle 01/17/2018   • Decreased mobility 01/17/2018   • Fall 01/17/2018   • Urinary tract infection associated with indwelling urethral catheter (CMS/[facility]) 08/02/2018     Resolved Ambulatory Problems     Diagnosis Date Noted   • Hemochromatosis 08/15/2016   • CKD (chronic kidney disease) stage 3, GFR 30-59 ml/min 11/17/2016     Past Medical History:   Diagnosis Date   • Anxiety    • Arthritis    • C. difficile colitis    • CHF  (congestive heart failure) (CMS/HCC)    • Chronic kidney disease    • Dementia    • Diabetes mellitus (CMS/HCC)    • Diskitis 11/2015   • Hemochromatosis    • History of anemia    • History of blood transfusion 2015   • History of pneumonia 2016   • History of sepsis 2015   • Hypertension    • Liver disease    • HOANG (nonalcoholic steatohepatitis)    • Pancytopenia (CMS/HCC)    • Spinal stenosis    • Splenomegaly    • Thrombocytopenia, chronic    • TIA (transient ischemic attack)        PAST SURGICAL HISTORY  Past Surgical History:   Procedure Laterality Date   • ABDOMINAL SURGERY     • KNEE SURGERY     • RENAL BIOPSY  10/2015   • TONSILLECTOMY     • WISDOM TOOTH EXTRACTION         FAMILY HISTORY  Family History   Problem Relation Age of Onset   • Heart disease Mother    • Diabetes Mother    • Heart disease Father    • Breast cancer Sister 45   • Hypertension Sister    • Diabetes Sister    • Colon cancer Brother 50   • Diabetes Brother        SOCIAL HISTORY  Social History     Social History   • Marital status:      Spouse name: Kirby   • Number of children: 2   • Years of education: High School     Occupational History   •  Retired     Social History Main Topics   • Smoking status: Former Smoker     Types: Cigarettes     Quit date: 11/17/1985   • Smokeless tobacco: Never Used      Comment: Heavy in past, but quit   • Alcohol use No   • Drug use: No   • Sexual activity: Defer     Other Topics Concern   • Not on file     Social History Narrative   • No narrative on file       ALLERGIES  Ciprofloxacin; Levaquin [levofloxacin]; and Promethazine hcl    REVIEW OF SYSTEMS  Review of Systems   Unable to perform ROS: Mental status change       PHYSICAL EXAM  ED Triage Vitals   Temp Heart Rate Resp BP SpO2   07/26/18 1227 07/26/18 1026 07/26/18 1026 07/26/18 1130 07/26/18 1026   98.7 °F (37.1 °C) 58 18 129/51 97 %      Temp src Heart Rate Source Patient Position BP Location FiO2 (%)   07/26/18 1227 07/26/18 1026  07/26/18 1130 07/26/18 1542 --   Tympanic Monitor Standing Right arm        Physical Exam   Constitutional: She is oriented to person, place, and time and well-developed, well-nourished, and in no distress.   HENT:   Head: Normocephalic and atraumatic.   Nose: Nose normal.   Mouth/Throat: Uvula is midline, oropharynx is clear and moist and mucous membranes are normal.   Eyes: Pupils are equal, round, and reactive to light.   Neck: Normal range of motion. Neck supple.   Cardiovascular: Normal rate, regular rhythm and normal heart sounds.    Pulmonary/Chest: Effort normal and breath sounds normal.   Abdominal: Soft. Normal appearance and bowel sounds are normal. There is no tenderness.   Neurological: She is alert and oriented to person, place, and time. GCS score is 15.   Skin: Skin is warm, dry and intact.   Psychiatric: Mood, memory, affect and judgment normal.   Nursing note and vitals reviewed.      LAB RESULTS  Labs Reviewed   AMMONIA - Abnormal; Notable for the following:        Result Value    Ammonia 106 (*)     All other components within normal limits   COMPREHENSIVE METABOLIC PANEL - Abnormal; Notable for the following:     Glucose 204 (*)     BUN 55 (*)     Creatinine 3.47 (*)     Albumin 2.90 (*)     AST (SGOT) 38 (*)     Alkaline Phosphatase 133 (*)     eGFR Non  Amer 13 (*)     All other components within normal limits    Narrative:     The MDRD GFR formula is only valid for adults with stable renal function between ages 18 and 70.   PROTIME-INR - Abnormal; Notable for the following:     Protime 15.7 (*)     INR 1.27 (*)     All other components within normal limits   URINALYSIS W/ MICROSCOPIC IF INDICATED (NO CULTURE) - Abnormal; Notable for the following:     Blood, UA Trace (*)     Protein, UA Trace (*)     All other components within normal limits   TROPONIN (IN-HOUSE) - Abnormal; Notable for the following:     Troponin T 0.117 (*)     All other components within normal limits    Narrative:      Troponin T Reference Ranges:  Less than 0.03 ng/mL:    Negative for AMI  0.03 to 0.09 ng/mL:      Indeterminant for AMI  Greater than 0.09 ng/mL: Positive for AMI   LACTIC ACID, PLASMA - Abnormal; Notable for the following:     Lactate 2.4 (*)     All other components within normal limits   MAGNESIUM - Abnormal; Notable for the following:     Magnesium 2.7 (*)     All other components within normal limits   CBC WITH AUTO DIFFERENTIAL - Abnormal; Notable for the following:     WBC 3.88 (*)     RBC 2.93 (*)     Hemoglobin 10.4 (*)     Hematocrit 30.5 (*)     .1 (*)     MCH 35.5 (*)     RDW 14.8 (*)     RDW-SD 56.1 (*)     Platelets 56 (*)     Eosinophil % 7.2 (*)     All other components within normal limits   POCT GLUCOSE FINGERSTICK - Abnormal; Notable for the following:     Glucose 239 (*)     All other components within normal limits    Narrative:     Meter: [id number] : [id number] Milo Munguia RN   POCT GLUCOSE FINGERSTICK - Abnormal; Notable for the following:     Glucose 214 (*)     All other components within normal limits    Narrative:     Meter: [id number] : [id number] Gideon Zarate   URINE CULTURE -    APTT - Normal   BNP (IN-HOUSE) - Normal    Narrative:     Among patients with dyspnea, NT-proBNP is highly sensitive for the detection of acute congestive heart failure. In addition NT-proBNP of <300 pg/ml effectively rules out acute congestive heart failure with 99% negative predictive value.   PROCALCITONIN - Normal    Narrative:     As a Marker for Sepsis (Non-Neonates):   1. <0.5 ng/mL represents a low risk of severe sepsis and/or septic shock.  1. >2 ng/mL represents a high risk of severe sepsis and/or septic shock.    As a Marker for Lower Respiratory Tract Infections that require antibiotic therapy:  PCT on Admission     Antibiotic Therapy             6-12 Hrs later  > 0.5                Strongly Recommended            >0.25 - <0.5         Recommended  0.1 - 0.25        "    Discouraged                   Remeasure/reassess PCT  <0.1                 Strongly Discouraged          Remeasure/reassess PCT      As 28 day mortality risk marker: \"Change in Procalcitonin Result\" (> 80 % or <=80 %) if Day 0 (or Day 1) and Day 4 values are available. Refer to http://[email or web address]-[email or web address]/   Change in PCT <=80 %   A decrease of PCT levels below or equal to 80 % defines a positive change in PCT test result representing a higher risk for 28-day all-cause mortality of patients diagnosed with severe sepsis or septic shock.  Change in PCT > 80 %   A decrease of PCT levels of more than 80 % defines a negative change in PCT result representing a lower risk for 28-day all-cause mortality of patients diagnosed with severe sepsis or septic shock.               RAINBOW DRAW    Narrative:     The following orders were created for panel order [place] Draw.  Procedure                               Abnormality         Status                     ---------                               -----------         ------                     Light Blue Top[[id number]]                                   Final result               Green Top (Gel)[[id number]]                                  Final result               Lavender Top[[id number]]                                     Final result               Gold Top - SST[[id number]]                                   Final result                 Please view results for these tests on the individual orders.   LACTIC ACID REFLEX TIMER   URINALYSIS, MICROSCOPIC ONLY   CBC AND DIFFERENTIAL    Narrative:     The following orders were created for panel order CBC & Differential.  Procedure                               Abnormality         Status                     ---------                               -----------         ------                     Scan Slide[[id number]]                                                                  CBC Auto Differential[[id number]]        " "Abnormal            Final result                 Please view results for these tests on the individual orders.   LIGHT BLUE TOP   GREEN TOP   LAVENDER TOP   GOLD TOP - SST       I ordered the above labs and reviewed the results    RADIOLOGY  CT Head Without Contrast   Final Result   No evidence of infarction or hemorrhage. Decreased   attenuation suggesting small vessel ischemic disease more prominent as   compared to 11/28/2016. Further evaluation could be performed with a MRI   examination of the brain as indicated. The above information was called   to and discussed with Dr. Bowens.        Radiation dose reduction techniques were utilized, including automated   exposure control and exposure modulation based on body size.       This report was finalized on 7/27/2018 9:57 AM by Dr. Killian Carty M.D.          XR Chest 1 View   Final Result        PROCEDURES      COURSE & MEDICAL DECISION MAKING  Pertinent Labs and Imaging studies that were ordered and reviewed are noted above.  Results were reviewed/discussed with the patient. Pt also made aware that some labs, such as cultures, will not be resulted during ER visit and follow up with PMD is necessary.       PROGRESS AND CONSULTS    Progress Notes:         1200 Reviewed pt's history and workup with Dr. Bowens who will manage disposition of the pt.     MEDICATIONS GIVEN IN ER  Medications   sodium chloride 0.9 % bolus 500 mL (0 mL Intravenous Stopped 7/26/18 1411)       /56 (BP Location: Right arm, Patient Position: Sitting)   Pulse 76   Temp 98.5 °F (36.9 °C) (Tympanic)   Resp 16   Ht 160 cm (63\")   Wt 90.7 kg (200 lb)   SpO2 96%   BMI 35.43 kg/m²       DIAGNOSIS  Final diagnoses:   COLEMAN (acute kidney injury) (CMS/HCC)   Lactic acidosis   Hyperammonemia (CMS/HCC)   Confusion   Hepatic encephalopathy (CMS/HCC)            Mitali Conteh, APRN  08/04/18 0644    "

## 2018-08-04 NOTE — PROGRESS NOTES
Name: Charu Bowens ADMIT: 2018   : 1935  PCP: Chayito Paredes MD    MRN: [id number] LOS: 2 days   AGE/SEX: 83 y.o. female  ROOM: [facility] [place]   Subjective   She is calm and alert currently. Confusion and agitation overnight noted. No CP SOA NVD. Just voided in restroom and is ambulating with walker without dizziness. She is still confused at times but redirectable.    Objective   Vital Signs  Temp:  [97.3 °F (36.3 °C)-98.6 °F (37 °C)] 98.6 °F (37 °C)  Heart Rate:  [80-91] 83  Resp:  [16-18] 16  BP: (154-179)/(53-72) 154/57  SpO2:  [93 %-97 %] 96 %  on   ;   Device (Oxygen Therapy): room air  Body mass index is 36.21 kg/m².    Physical Exam   Constitutional: She appears well-developed. No distress.   HENT:   Head: Normocephalic and atraumatic.   Eyes: Conjunctivae and EOM are normal. No scleral icterus.   Neck: Neck supple.   Cardiovascular: Normal rate, regular rhythm and intact distal pulses.    Pulmonary/Chest: Effort normal. No stridor. She has decreased breath sounds. She has no wheezes. She has no rales.   Abdominal: Soft. She exhibits distension. There is no tenderness. There is no guarding.   Musculoskeletal: Normal range of motion. She exhibits edema (trace).   Neurological: She is alert. No cranial nerve deficit.   Oriented to person and place only. Not month, day, or year.   Skin: Skin is warm and dry. She is not diaphoretic.   Psychiatric: She has a normal mood and affect. Her behavior is normal.   Nursing note and vitals reviewed.      Results Review:       I reviewed the patient's new clinical results.     I reviewed telemetry/EKG results, sinus rhythm      Results from last 7 days  Lab Units 18  0701 18  1041   WBC 10*3/mm3 3.05* 4.04*   HEMOGLOBIN g/dL 8.2* 10.8*   PLATELETS 10*3/mm3 52* 64*     Results from last 7 days  Lab Units 18  0701 18  0544 18  1630 18  1041   SODIUM mmol/L 141 142 140 141   POTASSIUM mmol/L 3.5 4.2 4.4 4.8   CHLORIDE mmol/L 106  105 105 103   CO2 mmol/L 20.7* 20.0* 20.4* 22.2   BUN mg/dL 42* 53* 58* 58*   CREATININE mg/dL 3.05* 3.63* 3.79* 3.87*   GLUCOSE mg/dL 223* 270* 190* 231*   Estimated Creatinine Clearance: 16.5 mL/min (A) (by C-G formula based on SCr of 3.05 mg/dL (H)).  Results from last 7 days  Lab Units 08/04/18  0701 08/03/18  0544 08/02/18  1630 08/02/18  1041   CALCIUM mg/dL 8.5* 8.1* 8.6 9.5   ALBUMIN g/dL 2.90*  --   --  3.20*   MAGNESIUM mg/dL 2.4  --   --   --    PHOSPHORUS mg/dL 3.0  --   --   --          allopurinol 150 mg Oral Daily   ceftriaxone 1 g Intravenous Q24H   cholecalciferol 1,000 Units Oral Daily   cyclobenzaprine 5 mg Oral Nightly   [START ON 8/5/2018] ferrous sulfate 325 mg Oral Daily With Breakfast   insulin aspart 0-7 Units Subcutaneous 4x Daily With Meals & Nightly   lactulose 30 g Oral BID   metoprolol tartrate 12.5 mg Oral Nightly   multivitamin 1 tablet Oral Daily   QUEtiapine 25 mg Oral Nightly   rifaximin 550 mg Oral Daily With Lunch & Dinner       sodium chloride 100 mL/hr Last Rate: 100 mL/hr (08/03/18 0641)   Diet Regular; Consistent Carbohydrate      Assessment/Plan      Active Hospital Problems    Diagnosis Date Noted   • **Urinary tract infection associated with indwelling urethral catheter (CMS/[facility]) [T83.511A, N39.0] 08/02/2018   • DM2 (diabetes mellitus, type 2) (CMS/[facility]) [E11.9] 11/29/2016   • Acute hepatic encephalopathy [K72.00] 11/28/2016   • Acute kidney failure (CMS/[facility]) [N17.9] 11/17/2016   • Thrombocytopenia (CMS/[facility]) [D69.6] 11/17/2016   • Chronic renal disease, stage 4, severely decreased glomerular filtration rate between 15-29 mL/min/1.73 square meter (CMS/[facility]) [N18.4] 08/15/2016   • Cirrhosis of liver (CMS/[facility]) [K74.60] 08/15/2016      Resolved Hospital Problems    Diagnosis Date Noted Date Resolved   No resolved problems to display.     - UTI: Catheter associated. GNR on Cx, speciation pending. Responding well to Rocephin. Voiding trial today. She has planned Urology follow up  on Tuesday.  - Cirrhosis: Hypersplenism noted. Lactulose, Xifaxan. Diuretic on hold for lactic acidosis and dehydration. GI evaluated.  - CKD4: Cr near baseline. Holding diuretic. Nephrology following.  - Lactic acidosis: Lactic remains mildly elevated but improved from admission. She has no tachycardia and no orthostatic symptoms when ambulating. Will continue IVF.  - Metabolic Encephalopathy: 2/2 UTI and Hepatic Encephalopathy. Increase Seroquel. Monitor.  - Disposition: HH/few days    Navarro Lizarraga MD  [facility]  08/04/18  11:05 AM

## 2018-08-04 NOTE — PROGRESS NOTES
"   LOS: 2 days    Patient Care Team:  Chayito Paredes MD as PCP - General (Internal Medicine)  Chayito Paredes MD as PCP - Claims Attributed  Dano Art MD as Consulting Physician (Hematology and Oncology)  Navarro Hung MD as Referring Physician (Nephrology)    Chief Complaint:    Chief Complaint   Patient presents with   • Altered Mental Status     Follow UP COLEMAN/CKD  Subjective     Interval History:   No major distress.  's more talkative.  Seems to be feeling better.  Tolerating by mouth.    Objective     Vital Signs  Temp:  [97.3 °F (36.3 °C)-98.6 °F (37 °C)] 98.6 °F (37 °C)  Heart Rate:  [80-91] 83  Resp:  [16-18] 16  BP: (154-179)/(53-72) 154/57    Flowsheet Rows      First Filed Value   Admission Height  165.1 cm (65\") Documented at 08/02/2018 1023   Admission Weight  97.1 kg (214 lb) Documented at 08/02/2018 1023          No intake/output data recorded.  I/O last 3 completed shifts:  In: 2030 [P.O.:330; I.V.:1200; IV Piggyback:500]  Out: 2900 [Urine:2900]    Intake/Output Summary (Last 24 hours) at 08/04/18 1130  Last data filed at 08/04/18 0515   Gross per 24 hour   Intake              620 ml   Output             2050 ml   Net            -1430 ml       Physical Exam:  General Appearance: alert, oriented x 3, no acute distress, obese, chronically ill  Skin: warm and dry  HEENT: pupils round and reactive to light, oral mucosa normal,   Neck: supple, no JVD, trachea midline  Lungs: CTA, unlabored breathing effort  Heart: RRR, normal S1 and S2, no S3, no rub  Abdomen: soft, non-tender,  present bowel sounds to auscultation  : no palpable bladder, Jimenez catheter is anchored in  Extremities: Trace pretibial edema, no cyanosis or clubbing  Joints: No significant deformities noted, no crepitation over the knees or the ankles.  Lymphatics: No cervical or supraclavicular lymphadenopathy  Neuro: normal speech.     Results Review:      Results from last 7 days  Lab Units 08/04/18  0701 " 08/03/18  0544 08/02/18  1630 08/02/18  1041   SODIUM mmol/L 141 142 140 141   POTASSIUM mmol/L 3.5 4.2 4.4 4.8   CHLORIDE mmol/L 106 105 105 103   CO2 mmol/L 20.7* 20.0* 20.4* 22.2   BUN mg/dL 42* 53* 58* 58*   CREATININE mg/dL 3.05* 3.63* 3.79* 3.87*   CALCIUM mg/dL 8.5* 8.1* 8.6 9.5   BILIRUBIN mg/dL 1.3*  --   --  1.2   ALK PHOS U/L 104  --   --  141*   ALT (SGPT) U/L 20  --   --  22   AST (SGOT) U/L 39*  --   --  42*   GLUCOSE mg/dL 223* 270* 190* 231*       Estimated Creatinine Clearance: 16.5 mL/min (A) (by C-G formula based on SCr of 3.05 mg/dL (H)).      Results from last 7 days  Lab Units 08/04/18  0701   MAGNESIUM mg/dL 2.4   PHOSPHORUS mg/dL 3.0         Results from last 7 days  Lab Units 08/04/18  0701   URIC ACID mg/dL 5.2         Results from last 7 days  Lab Units 08/04/18  0701 08/02/18  1041   WBC 10*3/mm3 3.05* 4.04*   HEMOGLOBIN g/dL 8.2* 10.8*   PLATELETS 10*3/mm3 52* 64*         Results from last 7 days  Lab Units 08/04/18  0701   INR  1.41*         Imaging Results (last 24 hours)     ** No results found for the last 24 hours. **          allopurinol 150 mg Oral Daily   ceftriaxone 1 g Intravenous Q24H   cholecalciferol 1,000 Units Oral Daily   cyclobenzaprine 5 mg Oral Nightly   [START ON 8/5/2018] ferrous sulfate 325 mg Oral Daily With Breakfast   insulin aspart 0-7 Units Subcutaneous 4x Daily With Meals & Nightly   lactulose 30 g Oral BID   metoprolol tartrate 12.5 mg Oral Nightly   multivitamin 1 tablet Oral Daily   potassium chloride 20 mEq Oral Once   QUEtiapine 50 mg Oral Nightly   rifaximin 550 mg Oral Daily With Lunch & Dinner          Medication Review:   Current Facility-Administered Medications   Medication Dose Route Frequency Provider Last Rate Last Dose   • acetaminophen (TYLENOL) tablet 325 mg  325 mg Oral Q8H PRN Chris Brown MD       • allopurinol (ZYLOPRIM) tablet 150 mg  150 mg Oral Daily Chris Brown MD   150 mg at 08/04/18 0850   • cefTRIAXone (ROCEPHIN)  IVPB 1 g  1 g Intravenous Q24H Chris Brown  mL/hr at 08/03/18 1152 1 g at 08/03/18 1152   • cholecalciferol (VITAMIN D3) tablet 1,000 Units  1,000 Units Oral Daily Chris Brown MD   1,000 Units at 08/04/18 0850   • cyclobenzaprine (FLEXERIL) tablet 5 mg  5 mg Oral Nightly Chris rBown MD   5 mg at 08/03/18 2143   • dextrose (D50W) solution 25 g  25 g Intravenous Q15 Min PRN Chris Brown MD       • dextrose (GLUTOSE) oral gel 15 g  15 g Oral Q15 Min PRN Chris Brown MD       • [START ON 8/5/2018] ferrous sulfate tablet 325 mg  325 mg Oral Daily With Breakfast Chris Brown MD       • glucagon (human recombinant) (GLUCAGEN DIAGNOSTIC) injection 1 mg  1 mg Subcutaneous PRN Chris Brown MD       • insulin aspart (novoLOG) injection 0-7 Units  0-7 Units Subcutaneous 4x Daily With Meals & Nightly Chris Brown MD   3 Units at 08/04/18 0850   • lactulose (CHRONULAC) 10 GM/15ML solution 30 g  30 g Oral BID Navarro Lizarraga MD   30 g at 08/04/18 0850   • metoprolol tartrate (LOPRESSOR) tablet 12.5 mg  12.5 mg Oral Nightly Chris Brown MD   12.5 mg at 08/03/18 2143   • multivitamin (THERAGRAN) tablet 1 tablet  1 tablet Oral Daily Chris Brown MD   1 tablet at 08/04/18 0850   • ondansetron (ZOFRAN) tablet 4 mg  4 mg Oral Q4H PRN Chris Brown MD       • potassium chloride (MICRO-K) CR capsule 20 mEq  20 mEq Oral Once Dread Gallegos MD       • QUEtiapine (SEROquel) tablet 50 mg  50 mg Oral Nightly Navarro Lizarraga MD       • rifaximin (XIFAXAN) tablet 550 mg  550 mg Oral Daily With Lunch & Dinner Chris Brown MD   550 mg at 08/03/18 1723   • sodium chloride 0.9 % flush 1-10 mL  1-10 mL Intravenous PRN Chris Brown MD       • sodium chloride 0.9 % flush 10 mL  10 mL Intravenous PRN Kirby Flores MD           Assessment/Plan     Principal Problem:    Urinary tract infection associated with  indwelling urethral catheter (CMS/HCC)  Active Problems:    Cirrhosis of liver (CMS/HCC)    Chronic renal disease, stage 4, severely decreased glomerular filtration rate between 15-29 mL/min/1.73 square meter (CMS/HCC)    Thrombocytopenia (CMS/HCC)    Acute kidney failure (CMS/HCC)    Acute hepatic encephalopathy    DM2 (diabetes mellitus, type 2) (CMS/HCC)      1.  COLEMAN/CKD, multifactorial, improving since admission and related to poor oral intake.  Renal function is better.  Status is stable.  We will stop IV fluid as she is tolerating by mouth and monitor.  Baseline creatinine between 2 and 2.5, recently.  2.  Chronic kidney disease stage IV at baseline associated with diabetic nephropathy, biopsy-proven.  3.  Diabetes mellitus type 2 with known diabetic nephropathy and retinopathy.  Sub-nephrotic range proteinuria.  4.  Cirrhosis, associated with HOANG, even though there is a report in the past medical history stating that there is history of hemochromatosis.  The patient had the hepatic encephalopathy associated with noncompliance with lactulose  5.  Anemia with chronic kidney disease.  6.  Thrombocytopenia with known history of chronic thrombocytopenia  7.  UTI, recent urinary retention patient had Jimenez catheter  8.  History of gout  9.  Borderline hypokalemia, will replace.  Magnesium was okay.  Discussed with her  at bedside.  Will follow.      Dread Gallegos MD  08/04/18  11:30 AM

## 2018-08-05 NOTE — PROGRESS NOTES
"   LOS: 3 days    Patient Care Team:  Chayito Paredes MD as PCP - General (Internal Medicine)  Chayito Paredes MD as PCP - Claims Attributed  Dano Art MD as Consulting Physician (Hematology and Oncology)  Navarro Hung MD as Referring Physician (Nephrology)    Chief Complaint:    Chief Complaint   Patient presents with   • Altered Mental Status     Follow UP COLEMAN/CKD  Subjective     Interval History:   No major distress. Feeling well. Tolerating by mouth. Does not like the food.    Objective     Vital Signs  Temp:  [98 °F (36.7 °C)-98.2 °F (36.8 °C)] 98 °F (36.7 °C)  Heart Rate:  [] 78  Resp:  [16-18] 18  BP: (148-165)/(41-61) 153/47    Flowsheet Rows      First Filed Value   Admission Height  165.1 cm (65\") Documented at 08/02/2018 1023   Admission Weight  97.1 kg (214 lb) Documented at 08/02/2018 1023          I/O this shift:  In: 240 [P.O.:240]  Out: -   I/O last 3 completed shifts:  In: 740 [P.O.:240; IV Piggyback:500]  Out: 1000 [Urine:1000]    Intake/Output Summary (Last 24 hours) at 08/05/18 1103  Last data filed at 08/05/18 0827   Gross per 24 hour   Intake              480 ml   Output                0 ml   Net              480 ml       Physical Exam:  General Appearance: alert, oriented x 3, no acute distress, obese, chronically ill  Skin: warm and dry  HEENT: pupils round and reactive to light, oral mucosa normal,   Neck: supple, no JVD, trachea midline  Lungs: CTA, unlabored breathing effort  Heart: RRR, normal S1 and S2, no S3, no rub  Abdomen: soft, non-tender,  present bowel sounds to auscultation  : no palpable bladder, Jimenez catheter is anchored in  Extremities: Trace pretibial edema, no cyanosis or clubbing  Joints: No significant deformities noted, no crepitation over the knees or the ankles.  Lymphatics: No cervical or supraclavicular lymphadenopathy  Neuro: normal speech.     Results Review:      Results from last 7 days  Lab Units 08/05/18  0653 08/04/18  0701 " 08/03/18  0544  08/02/18  1041   SODIUM mmol/L 143 141 142  < > 141   POTASSIUM mmol/L 3.8 3.5 4.2  < > 4.8   CHLORIDE mmol/L 112* 106 105  < > 103   CO2 mmol/L 18.3* 20.7* 20.0*  < > 22.2   BUN mg/dL 37* 42* 53*  < > 58*   CREATININE mg/dL 2.54* 3.05* 3.63*  < > 3.87*   CALCIUM mg/dL 8.9 8.5* 8.1*  < > 9.5   BILIRUBIN mg/dL 1.1 1.3*  --   --  1.2   ALK PHOS U/L 90 104  --   --  141*   ALT (SGPT) U/L 20 20  --   --  22   AST (SGOT) U/L 41* 39*  --   --  42*   GLUCOSE mg/dL 218* 223* 270*  < > 231*   < > = values in this interval not displayed.    Estimated Creatinine Clearance: 19.9 mL/min (A) (by C-G formula based on SCr of 2.54 mg/dL (H)).      Results from last 7 days  Lab Units 08/04/18  0701   MAGNESIUM mg/dL 2.4   PHOSPHORUS mg/dL 3.0         Results from last 7 days  Lab Units 08/04/18  0701   URIC ACID mg/dL 5.2         Results from last 7 days  Lab Units 08/05/18  0653 08/04/18  0701 08/02/18  1041   WBC 10*3/mm3 2.41* 3.05* 4.04*   HEMOGLOBIN g/dL 8.3* 8.2* 10.8*   PLATELETS 10*3/mm3 48* 52* 64*         Results from last 7 days  Lab Units 08/04/18  0701   INR  1.41*         Imaging Results (last 24 hours)     ** No results found for the last 24 hours. **          allopurinol 150 mg Oral Daily   ceftriaxone 1 g Intravenous Q24H   cholecalciferol 1,000 Units Oral Daily   cyclobenzaprine 5 mg Oral Nightly   ferrous sulfate 325 mg Oral Daily With Breakfast   insulin aspart 0-7 Units Subcutaneous 4x Daily With Meals & Nightly   lactulose 30 g Oral BID   metoprolol tartrate 12.5 mg Oral Nightly   multivitamin 1 tablet Oral Daily   QUEtiapine 50 mg Oral Nightly   rifaximin 550 mg Oral Daily With Lunch & Dinner          Medication Review:   Current Facility-Administered Medications   Medication Dose Route Frequency Provider Last Rate Last Dose   • acetaminophen (TYLENOL) tablet 325 mg  325 mg Oral Q8H PRN Chris Brown MD       • allopurinol (ZYLOPRIM) tablet 150 mg  150 mg Oral Daily Chris Brown,  MD   150 mg at 08/05/18 0821   • cefTRIAXone (ROCEPHIN) IVPB 1 g  1 g Intravenous Q24H Chris Brown  mL/hr at 08/04/18 1140 1 g at 08/04/18 1140   • cholecalciferol (VITAMIN D3) tablet 1,000 Units  1,000 Units Oral Daily Chris Brown MD   1,000 Units at 08/05/18 0820   • cyclobenzaprine (FLEXERIL) tablet 5 mg  5 mg Oral Nightly Chris Brown MD   5 mg at 08/04/18 2120   • dextrose (D50W) solution 25 g  25 g Intravenous Q15 Min PRN Chris Brown MD       • dextrose (GLUTOSE) oral gel 15 g  15 g Oral Q15 Min PRN Chris Brown MD       • ferrous sulfate tablet 325 mg  325 mg Oral Daily With Breakfast Chris Brown MD   325 mg at 08/05/18 0800   • glucagon (human recombinant) (GLUCAGEN DIAGNOSTIC) injection 1 mg  1 mg Subcutaneous PRN Chris Brown MD       • insulin aspart (novoLOG) injection 0-7 Units  0-7 Units Subcutaneous 4x Daily With Meals & Nightly Chris Brown MD   3 Units at 08/05/18 0821   • lactulose (CHRONULAC) 10 GM/15ML solution 30 g  30 g Oral BID Navarro Lizarraga MD   30 g at 08/05/18 0820   • LORazepam (ATIVAN) tablet 1 mg  1 mg Oral Q6H PRN Navarro Lizarraga MD   1 mg at 08/04/18 1557   • metoprolol tartrate (LOPRESSOR) tablet 12.5 mg  12.5 mg Oral Nightly Chris Brown MD   12.5 mg at 08/04/18 2120   • multivitamin (THERAGRAN) tablet 1 tablet  1 tablet Oral Daily Chris Brown MD   1 tablet at 08/05/18 0820   • ondansetron (ZOFRAN) tablet 4 mg  4 mg Oral Q4H PRN Chris Brown MD       • QUEtiapine (SEROquel) tablet 50 mg  50 mg Oral Nightly Navarro Lizarraga MD   50 mg at 08/04/18 2120   • rifaximin (XIFAXAN) tablet 550 mg  550 mg Oral Daily With Lunch & Dinner Chris Brown MD   550 mg at 08/04/18 1757   • sodium chloride 0.9 % flush 1-10 mL  1-10 mL Intravenous PRN Chris Brown MD       • sodium chloride 0.9 % flush 10 mL  10 mL Intravenous PRN Kirby Flores MD            Assessment/Plan     Principal Problem:    Urinary tract infection associated with indwelling urethral catheter (CMS/HCC)  Active Problems:    Cirrhosis of liver (CMS/HCC)    Chronic renal disease, stage 4, severely decreased glomerular filtration rate between 15-29 mL/min/1.73 square meter (CMS/HCC)    Thrombocytopenia (CMS/HCC)    Acute kidney failure (CMS/HCC)    Acute hepatic encephalopathy    DM2 (diabetes mellitus, type 2) (CMS/HCC)      1.  COLEMAN/CKD, multifactorial, improving since admission and related to poor oral intake.  Renal function is better. Volume status is stable. Off IVF. Some edema. Jimenez is out. Voiding well. Little PVR per bladder scan. Will resume smaller dose of bumex. Baseline cr 2.0 - 2.5, recently.  2.  Chronic kidney disease stage IV at baseline associated with diabetic nephropathy, biopsy-proven.  3.  Diabetes mellitus type 2 with known diabetic nephropathy and retinopathy.  Sub-nephrotic range proteinuria.  4.  Cirrhosis, associated with HOANG, even though there is a report in the past medical history stating that there is history of hemochromatosis.  The patient had the hepatic encephalopathy associated with noncompliance with lactulose  5.  Anemia with chronic kidney disease.  6.  Thrombocytopenia with known history of chronic thrombocytopenia  7.  UTI, recent urinary retention patient had Jimenez catheter  8.  History of gout  9.  Borderline hypokalemia, replaced.  Magnesium was okay. Will resume po kcl 10 meq po tid with meals.   Discussed with her  at bedside.  Will follow.      Dread Gallegos MD  08/05/18  11:03 AM

## 2018-08-05 NOTE — PROGRESS NOTES
Name: Charu Bowens ADMIT: 2018   : 1935  PCP: Chayito Paredes MD    MRN: [id number] LOS: 3 days   AGE/SEX: 83 y.o. female  ROOM: [facility]   Subjective   Reports she is able to tolerate lactulose here but difficulty at home. No CP SOA NVD. Had confusion and agitation yesterday evening but improved now.    Objective   Vital Signs  Temp:  [98 °F (36.7 °C)-98.2 °F (36.8 °C)] 98 °F (36.7 °C)  Heart Rate:  [] 78  Resp:  [16-18] 18  BP: (148-165)/(41-61) 153/47  SpO2:  [93 %-97 %] 96 %  on   ;   Device (Oxygen Therapy): room air  Body mass index is 36.21 kg/m².    Physical Exam   Constitutional: She appears well-developed. No distress.   HENT:   Head: Normocephalic and atraumatic.   Eyes: Conjunctivae and EOM are normal. No scleral icterus.   Neck: Neck supple.   Cardiovascular: Normal rate, regular rhythm and intact distal pulses.    Pulmonary/Chest: Effort normal. No stridor. She has decreased breath sounds. She has no wheezes. She has no rales.   Abdominal: Soft. She exhibits distension. There is no tenderness. There is no guarding.   Musculoskeletal: Normal range of motion. She exhibits edema (trace).   Neurological: She is alert. No cranial nerve deficit.   Oriented to person and place only.   Skin: Skin is warm and dry. She is not diaphoretic.   Psychiatric: She has a normal mood and affect. Her behavior is normal.   Nursing note and vitals reviewed.      Results Review:       I reviewed the patient's new clinical results.     I reviewed telemetry/EKG results, sinus rhythm        Results from last 7 days  Lab Units 18  0653 18  0701 18  1041   WBC 10*3/mm3 2.41* 3.05* 4.04*   HEMOGLOBIN g/dL 8.3* 8.2* 10.8*   PLATELETS 10*3/mm3 48* 52* 64*     Results from last 7 days  Lab Units 18  0653 18  0701 18  0544 18  1630   SODIUM mmol/L 143 141 142 140   POTASSIUM mmol/L 3.8 3.5 4.2 4.4   CHLORIDE mmol/L 112* 106 105 105   CO2 mmol/L 18.3* 20.7* 20.0* 20.4*   BUN  mg/dL 37* 42* 53* 58*   CREATININE mg/dL 2.54* 3.05* 3.63* 3.79*   GLUCOSE mg/dL 218* 223* 270* 190*   Estimated Creatinine Clearance: 19.9 mL/min (A) (by C-G formula based on SCr of 2.54 mg/dL (H)).  Results from last 7 days  Lab Units 08/05/18  0653 08/04/18  0701 08/03/18  0544 08/02/18  1630 08/02/18  1041   CALCIUM mg/dL 8.9 8.5* 8.1* 8.6 9.5   ALBUMIN g/dL 2.70* 2.90*  --   --  3.20*   MAGNESIUM mg/dL  --  2.4  --   --   --    PHOSPHORUS mg/dL  --  3.0  --   --   --          allopurinol 150 mg Oral Daily   bumetanide 2 mg Oral BID   ceftriaxone 1 g Intravenous Q24H   cholecalciferol 1,000 Units Oral Daily   cyclobenzaprine 5 mg Oral Nightly   ferrous sulfate 325 mg Oral Daily With Breakfast   insulin aspart 0-7 Units Subcutaneous 4x Daily With Meals & Nightly   lactulose 30 g Oral BID   metoprolol tartrate 12.5 mg Oral Nightly   multivitamin 1 tablet Oral Daily   potassium chloride 10 mEq Oral TID With Meals   QUEtiapine 50 mg Oral Nightly   rifaximin 550 mg Oral Daily With Lunch & Dinner      Diet Regular; Consistent Carbohydrate      Assessment/Plan      Active Hospital Problems    Diagnosis Date Noted   • **Urinary tract infection associated with indwelling urethral catheter (CMS/[facility]) [T83.511A, N39.0] 08/02/2018   • DM2 (diabetes mellitus, type 2) (CMS/[facility]) [E11.9] 11/29/2016   • Acute hepatic encephalopathy [K72.00] 11/28/2016   • Acute kidney failure (CMS/[facility]) [N17.9] 11/17/2016   • Thrombocytopenia (CMS/[facility]) [D69.6] 11/17/2016   • Chronic renal disease, stage 4, severely decreased glomerular filtration rate between 15-29 mL/min/1.73 square meter (CMS/[facility]) [N18.4] 08/15/2016   • Cirrhosis of liver (CMS/[facility]) [K74.60] 08/15/2016      Resolved Hospital Problems    Diagnosis Date Noted Date Resolved   No resolved problems to display.     - UTI: Catheter associated. Klebsiella on Cx and is sensitive to bactrim. Will change to that and given her frequent readmission and reported tolerance issues at home with  other medications, will monitor for tolerance here. Voiding fine. Urology follow up on Tuesday planned.  - Cirrhosis: Hypersplenism noted. Lactulose, Xifaxan. Diuretic resumed. GI evaluated.  - CKD4: Cr below baseline. Nephrology following.  - Lactic acidosis: Now returned to normal. Can stop fluids.  - Metabolic Encephalopathy: 2/2 UTI and Hepatic Encephalopathy. Increase Seroquel. Monitor.  - Disposition: HH/likely tomorrow (10-12)    Navarro Lizarraga MD  [facility]  08/05/18  12:17 PM

## 2018-08-05 NOTE — PLAN OF CARE
Problem: Patient Care Overview  Goal: Plan of Care Review  Outcome: Ongoing (interventions implemented as appropriate)   08/05/18 5885   Coping/Psychosocial   Plan of Care Reviewed With patient;spouse;daughter   OTHER   Outcome Summary No c/o pain. Up in recliner. More alert to place/time/situation today. Easily redirected. Cooperative with care. Voiding without difficulity. Plan for d/c home tomorrow.      Goal: Discharge Needs Assessment  Outcome: Ongoing (interventions implemented as appropriate)      Problem: Fall Risk (Adult)  Goal: Absence of Fall  Outcome: Ongoing (interventions implemented as appropriate)      Problem: Urinary Tract Infection (Adult)  Goal: Signs and Symptoms of Listed Potential Problems Will be Absent, Minimized or Managed (Urinary Tract Infection)  Outcome: Ongoing (interventions implemented as appropriate)      Problem: Pain, Acute (Adult)  Goal: Identify Related Risk Factors and Signs and Symptoms  Outcome: Ongoing (interventions implemented as appropriate)    Goal: Acceptable Pain Control/Comfort Level  Outcome: Ongoing (interventions implemented as appropriate)      Problem: Kidney Disease, Chronic/End Stage Renal Disease (Adult)  Goal: Signs and Symptoms of Listed Potential Problems Will be Absent, Minimized or Managed (Kidney Disease, Chronic/End Stage Renal Disease)  Outcome: Ongoing (interventions implemented as appropriate)      Problem: Confusion, Chronic (Adult)  Goal: Cognitive/Functional Impairments Minimized  Outcome: Ongoing (interventions implemented as appropriate)    Goal: Free from Harm/Injuries  Outcome: Ongoing (interventions implemented as appropriate)      Problem: Skin Injury Risk (Adult)  Goal: Skin Health and Integrity  Outcome: Ongoing (interventions implemented as appropriate)

## 2018-08-05 NOTE — PLAN OF CARE
Problem: Patient Care Overview  Goal: Plan of Care Review  Outcome: Ongoing (interventions implemented as appropriate)   08/05/18 0450   Coping/Psychosocial   Plan of Care Reviewed With patient   OTHER   Outcome Summary No complaints of pain, confused, asking for family members at beginning of shift, went to sleep around midnight, has slept most of the night without incident, VSS, will continue to monitor.   Plan of Care Review   Progress no change     Goal: Discharge Needs Assessment  Outcome: Ongoing (interventions implemented as appropriate)      Problem: Fall Risk (Adult)  Goal: Absence of Fall  Outcome: Ongoing (interventions implemented as appropriate)      Problem: Urinary Tract Infection (Adult)  Goal: Signs and Symptoms of Listed Potential Problems Will be Absent, Minimized or Managed (Urinary Tract Infection)  Outcome: Ongoing (interventions implemented as appropriate)      Problem: Kidney Disease, Chronic/End Stage Renal Disease (Adult)  Goal: Signs and Symptoms of Listed Potential Problems Will be Absent, Minimized or Managed (Kidney Disease, Chronic/End Stage Renal Disease)  Outcome: Ongoing (interventions implemented as appropriate)      Problem: Confusion, Chronic (Adult)  Goal: Cognitive/Functional Impairments Minimized  Outcome: Ongoing (interventions implemented as appropriate)    Goal: Free from Harm/Injuries  Outcome: Ongoing (interventions implemented as appropriate)      Problem: Skin Injury Risk (Adult)  Goal: Skin Health and Integrity  Outcome: Ongoing (interventions implemented as appropriate)

## 2018-08-06 NOTE — PROGRESS NOTES
"   LOS: 4 days    Patient Care Team:  Chayito Paredes MD as PCP - General (Internal Medicine)  Chayito Paredes MD as PCP - Claims Attributed  Dano Art MD as Consulting Physician (Hematology and Oncology)  Navarro Hung MD as Referring Physician (Nephrology)    Chief Complaint:    Chief Complaint   Patient presents with   • Altered Mental Status     Follow UP COLEMAN/CKD  Subjective     Interval History:   The patient is feeling better, denies any chest pain or shortness of air, no orthopnea or PND, no nausea or vomiting, no dysuria or gross hematuria, no bladder outlet symptoms.  Her lower extremity edema has improved significantly      Objective     Vital Signs  Temp:  [97.4 °F (36.3 °C)-99.3 °F (37.4 °C)] 98.2 °F (36.8 °C)  Heart Rate:  [80-95] 86  Resp:  [16-18] 18  BP: (117-168)/(55-87) 147/84    Flowsheet Rows      First Filed Value   Admission Height  165.1 cm (65\") Documented at 08/02/2018 1023   Admission Weight  97.1 kg (214 lb) Documented at 08/02/2018 1023          No intake/output data recorded.  I/O last 3 completed shifts:  In: 240 [P.O.:240]  Out: -   No intake or output data in the 24 hours ending 08/06/18 1021    Physical Exam:  General Appearance: alert, oriented x 3, no acute distress, obese, chronically ill  Skin: warm and dry  HEENT: pupils round and reactive to light, oral mucosa normal,   Neck: supple, no JVD, trachea midline  Lungs: CTA, unlabored breathing effort  Heart: RRR, normal S1 and S2, no S3, no rub  Abdomen: soft, non-tender,  present bowel sounds to auscultation  : no palpable bladder, Jimenez catheter is anchored in  Extremities: Trace pretibial edema, no cyanosis or clubbing  Neuro: normal speech and mental status.     Results Review:      Results from last 7 days  Lab Units 08/06/18  0602 08/05/18  0653 08/04/18  0701  08/02/18  1041   SODIUM mmol/L 139 143 141  < > 141   POTASSIUM mmol/L 3.6 3.8 3.5  < > 4.8   CHLORIDE mmol/L 106 112* 106  < > 103   CO2 mmol/L " 14.8* 18.3* 20.7*  < > 22.2   BUN mg/dL 34* 37* 42*  < > 58*   CREATININE mg/dL 2.63* 2.54* 3.05*  < > 3.87*   CALCIUM mg/dL 8.8 8.9 8.5*  < > 9.5   BILIRUBIN mg/dL  --  1.1 1.3*  --  1.2   ALK PHOS U/L  --  90 104  --  141*   ALT (SGPT) U/L  --  20 20  --  22   AST (SGOT) U/L  --  41* 39*  --  42*   GLUCOSE mg/dL 243* 218* 223*  < > 231*   < > = values in this interval not displayed.    Estimated Creatinine Clearance: 19.2 mL/min (A) (by C-G formula based on SCr of 2.63 mg/dL (H)).      Results from last 7 days  Lab Units 08/04/18  0701   MAGNESIUM mg/dL 2.4   PHOSPHORUS mg/dL 3.0         Results from last 7 days  Lab Units 08/04/18  0701   URIC ACID mg/dL 5.2         Results from last 7 days  Lab Units 08/06/18  0602 08/05/18  0653 08/04/18  0701 08/02/18  1041   WBC 10*3/mm3 3.29* 2.41* 3.05* 4.04*   HEMOGLOBIN g/dL 9.6* 8.3* 8.2* 10.8*   PLATELETS 10*3/mm3 58* 48* 52* 64*         Results from last 7 days  Lab Units 08/04/18  0701   INR  1.41*         Imaging Results (last 24 hours)     ** No results found for the last 24 hours. **          allopurinol 150 mg Oral Daily   bumetanide 2 mg Oral BID   cholecalciferol 1,000 Units Oral Daily   cyclobenzaprine 5 mg Oral Nightly   ferrous sulfate 325 mg Oral Daily With Breakfast   insulin aspart 0-7 Units Subcutaneous 4x Daily With Meals & Nightly   lactulose 30 g Oral BID   metoprolol tartrate 12.5 mg Oral Nightly   multivitamin 1 tablet Oral Daily   potassium chloride 10 mEq Oral TID With Meals   QUEtiapine 50 mg Oral Nightly   rifaximin 550 mg Oral Daily With Lunch & Dinner   sulfamethoxazole-trimethoprim 1 tablet Oral Q12H          Medication Review:   Current Facility-Administered Medications   Medication Dose Route Frequency Provider Last Rate Last Dose   • acetaminophen (TYLENOL) tablet 325 mg  325 mg Oral Q8H PRN Chris Brown MD       • allopurinol (ZYLOPRIM) tablet 150 mg  150 mg Oral Daily Chris Brown MD   150 mg at 08/06/18 0905   •  bumetanide (BUMEX) tablet 2 mg  2 mg Oral BID Dread Gallegos MD   2 mg at 08/06/18 0904   • cholecalciferol (VITAMIN D3) tablet 1,000 Units  1,000 Units Oral Daily Chris Brown MD   1,000 Units at 08/06/18 0904   • cyclobenzaprine (FLEXERIL) tablet 5 mg  5 mg Oral Nightly Chris Brown MD   5 mg at 08/05/18 2117   • dextrose (D50W) solution 25 g  25 g Intravenous Q15 Min PRN Chris Brown MD       • dextrose (GLUTOSE) oral gel 15 g  15 g Oral Q15 Min PRN Chris Brown MD       • ferrous sulfate tablet 325 mg  325 mg Oral Daily With Breakfast hCris Brown MD   325 mg at 08/06/18 0905   • glucagon (human recombinant) (GLUCAGEN DIAGNOSTIC) injection 1 mg  1 mg Subcutaneous PRN Chris Brown MD       • insulin aspart (novoLOG) injection 0-7 Units  0-7 Units Subcutaneous 4x Daily With Meals & Nightly Chris Brown MD   4 Units at 08/06/18 0904   • lactulose (CHRONULAC) 10 GM/15ML solution 30 g  30 g Oral BID Navarro Lizarraga MD   30 g at 08/06/18 0904   • LORazepam (ATIVAN) tablet 1 mg  1 mg Oral Q6H PRN Navarro Lizarraga MD   1 mg at 08/04/18 1557   • metoprolol tartrate (LOPRESSOR) tablet 12.5 mg  12.5 mg Oral Nightly Chris Brown MD   12.5 mg at 08/05/18 2118   • multivitamin (THERAGRAN) tablet 1 tablet  1 tablet Oral Daily Chris Brown MD   1 tablet at 08/06/18 0904   • ondansetron (ZOFRAN) tablet 4 mg  4 mg Oral Q4H PRN Chris Brown MD       • potassium chloride (MICRO-K) CR capsule 10 mEq  10 mEq Oral TID With Meals Dread Gallegos MD   10 mEq at 08/06/18 0905   • QUEtiapine (SEROquel) tablet 50 mg  50 mg Oral Nightly Navarro Lizarraga MD   50 mg at 08/05/18 2118   • rifaximin (XIFAXAN) tablet 550 mg  550 mg Oral Daily With Lunch & Dinner Chris Brown MD   550 mg at 08/05/18 1738   • sodium chloride 0.9 % flush 1-10 mL  1-10 mL Intravenous PRN Chris Brown MD       • sodium chloride 0.9 % flush  10 mL  10 mL Intravenous PRN Kirby Flores MD       • sulfamethoxazole-trimethoprim (BACTRIM DS,SEPTRA DS) 800-160 MG per tablet 160 mg  1 tablet Oral Q12H Navarro Lizarraga MD   160 mg at 08/06/18 0904       Assessment/Plan   1.  COLEMAN/CKD, multifactorial, improving since admission and related to poor oral intake.  Renal function is better. Volume status is stable.  Baseline cr 2.0 - 2.5, recently.  Creatinine today 2.63  2.  Chronic kidney disease stage IV at baseline associated with diabetic nephropathy, biopsy-proven.  3.  Diabetes mellitus type 2 with known diabetic nephropathy and retinopathy.  Sub-nephrotic range proteinuria.  4.  Cirrhosis, associated with HOANG, even though there is a report in the past medical history stating that there is history of hemochromatosis.  Hepatic encephalopathy has resolved  5.  Anemia with chronic kidney disease.  Hemoglobin today is 9.6  6.  Thrombocytopenia with known history of chronic thrombocytopenia, platelets 58  7.  UTI, recent urinary retention patient had Jimenez catheter which was removed and the patient is voiding reasonably  8.  History of gout  9.  Borderline hypokalemia, currently on replacement     Plan:  1.  Continue the same treatment  2.  Surveillance labs    I discussed the case with the patient and her  at the bedside      Mack Pruitt MD  08/06/18  10:21 AM

## 2018-08-06 NOTE — PROGRESS NOTES
Continued Stay Note  [facility]     Patient Name: Charu Bowens  MRN: [id number]  Today's Date: 8/6/2018    Admit Date: 8/2/2018          Discharge Plan     Row Name 08/06/18 1043       Plan    Plan Plan home with [facility].  ROSALIE Cota    Patient/Family in Agreement with Plan yes    Plan Comments Spoke with pt and pt's   (Kirby Bowens [phone]) at bedside.  Pt and  provided a HH list and chose [facility] to follow.  Nereyda  ([id number]) called to follow for [facility].  Plan home with [facility].  ROSALIE Cota              Discharge Codes    No documentation.       Expected Discharge Date and Time     Expected Discharge Date Expected Discharge Time    Aug 6, 2018             Izzy Moon RN

## 2018-08-06 NOTE — DISCHARGE SUMMARY
Date of Admission: 8/2/2018  Date of Discharge:  8/6/2018  Primary Care Physician: Chayito Paredes MD     Discharge Diagnosis:  Active Hospital Problems    Diagnosis Date Noted   • **Urinary tract infection associated with indwelling urethral catheter (CMS/HCC) [T83.511A, N39.0] 08/02/2018   • DM2 (diabetes mellitus, type 2) (CMS/HCC) [E11.9] 11/29/2016   • Acute hepatic encephalopathy [K72.00] 11/28/2016   • Acute kidney failure (CMS/HCC) [N17.9] 11/17/2016   • Thrombocytopenia (CMS/HCC) [D69.6] 11/17/2016   • Chronic renal disease, stage 4, severely decreased glomerular filtration rate between 15-29 mL/min/1.73 square meter (CMS/HCC) [N18.4] 08/15/2016   • Cirrhosis of liver (CMS/HCC) [K74.60] 08/15/2016      Resolved Hospital Problems    Diagnosis Date Noted Date Resolved   No resolved problems to display.       Presenting Problem/History of Present Illness:  Hepatic encephalopathy (CMS/HCC) [K72.90]  Acute UTI [N39.0]     Ms. Bowens is a 83 y.o. 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  Per family, pt was seen at [facility] ED on 7/26/18 for increased confusion compared to baseline and decreased urination for which she had urinary catheter placed and was diagnosed with hepatic encephalopathy, COLEMAN, and lactic acidosis. Family states that pt was recommended admission at that time but she declined.      She again presented to the hospital today with a history of having confusion and has not taken lactulose for 2 days.  She also has a indwelling Jimenez catheter and found to have UTI.  She has chronic kidney disease stage IV and now her creatinine has bumped up under ammonia also has increased.  She lives with her .  I talked to the  that if he is not able to take care of her he may have to put her in a nursing home    Hospital Course:  The patient is a 83 y.o. female who presented with catheter associated UTI and metabolic encephalopathy. Her catheter was removed and  she passed voiding trial. She has Urology follow up planned for Tuesday and she is encouraged to go to that follow up. She was placed on Rocephin empirically and culture was obtained. This grew Klebsiella. She was transitioned to bactrim based on sensitivities and monitored for tolerance given previous issues with medications. She tolerated it fine and is stable for discharge today with bactrim to complete antibiotic course.    She had lactic acidosis on admission in addition to CKD4. It took several days of volume resuscitation for acidosis to resolved. Nephrology was consulted and once fluids were able to be stopped, she was resumed on Bumex at decreased dose. She tolerated this well without hypotension. Cr is currently 2.6 with baseline of 2.0-2.5 which has been variable recently. She will need nephrology follow up.    She had metabolic encephalopathy from UTI and also HOANG cirrhosis. Ammonia was elevated. She was not compliant with home lactulose. She tolerated lactulose and xifaxan well here. Gastroenterology evaluated her while inpatient and would like her to continue this regimen at discharge.    She worked with PT and OT and can be discharged today with home health consultation to continue therapy. She had increased confusion one night while here and seroquel was increased. I am going to discharge her on home dose to decrease risk for polypharmacy. Would recommend medication review with primary care to investigate dosing while she is in home environment.    Exam Today:  Constitutional: She appears well-developed. No distress.   HENT:   Head: Normocephalic and atraumatic.   Eyes: Conjunctivae and EOM are normal. No scleral icterus.   Neck: Neck supple.   Cardiovascular: Normal rate, regular rhythm and intact distal pulses.    Pulmonary/Chest: Effort normal. No stridor. She has decreased breath sounds. She has no wheezes. She has no rales.   Abdominal: Soft. She exhibits distension. There is no tenderness.  "There is no guarding.   Musculoskeletal: Normal range of motion. She exhibits edema (trace).   Neurological: She is alert. No cranial nerve deficit.   Oriented to person and place only.   Skin: Skin is warm and dry. She is not diaphoretic.   Psychiatric: She has a normal mood and affect. Her behavior is normal.     Results:  Urine Culture >100,000 CFU/mL Klebsiella pneumoniae ssp pneumoniae           Resulting Agency: [facility]   Susceptibility      Klebsiella pneumoniae ssp pneumoniae     RAUL     Ampicillin >=32 ug/ml Resistant     Ampicillin + Sulbactam 4 ug/ml Susceptible     Cefazolin <=4 ug/ml\"><=4 ug/ml Susceptible     Cefepime <=1 ug/ml\"><=1 ug/ml Susceptible     Ceftriaxone <=1 ug/ml\"><=1 ug/ml Susceptible     Ciprofloxacin <=0.25 ug/ml\"><=0.25 ug/ml Susceptible     Ertapenem <=0.5 ug/ml\"><=0.5 ug/ml Susceptible     Gentamicin <=1 ug/ml\"><=1 ug/ml Susceptible     Levofloxacin <=0.12 ug/ml\"><=0.12 ug/ml Susceptible     Nitrofurantoin 64 ug/ml Intermediate     Piperacillin + Tazobactam <=4 ug/ml\"><=4 ug/ml Susceptible     Tetracycline <=1 ug/ml\"><=1 ug/ml Susceptible     Trimethoprim + Sulfamethoxazole <=20 ug/ml\"><=20 ug/ml Susceptible         CT Head  No evidence of infarction or hemorrhage. Decreased  attenuation suggesting small vessel ischemic disease more prominent as  compared to 11/28/2016. Further evaluation could be performed with a MRI  examination of the brain as indicated. The above information was called  to and discussed with Dr. Bowens.     Procedures Performed:         Consults:   Consults     Date and Time Order Name Status Description    8/3/2018 1033 Inpatient Gastroenterology Consult Completed     8/3/2018 1022 Inpatient Nephrology Consult Completed     8/2/2018 1201 [facility] (on-call MD unless specified) Completed            Discharge Disposition:  Home or Self Care    Discharge Medications:     Discharge Medications      New Medications      Instructions Start Date "   sulfamethoxazole-trimethoprim 800-160 MG per tablet  Commonly known as:  BACTRIM DS,SEPTRA DS   1 tablet, Oral, Every 12 Hours Scheduled         Changes to Medications      Instructions Start Date   allopurinol 300 MG tablet  Commonly known as:  ZYLOPRIM  What changed:  · medication strength  · how much to take  · when to take this   150 mg, Oral, Daily      bumetanide 2 MG tablet  Commonly known as:  BUMEX  What changed:  how much to take   2 mg, Oral, 2 Times Daily         Continue These Medications      Instructions Start Date   acetaminophen 650 MG 8 hr tablet  Commonly known as:  TYLENOL   650 mg, Oral, Every 8 Hours PRN      cholecalciferol 1000 units tablet  Commonly known as:  VITAMIN D3   1,000 Units, Oral, Daily      cyclobenzaprine 5 MG tablet  Commonly known as:  FLEXERIL   5 mg, Oral, Nightly      guaiFENesin 600 MG 12 hr tablet  Commonly known as:  MUCINEX   600 mg, Oral, Nightly      insulin NPH-insulin regular (70-30) 100 UNIT/ML injection  Commonly known as:  humuLIN 70/30,novoLIN 70/30   40 Units, Subcutaneous, Daily With Breakfast & Lunch      Iron 325 (65 Fe) MG tablet   Oral, Daily      KLOR-CON 20 MEQ CR tablet  Generic drug:  potassium chloride   10 mEq, Oral, 3 Times Daily      lactulose 10 GM/15ML solution  Commonly known as:  CHRONULAC   30 g, Oral, Daily      lidocaine 5 %  Commonly known as:  LIDODERM   1 patch, Transdermal, As Needed, Remove & Discard patch within 12 hours or as directed by MD      metoprolol tartrate 25 MG tablet  Commonly known as:  LOPRESSOR   12.5 mg, Oral, Nightly      MULTI-VITAMIN PO   1 tablet, Oral, Daily      ondansetron 4 MG tablet  Commonly known as:  ZOFRAN   4 mg, Oral, As Needed      PROBIOTIC PO   1 tablet, Oral, Daily With Lunch & Dinner      QUEtiapine 25 MG tablet  Commonly known as:  SEROquel   25 mg, Oral, Nightly      simethicone 125 MG chewable tablet  Commonly known as:  MYLICON   125 mg, Oral, As Needed      temazepam 7.5 MG capsule  Commonly  known as:  RESTORIL   7.5 mg, Oral, Nightly      traMADol 50 MG tablet  Commonly known as:  ULTRAM   50 mg, Oral, Nightly      vitamin B-12 2500 MCG sublingual tablet tablet  Commonly known as:  CYANOCOBALAMIN   1,000 mcg, Sublingual, Daily      XIFAXAN 550 MG tablet  Generic drug:  rifaximin   550 mg, Oral, Daily With Lunch & Dinner         Stop These Medications    predniSONE 20 MG tablet  Commonly known as:  DELTASONE            Discharge Diet:   Diet Instructions     Diet: Consistent Carbohydrate       Discharge Diet:  Consistent Carbohydrate          Activity at Discharge:   Activity Instructions     Activity as Tolerated             Follow-up Appointments:  Future Appointments  Date Time Provider Department Center   8/8/2018 12:00 PM LAB CHAIR 6 [facility]  [facility]   8/15/2018 11:20 AM VITALS ONLY [facility]  [facility]   8/15/2018 11:40 AM Dano Art MD [facility]  [facility]     Additional Instructions for the Follow-ups that You Need to Schedule     Discharge Follow-up with PCP    As directed      Currently Documented PCP:  Chayito Paredes MD  PCP Phone Number:  [phone]    Follow Up Details:  1-2 weeks         Discharge Follow-up with Specified Provider: Gastroenterology    As directed      To:  Gastroenterology    Follow Up Details:  as scheduled         Discharge Follow-up with Specified Provider: Nephrology    As directed      To:  Nephrology    Follow Up Details:  as scheduled         Referral to Home Health    As directed      Face to Face Visit Date:  8/6/2018    Follow-up Provider for Plan of Care?:  I treated the patient in an acute care facility and will not continue treatment after discharge.    Follow-up Provider:  CHAYITO PAREDES [[id number]]    Reason/Clinical Findings:  weakness, cirrhosis    Describe mobility limitations that make leaving home difficult:  see above    Nursing/Therapeutic Services Requested:  Physical Therapy Occupational Therapy    PT orders:  Therapeutic  exercise Strengthening    Occupational orders:  Strengthening    Frequency:  1 Week 1               Test Results Pending at Discharge:       Navarro Lizarraga MD  08/06/18  10:26 AM    Time Spent on Discharge Activities: >30 minutes

## 2018-08-06 NOTE — PROGRESS NOTES
Case Management Discharge Note    Final Note: Discharged home with [facility] ROSELYN Cota RN    Destination     No service has been selected for the patient.      Durable Medical Equipment     No service has been selected for the patient.      Dialysis/Infusion     No service has been selected for the patient.      Home Medical Care     Service Request Status Selected Specialties Address Phone Number Fax Number    [facility] [address] [address] [phone]      Social Care     No service has been selected for the patient.        Other: Other (Private Auto)    Final Discharge Disposition Code: 06 - home with home health care

## 2018-08-06 NOTE — SIGNIFICANT NOTE
08/06/18 1217   Rehab Treatment   Discipline occupational therapist   Reason Treatment Not Performed other (see comments)  (Pt to d/c home today. )

## 2018-08-06 NOTE — DISCHARGE INSTR - APPOINTMENTS
A hospital follow-up appointment has been scheduled for you to see Dr. Paredes on August 13, 2018 at 11:20.

## 2018-08-06 NOTE — SIGNIFICANT NOTE
08/06/18 1043   PT Discharge Summary   Reason for Discharge Discharge from facility   Discharge Destination Home with assist

## 2018-08-06 NOTE — DISCHARGE PLACEMENT REQUEST
"Charu Bowens  (83 y.o. Female)     Date of Birth Social Security Number Address Home Phone MRN    [id number]  [id number]  [facility] [address] [phone] [id number]    Jehovah's witness Marital Status          Taoism        Admission Date Admission Type Admitting Provider Attending Provider Department, Room/Bed    8/2/18 Emergency Navarro Lizarraga MD Baumann, Patrick D, MD [address], 662/1    Discharge Date Discharge Disposition Discharge Destination         Home or Self Care              Attending Provider:  Navarro Lizarraga MD    Allergies:  Ciprofloxacin, Levaquin [Levofloxacin], Promethazine Hcl    Isolation:  None   Infection:  None   Code Status:  CPR    Ht:  165.1 cm (65\")   Wt:  98.7 kg (217 lb 9.5 oz)    Admission Cmt:  None   Principal Problem:  Urinary tract infection associated with indwelling urethral catheter (CMS/[facility]) [T83.511A,N39.0]                 Active Insurance as of 8/2/2018     Primary Coverage     Payor Plan Insurance Group Employer/Plan Group    MEDICARE MEDICARE A & B      Payor Plan Address Payor Plan Phone Number Effective From Effective To    [address] [phone] 1/1/2001     [facility] [id number]       Subscriber Name Subscriber Birth Date Member ID       CHARU BOWENS 1935 [id number]           Secondary Coverage     Payor Plan Insurance Group Employer/Plan Group    [facility] [id number]     Payor Plan Address Payor Plan Phone Number Effective From Effective To    [address] [phone] 1/1/2016     [place] [id number]       Subscriber Name Subscriber Birth Date Member ID       KIRBY BOWENS 5/31/1934 [id number]                 Emergency Contacts      (Rel.) Home Phone Work Phone Mobile Phone    Kirby Bowens (Spouse) [phone] -- [phone]              "

## 2018-08-08 NOTE — OUTREACH NOTE
Prep Survey      Responses   Facility patient discharged from?  [place]   Is patient eligible?  Yes   Discharge diagnosis  Urinary tract infection associated with indwelling urethral catheter, DM2, Acute hepatic encephalopathy,lactic acidosis, noncompliant with Lactulose x 2 days   Does the patient have one of the following disease processes/diagnoses(primary or secondary)?  Other   Does the patient have Home health ordered?  Yes   What is the Home health agency?   home with [facility]   Is there a DME ordered?  No   Comments regarding appointments  nearest appt is Aug 15 @11:20   General alerts for this patient   Lives with , declined placement to SNF because patient has been placed before and checked herself out before she was fully admitted.    Prep survey completed?  Yes          Connie Godwin RN

## 2018-08-09 NOTE — OUTREACH NOTE
Medical Week 1 Survey      Responses   Facility patient discharged from?  [place]   Does the patient have one of the following disease processes/diagnoses(primary or secondary)?  Other   Is there a successful TCM telephone encounter documented?  No   Week 1 attempt successful?  Yes   Call start time  1331   Call end time  1338   General alerts for this patient   Lives with , declined placement to SNF because patient has been placed before and checked herself out before she was fully admitted.    Discharge diagnosis  Urinary tract infection associated with indwelling urethral catheter, DM2, Acute hepatic encephalopathy,lactic acidosis, noncompliant with Lactulose x 2 days   Is patient permission given to speak with other caregiver?  Yes   Person spoke with today (if not patient) and relationship  Spouse   Meds reviewed with patient/caregiver?  Yes   Is the patient having any side effects they believe may be caused by any medication additions or changes?  No   Does the patient have all medications ordered at discharge?  Yes   Is the patient taking all medications as directed (includes completed medication regime)?  Yes   Does the patient have a primary care provider?   Yes   Does the patient have an appointment with their PCP within 7 days of discharge?  Yes   Has the patient kept scheduled appointments due by today?  N/A   Comments  All appts arranged.   What is the Home health agency?   home with [facility]   Has home health visited the patient within 72 hours of discharge?  Yes   Psychosocial issues?  No   Comments  HH coming over. No complaints.   Did the patient receive a copy of their discharge instructions?  Yes   Nursing interventions  Reviewed instructions with patient   What is the patient's perception of their health status since discharge?  Improving   Is the patient/caregiver able to teach back signs and symptoms related to disease process for when to call PCP?  Yes   Is the patient/caregiver able  to teach back signs and symptoms related to disease process for when to call 911?  Yes   Is the patient/caregiver able to teach back the hierarchy of who to call/visit for symptoms/problems? PCP, Specialist, Home health nurse, Urgent Care, ED, 911  Yes   Additional teach back comments  Advised to speak to MD about clarifing his questions about the medication dosage changes.   Week 1 call completed?  Yes          Guillermo Sullivan RN

## 2018-08-15 NOTE — PROGRESS NOTES
Subjective     REASON FOR FOLLOW UP  1. Anemia of chronic kidney disease  2. Homozygous H63D hemochromatosis gene mutation.  3. 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  4.  History of iron deficiency    HISTORY OF PRESENT ILLNESS:  The patient is a 83 y.o. year old female who was seen in consultation  for the above noted issues.  She had previously been evaluated in our practice several years ago for this same issue.  She had become more anemic which was felt to be in part related to anemia of chronic kidney disease and she was started on Procrit therapy every 2 weeks at a dose of 20,000 units subcutaneous.  She initially responded to Procrit therapy but after iron replacement she has not been requiring Procrit routinely.    She also is homozygous for the H63D mutation.  Most people with this mutation do not have clinical iron overload and given the patient's degree of anemia and pancytopenia she would not be a candidate for therapeutic phlebotomy in any case.     She received 2 doses of IV Feraheme as an outpatient 510 mg each on 4/27/2017 and 5/4/2017.     She was recently hospitalized from 8/2/2018 to 8/6/2018 for treatment of urinary tract infection and hepatic encephalopathy.  She had labs performed on 8/8/2018 which show that her iron stores are adequate with an iron saturation of 32% and ferritin level 136.  She remains anemic with a hemoglobin of 9.3 and her platelets are low but stable at 65,000..  History of Present Illness        ALLERGIES:    Allergies   Allergen Reactions   • Ciprofloxacin      Itching and red streaks   • Levaquin [Levofloxacin]    • Promethazine Hcl      Itching, red streaks when given IV        Review of Systems   Constitutional: Positive for fatigue. Negative for activity change, appetite change, fever and unexpected weight change.   HENT: Negative for hearing loss, nosebleeds, trouble swallowing and voice change.    Eyes: Negative for visual disturbance.   Respiratory:  "Negative for cough, shortness of breath and wheezing.    Cardiovascular: Negative for chest pain and palpitations.   Gastrointestinal: Negative for abdominal pain, diarrhea, nausea and vomiting.   Genitourinary: Negative for difficulty urinating, frequency, hematuria and urgency.   Musculoskeletal: Negative for back pain and neck pain.   Skin: Positive for color change. Negative for rash.        Sun damaged skin and senile purpura.   Neurological: Negative for dizziness, seizures, syncope and headaches.   Hematological: Negative for adenopathy. Bruises/bleeds easily.   Psychiatric/Behavioral: Positive for confusion. Negative for behavioral problems. The patient is not nervous/anxious.         Objective     Vitals:    08/15/18 1130   BP: 132/58   Pulse: 72   Resp: 12   Temp: 98 °F (36.7 °C)   TempSrc: Oral   SpO2: 99%   Weight: Comment: unable to get a new weight   Height: 160.5 cm (63.19\")   PainSc: 10-Worst pain ever  Comment: buttocks pain     Current Status 8/15/2018   ECOG score 2       Physical Exam   Constitutional: She is oriented to person, place, and time. She appears well-developed and well-nourished. No distress.   HENT:   Head: Normocephalic.   Eyes: Pupils are equal, round, and reactive to light. Conjunctivae and EOM are normal. No scleral icterus.   Neck: Normal range of motion. Neck supple. No JVD present. No thyromegaly present.   Cardiovascular: Normal rate and regular rhythm.  Exam reveals no gallop and no friction rub.    No murmur heard.  Pulmonary/Chest: Effort normal and breath sounds normal. She has no wheezes. She has no rales.   Abdominal: Soft. She exhibits no distension and no mass. There is no tenderness.   Musculoskeletal: Normal range of motion. She exhibits no edema or deformity.   Lymphadenopathy:     She has no cervical adenopathy.   Neurological: She is alert and oriented to person, place, and time. She has normal reflexes. No cranial nerve deficit.   Skin: Skin is warm and dry. No " rash noted. No erythema.   Sun damaged skin and senile purpura   Psychiatric: She has a normal mood and affect. Her behavior is normal. Judgment normal.       RECENT LABS:  Hematology WBC   Date Value Ref Range Status   08/08/2018 4.74 4.00 - 10.00 10*3/mm3 Final     RBC   Date Value Ref Range Status   08/08/2018 2.71 (L) 3.90 - 5.00 10*6/mm3 Final     Hemoglobin   Date Value Ref Range Status   08/08/2018 9.3 (L) 11.5 - 14.9 g/dL Final     Hematocrit   Date Value Ref Range Status   08/08/2018 28.7 (L) 34.0 - 45.0 % Final     Platelets   Date Value Ref Range Status   08/08/2018 65 (L) 150 - 375 10*3/mm3 Final        Lab Results   Component Value Date    GLUCOSE 243 (H) 08/06/2018    BUN 34 (H) 08/06/2018    CREATININE 2.63 (H) 08/06/2018    EGFRIFNONA 17 (L) 08/06/2018    BCR 12.9 08/06/2018    K 3.6 08/06/2018    CO2 14.8 (L) 08/06/2018    CALCIUM 8.8 08/06/2018    PROTENTOTREF 5.9 (L) 12/16/2015    ALBUMIN 2.70 (L) 08/05/2018    LABIL2 0.6 (L) 12/16/2015    AST 41 (H) 08/05/2018    ALT 20 08/05/2018       Lab Results   Component Value Date    IRON 94 08/08/2018    TIBC 290 08/08/2018    FERRITIN 136.00 08/08/2018       Assessment/Plan     1.  Pancytopenia due to underlying cirrhosis of the liver and hypersplenism. This is a chronic process and has been relatively stable for several years.  As noted above, she had a normal appearing bone marrow examination in 2011.  2.  Component of anemia of chronic renal insufficiency stage IV.    3.  Prior Iron deficiency Previously with excellent response to IV Feraheme.  Her hemoglobin is low at 9.3 g/dL but her iron studies are still within normal limits as noted above.   4.  Hemachromatosis mutation but no clinical iron overload.     Plan  1.  We will schedule an M.D. follow-up in 6 months and we'll ask that she return for labs one week prior to that visit to include a CBC and iron studies.  2.  We will continue to supply IV iron as needed and if the patient's anemia  continues to worsen we could discuss resuming Procrit for anemia of chronic kidney disease.

## 2018-08-20 NOTE — OUTREACH NOTE
Medical Week 2 Survey      Responses   Facility patient discharged from?  [place]   Does the patient have one of the following disease processes/diagnoses(primary or secondary)?  Other   Week 2 attempt successful?  Yes   Call start time  1543   General alerts for this patient   Lives with , declined placement to SNF because patient has been placed before and checked herself out before she was fully admitted.    Discharge diagnosis  Urinary tract infection associated with indwelling urethral catheter, DM2, Acute hepatic encephalopathy,lactic acidosis, noncompliant with Lactulose x 2 days   Call end time  1547   Is patient permission given to speak with other caregiver?  Yes   List who call center can speak with     Person spoke with today (if not patient) and relationship  Spouse   Meds reviewed with patient/caregiver?  Yes   Is the patient having any side effects they believe may be caused by any medication additions or changes?  No   Prescription comments  Per , increase lactulose per PCP   Is the patient taking all medications as directed (includes completed medication regime)?  Yes   Comments regarding appointments  has been to numerous appts   Does the patient have a primary care provider?   Yes   Has the patient kept scheduled appointments due by today?  Yes   What is the Home health agency?   [facility]   Home health comments  HH working with pt and she is doing well with her therapy   Psychosocial issues?  No   Additional teach back comments  per , pt doing better. Taking meds as ordered and he states she has had numerous F/U appts since her D/C. Per , her edema has been decreasing.   Week 2 Call Completed?  Yes          Krystyna Ho RN

## 2018-08-29 NOTE — OUTREACH NOTE
Medical Week 3 Survey      Responses   Facility patient discharged from?  [facility]   Does the patient have one of the following disease processes/diagnoses(primary or secondary)?  Other   Week 3 attempt successful?  Yes   Call start time  1541   Call end time  1548   Discharge diagnosis  Urinary tract infection associated with indwelling urethral catheter, DM2, Acute hepatic encephalopathy,lactic acidosis, noncompliant with Lactulose x 2 days   Is patient permission given to speak with other caregiver?  Yes   Person spoke with today (if not patient) and relationship     Meds reviewed with patient/caregiver?  Yes   Is the patient having any side effects they believe may be caused by any medication additions or changes?  No   Prescription comments  dosage change with allopurinol and bumex   Has the patient kept scheduled appointments due by today?  Yes   What is the Home health agency?   [facility]   Home health comments  HH and PT still coming   Additional teach back comments  Pt is doing well. SHe fell and coccyx area is sore.  is aware of fall   Week 3 Call Completed?  Yes          Carmen Chu RN

## 2018-09-06 NOTE — OUTREACH NOTE
Medical Week 4 Survey      Responses   Facility patient discharged from?  [place]   Does the patient have one of the following disease processes/diagnoses(primary or secondary)?  Other   Week 4 attempt successful?  Yes   Call start time  1103   Call end time  1107   General alerts for this patient   Lives with , declined placement to SNF because patient has been placed before and checked herself out before she was fully admitted.    Discharge diagnosis  Urinary tract infection associated with indwelling urethral catheter, DM2, Acute hepatic encephalopathy,lactic acidosis, noncompliant with Lactulose x 2 days   Is patient permission given to speak with other caregiver?  Yes   List who call center can speak with     Meds reviewed with patient/caregiver?  Yes   Is the patient having any side effects they believe may be caused by any medication additions or changes?  No   Is the patient taking all medications as directed (includes completed medication regime)?  Yes   Has the patient kept scheduled appointments due by today?  No   What is preventing the patient from keeping their appointments?  Doesn't understand importance   Nursing Interventions  Advised patient to keep appointment   Is the patient still receiving Home Health Services?  Yes   Home health comments  Today may have been the last day, HH removed catheter and she is voiding.   Psychosocial issues?  No   What is the patient's perception of their health status since discharge?  Improving   Is the patient/caregiver able to teach back signs and symptoms related to disease process for when to call PCP?  Yes   Is the patient/caregiver able to teach back signs and symptoms related to disease process for when to call 911?  Yes   Is the patient/caregiver able to teach back the hierarchy of who to call/visit for symptoms/problems? PCP, Specialist, Home health nurse, Urgent Care, ED, 911  Yes   Week 4 Call Completed?  Yes   Would the patient like one  additional call?  Yes          Lakia Hdz RN

## 2018-09-15 NOTE — OUTREACH NOTE
Medical Week 5 Survey      Responses   Facility patient discharged from?  [place]   Does the patient have one of the following disease processes/diagnoses(primary or secondary)?  Other   Week 5 attempt successful?  Yes   Call start time  1202   Call end time  1204   General alerts for this patient   Lives with , declined placement to SNF because patient has been placed before and checked herself out before she was fully admitted.    Is patient permission given to speak with other caregiver?  Yes   List who call center can speak with  Kirby-   Person spoke with today (if not patient) and relationship     Meds reviewed with patient/caregiver?  Yes   Is the patient having any side effects they believe may be caused by any medication additions or changes?  No   Is the patient taking all medications as directed (includes completed medication regime)?  Yes   Has the patient kept scheduled appointments due by today?  Yes   Is the patient still receiving Home Health Services?  No   Home health comments  Finished last week   Psychosocial issues?  No   What is the patient's perception of their health status since discharge?  Improving   Is the patient/caregiver able to teach back signs and symptoms related to disease process for when to call PCP?  Yes   Is the patient/caregiver able to teach back signs and symptoms related to disease process for when to call 911?  Yes   Is the patient/caregiver able to teach back the hierarchy of who to call/visit for symptoms/problems? PCP, Specialist, Home health nurse, Urgent Care, ED, 911  Yes   Graduated  Yes   Did the patient feel the follow up calls were helpful during their recovery period?  Yes   Was the number of calls appropriate?  Yes   Does the patient have an Advance Directive or Living Will?  No   Is the patient/caregiver familiar with Advance Care Planning?  No   Would the patient like more information on Advance Care Planning?  No          Ruby CHACON  ROSALIE Krueger

## 2018-10-31 NOTE — PLAN OF CARE
Problem: Patient Care Overview  Goal: Plan of Care Review  Outcome: Ongoing (interventions implemented as appropriate)   08/06/18 7853   Coping/Psychosocial   Plan of Care Reviewed With patient   OTHER   Outcome Summary No c/o pain. SOA when returning to bed from bathroom. Confused through the night. Calm and pleasant. Up with walker and assist to the bathroom. Medications administered per orders. Urinating w/o difficulty. VSS. No s/s of distress at this time. Will continue to monitor.    Plan of Care Review   Progress no change     Goal: Discharge Needs Assessment  Outcome: Ongoing (interventions implemented as appropriate)      Problem: Fall Risk (Adult)  Goal: Absence of Fall  Outcome: Ongoing (interventions implemented as appropriate)      Problem: Urinary Tract Infection (Adult)  Goal: Signs and Symptoms of Listed Potential Problems Will be Absent, Minimized or Managed (Urinary Tract Infection)  Outcome: Ongoing (interventions implemented as appropriate)      Problem: Pain, Acute (Adult)  Goal: Identify Related Risk Factors and Signs and Symptoms  Outcome: Outcome(s) achieved Date Met: 08/06/18    Goal: Acceptable Pain Control/Comfort Level  Outcome: Ongoing (interventions implemented as appropriate)      Problem: Kidney Disease, Chronic/End Stage Renal Disease (Adult)  Goal: Signs and Symptoms of Listed Potential Problems Will be Absent, Minimized or Managed (Kidney Disease, Chronic/End Stage Renal Disease)  Outcome: Ongoing (interventions implemented as appropriate)      Problem: Confusion, Chronic (Adult)  Goal: Cognitive/Functional Impairments Minimized  Outcome: Ongoing (interventions implemented as appropriate)    Goal: Free from Harm/Injuries  Outcome: Ongoing (interventions implemented as appropriate)      Problem: Skin Injury Risk (Adult)  Goal: Skin Health and Integrity  Outcome: Ongoing (interventions implemented as appropriate)         - patient on flomax at home  - has difficulty urinating in ED  - will bladder scan and straight cath for > 250 cc

## 2018-11-04 NOTE — ED PROVIDER NOTES
EMERGENCY DEPARTMENT ENCOUNTER    CHIEF COMPLAINT  Chief Complaint: Confusion  History given by: Patient  History limited by: Dementia  Room Number: 18/18  PMD: Chayito Paredes MD      HPI:  Pt is a 83 y.o. female, Hx of UTI and Dementia, who presents with increased confusion. Per , pt has had intermittent periods of confusion since yesterday along with constipation. Per , pt has been more belligerent and quarrelsome at times. Per , he is also concerned a UTI could possibly be causing behavioral changes. Per , Pt was given extra lactulose yesterday and had 4-5 BM with one today. Pt denies dysuria, frequency, or difficulty urinating. Per , pt was placed in an ortho boot three weeks ago for right leg and has been dragging her left foot and has developed a blister on left heel.      Duration:  Worsening since yesterday  Onset: gradual  Timing: constant  Location: none  Radiation: none  Quality: confusion  Intensity/Severity: moderate  Progression: unchanged  Associated Symptoms: none  Aggravating Factors: none  Alleviating Factors: none  Previous Episodes: none  Treatment before arrival: none    PAST MEDICAL HISTORY  Active Ambulatory Problems     Diagnosis Date Noted   • Cirrhosis of liver (CMS/HCC) 08/15/2016   • Hypersplenism 08/15/2016   • Splenic pancytopenia syndrome (CMS/HCC) 08/15/2016   • Chronic renal disease, stage 4, severely decreased glomerular filtration rate between 15-29 mL/min/1.73 square meter (CMS/HCC) 08/15/2016   • Anemia of chronic renal failure, stage 4 (severe) (CMS/HCC) 09/08/2016   • Hepatic encephalopathy (CMS/HCC) 11/16/2016   • Thrombocytopenia (CMS/HCC) 11/17/2016   • Leukopenia 11/17/2016   • Acute kidney failure (CMS/HCC) 11/17/2016   • Acute hepatic encephalopathy 11/28/2016   • DM2 (diabetes mellitus, type 2) (CMS/HCC) 11/29/2016   • Iron deficiency 03/30/2017   • Acute idiopathic gout of right ankle 01/17/2018   • Decreased mobility 01/17/2018   •  Fall 01/17/2018   • Urinary tract infection associated with indwelling urethral catheter (CMS/[facility]) 08/02/2018     Resolved Ambulatory Problems     Diagnosis Date Noted   • Hemochromatosis 08/15/2016   • CKD (chronic kidney disease) stage 3, GFR 30-59 ml/min (CMS/[facility]) 11/17/2016     Past Medical History:   Diagnosis Date   • Anxiety    • Arthritis    • C. difficile colitis    • CHF (congestive heart failure) (CMS/[facility])    • Chronic kidney disease    • Dementia    • Diabetes mellitus (CMS/[facility])    • Diskitis 11/2015   • Hemochromatosis    • History of anemia    • History of blood transfusion 2015   • History of pneumonia 2016   • History of sepsis 2015   • Hypertension    • Liver disease    • HOANG (nonalcoholic steatohepatitis)    • Pancytopenia (CMS/[facility])    • Spinal stenosis    • Splenomegaly    • Thrombocytopenia, chronic    • TIA (transient ischemic attack)        PAST SURGICAL HISTORY  Past Surgical History:   Procedure Laterality Date   • ABDOMINAL SURGERY     • KNEE SURGERY     • RENAL BIOPSY  10/2015   • TONSILLECTOMY     • WISDOM TOOTH EXTRACTION         FAMILY HISTORY  Family History   Problem Relation Age of Onset   • Heart disease Mother    • Diabetes Mother    • Heart disease Father    • Breast cancer Sister 45   • Hypertension Sister    • Diabetes Sister    • Colon cancer Brother 50   • Diabetes Brother        SOCIAL HISTORY  Social History     Social History   • Marital status:      Spouse name: Kirby   • Number of children: 2   • Years of education: High School     Occupational History   •  Retired     Social History Main Topics   • Smoking status: Former Smoker     Types: Cigarettes     Quit date: 11/17/1985   • Smokeless tobacco: Never Used      Comment: Heavy in past, but quit   • Alcohol use No   • Drug use: No   • Sexual activity: Defer     Other Topics Concern   • Not on file     Social History Narrative   • No narrative on file       ALLERGIES  Ciprofloxacin; Levaquin [levofloxacin]; and  Promethazine hcl    REVIEW OF SYSTEMS  Review of Systems   Unable to perform ROS: Dementia       PHYSICAL EXAM  ED Triage Vitals [11/04/18 1520]   Temp Heart Rate Resp BP SpO2   98.7 °F (37.1 °C) (!) 39 -- -- 99 %      Temp src Heart Rate Source Patient Position BP Location FiO2 (%)   -- -- -- -- --       Physical Exam   Constitutional: No distress.   HENT:   Head: Normocephalic and atraumatic.   Eyes: Pupils are equal, round, and reactive to light. EOM are normal.   Neck: Normal range of motion. Neck supple.   Cardiovascular: Normal rate and regular rhythm.    Pulmonary/Chest: Effort normal and breath sounds normal. No respiratory distress.   Abdominal: Soft. There is no tenderness. There is no rebound and no guarding.   Musculoskeletal: Normal range of motion. She exhibits no edema.   Ortho boot on right lower leg.   Neurological: She is alert. She has normal sensation and normal strength.   Oriented to person and place. no facial droop. Speech normal. Equal strength and sensation in all extremities.    Skin: Skin is warm and dry. No rash noted.   Area of thickened skin with some cracked skin as well on left heel. No signs of infection.    Psychiatric: Mood and affect normal.   Nursing note and vitals reviewed.      LAB RESULTS  Lab Results (last 24 hours)     Procedure Component Value Units Date/Time    Urinalysis With Microscopic If Indicated (No Culture) - Urine, Catheter [[id number]]  (Abnormal) Collected:  11/04/18 1611    Specimen:  Urine from Urine, Catheter Updated:  11/04/18 1634     Color, UA Yellow     Appearance, UA Clear     pH, UA 5.5     Specific Gravity, UA 1.013     Glucose,  mg/dL (2+) (A)     Ketones, UA Negative     Bilirubin, UA Negative     Blood, UA Trace (A)     Protein, UA 30 mg/dL (1+) (A)     Leuk Esterase, UA Negative     Nitrite, UA Negative     Urobilinogen, UA 0.2 E.U./dL    Urinalysis, Microscopic Only - Urine, Clean Catch [[id number]] Collected:  11/04/18 1611    Specimen:   Urine from Urine, Catheter Updated:  11/04/18 1634     RBC, UA 0-2 /HPF      WBC, UA 0-2 /HPF      Bacteria, UA None Seen /HPF      Squamous Epithelial Cells, UA 0-2 /HPF      Hyaline Casts, UA 0-2 /LPF      Methodology Automated Microscopy    Urine Culture - Urine, Urine, Catheter [[id number]]  (Normal) Collected:  11/04/18 1611    Specimen:  Urine from Urine, Catheter Updated:  11/05/18 1000     Urine Culture No growth    CBC & Differential [[id number]] Collected:  11/04/18 1753    Specimen:  Blood Updated:  11/04/18 1808    Narrative:       The following orders were created for panel order CBC & Differential.  Procedure                               Abnormality         Status                     ---------                               -----------         ------                     CBC Auto Differential[[id number]]        Abnormal            Final result                 Please view results for these tests on the individual orders.    Comprehensive Metabolic Panel [[id number]]  (Abnormal) Collected:  11/04/18 1753    Specimen:  Blood Updated:  11/04/18 1829     Glucose 209 (H) mg/dL      BUN 33 (H) mg/dL      Creatinine 3.48 (H) mg/dL      Sodium 142 mmol/L      Potassium 4.2 mmol/L      Chloride 104 mmol/L      CO2 23.3 mmol/L      Calcium 9.5 mg/dL      Total Protein 7.5 g/dL      Albumin 2.90 (L) g/dL      ALT (SGPT) 18 U/L      AST (SGOT) 33 (H) U/L      Alkaline Phosphatase 188 (H) U/L      Total Bilirubin 1.1 mg/dL      eGFR Non African Amer 13 (L) mL/min/1.73      Comment: <15 Indicative of kidney failure.        eGFR   Amer -- mL/min/1.73      Comment: <15 Indicative of kidney failure.        Globulin 4.6 gm/dL      A/G Ratio 0.6 g/dL      BUN/Creatinine Ratio 9.5     Anion Gap 14.7 mmol/L     Narrative:       The MDRD GFR formula is only valid for adults with stable renal function between ages 18 and 70.    Ammonia [[id number]]  (Normal) Collected:  11/04/18 1753    Specimen:  Blood Updated:  11/04/18  1820     Ammonia 45 umol/L     Troponin [[id number]]  (Abnormal) Collected:  11/04/18 1753    Specimen:  Blood Updated:  11/04/18 1830     Troponin T 0.076 (H) ng/mL     Narrative:       Troponin T Reference Ranges:  Less than 0.03 ng/mL:    Negative for AMI  0.03 to 0.09 ng/mL:      Indeterminant for AMI  Greater than 0.09 ng/mL: Positive for AMI    CBC Auto Differential [[id number]]  (Abnormal) Collected:  11/04/18 1753    Specimen:  Blood Updated:  11/04/18 1808     WBC 4.06 (L) 10*3/mm3      RBC 3.04 (L) 10*6/mm3      Hemoglobin 10.5 (L) g/dL      Hematocrit 31.1 (L) %      .3 (H) fL      MCH 34.5 (H) pg      MCHC 33.8 g/dL      RDW 13.7 (H) %      RDW-SD 51.5 fl      MPV 10.3 fL      Platelets 87 (L) 10*3/mm3      Neutrophil % 56.7 %      Lymphocyte % 27.3 %      Monocyte % 11.1 %      Eosinophil % 4.7 %      Basophil % 0.2 %      Immature Grans % 0.2 %      Neutrophils, Absolute 2.30 10*3/mm3      Lymphocytes, Absolute 1.11 10*3/mm3      Monocytes, Absolute 0.45 10*3/mm3      Eosinophils, Absolute 0.19 10*3/mm3      Basophils, Absolute 0.01 10*3/mm3      Immature Grans, Absolute 0.01 10*3/mm3           I ordered the above labs and reviewed the results    RADIOLOGY  CT Head Without Contrast   Final Result       No significant change when compared to most recent head CT [facility] 07/26/2018. There is moderate small vessel disease in   cerebral white matter, 1.9 cm old lateral right parietal subcortical   white matter infarct and mild diffuse cerebral atrophy. The remainder of   the head CT is within normal limits.       Radiation dose reduction techniques were utilized, including automated   exposure control and exposure modulation based on body size.       This report was finalized on 11/4/2018 7:53 PM by Dr. Forrest Chanel M.D.               I ordered the above noted radiological studies. Interpreted by radiologist. Reviewed by me in PACS.       PROCEDURES  Procedures    EKG           EKG time: 1552  Rhythm/Rate: NSR, 81  P waves and NV: Nml  QRS, axis: Nml axis, Nml QRS   ST and T waves: Nml     Interpreted Contemporaneously by me, independently viewed  changed compared to prior 8/2/18. A-fib present at that time.       PROGRESS AND CONSULTS  ED Course as of Nov 05 1201   Sun Nov 04, 2018   1829 9.3 two months ago Hemoglobin: (!) 10.5 [WH]   1830 2.63 three months ago Creatinine: (!) 3.48 []   1831 0.117 three months ago Troponin T: (!) 0.076 []      ED Course User Index  [] Kirby Flores MD     1539  Ordered lab work, CT Head, and EKG.     1653  Discussed with Dr. Chanel, Radiology. CT Head unchanged from prior.     1834  Ordered Urine culture.     1841  Rechecked pt.  at bedside. Pt is resting comfortably. NAD Notified pt of lab work, including normal ammonia, CBC with non-elevated WBC, and UA that shows no bacteria, RBC, or WBC. Discussed the plan to discharge pt home and f/u with PCP. Informed  that pt's behavioral changes are likely from dementia and urine cultures will take a couple of days for results. Pt and  understand and agree with the plan, all questions answered.      MEDICAL DECISION MAKING  Results were reviewed/discussed with the patient and they were also made aware of online access. Pt also made aware that some labs, such as cultures, will not be resulted during ER visit and follow up with PMD is necessary.     MDM  Number of Diagnoses or Management Options  Chronic kidney disease, unspecified CKD stage:   Dementia without behavioral disturbance, unspecified dementia type:   Open wound of left heel, initial encounter:   Diagnosis management comments: Patient's ammonia level was normal.  Urinalysis did not show any evidence of UTI.  Patient's creatinine was mildly elevated from baseline.  Patient's intermittent confusion is likely due to her underlying dementia.  Patient will be discharged and advised follow-up with her primary care  doctor.       Amount and/or Complexity of Data Reviewed  Clinical lab tests: reviewed and ordered (UA shows no bacteria; Ammonia-45; Troponin-0.076(stable), Creatinine-3.48(2.63 3 months ago), and Hgb 10.5(stable))  Tests in the radiology section of CPT®: ordered and reviewed (CT Head is negative acute. )  Tests in the medicine section of CPT®: ordered and reviewed (See EKG results in procedure. )  Discussion of test results with the performing providers: yes (Dr. Chanel, Radiology)  Decide to obtain previous medical records or to obtain history from someone other than the patient: yes  Review and summarize past medical records: yes (PT was admitted here in August this year for UTI and hepatic encephalopathy.  )  Independent visualization of images, tracings, or specimens: yes           DIAGNOSIS  Final diagnoses:   Dementia without behavioral disturbance, unspecified dementia type   Chronic kidney disease, unspecified CKD stage   Open wound of left heel, initial encounter       DISPOSITION  DISCHARGE    Patient discharged in stable condition.    Reviewed implications of results, diagnosis, meds, responsibility to follow up, warning signs and symptoms of possible worsening, potential complications and reasons to return to ER.    Patient/Family voiced understanding of above instructions.    Discussed plan for discharge, as there is no emergent indication for admission. Patient referred to primary care provider for BP management due to today's BP. Pt/family is agreeable and understands need for follow up and repeat testing.  Pt is aware that discharge does not mean that nothing is wrong but it indicates no emergency is present that requires admission and they must continue care with follow-up as given below or physician of their choice.     FOLLOW-UP  Chayito Paredes MD  [facility] [place] [id number] [phone]    Call in 1 day           Medication List      No changes were made to your  prescriptions during this visit.           Latest Documented Vital Signs:  As of 12:01 PM  BP- 165/84 HR- 83 Temp- 98.7 °F (37.1 °C) O2 sat- 93%    --  Documentation assistance provided by colby Douglass for Dr. Flores.  Information recorded by the scribe was done at my direction and has been verified and validated by me.                Dewey Douglass  11/04/18 7321       Kirby Flores MD  11/05/18 1203

## 2018-11-04 NOTE — ED NOTES
Lab called about collecting blood, patient out of room for CT scan when they first attempted to draw blood.  Lab made aware patient back in room and labs still needed to be drawn.     Vincent Armendariz RN  11/04/18 9358

## 2018-11-04 NOTE — ED NOTES
Patient presents with altered mental status that started yesterday, family said patient got better.  Worsened again today at 1000.  Patient normally alert and oriented x4.  Currently oriented times two. Denies any chest pain or shortness of breath. Breathing is even and unlabored.  NAD noted at this time.      Vincent Armendariz, RN  11/04/18 6642

## 2018-11-04 NOTE — DISCHARGE INSTRUCTIONS
You will need to have your creatinine rechecked in 1-2 weeks.  Keep your left heel wound covered and padded.  Soak area in warm soapy water at least once daily.  Call your primary care doctor tomorrow.  Return to the emergency department for worsening symptoms.

## 2019-01-01 ENCOUNTER — ANESTHESIA EVENT (OUTPATIENT)
Dept: PERIOP | Facility: HOSPITAL | Age: 84
End: 2019-01-01

## 2019-01-01 ENCOUNTER — APPOINTMENT (OUTPATIENT)
Dept: GENERAL RADIOLOGY | Facility: HOSPITAL | Age: 84
End: 2019-01-01

## 2019-01-01 ENCOUNTER — APPOINTMENT (OUTPATIENT)
Dept: ONCOLOGY | Facility: CLINIC | Age: 84
End: 2019-01-01

## 2019-01-01 ENCOUNTER — HOSPITAL ENCOUNTER (INPATIENT)
Facility: HOSPITAL | Age: 84
LOS: 10 days | Discharge: HOSPICE | End: 2019-04-13
Attending: EMERGENCY MEDICINE | Admitting: INTERNAL MEDICINE

## 2019-01-01 ENCOUNTER — APPOINTMENT (OUTPATIENT)
Dept: LAB | Facility: HOSPITAL | Age: 84
End: 2019-01-01

## 2019-01-01 ENCOUNTER — OFFICE (OUTPATIENT)
Dept: URBAN - METROPOLITAN AREA CLINIC 66 | Facility: CLINIC | Age: 84
End: 2019-01-01
Payer: OTHER GOVERNMENT

## 2019-01-01 ENCOUNTER — LAB (OUTPATIENT)
Dept: LAB | Facility: HOSPITAL | Age: 84
End: 2019-01-01

## 2019-01-01 ENCOUNTER — APPOINTMENT (OUTPATIENT)
Dept: CT IMAGING | Facility: HOSPITAL | Age: 84
End: 2019-01-01

## 2019-01-01 ENCOUNTER — ANESTHESIA (OUTPATIENT)
Dept: PERIOP | Facility: HOSPITAL | Age: 84
End: 2019-01-01

## 2019-01-01 ENCOUNTER — HOSPITAL ENCOUNTER (INPATIENT)
Facility: HOSPITAL | Age: 84
LOS: 7 days | End: 2019-04-20
Attending: INTERNAL MEDICINE | Admitting: INTERNAL MEDICINE

## 2019-01-01 VITALS
WEIGHT: 203 LBS | HEIGHT: 63 IN | DIASTOLIC BLOOD PRESSURE: 74 MMHG | SYSTOLIC BLOOD PRESSURE: 118 MMHG | HEART RATE: 80 BPM

## 2019-01-01 VITALS
RESPIRATION RATE: 16 BRPM | WEIGHT: 207.13 LBS | HEART RATE: 86 BPM | SYSTOLIC BLOOD PRESSURE: 126 MMHG | BODY MASS INDEX: 36.7 KG/M2 | DIASTOLIC BLOOD PRESSURE: 56 MMHG | OXYGEN SATURATION: 96 % | HEIGHT: 63 IN | TEMPERATURE: 97.5 F

## 2019-01-01 VITALS — TEMPERATURE: 98.8 F

## 2019-01-01 DIAGNOSIS — E61.1 IRON DEFICIENCY: ICD-10-CM

## 2019-01-01 DIAGNOSIS — K76.82 HEPATIC ENCEPHALOPATHY (HCC): ICD-10-CM

## 2019-01-01 DIAGNOSIS — D63.1 ANEMIA OF CHRONIC RENAL FAILURE, STAGE 4 (SEVERE) (HCC): ICD-10-CM

## 2019-01-01 DIAGNOSIS — N18.4 ANEMIA OF CHRONIC RENAL FAILURE, STAGE 4 (SEVERE) (HCC): ICD-10-CM

## 2019-01-01 DIAGNOSIS — K72.90 HEPATIC FAILURE, UNSPECIFIED WITHOUT COMA: ICD-10-CM

## 2019-01-01 DIAGNOSIS — R79.89 ELEVATED LACTIC ACID LEVEL: ICD-10-CM

## 2019-01-01 DIAGNOSIS — N18.9 CHRONIC KIDNEY DISEASE, UNSPECIFIED: ICD-10-CM

## 2019-01-01 DIAGNOSIS — K74.60 UNSPECIFIED CIRRHOSIS OF LIVER: ICD-10-CM

## 2019-01-01 DIAGNOSIS — R53.1 GENERALIZED WEAKNESS: ICD-10-CM

## 2019-01-01 DIAGNOSIS — Z80.9 FAMILY HISTORY OF MALIGNANT NEOPLASM, UNSPECIFIED: ICD-10-CM

## 2019-01-01 DIAGNOSIS — N18.9 ACUTE RENAL FAILURE SUPERIMPOSED ON CHRONIC KIDNEY DISEASE, UNSPECIFIED CKD STAGE, UNSPECIFIED ACUTE RENAL FAILURE TYPE (HCC): Primary | ICD-10-CM

## 2019-01-01 DIAGNOSIS — N17.9 ACUTE RENAL FAILURE SUPERIMPOSED ON CHRONIC KIDNEY DISEASE, UNSPECIFIED CKD STAGE, UNSPECIFIED ACUTE RENAL FAILURE TYPE (HCC): Primary | ICD-10-CM

## 2019-01-01 DIAGNOSIS — D61.818 SPLENIC PANCYTOPENIA SYNDROME (HCC): ICD-10-CM

## 2019-01-01 DIAGNOSIS — D73.9 SPLENIC PANCYTOPENIA SYNDROME (HCC): ICD-10-CM

## 2019-01-01 LAB
ALBUMIN SERPL-MCNC: 1.8 G/DL (ref 3.5–5.2)
ALBUMIN SERPL-MCNC: 1.8 G/DL (ref 3.5–5.2)
ALBUMIN SERPL-MCNC: 2.1 G/DL (ref 3.5–5.2)
ALBUMIN SERPL-MCNC: 2.2 G/DL (ref 3.5–5.2)
ALBUMIN SERPL-MCNC: 2.2 G/DL (ref 3.5–5.2)
ALBUMIN SERPL-MCNC: 2.3 G/DL (ref 3.5–5.2)
ALBUMIN SERPL-MCNC: 2.3 G/DL (ref 3.5–5.2)
ALBUMIN SERPL-MCNC: 2.4 G/DL (ref 3.5–5.2)
ALBUMIN SERPL-MCNC: 2.8 G/DL (ref 3.5–5.2)
ALBUMIN/GLOB SERPL: 0.5 G/DL
ALBUMIN/GLOB SERPL: 0.6 G/DL
ALBUMIN/GLOB SERPL: 0.6 G/DL
ALBUMIN/GLOB SERPL: 0.8 G/DL (ref 1.1–2.4)
ALP SERPL-CCNC: 104 U/L (ref 39–117)
ALP SERPL-CCNC: 114 U/L (ref 39–117)
ALP SERPL-CCNC: 118 U/L (ref 39–117)
ALP SERPL-CCNC: 129 U/L (ref 39–117)
ALP SERPL-CCNC: 177 U/L (ref 38–116)
ALT SERPL W P-5'-P-CCNC: 11 U/L (ref 0–33)
ALT SERPL W P-5'-P-CCNC: 14 U/L (ref 1–33)
ALT SERPL W P-5'-P-CCNC: 18 U/L (ref 1–33)
ALT SERPL W P-5'-P-CCNC: 20 U/L (ref 1–33)
ALT SERPL W P-5'-P-CCNC: 23 U/L (ref 1–33)
AMMONIA BLD-SCNC: 42 UMOL/L (ref 11–51)
AMMONIA BLD-SCNC: 62 UMOL/L (ref 11–51)
ANION GAP SERPL CALCULATED.3IONS-SCNC: 12.2 MMOL/L
ANION GAP SERPL CALCULATED.3IONS-SCNC: 13.3 MMOL/L
ANION GAP SERPL CALCULATED.3IONS-SCNC: 14 MMOL/L
ANION GAP SERPL CALCULATED.3IONS-SCNC: 14.7 MMOL/L
ANION GAP SERPL CALCULATED.3IONS-SCNC: 15.5 MMOL/L
ANION GAP SERPL CALCULATED.3IONS-SCNC: 15.7 MMOL/L
ANION GAP SERPL CALCULATED.3IONS-SCNC: 15.8 MMOL/L
ANION GAP SERPL CALCULATED.3IONS-SCNC: 15.9 MMOL/L
ANION GAP SERPL CALCULATED.3IONS-SCNC: 15.9 MMOL/L
ANION GAP SERPL CALCULATED.3IONS-SCNC: 16.1 MMOL/L
ANION GAP SERPL CALCULATED.3IONS-SCNC: 18.3 MMOL/L
ANISOCYTOSIS BLD QL: NORMAL
AST SERPL-CCNC: 100 U/L (ref 1–32)
AST SERPL-CCNC: 124 U/L (ref 1–32)
AST SERPL-CCNC: 54 U/L (ref 0–32)
AST SERPL-CCNC: 91 U/L (ref 1–32)
AST SERPL-CCNC: 97 U/L (ref 1–32)
BACTERIA SPEC AEROBE CULT: NORMAL
BACTERIA SPEC AEROBE CULT: NORMAL
BACTERIA UR QL AUTO: NORMAL /HPF
BASOPHILS # BLD AUTO: 0.01 10*3/MM3 (ref 0–0.1)
BASOPHILS # BLD AUTO: 0.01 10*3/MM3 (ref 0–0.2)
BASOPHILS # BLD AUTO: 0.02 10*3/MM3 (ref 0–0.2)
BASOPHILS # BLD AUTO: 0.02 10*3/MM3 (ref 0–0.2)
BASOPHILS NFR BLD AUTO: 0.3 % (ref 0–1.5)
BASOPHILS NFR BLD AUTO: 0.3 % (ref 0–1.5)
BASOPHILS NFR BLD AUTO: 0.4 % (ref 0–1.1)
BASOPHILS NFR BLD AUTO: 0.4 % (ref 0–1.5)
BASOPHILS NFR BLD AUTO: 0.7 % (ref 0–1.5)
BASOPHILS NFR BLD AUTO: 0.7 % (ref 0–1.5)
BILIRUB CONJ SERPL-MCNC: 2.7 MG/DL (ref 0–0.3)
BILIRUB INDIRECT SERPL-MCNC: 1.2 MG/DL
BILIRUB SERPL-MCNC: 0.9 MG/DL (ref 0.1–1.2)
BILIRUB SERPL-MCNC: 2.7 MG/DL (ref 0.2–1.2)
BILIRUB SERPL-MCNC: 2.8 MG/DL (ref 0.2–1.2)
BILIRUB SERPL-MCNC: 3.2 MG/DL (ref 0.2–1.2)
BILIRUB SERPL-MCNC: 3.9 MG/DL (ref 0.2–1.2)
BILIRUB UR QL STRIP: NEGATIVE
BUN BLD-MCNC: 32 MG/DL (ref 6–20)
BUN BLD-MCNC: 38 MG/DL (ref 8–23)
BUN BLD-MCNC: 48 MG/DL (ref 8–23)
BUN BLD-MCNC: 50 MG/DL (ref 8–23)
BUN BLD-MCNC: 56 MG/DL (ref 8–23)
BUN BLD-MCNC: 75 MG/DL (ref 8–23)
BUN BLD-MCNC: 77 MG/DL (ref 8–23)
BUN BLD-MCNC: 78 MG/DL (ref 8–23)
BUN BLD-MCNC: 84 MG/DL (ref 8–23)
BUN/CREAT SERPL: 11.1 (ref 7–25)
BUN/CREAT SERPL: 11.2 (ref 7–25)
BUN/CREAT SERPL: 13.1 (ref 7–25)
BUN/CREAT SERPL: 14.1 (ref 7–25)
BUN/CREAT SERPL: 15 (ref 7–25)
BUN/CREAT SERPL: 15.5 (ref 7–25)
BUN/CREAT SERPL: 15.6 (ref 7–25)
BUN/CREAT SERPL: 16.2 (ref 7–25)
BUN/CREAT SERPL: 16.3 (ref 7–25)
BUN/CREAT SERPL: 16.5 (ref 7–25)
BUN/CREAT SERPL: 9.8 (ref 7.3–30)
CALCIUM SPEC-SCNC: 7.9 MG/DL (ref 8.6–10.5)
CALCIUM SPEC-SCNC: 7.9 MG/DL (ref 8.6–10.5)
CALCIUM SPEC-SCNC: 8 MG/DL (ref 8.6–10.5)
CALCIUM SPEC-SCNC: 8 MG/DL (ref 8.6–10.5)
CALCIUM SPEC-SCNC: 8.1 MG/DL (ref 8.6–10.5)
CALCIUM SPEC-SCNC: 8.1 MG/DL (ref 8.6–10.5)
CALCIUM SPEC-SCNC: 8.3 MG/DL (ref 8.6–10.5)
CALCIUM SPEC-SCNC: 8.5 MG/DL (ref 8.6–10.5)
CALCIUM SPEC-SCNC: 8.6 MG/DL (ref 8.6–10.5)
CALCIUM SPEC-SCNC: 8.9 MG/DL (ref 8.5–10.2)
CALCIUM SPEC-SCNC: 9.6 MG/DL (ref 8.6–10.5)
CHLORIDE SERPL-SCNC: 103 MMOL/L (ref 98–107)
CHLORIDE SERPL-SCNC: 105 MMOL/L (ref 98–107)
CHLORIDE SERPL-SCNC: 109 MMOL/L (ref 98–107)
CHLORIDE SERPL-SCNC: 109 MMOL/L (ref 98–107)
CHLORIDE SERPL-SCNC: 111 MMOL/L (ref 98–107)
CHLORIDE SERPL-SCNC: 111 MMOL/L (ref 98–107)
CHLORIDE SERPL-SCNC: 112 MMOL/L (ref 98–107)
CHLORIDE SERPL-SCNC: 112 MMOL/L (ref 98–107)
CHLORIDE SERPL-SCNC: 98 MMOL/L (ref 98–107)
CHLORIDE UR-SCNC: 40 MMOL/L
CLARITY UR: CLEAR
CLUMPED PLATELETS: PRESENT
CO2 SERPL-SCNC: 10.7 MMOL/L (ref 22–29)
CO2 SERPL-SCNC: 13 MMOL/L (ref 22–29)
CO2 SERPL-SCNC: 13.3 MMOL/L (ref 22–29)
CO2 SERPL-SCNC: 13.3 MMOL/L (ref 22–29)
CO2 SERPL-SCNC: 13.9 MMOL/L (ref 22–29)
CO2 SERPL-SCNC: 15.2 MMOL/L (ref 22–29)
CO2 SERPL-SCNC: 16.5 MMOL/L (ref 22–29)
CO2 SERPL-SCNC: 16.7 MMOL/L (ref 22–29)
CO2 SERPL-SCNC: 19.1 MMOL/L (ref 22–29)
CO2 SERPL-SCNC: 21.1 MMOL/L (ref 22–29)
CO2 SERPL-SCNC: 23.8 MMOL/L (ref 22–29)
COLOR UR: ABNORMAL
CREAT BLD-MCNC: 3.26 MG/DL (ref 0.6–1.1)
CREAT BLD-MCNC: 3.42 MG/DL (ref 0.57–1)
CREAT BLD-MCNC: 3.81 MG/DL (ref 0.57–1)
CREAT BLD-MCNC: 3.98 MG/DL (ref 0.57–1)
CREAT BLD-MCNC: 4.29 MG/DL (ref 0.57–1)
CREAT BLD-MCNC: 4.68 MG/DL (ref 0.57–1)
CREAT BLD-MCNC: 4.81 MG/DL (ref 0.57–1)
CREAT BLD-MCNC: 4.99 MG/DL (ref 0.57–1)
CREAT BLD-MCNC: 5.01 MG/DL (ref 0.57–1)
CREAT BLD-MCNC: 5.04 MG/DL (ref 0.57–1)
CREAT BLD-MCNC: 5.14 MG/DL (ref 0.57–1)
CREAT UR-MCNC: 66.8 MG/DL
D-LACTATE SERPL-SCNC: 2.7 MMOL/L (ref 0.5–2)
D-LACTATE SERPL-SCNC: 3.5 MMOL/L (ref 0.5–2)
DEPRECATED RDW RBC AUTO: 55.2 FL (ref 37–49)
DEPRECATED RDW RBC AUTO: 72.5 FL (ref 37–54)
DEPRECATED RDW RBC AUTO: 73.7 FL (ref 37–54)
DEPRECATED RDW RBC AUTO: 73.8 FL (ref 37–54)
DEPRECATED RDW RBC AUTO: 74.2 FL (ref 37–54)
DEPRECATED RDW RBC AUTO: 75.1 FL (ref 37–54)
DEPRECATED RDW RBC AUTO: 75.2 FL (ref 37–54)
DEPRECATED RDW RBC AUTO: 78.3 FL (ref 37–54)
DEPRECATED RDW RBC AUTO: 79.3 FL (ref 37–54)
DIFFERENTIAL METHOD BLD: ABNORMAL
EOSINOPHIL # BLD AUTO: 0.13 10*3/MM3 (ref 0–0.4)
EOSINOPHIL # BLD AUTO: 0.14 10*3/MM3 (ref 0–0.4)
EOSINOPHIL # BLD AUTO: 0.15 10*3/MM3 (ref 0–0.4)
EOSINOPHIL # BLD AUTO: 0.15 10*3/MM3 (ref 0–0.4)
EOSINOPHIL # BLD AUTO: 0.16 10*3/MM3 (ref 0–0.4)
EOSINOPHIL # BLD AUTO: 0.17 10*3/MM3 (ref 0–0.36)
EOSINOPHIL # BLD AUTO: 0.17 10*3/MM3 (ref 0–0.4)
EOSINOPHIL # BLD AUTO: 0.19 10*3/MM3 (ref 0–0.4)
EOSINOPHIL NFR BLD AUTO: 3.7 % (ref 0.3–6.2)
EOSINOPHIL NFR BLD AUTO: 5.1 % (ref 0.3–6.2)
EOSINOPHIL NFR BLD AUTO: 5.2 % (ref 0.3–6.2)
EOSINOPHIL NFR BLD AUTO: 5.6 % (ref 0.3–6.2)
EOSINOPHIL NFR BLD AUTO: 5.6 % (ref 0.3–6.2)
EOSINOPHIL NFR BLD AUTO: 5.7 % (ref 0.3–6.2)
EOSINOPHIL NFR BLD AUTO: 6.3 % (ref 1–5)
EOSINOPHIL NFR BLD AUTO: 7 % (ref 0.3–6.2)
ERYTHROCYTE [DISTWIDTH] IN BLOOD BY AUTOMATED COUNT: 14.1 % (ref 11.7–14.5)
ERYTHROCYTE [DISTWIDTH] IN BLOOD BY AUTOMATED COUNT: 18.5 % (ref 12.3–15.4)
ERYTHROCYTE [DISTWIDTH] IN BLOOD BY AUTOMATED COUNT: 18.7 % (ref 12.3–15.4)
ERYTHROCYTE [DISTWIDTH] IN BLOOD BY AUTOMATED COUNT: 18.7 % (ref 12.3–15.4)
ERYTHROCYTE [DISTWIDTH] IN BLOOD BY AUTOMATED COUNT: 18.8 % (ref 12.3–15.4)
ERYTHROCYTE [DISTWIDTH] IN BLOOD BY AUTOMATED COUNT: 19 % (ref 12.3–15.4)
ERYTHROCYTE [DISTWIDTH] IN BLOOD BY AUTOMATED COUNT: 19 % (ref 12.3–15.4)
ERYTHROCYTE [DISTWIDTH] IN BLOOD BY AUTOMATED COUNT: 20.7 % (ref 12.3–15.4)
ERYTHROCYTE [DISTWIDTH] IN BLOOD BY AUTOMATED COUNT: 21.1 % (ref 12.3–15.4)
FERRITIN SERPL-MCNC: 122.5 NG/ML
GFR SERPL CREATININE-BSD FRML MDRD: 10 ML/MIN/1.73
GFR SERPL CREATININE-BSD FRML MDRD: 11 ML/MIN/1.73
GFR SERPL CREATININE-BSD FRML MDRD: 11 ML/MIN/1.73
GFR SERPL CREATININE-BSD FRML MDRD: 13 ML/MIN/1.73
GFR SERPL CREATININE-BSD FRML MDRD: 14 ML/MIN/1.73
GFR SERPL CREATININE-BSD FRML MDRD: 8 ML/MIN/1.73
GFR SERPL CREATININE-BSD FRML MDRD: 9 ML/MIN/1.73
GFR SERPL CREATININE-BSD FRML MDRD: 9 ML/MIN/1.73
GFR SERPL CREATININE-BSD FRML MDRD: ABNORMAL ML/MIN/1.73
GLOBULIN UR ELPH-MCNC: 3.5 GM/DL
GLOBULIN UR ELPH-MCNC: 3.5 GM/DL (ref 1.8–3.5)
GLOBULIN UR ELPH-MCNC: 3.7 GM/DL
GLOBULIN UR ELPH-MCNC: 4.1 GM/DL
GLUCOSE BLD-MCNC: 101 MG/DL (ref 65–99)
GLUCOSE BLD-MCNC: 101 MG/DL (ref 65–99)
GLUCOSE BLD-MCNC: 128 MG/DL (ref 65–99)
GLUCOSE BLD-MCNC: 130 MG/DL (ref 65–99)
GLUCOSE BLD-MCNC: 132 MG/DL (ref 65–99)
GLUCOSE BLD-MCNC: 180 MG/DL (ref 65–99)
GLUCOSE BLD-MCNC: 292 MG/DL (ref 74–124)
GLUCOSE BLD-MCNC: 295 MG/DL (ref 65–99)
GLUCOSE BLD-MCNC: 72 MG/DL (ref 65–99)
GLUCOSE BLD-MCNC: 87 MG/DL (ref 65–99)
GLUCOSE BLD-MCNC: 96 MG/DL (ref 65–99)
GLUCOSE BLDC GLUCOMTR-MCNC: 101 MG/DL (ref 70–130)
GLUCOSE BLDC GLUCOMTR-MCNC: 104 MG/DL (ref 70–130)
GLUCOSE BLDC GLUCOMTR-MCNC: 108 MG/DL (ref 70–130)
GLUCOSE BLDC GLUCOMTR-MCNC: 108 MG/DL (ref 70–130)
GLUCOSE BLDC GLUCOMTR-MCNC: 111 MG/DL (ref 70–130)
GLUCOSE BLDC GLUCOMTR-MCNC: 113 MG/DL (ref 70–130)
GLUCOSE BLDC GLUCOMTR-MCNC: 116 MG/DL (ref 70–130)
GLUCOSE BLDC GLUCOMTR-MCNC: 123 MG/DL (ref 70–130)
GLUCOSE BLDC GLUCOMTR-MCNC: 123 MG/DL (ref 70–130)
GLUCOSE BLDC GLUCOMTR-MCNC: 127 MG/DL (ref 70–130)
GLUCOSE BLDC GLUCOMTR-MCNC: 129 MG/DL (ref 70–130)
GLUCOSE BLDC GLUCOMTR-MCNC: 135 MG/DL (ref 70–130)
GLUCOSE BLDC GLUCOMTR-MCNC: 136 MG/DL (ref 70–130)
GLUCOSE BLDC GLUCOMTR-MCNC: 137 MG/DL (ref 70–130)
GLUCOSE BLDC GLUCOMTR-MCNC: 150 MG/DL (ref 70–130)
GLUCOSE BLDC GLUCOMTR-MCNC: 154 MG/DL (ref 70–130)
GLUCOSE BLDC GLUCOMTR-MCNC: 157 MG/DL (ref 70–130)
GLUCOSE BLDC GLUCOMTR-MCNC: 163 MG/DL (ref 70–130)
GLUCOSE BLDC GLUCOMTR-MCNC: 169 MG/DL (ref 70–130)
GLUCOSE BLDC GLUCOMTR-MCNC: 171 MG/DL (ref 70–130)
GLUCOSE BLDC GLUCOMTR-MCNC: 173 MG/DL (ref 70–130)
GLUCOSE BLDC GLUCOMTR-MCNC: 177 MG/DL (ref 70–130)
GLUCOSE BLDC GLUCOMTR-MCNC: 179 MG/DL (ref 70–130)
GLUCOSE BLDC GLUCOMTR-MCNC: 182 MG/DL (ref 70–130)
GLUCOSE BLDC GLUCOMTR-MCNC: 187 MG/DL (ref 70–130)
GLUCOSE BLDC GLUCOMTR-MCNC: 196 MG/DL (ref 70–130)
GLUCOSE BLDC GLUCOMTR-MCNC: 221 MG/DL (ref 70–130)
GLUCOSE BLDC GLUCOMTR-MCNC: 68 MG/DL (ref 70–130)
GLUCOSE BLDC GLUCOMTR-MCNC: 70 MG/DL (ref 70–130)
GLUCOSE BLDC GLUCOMTR-MCNC: 72 MG/DL (ref 70–130)
GLUCOSE BLDC GLUCOMTR-MCNC: 82 MG/DL (ref 70–130)
GLUCOSE BLDC GLUCOMTR-MCNC: 83 MG/DL (ref 70–130)
GLUCOSE BLDC GLUCOMTR-MCNC: 84 MG/DL (ref 70–130)
GLUCOSE BLDC GLUCOMTR-MCNC: 87 MG/DL (ref 70–130)
GLUCOSE BLDC GLUCOMTR-MCNC: 90 MG/DL (ref 70–130)
GLUCOSE BLDC GLUCOMTR-MCNC: 92 MG/DL (ref 70–130)
GLUCOSE BLDC GLUCOMTR-MCNC: 93 MG/DL (ref 70–130)
GLUCOSE UR STRIP-MCNC: NEGATIVE MG/DL
HBV SURFACE AG SERPL QL IA: NORMAL
HCT VFR BLD AUTO: 28.8 % (ref 34–45)
HCT VFR BLD AUTO: 32.9 % (ref 34–46.6)
HCT VFR BLD AUTO: 33.2 % (ref 34–46.6)
HCT VFR BLD AUTO: 33.2 % (ref 34–46.6)
HCT VFR BLD AUTO: 33.4 % (ref 34–46.6)
HCT VFR BLD AUTO: 33.8 % (ref 34–46.6)
HCT VFR BLD AUTO: 33.9 % (ref 34–46.6)
HCT VFR BLD AUTO: 34.6 % (ref 34–46.6)
HCT VFR BLD AUTO: 35.4 % (ref 34–46.6)
HGB BLD-MCNC: 10.3 G/DL (ref 12–15.9)
HGB BLD-MCNC: 10.6 G/DL (ref 12–15.9)
HGB BLD-MCNC: 10.6 G/DL (ref 12–15.9)
HGB BLD-MCNC: 10.7 G/DL (ref 12–15.9)
HGB BLD-MCNC: 10.7 G/DL (ref 12–15.9)
HGB BLD-MCNC: 11 G/DL (ref 12–15.9)
HGB BLD-MCNC: 11.1 G/DL (ref 12–15.9)
HGB BLD-MCNC: 11.3 G/DL (ref 12–15.9)
HGB BLD-MCNC: 9.1 G/DL (ref 11.5–14.9)
HGB UR QL STRIP.AUTO: NEGATIVE
HOLD SPECIMEN: NORMAL
HYALINE CASTS UR QL AUTO: NORMAL /LPF
IMM GRANULOCYTES # BLD AUTO: 0.01 10*3/MM3 (ref 0–0.03)
IMM GRANULOCYTES # BLD AUTO: 0.02 10*3/MM3 (ref 0–0.05)
IMM GRANULOCYTES NFR BLD AUTO: 0.4 % (ref 0–0.5)
IMM GRANULOCYTES NFR BLD AUTO: 0.6 % (ref 0–0.5)
IMM GRANULOCYTES NFR BLD AUTO: 0.7 % (ref 0–0.5)
INR PPP: 1.88 (ref 0.9–1.1)
IRON 24H UR-MRATE: 96 MCG/DL (ref 37–145)
IRON SATN MFR SERPL: 35 % (ref 14–48)
KETONES UR QL STRIP: NEGATIVE
LEUKOCYTE ESTERASE UR QL STRIP.AUTO: ABNORMAL
LYMPHOCYTES # BLD AUTO: 0.54 10*3/MM3 (ref 0.7–3.1)
LYMPHOCYTES # BLD AUTO: 0.55 10*3/MM3 (ref 0.7–3.1)
LYMPHOCYTES # BLD AUTO: 0.56 10*3/MM3 (ref 0.7–3.1)
LYMPHOCYTES # BLD AUTO: 0.6 10*3/MM3 (ref 0.7–3.1)
LYMPHOCYTES # BLD AUTO: 0.6 10*3/MM3 (ref 0.7–3.1)
LYMPHOCYTES # BLD AUTO: 0.65 10*3/MM3 (ref 0.7–3.1)
LYMPHOCYTES # BLD AUTO: 0.68 10*3/MM3 (ref 1–3.5)
LYMPHOCYTES # BLD AUTO: 0.71 10*3/MM3 (ref 0.7–3.1)
LYMPHOCYTES NFR BLD AUTO: 16.1 % (ref 19.6–45.3)
LYMPHOCYTES NFR BLD AUTO: 18.6 % (ref 19.6–45.3)
LYMPHOCYTES NFR BLD AUTO: 20.2 % (ref 19.6–45.3)
LYMPHOCYTES NFR BLD AUTO: 21.5 % (ref 19.6–45.3)
LYMPHOCYTES NFR BLD AUTO: 22 % (ref 19.6–45.3)
LYMPHOCYTES NFR BLD AUTO: 22.5 % (ref 19.6–45.3)
LYMPHOCYTES NFR BLD AUTO: 25.2 % (ref 20–49)
LYMPHOCYTES NFR BLD AUTO: 25.3 % (ref 19.6–45.3)
MACROCYTES BLD QL SMEAR: NORMAL
MAGNESIUM SERPL-MCNC: 2.2 MG/DL (ref 1.6–2.4)
MAGNESIUM SERPL-MCNC: 2.6 MG/DL (ref 1.6–2.4)
MAGNESIUM SERPL-MCNC: 2.6 MG/DL (ref 1.6–2.4)
MAGNESIUM SERPL-MCNC: 2.7 MG/DL (ref 1.6–2.4)
MAGNESIUM SERPL-MCNC: 2.7 MG/DL (ref 1.6–2.4)
MCH RBC QN AUTO: 34 PG (ref 26.6–33)
MCH RBC QN AUTO: 34.1 PG (ref 27–33)
MCH RBC QN AUTO: 34.4 PG (ref 26.6–33)
MCH RBC QN AUTO: 34.9 PG (ref 26.6–33)
MCH RBC QN AUTO: 35 PG (ref 26.6–33)
MCH RBC QN AUTO: 35.1 PG (ref 26.6–33)
MCH RBC QN AUTO: 35.6 PG (ref 26.6–33)
MCH RBC QN AUTO: 35.8 PG (ref 26.6–33)
MCH RBC QN AUTO: 37.5 PG (ref 26.6–33)
MCHC RBC AUTO-ENTMCNC: 30.9 G/DL (ref 31.5–35.7)
MCHC RBC AUTO-ENTMCNC: 31.3 G/DL (ref 31.5–35.7)
MCHC RBC AUTO-ENTMCNC: 31.6 G/DL (ref 32–35)
MCHC RBC AUTO-ENTMCNC: 31.7 G/DL (ref 31.5–35.7)
MCHC RBC AUTO-ENTMCNC: 31.7 G/DL (ref 31.5–35.7)
MCHC RBC AUTO-ENTMCNC: 31.9 G/DL (ref 31.5–35.7)
MCHC RBC AUTO-ENTMCNC: 31.9 G/DL (ref 31.5–35.7)
MCHC RBC AUTO-ENTMCNC: 32.4 G/DL (ref 31.5–35.7)
MCHC RBC AUTO-ENTMCNC: 33.4 G/DL (ref 31.5–35.7)
MCV RBC AUTO: 107.9 FL (ref 83–97)
MCV RBC AUTO: 108.4 FL (ref 79–97)
MCV RBC AUTO: 109.7 FL (ref 79–97)
MCV RBC AUTO: 109.8 FL (ref 79–97)
MCV RBC AUTO: 109.9 FL (ref 79–97)
MCV RBC AUTO: 110.5 FL (ref 79–97)
MCV RBC AUTO: 111.5 FL (ref 79–97)
MCV RBC AUTO: 112 FL (ref 79–97)
MCV RBC AUTO: 112.2 FL (ref 79–97)
MONOCYTES # BLD AUTO: 0.27 10*3/MM3 (ref 0.25–0.8)
MONOCYTES # BLD AUTO: 0.3 10*3/MM3 (ref 0.1–0.9)
MONOCYTES # BLD AUTO: 0.33 10*3/MM3 (ref 0.1–0.9)
MONOCYTES # BLD AUTO: 0.37 10*3/MM3 (ref 0.1–0.9)
MONOCYTES # BLD AUTO: 0.38 10*3/MM3 (ref 0.1–0.9)
MONOCYTES # BLD AUTO: 0.39 10*3/MM3 (ref 0.1–0.9)
MONOCYTES # BLD AUTO: 0.39 10*3/MM3 (ref 0.1–0.9)
MONOCYTES # BLD AUTO: 0.4 10*3/MM3 (ref 0.1–0.9)
MONOCYTES NFR BLD AUTO: 10 % (ref 4–12)
MONOCYTES NFR BLD AUTO: 11.2 % (ref 5–12)
MONOCYTES NFR BLD AUTO: 11.2 % (ref 5–12)
MONOCYTES NFR BLD AUTO: 12.1 % (ref 5–12)
MONOCYTES NFR BLD AUTO: 12.9 % (ref 5–12)
MONOCYTES NFR BLD AUTO: 13.2 % (ref 5–12)
MONOCYTES NFR BLD AUTO: 13.6 % (ref 5–12)
MONOCYTES NFR BLD AUTO: 14.2 % (ref 5–12)
NEUTROPHILS # BLD AUTO: 1.54 10*3/MM3 (ref 1.4–7)
NEUTROPHILS # BLD AUTO: 1.56 10*3/MM3 (ref 1.5–7)
NEUTROPHILS # BLD AUTO: 1.58 10*3/MM3 (ref 1.4–7)
NEUTROPHILS # BLD AUTO: 1.79 10*3/MM3 (ref 1.4–7)
NEUTROPHILS # BLD AUTO: 1.82 10*3/MM3 (ref 1.4–7)
NEUTROPHILS # BLD AUTO: 2.37 10*3/MM3 (ref 1.4–7)
NEUTROPHILS NFR BLD AUTO: 54.7 % (ref 42.7–76)
NEUTROPHILS NFR BLD AUTO: 57.7 % (ref 39–75)
NEUTROPHILS NFR BLD AUTO: 57.8 % (ref 42.7–76)
NEUTROPHILS NFR BLD AUTO: 59 % (ref 42.7–76)
NEUTROPHILS NFR BLD AUTO: 59.3 % (ref 42.7–76)
NEUTROPHILS NFR BLD AUTO: 59.3 % (ref 42.7–76)
NEUTROPHILS NFR BLD AUTO: 61.7 % (ref 42.7–76)
NEUTROPHILS NFR BLD AUTO: 68.1 % (ref 42.7–76)
NITRITE UR QL STRIP: NEGATIVE
NRBC BLD AUTO-RTO: 0 /100 WBC (ref 0–0)
NRBC BLD AUTO-RTO: 0.4 /100 WBC (ref 0–0)
NRBC BLD AUTO-RTO: 0.4 /100 WBC (ref 0–0)
NT-PROBNP SERPL-MCNC: 1030 PG/ML (ref 5–1800)
PH UR STRIP.AUTO: <=5 [PH] (ref 5–8)
PHOSPHATE SERPL-MCNC: 4.1 MG/DL (ref 2.5–4.5)
PHOSPHATE SERPL-MCNC: 4.6 MG/DL (ref 2.5–4.5)
PHOSPHATE SERPL-MCNC: 5.2 MG/DL (ref 2.5–4.5)
PHOSPHATE SERPL-MCNC: 5.6 MG/DL (ref 2.5–4.5)
PHOSPHATE SERPL-MCNC: 5.9 MG/DL (ref 2.5–4.5)
PHOSPHATE SERPL-MCNC: 6.1 MG/DL (ref 2.5–4.5)
PHOSPHATE SERPL-MCNC: 6.1 MG/DL (ref 2.5–4.5)
PHOSPHATE SERPL-MCNC: 6.2 MG/DL (ref 2.5–4.5)
PLATELET # BLD AUTO: 53 10*3/MM3 (ref 140–450)
PLATELET # BLD AUTO: 53 10*3/MM3 (ref 150–375)
PLATELET # BLD AUTO: 62 10*3/MM3 (ref 140–450)
PLATELET # BLD AUTO: 70 10*3/MM3 (ref 140–450)
PLATELET # BLD AUTO: 70 10*3/MM3 (ref 140–450)
PLATELET # BLD AUTO: 73 10*3/MM3 (ref 140–450)
PLATELET # BLD AUTO: 75 10*3/MM3 (ref 140–450)
PLATELET # BLD AUTO: 80 10*3/MM3 (ref 140–450)
PLATELET # BLD AUTO: 80 10*3/MM3 (ref 140–450)
PMV BLD AUTO: 10.5 FL (ref 6–12)
PMV BLD AUTO: 10.6 FL (ref 8.9–12.1)
PMV BLD AUTO: 12.2 FL (ref 6–12)
PMV BLD AUTO: ABNORMAL FL (ref 6–12)
POTASSIUM BLD-SCNC: 3.5 MMOL/L (ref 3.5–5.2)
POTASSIUM BLD-SCNC: 3.8 MMOL/L (ref 3.5–5.2)
POTASSIUM BLD-SCNC: 3.8 MMOL/L (ref 3.5–5.2)
POTASSIUM BLD-SCNC: 3.9 MMOL/L (ref 3.5–5.2)
POTASSIUM BLD-SCNC: 3.9 MMOL/L (ref 3.5–5.2)
POTASSIUM BLD-SCNC: 4.1 MMOL/L (ref 3.5–5.2)
POTASSIUM BLD-SCNC: 4.1 MMOL/L (ref 3.5–5.2)
POTASSIUM BLD-SCNC: 4.2 MMOL/L (ref 3.5–5.2)
POTASSIUM BLD-SCNC: 4.3 MMOL/L (ref 3.5–5.2)
POTASSIUM BLD-SCNC: 4.8 MMOL/L (ref 3.5–4.7)
POTASSIUM BLD-SCNC: 4.8 MMOL/L (ref 3.5–5.2)
PROCALCITONIN SERPL-MCNC: 1.76 NG/ML (ref 0.1–0.25)
PROT SERPL-MCNC: 5.5 G/DL (ref 6–8.5)
PROT SERPL-MCNC: 5.7 G/DL (ref 6–8.5)
PROT SERPL-MCNC: 5.9 G/DL (ref 6–8.5)
PROT SERPL-MCNC: 6.3 G/DL (ref 6.3–8)
PROT SERPL-MCNC: 6.5 G/DL (ref 6–8.5)
PROT UR QL STRIP: NEGATIVE
PROT UR-MCNC: 16 MG/DL
PROT/CREAT UR: 239.5 MG/G CREA (ref 0–200)
PROTHROMBIN TIME: 21.3 SECONDS (ref 11.7–14.2)
RBC # BLD AUTO: 2.67 10*6/MM3 (ref 3.9–5)
RBC # BLD AUTO: 2.95 10*6/MM3 (ref 3.77–5.28)
RBC # BLD AUTO: 2.96 10*6/MM3 (ref 3.77–5.28)
RBC # BLD AUTO: 3.02 10*6/MM3 (ref 3.77–5.28)
RBC # BLD AUTO: 3.06 10*6/MM3 (ref 3.77–5.28)
RBC # BLD AUTO: 3.08 10*6/MM3 (ref 3.77–5.28)
RBC # BLD AUTO: 3.09 10*6/MM3 (ref 3.77–5.28)
RBC # BLD AUTO: 3.15 10*6/MM3 (ref 3.77–5.28)
RBC # BLD AUTO: 3.16 10*6/MM3 (ref 3.77–5.28)
RBC # UR: NORMAL /HPF
REF LAB TEST METHOD: NORMAL
SODIUM BLD-SCNC: 133 MMOL/L (ref 136–145)
SODIUM BLD-SCNC: 136 MMOL/L (ref 136–145)
SODIUM BLD-SCNC: 137 MMOL/L (ref 136–145)
SODIUM BLD-SCNC: 138 MMOL/L (ref 136–145)
SODIUM BLD-SCNC: 139 MMOL/L (ref 136–145)
SODIUM BLD-SCNC: 140 MMOL/L (ref 136–145)
SODIUM BLD-SCNC: 140 MMOL/L (ref 136–145)
SODIUM BLD-SCNC: 141 MMOL/L (ref 134–145)
SODIUM BLD-SCNC: 142 MMOL/L (ref 136–145)
SODIUM UR-SCNC: 32 MMOL/L
SP GR UR STRIP: 1.02 (ref 1–1.03)
SQUAMOUS #/AREA URNS HPF: NORMAL /HPF
TIBC SERPL-MCNC: 273 MCG/DL (ref 249–505)
TRANSFERRIN SERPL-MCNC: 195 MG/DL (ref 200–360)
TROPONIN T SERPL-MCNC: 0.03 NG/ML (ref 0–0.03)
URATE SERPL-MCNC: 4.5 MG/DL (ref 2.4–5.7)
URATE SERPL-MCNC: 4.8 MG/DL (ref 2.4–5.7)
URATE SERPL-MCNC: 4.9 MG/DL (ref 2.4–5.7)
URATE SERPL-MCNC: 4.9 MG/DL (ref 2.4–5.7)
UROBILINOGEN UR QL STRIP: ABNORMAL
WBC # BLD AUTO: 2.67 10*3/MM3 (ref 3.4–10.8)
WBC MORPH BLD: NORMAL
WBC NRBC COR # BLD: 2.67 10*3/MM3 (ref 3.4–10.8)
WBC NRBC COR # BLD: 2.67 10*3/MM3 (ref 3.4–10.8)
WBC NRBC COR # BLD: 2.7 10*3/MM3 (ref 4–10)
WBC NRBC COR # BLD: 2.73 10*3/MM3 (ref 3.4–10.8)
WBC NRBC COR # BLD: 2.81 10*3/MM3 (ref 3.4–10.8)
WBC NRBC COR # BLD: 2.89 10*3/MM3 (ref 3.4–10.8)
WBC NRBC COR # BLD: 2.95 10*3/MM3 (ref 3.4–10.8)
WBC NRBC COR # BLD: 3.03 10*3/MM3 (ref 3.4–10.8)
WBC NRBC COR # BLD: 3.48 10*3/MM3 (ref 3.4–10.8)
WBC UR QL AUTO: NORMAL /HPF
WHOLE BLOOD HOLD SPECIMEN: NORMAL
WHOLE BLOOD HOLD SPECIMEN: NORMAL

## 2019-01-01 PROCEDURE — 71045 X-RAY EXAM CHEST 1 VIEW: CPT

## 2019-01-01 PROCEDURE — 77001 FLUOROGUIDE FOR VEIN DEVICE: CPT

## 2019-01-01 PROCEDURE — 25010000002 HEPARIN (PORCINE) PER 1000 UNITS: Performed by: INTERNAL MEDICINE

## 2019-01-01 PROCEDURE — 63710000001 INSULIN LISPRO PROTAMINE-INSULIN LISPRO (75-25) 100 UNIT/ML SUSPENSION 10 ML VIAL: Performed by: HOSPITALIST

## 2019-01-01 PROCEDURE — 25010000002 LORAZEPAM PER 2 MG: Performed by: INTERNAL MEDICINE

## 2019-01-01 PROCEDURE — 82962 GLUCOSE BLOOD TEST: CPT

## 2019-01-01 PROCEDURE — B518ZZA FLUOROSCOPY OF SUPERIOR VENA CAVA, GUIDANCE: ICD-10-PCS | Performed by: SURGERY

## 2019-01-01 PROCEDURE — 97535 SELF CARE MNGMENT TRAINING: CPT

## 2019-01-01 PROCEDURE — 80048 BASIC METABOLIC PNL TOTAL CA: CPT | Performed by: INTERNAL MEDICINE

## 2019-01-01 PROCEDURE — 82728 ASSAY OF FERRITIN: CPT | Performed by: INTERNAL MEDICINE

## 2019-01-01 PROCEDURE — 85025 COMPLETE CBC W/AUTO DIFF WBC: CPT | Performed by: HOSPITALIST

## 2019-01-01 PROCEDURE — 83605 ASSAY OF LACTIC ACID: CPT | Performed by: EMERGENCY MEDICINE

## 2019-01-01 PROCEDURE — 84550 ASSAY OF BLOOD/URIC ACID: CPT | Performed by: INTERNAL MEDICINE

## 2019-01-01 PROCEDURE — 80069 RENAL FUNCTION PANEL: CPT | Performed by: INTERNAL MEDICINE

## 2019-01-01 PROCEDURE — 36415 COLL VENOUS BLD VENIPUNCTURE: CPT | Performed by: EMERGENCY MEDICINE

## 2019-01-01 PROCEDURE — 83735 ASSAY OF MAGNESIUM: CPT | Performed by: INTERNAL MEDICINE

## 2019-01-01 PROCEDURE — 25010000002 MORPHINE PER 10 MG: Performed by: INTERNAL MEDICINE

## 2019-01-01 PROCEDURE — 87340 HEPATITIS B SURFACE AG IA: CPT | Performed by: INTERNAL MEDICINE

## 2019-01-01 PROCEDURE — C1750 CATH, HEMODIALYSIS,LONG-TERM: HCPCS | Performed by: SURGERY

## 2019-01-01 PROCEDURE — 84100 ASSAY OF PHOSPHORUS: CPT | Performed by: INTERNAL MEDICINE

## 2019-01-01 PROCEDURE — 84466 ASSAY OF TRANSFERRIN: CPT | Performed by: INTERNAL MEDICINE

## 2019-01-01 PROCEDURE — 63710000001 INSULIN LISPRO (HUMAN) PER 5 UNITS: Performed by: HOSPITALIST

## 2019-01-01 PROCEDURE — 84300 ASSAY OF URINE SODIUM: CPT | Performed by: INTERNAL MEDICINE

## 2019-01-01 PROCEDURE — 85025 COMPLETE CBC W/AUTO DIFF WBC: CPT | Performed by: INTERNAL MEDICINE

## 2019-01-01 PROCEDURE — 80076 HEPATIC FUNCTION PANEL: CPT | Performed by: NURSE PRACTITIONER

## 2019-01-01 PROCEDURE — 25010000002 HEPARIN (PORCINE) PER 1000 UNITS: Performed by: SURGERY

## 2019-01-01 PROCEDURE — 82140 ASSAY OF AMMONIA: CPT | Performed by: EMERGENCY MEDICINE

## 2019-01-01 PROCEDURE — 70450 CT HEAD/BRAIN W/O DYE: CPT

## 2019-01-01 PROCEDURE — 82570 ASSAY OF URINE CREATININE: CPT | Performed by: INTERNAL MEDICINE

## 2019-01-01 PROCEDURE — 87040 BLOOD CULTURE FOR BACTERIA: CPT | Performed by: EMERGENCY MEDICINE

## 2019-01-01 PROCEDURE — 84484 ASSAY OF TROPONIN QUANT: CPT | Performed by: EMERGENCY MEDICINE

## 2019-01-01 PROCEDURE — 97110 THERAPEUTIC EXERCISES: CPT

## 2019-01-01 PROCEDURE — 81001 URINALYSIS AUTO W/SCOPE: CPT | Performed by: HOSPITALIST

## 2019-01-01 PROCEDURE — 99213 OFFICE O/P EST LOW 20 MIN: CPT | Performed by: INTERNAL MEDICINE

## 2019-01-01 PROCEDURE — 83540 ASSAY OF IRON: CPT | Performed by: INTERNAL MEDICINE

## 2019-01-01 PROCEDURE — 80069 RENAL FUNCTION PANEL: CPT | Performed by: HOSPITALIST

## 2019-01-01 PROCEDURE — 02HV33Z INSERTION OF INFUSION DEVICE INTO SUPERIOR VENA CAVA, PERCUTANEOUS APPROACH: ICD-10-PCS | Performed by: SURGERY

## 2019-01-01 PROCEDURE — 97162 PT EVAL MOD COMPLEX 30 MIN: CPT

## 2019-01-01 PROCEDURE — 84145 PROCALCITONIN (PCT): CPT | Performed by: EMERGENCY MEDICINE

## 2019-01-01 PROCEDURE — 82436 ASSAY OF URINE CHLORIDE: CPT | Performed by: INTERNAL MEDICINE

## 2019-01-01 PROCEDURE — 5A1D70Z PERFORMANCE OF URINARY FILTRATION, INTERMITTENT, LESS THAN 6 HOURS PER DAY: ICD-10-PCS | Performed by: INTERNAL MEDICINE

## 2019-01-01 PROCEDURE — 85025 COMPLETE CBC W/AUTO DIFF WBC: CPT | Performed by: EMERGENCY MEDICINE

## 2019-01-01 PROCEDURE — 83880 ASSAY OF NATRIURETIC PEPTIDE: CPT | Performed by: EMERGENCY MEDICINE

## 2019-01-01 PROCEDURE — 85027 COMPLETE CBC AUTOMATED: CPT | Performed by: HOSPITALIST

## 2019-01-01 PROCEDURE — 84156 ASSAY OF PROTEIN URINE: CPT | Performed by: INTERNAL MEDICINE

## 2019-01-01 PROCEDURE — 83735 ASSAY OF MAGNESIUM: CPT | Performed by: HOSPITALIST

## 2019-01-01 PROCEDURE — 25010000002 PROPOFOL 10 MG/ML EMULSION: Performed by: NURSE ANESTHETIST, CERTIFIED REGISTERED

## 2019-01-01 PROCEDURE — 80053 COMPREHEN METABOLIC PANEL: CPT | Performed by: INTERNAL MEDICINE

## 2019-01-01 PROCEDURE — 80053 COMPREHEN METABOLIC PANEL: CPT | Performed by: EMERGENCY MEDICINE

## 2019-01-01 PROCEDURE — 84550 ASSAY OF BLOOD/URIC ACID: CPT | Performed by: HOSPITALIST

## 2019-01-01 PROCEDURE — 93010 ELECTROCARDIOGRAM REPORT: CPT | Performed by: INTERNAL MEDICINE

## 2019-01-01 PROCEDURE — 25010000003 CEFAZOLIN IN DEXTROSE 2-4 GM/100ML-% SOLUTION: Performed by: SURGERY

## 2019-01-01 PROCEDURE — 36415 COLL VENOUS BLD VENIPUNCTURE: CPT | Performed by: INTERNAL MEDICINE

## 2019-01-01 PROCEDURE — 97530 THERAPEUTIC ACTIVITIES: CPT

## 2019-01-01 PROCEDURE — 99222 1ST HOSP IP/OBS MODERATE 55: CPT | Performed by: INTERNAL MEDICINE

## 2019-01-01 PROCEDURE — 82140 ASSAY OF AMMONIA: CPT | Performed by: NURSE PRACTITIONER

## 2019-01-01 PROCEDURE — 85610 PROTHROMBIN TIME: CPT | Performed by: EMERGENCY MEDICINE

## 2019-01-01 PROCEDURE — 97166 OT EVAL MOD COMPLEX 45 MIN: CPT

## 2019-01-01 PROCEDURE — 93005 ELECTROCARDIOGRAM TRACING: CPT | Performed by: EMERGENCY MEDICINE

## 2019-01-01 PROCEDURE — 85007 BL SMEAR W/DIFF WBC COUNT: CPT | Performed by: EMERGENCY MEDICINE

## 2019-01-01 PROCEDURE — 25010000002 ONDANSETRON PER 1 MG: Performed by: HOSPITALIST

## 2019-01-01 PROCEDURE — 99285 EMERGENCY DEPT VISIT HI MDM: CPT

## 2019-01-01 PROCEDURE — 25010000002 ALTEPLASE 2 MG RECONSTITUTED SOLUTION: Performed by: INTERNAL MEDICINE

## 2019-01-01 RX ORDER — HEPARIN SODIUM 5000 [USP'U]/ML
5000 INJECTION, SOLUTION INTRAVENOUS; SUBCUTANEOUS EVERY 12 HOURS SCHEDULED
Status: DISCONTINUED | OUTPATIENT
Start: 2019-01-01 | End: 2019-01-01 | Stop reason: HOSPADM

## 2019-01-01 RX ORDER — BUMETANIDE 2 MG/1
2 TABLET ORAL 2 TIMES DAILY
Status: CANCELLED | OUTPATIENT
Start: 2019-01-01

## 2019-01-01 RX ORDER — NICOTINE POLACRILEX 4 MG
15 LOZENGE BUCCAL
Status: DISCONTINUED | OUTPATIENT
Start: 2019-01-01 | End: 2019-01-01 | Stop reason: HOSPADM

## 2019-01-01 RX ORDER — LORAZEPAM 2 MG/ML
1 INJECTION INTRAMUSCULAR
Status: CANCELLED | OUTPATIENT
Start: 2019-01-01 | End: 2019-04-23

## 2019-01-01 RX ORDER — ALLOPURINOL 100 MG/1
100 TABLET ORAL 2 TIMES DAILY
COMMUNITY

## 2019-01-01 RX ORDER — HYDROMORPHONE HYDROCHLORIDE 1 MG/ML
0.5 INJECTION, SOLUTION INTRAMUSCULAR; INTRAVENOUS; SUBCUTANEOUS
Status: DISCONTINUED | OUTPATIENT
Start: 2019-01-01 | End: 2019-01-01 | Stop reason: HOSPADM

## 2019-01-01 RX ORDER — CHOLECALCIFEROL (VITAMIN D3) 125 MCG
10 CAPSULE ORAL NIGHTLY PRN
Status: DISCONTINUED | OUTPATIENT
Start: 2019-01-01 | End: 2019-01-01 | Stop reason: HOSPADM

## 2019-01-01 RX ORDER — LORAZEPAM 2 MG/ML
0.5 CONCENTRATE ORAL
Status: DISCONTINUED | OUTPATIENT
Start: 2019-01-01 | End: 2019-01-01 | Stop reason: HOSPADM

## 2019-01-01 RX ORDER — HYDROMORPHONE HCL 110MG/55ML
1.5 PATIENT CONTROLLED ANALGESIA SYRINGE INTRAVENOUS
Status: DISCONTINUED | OUTPATIENT
Start: 2019-01-01 | End: 2019-01-01 | Stop reason: HOSPADM

## 2019-01-01 RX ORDER — PROPOFOL 10 MG/ML
VIAL (ML) INTRAVENOUS CONTINUOUS PRN
Status: DISCONTINUED | OUTPATIENT
Start: 2019-01-01 | End: 2019-01-01 | Stop reason: SURG

## 2019-01-01 RX ORDER — ALBUMIN (HUMAN) 12.5 G/50ML
12.5 SOLUTION INTRAVENOUS AS NEEDED
Status: ACTIVE | OUTPATIENT
Start: 2019-01-01 | End: 2019-01-01

## 2019-01-01 RX ORDER — QUETIAPINE FUMARATE 50 MG/1
50 TABLET, FILM COATED ORAL NIGHTLY
Status: DISCONTINUED | OUTPATIENT
Start: 2019-01-01 | End: 2019-01-01

## 2019-01-01 RX ORDER — MORPHINE SULFATE 20 MG/ML
5 SOLUTION ORAL
Status: DISCONTINUED | OUTPATIENT
Start: 2019-01-01 | End: 2019-01-01 | Stop reason: HOSPADM

## 2019-01-01 RX ORDER — DIPHENOXYLATE HYDROCHLORIDE AND ATROPINE SULFATE 2.5; .025 MG/1; MG/1
1 TABLET ORAL
Status: DISCONTINUED | OUTPATIENT
Start: 2019-01-01 | End: 2019-01-01 | Stop reason: HOSPADM

## 2019-01-01 RX ORDER — MORPHINE SULFATE 20 MG/ML
10 SOLUTION ORAL
Status: DISCONTINUED | OUTPATIENT
Start: 2019-01-01 | End: 2019-01-01 | Stop reason: HOSPADM

## 2019-01-01 RX ORDER — MORPHINE SULFATE 20 MG/ML
5 SOLUTION ORAL
Status: CANCELLED | OUTPATIENT
Start: 2019-01-01 | End: 2019-04-23

## 2019-01-01 RX ORDER — LORAZEPAM 2 MG/ML
0.5 CONCENTRATE ORAL
Status: CANCELLED | OUTPATIENT
Start: 2019-01-01 | End: 2019-04-23

## 2019-01-01 RX ORDER — SERTRALINE HYDROCHLORIDE 25 MG/1
25 TABLET, FILM COATED ORAL DAILY
Status: DISCONTINUED | OUTPATIENT
Start: 2019-01-01 | End: 2019-01-01 | Stop reason: HOSPADM

## 2019-01-01 RX ORDER — ALLOPURINOL 100 MG/1
100 TABLET ORAL 2 TIMES DAILY
Status: DISCONTINUED | OUTPATIENT
Start: 2019-01-01 | End: 2019-01-01

## 2019-01-01 RX ORDER — ACETAMINOPHEN 325 MG/1
650 TABLET ORAL EVERY 4 HOURS PRN
Status: CANCELLED | OUTPATIENT
Start: 2019-01-01

## 2019-01-01 RX ORDER — DEXTROSE MONOHYDRATE 25 G/50ML
25 INJECTION, SOLUTION INTRAVENOUS
Status: DISCONTINUED | OUTPATIENT
Start: 2019-01-01 | End: 2019-01-01 | Stop reason: HOSPADM

## 2019-01-01 RX ORDER — LORAZEPAM 2 MG/ML
2 INJECTION INTRAMUSCULAR
Status: DISCONTINUED | OUTPATIENT
Start: 2019-01-01 | End: 2019-01-01 | Stop reason: HOSPADM

## 2019-01-01 RX ORDER — LACTULOSE 10 G/15ML
30 SOLUTION ORAL DAILY
Status: CANCELLED | OUTPATIENT
Start: 2019-01-01

## 2019-01-01 RX ORDER — PANTOPRAZOLE SODIUM 40 MG/1
40 TABLET, DELAYED RELEASE ORAL
Status: CANCELLED | OUTPATIENT
Start: 2019-01-01

## 2019-01-01 RX ORDER — HYDROCODONE BITARTRATE AND ACETAMINOPHEN 7.5; 325 MG/1; MG/1
1 TABLET ORAL ONCE AS NEEDED
Status: DISCONTINUED | OUTPATIENT
Start: 2019-01-01 | End: 2019-01-01 | Stop reason: HOSPADM

## 2019-01-01 RX ORDER — MULTIPLE VITAMINS W/ MINERALS TAB 9MG-400MCG
1 TAB ORAL DAILY
COMMUNITY

## 2019-01-01 RX ORDER — LANOLIN ALCOHOL/MO/W.PET/CERES
1000 CREAM (GRAM) TOPICAL DAILY
COMMUNITY

## 2019-01-01 RX ORDER — LORAZEPAM 2 MG/ML
2 INJECTION INTRAMUSCULAR
Status: CANCELLED | OUTPATIENT
Start: 2019-01-01 | End: 2019-04-23

## 2019-01-01 RX ORDER — LACTULOSE 10 G/15ML
30 SOLUTION ORAL DAILY
Status: DISCONTINUED | OUTPATIENT
Start: 2019-01-01 | End: 2019-01-01 | Stop reason: HOSPADM

## 2019-01-01 RX ORDER — LORAZEPAM 2 MG/ML
2 CONCENTRATE ORAL
Status: DISCONTINUED | OUTPATIENT
Start: 2019-01-01 | End: 2019-01-01 | Stop reason: HOSPADM

## 2019-01-01 RX ORDER — QUETIAPINE FUMARATE 50 MG/1
50 TABLET, FILM COATED ORAL NIGHTLY
Status: CANCELLED | OUTPATIENT
Start: 2019-01-01

## 2019-01-01 RX ORDER — LORAZEPAM 2 MG/ML
1 CONCENTRATE ORAL
Status: CANCELLED | OUTPATIENT
Start: 2019-01-01 | End: 2019-04-23

## 2019-01-01 RX ORDER — SERTRALINE HYDROCHLORIDE 25 MG/1
25 TABLET, FILM COATED ORAL DAILY
Status: DISCONTINUED | OUTPATIENT
Start: 2019-01-01 | End: 2019-01-01

## 2019-01-01 RX ORDER — GLYCOPYRROLATE 0.2 MG/ML
0.4 INJECTION INTRAMUSCULAR; INTRAVENOUS
Status: DISCONTINUED | OUTPATIENT
Start: 2019-01-01 | End: 2019-01-01 | Stop reason: HOSPADM

## 2019-01-01 RX ORDER — SODIUM CHLORIDE 0.9 % (FLUSH) 0.9 %
10 SYRINGE (ML) INJECTION AS NEEDED
Status: DISCONTINUED | OUTPATIENT
Start: 2019-01-01 | End: 2019-01-01

## 2019-01-01 RX ORDER — SCOLOPAMINE TRANSDERMAL SYSTEM 1 MG/1
1 PATCH, EXTENDED RELEASE TRANSDERMAL
Status: DISCONTINUED | OUTPATIENT
Start: 2019-01-01 | End: 2019-01-01 | Stop reason: HOSPADM

## 2019-01-01 RX ORDER — GLYCOPYRROLATE 0.2 MG/ML
0.2 INJECTION INTRAMUSCULAR; INTRAVENOUS
Status: CANCELLED | OUTPATIENT
Start: 2019-01-01

## 2019-01-01 RX ORDER — LORAZEPAM 2 MG/ML
0.5 INJECTION INTRAMUSCULAR
Status: CANCELLED | OUTPATIENT
Start: 2019-01-01 | End: 2019-04-23

## 2019-01-01 RX ORDER — LIDOCAINE HYDROCHLORIDE 20 MG/ML
INJECTION, SOLUTION INFILTRATION; PERINEURAL AS NEEDED
Status: DISCONTINUED | OUTPATIENT
Start: 2019-01-01 | End: 2019-01-01 | Stop reason: SURG

## 2019-01-01 RX ORDER — FENTANYL CITRATE 50 UG/ML
50 INJECTION, SOLUTION INTRAMUSCULAR; INTRAVENOUS
Status: DISCONTINUED | OUTPATIENT
Start: 2019-01-01 | End: 2019-01-01 | Stop reason: HOSPADM

## 2019-01-01 RX ORDER — MORPHINE SULFATE 10 MG/ML
6 INJECTION INTRAMUSCULAR; INTRAVENOUS; SUBCUTANEOUS
Status: DISCONTINUED | OUTPATIENT
Start: 2019-01-01 | End: 2019-01-01 | Stop reason: HOSPADM

## 2019-01-01 RX ORDER — GLYCOPYRROLATE 0.2 MG/ML
0.2 INJECTION INTRAMUSCULAR; INTRAVENOUS
Status: DISCONTINUED | OUTPATIENT
Start: 2019-01-01 | End: 2019-01-01 | Stop reason: HOSPADM

## 2019-01-01 RX ORDER — ZINC OXIDE 13 %
1 CREAM (GRAM) TOPICAL DAILY
COMMUNITY

## 2019-01-01 RX ORDER — LORAZEPAM 2 MG/ML
2 CONCENTRATE ORAL
Status: CANCELLED | OUTPATIENT
Start: 2019-01-01 | End: 2019-04-23

## 2019-01-01 RX ORDER — QUETIAPINE FUMARATE 25 MG/1
25 TABLET, FILM COATED ORAL
Status: CANCELLED | OUTPATIENT
Start: 2019-01-01

## 2019-01-01 RX ORDER — ONDANSETRON 2 MG/ML
4 INJECTION INTRAMUSCULAR; INTRAVENOUS EVERY 6 HOURS PRN
Status: DISCONTINUED | OUTPATIENT
Start: 2019-01-01 | End: 2019-01-01 | Stop reason: HOSPADM

## 2019-01-01 RX ORDER — CEFAZOLIN SODIUM 2 G/100ML
2 INJECTION, SOLUTION INTRAVENOUS ONCE
Status: COMPLETED | OUTPATIENT
Start: 2019-01-01 | End: 2019-01-01

## 2019-01-01 RX ORDER — ACETAMINOPHEN 160 MG/5ML
650 SOLUTION ORAL EVERY 4 HOURS PRN
Status: CANCELLED | OUTPATIENT
Start: 2019-01-01

## 2019-01-01 RX ORDER — ALLOPURINOL 100 MG/1
100 TABLET ORAL DAILY
Status: DISCONTINUED | OUTPATIENT
Start: 2019-01-01 | End: 2019-01-01 | Stop reason: HOSPADM

## 2019-01-01 RX ORDER — ALLOPURINOL 100 MG/1
100 TABLET ORAL DAILY
Status: DISCONTINUED | OUTPATIENT
Start: 2019-01-01 | End: 2019-01-01

## 2019-01-01 RX ORDER — FAMOTIDINE 10 MG/ML
20 INJECTION, SOLUTION INTRAVENOUS ONCE
Status: COMPLETED | OUTPATIENT
Start: 2019-01-01 | End: 2019-01-01

## 2019-01-01 RX ORDER — QUETIAPINE FUMARATE 25 MG/1
50 TABLET, FILM COATED ORAL NIGHTLY
COMMUNITY

## 2019-01-01 RX ORDER — DIPHENOXYLATE HYDROCHLORIDE AND ATROPINE SULFATE 2.5; .025 MG/1; MG/1
1 TABLET ORAL
Status: CANCELLED | OUTPATIENT
Start: 2019-01-01

## 2019-01-01 RX ORDER — MORPHINE SULFATE 20 MG/ML
20 SOLUTION ORAL
Status: CANCELLED | OUTPATIENT
Start: 2019-01-01 | End: 2019-04-23

## 2019-01-01 RX ORDER — HEPARIN SODIUM 1000 [USP'U]/ML
INJECTION, SOLUTION INTRAVENOUS; SUBCUTANEOUS AS NEEDED
Status: DISCONTINUED | OUTPATIENT
Start: 2019-01-01 | End: 2019-01-01 | Stop reason: HOSPADM

## 2019-01-01 RX ORDER — BUMETANIDE 2 MG/1
2 TABLET ORAL 2 TIMES DAILY
Status: DISCONTINUED | OUTPATIENT
Start: 2019-01-01 | End: 2019-01-01

## 2019-01-01 RX ORDER — SODIUM CHLORIDE 0.9 % (FLUSH) 0.9 %
1-10 SYRINGE (ML) INJECTION AS NEEDED
Status: DISCONTINUED | OUTPATIENT
Start: 2019-01-01 | End: 2019-01-01 | Stop reason: HOSPADM

## 2019-01-01 RX ORDER — QUETIAPINE FUMARATE 50 MG/1
50 TABLET, FILM COATED ORAL NIGHTLY
Status: DISCONTINUED | OUTPATIENT
Start: 2019-01-01 | End: 2019-01-01 | Stop reason: HOSPADM

## 2019-01-01 RX ORDER — ALLOPURINOL 100 MG/1
100 TABLET ORAL DAILY
Status: CANCELLED | OUTPATIENT
Start: 2019-01-01

## 2019-01-01 RX ORDER — FAMOTIDINE 20 MG/1
20 TABLET, FILM COATED ORAL DAILY
Status: DISCONTINUED | OUTPATIENT
Start: 2019-01-01 | End: 2019-01-01

## 2019-01-01 RX ORDER — EPHEDRINE SULFATE 50 MG/ML
5 INJECTION, SOLUTION INTRAVENOUS ONCE AS NEEDED
Status: DISCONTINUED | OUTPATIENT
Start: 2019-01-01 | End: 2019-01-01 | Stop reason: HOSPADM

## 2019-01-01 RX ORDER — HEPARIN SODIUM 1000 [USP'U]/ML
3600 INJECTION, SOLUTION INTRAVENOUS; SUBCUTANEOUS ONCE
Status: COMPLETED | OUTPATIENT
Start: 2019-01-01 | End: 2019-01-01

## 2019-01-01 RX ORDER — QUETIAPINE FUMARATE 25 MG/1
25 TABLET, FILM COATED ORAL
COMMUNITY

## 2019-01-01 RX ORDER — PHENOL 1.4 %
10 AEROSOL, SPRAY (ML) MUCOUS MEMBRANE NIGHTLY
COMMUNITY

## 2019-01-01 RX ORDER — LIDOCAINE HYDROCHLORIDE 10 MG/ML
0.5 INJECTION, SOLUTION EPIDURAL; INFILTRATION; INTRACAUDAL; PERINEURAL ONCE AS NEEDED
Status: DISCONTINUED | OUTPATIENT
Start: 2019-01-01 | End: 2019-01-01 | Stop reason: HOSPADM

## 2019-01-01 RX ORDER — ACETAMINOPHEN 325 MG/1
650 TABLET ORAL EVERY 4 HOURS PRN
Status: DISCONTINUED | OUTPATIENT
Start: 2019-01-01 | End: 2019-01-01 | Stop reason: HOSPADM

## 2019-01-01 RX ORDER — MORPHINE SULFATE 20 MG/ML
10 SOLUTION ORAL
Status: CANCELLED | OUTPATIENT
Start: 2019-01-01 | End: 2019-04-23

## 2019-01-01 RX ORDER — ONDANSETRON 4 MG/1
4 TABLET, FILM COATED ORAL EVERY 6 HOURS PRN
Status: DISCONTINUED | OUTPATIENT
Start: 2019-01-01 | End: 2019-01-01 | Stop reason: HOSPADM

## 2019-01-01 RX ORDER — MIDAZOLAM HYDROCHLORIDE 1 MG/ML
1 INJECTION INTRAMUSCULAR; INTRAVENOUS
Status: DISCONTINUED | OUTPATIENT
Start: 2019-01-01 | End: 2019-01-01 | Stop reason: HOSPADM

## 2019-01-01 RX ORDER — SCOLOPAMINE TRANSDERMAL SYSTEM 1 MG/1
1 PATCH, EXTENDED RELEASE TRANSDERMAL
Status: CANCELLED | OUTPATIENT
Start: 2019-01-01

## 2019-01-01 RX ORDER — ACETAMINOPHEN 650 MG/1
650 SUPPOSITORY RECTAL EVERY 4 HOURS PRN
Status: DISCONTINUED | OUTPATIENT
Start: 2019-01-01 | End: 2019-01-01 | Stop reason: HOSPADM

## 2019-01-01 RX ORDER — MORPHINE SULFATE 2 MG/ML
2 INJECTION, SOLUTION INTRAMUSCULAR; INTRAVENOUS
Status: DISCONTINUED | OUTPATIENT
Start: 2019-01-01 | End: 2019-01-01 | Stop reason: HOSPADM

## 2019-01-01 RX ORDER — NALOXONE HCL 0.4 MG/ML
0.2 VIAL (ML) INJECTION AS NEEDED
Status: DISCONTINUED | OUTPATIENT
Start: 2019-01-01 | End: 2019-01-01 | Stop reason: HOSPADM

## 2019-01-01 RX ORDER — ACETAMINOPHEN 160 MG/5ML
650 SOLUTION ORAL EVERY 4 HOURS PRN
Status: DISCONTINUED | OUTPATIENT
Start: 2019-01-01 | End: 2019-01-01 | Stop reason: HOSPADM

## 2019-01-01 RX ORDER — MORPHINE SULFATE 2 MG/ML
6 INJECTION, SOLUTION INTRAMUSCULAR; INTRAVENOUS
Status: CANCELLED | OUTPATIENT
Start: 2019-01-01 | End: 2019-04-23

## 2019-01-01 RX ORDER — QUETIAPINE FUMARATE 25 MG/1
25 TABLET, FILM COATED ORAL
Status: DISCONTINUED | OUTPATIENT
Start: 2019-01-01 | End: 2019-01-01

## 2019-01-01 RX ORDER — TRAMADOL HYDROCHLORIDE 50 MG/1
50 TABLET ORAL EVERY 12 HOURS PRN
Status: DISCONTINUED | OUTPATIENT
Start: 2019-01-01 | End: 2019-01-01 | Stop reason: HOSPADM

## 2019-01-01 RX ORDER — MORPHINE SULFATE 2 MG/ML
2 INJECTION, SOLUTION INTRAMUSCULAR; INTRAVENOUS
Status: CANCELLED | OUTPATIENT
Start: 2019-01-01 | End: 2019-04-23

## 2019-01-01 RX ORDER — PANTOPRAZOLE SODIUM 40 MG/1
40 TABLET, DELAYED RELEASE ORAL
Status: DISCONTINUED | OUTPATIENT
Start: 2019-01-01 | End: 2019-01-01

## 2019-01-01 RX ORDER — NITROGLYCERIN 0.4 MG/1
0.4 TABLET SUBLINGUAL
Status: DISCONTINUED | OUTPATIENT
Start: 2019-01-01 | End: 2019-01-01 | Stop reason: HOSPADM

## 2019-01-01 RX ORDER — OXYCODONE AND ACETAMINOPHEN 7.5; 325 MG/1; MG/1
1 TABLET ORAL ONCE AS NEEDED
Status: DISCONTINUED | OUTPATIENT
Start: 2019-01-01 | End: 2019-01-01 | Stop reason: HOSPADM

## 2019-01-01 RX ORDER — PANTOPRAZOLE SODIUM 20 MG/1
20 TABLET, DELAYED RELEASE ORAL
Status: DISCONTINUED | OUTPATIENT
Start: 2019-01-01 | End: 2019-01-01

## 2019-01-01 RX ORDER — ACETAMINOPHEN 650 MG/1
650 SUPPOSITORY RECTAL EVERY 4 HOURS PRN
Status: CANCELLED | OUTPATIENT
Start: 2019-01-01

## 2019-01-01 RX ORDER — PANTOPRAZOLE SODIUM 40 MG/1
40 TABLET, DELAYED RELEASE ORAL
Status: DISCONTINUED | OUTPATIENT
Start: 2019-01-01 | End: 2019-01-01 | Stop reason: HOSPADM

## 2019-01-01 RX ORDER — LACTULOSE 10 G/15ML
30 SOLUTION ORAL DAILY
Status: DISCONTINUED | OUTPATIENT
Start: 2019-01-01 | End: 2019-01-01

## 2019-01-01 RX ORDER — SODIUM CHLORIDE 0.9 % (FLUSH) 0.9 %
3 SYRINGE (ML) INJECTION EVERY 12 HOURS SCHEDULED
Status: DISCONTINUED | OUTPATIENT
Start: 2019-01-01 | End: 2019-01-01

## 2019-01-01 RX ORDER — SERTRALINE HYDROCHLORIDE 25 MG/1
25 TABLET, FILM COATED ORAL DAILY
Status: CANCELLED | OUTPATIENT
Start: 2019-01-01

## 2019-01-01 RX ORDER — ONDANSETRON 2 MG/ML
4 INJECTION INTRAMUSCULAR; INTRAVENOUS ONCE AS NEEDED
Status: DISCONTINUED | OUTPATIENT
Start: 2019-01-01 | End: 2019-01-01 | Stop reason: HOSPADM

## 2019-01-01 RX ORDER — FERROUS SULFATE 325(65) MG
325 TABLET ORAL DAILY
Status: DISCONTINUED | OUTPATIENT
Start: 2019-01-01 | End: 2019-01-01

## 2019-01-01 RX ORDER — MORPHINE SULFATE 2 MG/ML
4 INJECTION, SOLUTION INTRAMUSCULAR; INTRAVENOUS
Status: DISCONTINUED | OUTPATIENT
Start: 2019-01-01 | End: 2019-01-01 | Stop reason: HOSPADM

## 2019-01-01 RX ORDER — MIDAZOLAM HYDROCHLORIDE 1 MG/ML
2 INJECTION INTRAMUSCULAR; INTRAVENOUS
Status: DISCONTINUED | OUTPATIENT
Start: 2019-01-01 | End: 2019-01-01 | Stop reason: HOSPADM

## 2019-01-01 RX ORDER — MORPHINE SULFATE 2 MG/ML
4 INJECTION, SOLUTION INTRAMUSCULAR; INTRAVENOUS
Status: CANCELLED | OUTPATIENT
Start: 2019-01-01 | End: 2019-04-23

## 2019-01-01 RX ORDER — LORAZEPAM 2 MG/ML
0.5 INJECTION INTRAMUSCULAR
Status: DISCONTINUED | OUTPATIENT
Start: 2019-01-01 | End: 2019-01-01 | Stop reason: HOSPADM

## 2019-01-01 RX ORDER — GLYCOPYRROLATE 0.2 MG/ML
0.4 INJECTION INTRAMUSCULAR; INTRAVENOUS
Status: CANCELLED | OUTPATIENT
Start: 2019-01-01

## 2019-01-01 RX ORDER — LORAZEPAM 2 MG/ML
1 CONCENTRATE ORAL
Status: DISCONTINUED | OUTPATIENT
Start: 2019-01-01 | End: 2019-01-01 | Stop reason: HOSPADM

## 2019-01-01 RX ORDER — QUETIAPINE FUMARATE 25 MG/1
25 TABLET, FILM COATED ORAL
Status: DISCONTINUED | OUTPATIENT
Start: 2019-01-01 | End: 2019-01-01 | Stop reason: HOSPADM

## 2019-01-01 RX ORDER — CHOLECALCIFEROL (VITAMIN D3) 125 MCG
1000 CAPSULE ORAL DAILY
Status: DISCONTINUED | OUTPATIENT
Start: 2019-01-01 | End: 2019-01-01 | Stop reason: HOSPADM

## 2019-01-01 RX ORDER — FERROUS SULFATE 325(65) MG
325 TABLET ORAL DAILY
COMMUNITY

## 2019-01-01 RX ORDER — SERTRALINE HYDROCHLORIDE 25 MG/1
25 TABLET, FILM COATED ORAL DAILY
COMMUNITY

## 2019-01-01 RX ORDER — SODIUM CHLORIDE 9 MG/ML
9 INJECTION, SOLUTION INTRAVENOUS CONTINUOUS
Status: DISCONTINUED | OUTPATIENT
Start: 2019-01-01 | End: 2019-01-01 | Stop reason: HOSPADM

## 2019-01-01 RX ORDER — FLUMAZENIL 0.1 MG/ML
0.2 INJECTION INTRAVENOUS AS NEEDED
Status: DISCONTINUED | OUTPATIENT
Start: 2019-01-01 | End: 2019-01-01 | Stop reason: HOSPADM

## 2019-01-01 RX ORDER — HYDROMORPHONE HYDROCHLORIDE 1 MG/ML
0.5 INJECTION, SOLUTION INTRAMUSCULAR; INTRAVENOUS; SUBCUTANEOUS
Status: CANCELLED | OUTPATIENT
Start: 2019-01-01 | End: 2019-04-23

## 2019-01-01 RX ORDER — ONDANSETRON 4 MG/1
4 TABLET, ORALLY DISINTEGRATING ORAL EVERY 6 HOURS PRN
Status: DISCONTINUED | OUTPATIENT
Start: 2019-01-01 | End: 2019-01-01 | Stop reason: HOSPADM

## 2019-01-01 RX ORDER — LORAZEPAM 2 MG/ML
1 INJECTION INTRAMUSCULAR
Status: DISCONTINUED | OUTPATIENT
Start: 2019-01-01 | End: 2019-01-01 | Stop reason: HOSPADM

## 2019-01-01 RX ORDER — POTASSIUM CHLORIDE 750 MG/1
10 TABLET, FILM COATED, EXTENDED RELEASE ORAL 2 TIMES DAILY
COMMUNITY

## 2019-01-01 RX ORDER — SODIUM CHLORIDE 9 MG/ML
75 INJECTION, SOLUTION INTRAVENOUS CONTINUOUS
Status: DISCONTINUED | OUTPATIENT
Start: 2019-01-01 | End: 2019-01-01

## 2019-01-01 RX ORDER — SODIUM CHLORIDE, SODIUM LACTATE, POTASSIUM CHLORIDE, CALCIUM CHLORIDE 600; 310; 30; 20 MG/100ML; MG/100ML; MG/100ML; MG/100ML
9 INJECTION, SOLUTION INTRAVENOUS CONTINUOUS
Status: DISCONTINUED | OUTPATIENT
Start: 2019-01-01 | End: 2019-01-01

## 2019-01-01 RX ORDER — ACETAMINOPHEN 325 MG/1
650 TABLET ORAL ONCE AS NEEDED
Status: DISCONTINUED | OUTPATIENT
Start: 2019-01-01 | End: 2019-01-01 | Stop reason: HOSPADM

## 2019-01-01 RX ORDER — SODIUM CHLORIDE 0.9 % (FLUSH) 0.9 %
3-10 SYRINGE (ML) INJECTION AS NEEDED
Status: DISCONTINUED | OUTPATIENT
Start: 2019-01-01 | End: 2019-01-01 | Stop reason: HOSPADM

## 2019-01-01 RX ORDER — MORPHINE SULFATE 20 MG/ML
20 SOLUTION ORAL
Status: DISCONTINUED | OUTPATIENT
Start: 2019-01-01 | End: 2019-01-01 | Stop reason: HOSPADM

## 2019-01-01 RX ADMIN — MORPHINE SULFATE 2 MG: 2 INJECTION, SOLUTION INTRAMUSCULAR; INTRAVENOUS at 21:00

## 2019-01-01 RX ADMIN — HEPARIN SODIUM 5000 UNITS: 5000 INJECTION INTRAVENOUS; SUBCUTANEOUS at 20:12

## 2019-01-01 RX ADMIN — RIFAXIMIN 550 MG: 550 TABLET ORAL at 17:53

## 2019-01-01 RX ADMIN — QUETIAPINE FUMARATE 25 MG: 25 TABLET ORAL at 12:14

## 2019-01-01 RX ADMIN — RIFAXIMIN 550 MG: 550 TABLET ORAL at 12:01

## 2019-01-01 RX ADMIN — MORPHINE SULFATE 4 MG: 2 INJECTION, SOLUTION INTRAMUSCULAR; INTRAVENOUS at 09:49

## 2019-01-01 RX ADMIN — HEPARIN SODIUM 5000 UNITS: 5000 INJECTION INTRAVENOUS; SUBCUTANEOUS at 20:53

## 2019-01-01 RX ADMIN — HEPARIN SODIUM 5000 UNITS: 5000 INJECTION INTRAVENOUS; SUBCUTANEOUS at 20:16

## 2019-01-01 RX ADMIN — SODIUM CHLORIDE: 9 INJECTION, SOLUTION INTRAVENOUS at 08:52

## 2019-01-01 RX ADMIN — RIFAXIMIN 550 MG: 550 TABLET ORAL at 17:52

## 2019-01-01 RX ADMIN — SERTRALINE 25 MG: 25 TABLET, FILM COATED ORAL at 21:31

## 2019-01-01 RX ADMIN — QUETIAPINE FUMARATE 50 MG: 25 TABLET ORAL at 20:25

## 2019-01-01 RX ADMIN — PANTOPRAZOLE SODIUM 40 MG: 40 TABLET, DELAYED RELEASE ORAL at 05:19

## 2019-01-01 RX ADMIN — INSULIN LISPRO 2 UNITS: 100 INJECTION, SOLUTION INTRAVENOUS; SUBCUTANEOUS at 21:29

## 2019-01-01 RX ADMIN — MORPHINE SULFATE 2 MG: 2 INJECTION, SOLUTION INTRAMUSCULAR; INTRAVENOUS at 08:22

## 2019-01-01 RX ADMIN — Medication 1000 MCG: at 09:01

## 2019-01-01 RX ADMIN — INSULIN LISPRO 40 UNITS: 100 INJECTION, SUSPENSION SUBCUTANEOUS at 08:37

## 2019-01-01 RX ADMIN — SERTRALINE 25 MG: 25 TABLET, FILM COATED ORAL at 08:41

## 2019-01-01 RX ADMIN — QUETIAPINE FUMARATE 50 MG: 50 TABLET, FILM COATED ORAL at 23:03

## 2019-01-01 RX ADMIN — PANTOPRAZOLE SODIUM 40 MG: 40 TABLET, DELAYED RELEASE ORAL at 05:20

## 2019-01-01 RX ADMIN — RIFAXIMIN 550 MG: 550 TABLET ORAL at 20:33

## 2019-01-01 RX ADMIN — QUETIAPINE FUMARATE 50 MG: 50 TABLET, FILM COATED ORAL at 20:16

## 2019-01-01 RX ADMIN — BUMETANIDE 2 MG: 2 TABLET ORAL at 08:53

## 2019-01-01 RX ADMIN — QUETIAPINE FUMARATE 25 MG: 25 TABLET ORAL at 12:27

## 2019-01-01 RX ADMIN — GLYCOPYRROLATE 0.2 MG: 0.2 INJECTION, SOLUTION INTRAMUSCULAR; INTRAVENOUS at 08:30

## 2019-01-01 RX ADMIN — FAMOTIDINE 20 MG: 20 TABLET, FILM COATED ORAL at 08:35

## 2019-01-01 RX ADMIN — SODIUM CHLORIDE 500 ML: 9 INJECTION, SOLUTION INTRAVENOUS at 15:41

## 2019-01-01 RX ADMIN — MORPHINE SULFATE 2 MG: 2 INJECTION, SOLUTION INTRAMUSCULAR; INTRAVENOUS at 17:23

## 2019-01-01 RX ADMIN — BUMETANIDE 2 MG: 2 TABLET ORAL at 20:33

## 2019-01-01 RX ADMIN — INSULIN LISPRO 2 UNITS: 100 INJECTION, SOLUTION INTRAVENOUS; SUBCUTANEOUS at 20:54

## 2019-01-01 RX ADMIN — ALTEPLASE: 2.2 INJECTION, POWDER, LYOPHILIZED, FOR SOLUTION INTRAVENOUS at 15:05

## 2019-01-01 RX ADMIN — MORPHINE SULFATE 2 MG: 2 INJECTION, SOLUTION INTRAMUSCULAR; INTRAVENOUS at 04:27

## 2019-01-01 RX ADMIN — FAMOTIDINE 20 MG: 20 TABLET, FILM COATED ORAL at 09:05

## 2019-01-01 RX ADMIN — RIFAXIMIN 550 MG: 550 TABLET ORAL at 17:00

## 2019-01-01 RX ADMIN — LORAZEPAM 0.5 MG: 2 INJECTION INTRAMUSCULAR; INTRAVENOUS at 20:25

## 2019-01-01 RX ADMIN — Medication 1000 MCG: at 09:06

## 2019-01-01 RX ADMIN — INSULIN LISPRO 2 UNITS: 100 INJECTION, SOLUTION INTRAVENOUS; SUBCUTANEOUS at 17:07

## 2019-01-01 RX ADMIN — METOPROLOL TARTRATE 12.5 MG: 25 TABLET ORAL at 21:31

## 2019-01-01 RX ADMIN — INSULIN LISPRO 3 UNITS: 100 INJECTION, SOLUTION INTRAVENOUS; SUBCUTANEOUS at 18:39

## 2019-01-01 RX ADMIN — RIFAXIMIN 550 MG: 550 TABLET ORAL at 18:39

## 2019-01-01 RX ADMIN — QUETIAPINE FUMARATE 25 MG: 25 TABLET ORAL at 12:01

## 2019-01-01 RX ADMIN — SERTRALINE 25 MG: 25 TABLET, FILM COATED ORAL at 13:18

## 2019-01-01 RX ADMIN — METOPROLOL TARTRATE 12.5 MG: 25 TABLET ORAL at 20:53

## 2019-01-01 RX ADMIN — SODIUM CHLORIDE 75 ML/HR: 9 INJECTION, SOLUTION INTRAVENOUS at 15:39

## 2019-01-01 RX ADMIN — LORAZEPAM 1 MG: 2 INJECTION INTRAMUSCULAR; INTRAVENOUS at 10:27

## 2019-01-01 RX ADMIN — ALLOPURINOL 100 MG: 100 TABLET ORAL at 08:35

## 2019-01-01 RX ADMIN — ALLOPURINOL 100 MG: 100 TABLET ORAL at 20:59

## 2019-01-01 RX ADMIN — FERROUS SULFATE TAB 325 MG (65 MG ELEMENTAL FE) 325 MG: 325 (65 FE) TAB at 21:31

## 2019-01-01 RX ADMIN — METOPROLOL TARTRATE 12.5 MG: 25 TABLET ORAL at 20:42

## 2019-01-01 RX ADMIN — METOPROLOL TARTRATE 12.5 MG: 25 TABLET ORAL at 20:32

## 2019-01-01 RX ADMIN — INSULIN LISPRO 2 UNITS: 100 INJECTION, SOLUTION INTRAVENOUS; SUBCUTANEOUS at 20:53

## 2019-01-01 RX ADMIN — LACTULOSE 30 G: 10 SOLUTION ORAL at 08:34

## 2019-01-01 RX ADMIN — CEFAZOLIN SODIUM 2 G: 2 INJECTION, SOLUTION INTRAVENOUS at 09:08

## 2019-01-01 RX ADMIN — ALLOPURINOL 100 MG: 100 TABLET ORAL at 08:34

## 2019-01-01 RX ADMIN — METOPROLOL TARTRATE 12.5 MG: 25 TABLET ORAL at 20:16

## 2019-01-01 RX ADMIN — INSULIN LISPRO 40 UNITS: 100 INJECTION, SUSPENSION SUBCUTANEOUS at 09:00

## 2019-01-01 RX ADMIN — LORAZEPAM 0.5 MG: 2 INJECTION INTRAMUSCULAR; INTRAVENOUS at 05:58

## 2019-01-01 RX ADMIN — LACTULOSE 30 G: 10 SOLUTION ORAL at 09:06

## 2019-01-01 RX ADMIN — GLYCOPYRROLATE 0.2 MG: 0.2 INJECTION, SOLUTION INTRAMUSCULAR; INTRAVENOUS at 17:27

## 2019-01-01 RX ADMIN — MORPHINE SULFATE 2 MG: 2 INJECTION, SOLUTION INTRAMUSCULAR; INTRAVENOUS at 17:27

## 2019-01-01 RX ADMIN — METOPROLOL TARTRATE 12.5 MG: 25 TABLET ORAL at 20:25

## 2019-01-01 RX ADMIN — FERROUS SULFATE TAB 325 MG (65 MG ELEMENTAL FE) 325 MG: 325 (65 FE) TAB at 08:35

## 2019-01-01 RX ADMIN — SERTRALINE 25 MG: 25 TABLET, FILM COATED ORAL at 08:34

## 2019-01-01 RX ADMIN — MORPHINE SULFATE 2 MG: 2 INJECTION, SOLUTION INTRAMUSCULAR; INTRAVENOUS at 08:58

## 2019-01-01 RX ADMIN — HEPARIN SODIUM 5000 UNITS: 5000 INJECTION INTRAVENOUS; SUBCUTANEOUS at 08:34

## 2019-01-01 RX ADMIN — PROPOFOL 50 MCG/KG/MIN: 10 INJECTION, EMULSION INTRAVENOUS at 09:16

## 2019-01-01 RX ADMIN — LACTULOSE 30 G: 10 SOLUTION ORAL at 09:05

## 2019-01-01 RX ADMIN — RIFAXIMIN 550 MG: 550 TABLET ORAL at 13:19

## 2019-01-01 RX ADMIN — FAMOTIDINE 20 MG: 20 TABLET, FILM COATED ORAL at 09:06

## 2019-01-01 RX ADMIN — SERTRALINE 25 MG: 25 TABLET, FILM COATED ORAL at 09:06

## 2019-01-01 RX ADMIN — QUETIAPINE FUMARATE 25 MG: 25 TABLET ORAL at 13:19

## 2019-01-01 RX ADMIN — METOPROLOL TARTRATE 12.5 MG: 25 TABLET ORAL at 21:07

## 2019-01-01 RX ADMIN — QUETIAPINE FUMARATE 25 MG: 25 TABLET ORAL at 12:17

## 2019-01-01 RX ADMIN — Medication 1000 MCG: at 09:00

## 2019-01-01 RX ADMIN — QUETIAPINE FUMARATE 50 MG: 50 TABLET, FILM COATED ORAL at 21:31

## 2019-01-01 RX ADMIN — MORPHINE SULFATE 4 MG: 2 INJECTION, SOLUTION INTRAMUSCULAR; INTRAVENOUS at 00:35

## 2019-01-01 RX ADMIN — LORAZEPAM 0.5 MG: 2 INJECTION INTRAMUSCULAR; INTRAVENOUS at 09:09

## 2019-01-01 RX ADMIN — SERTRALINE 25 MG: 25 TABLET, FILM COATED ORAL at 08:35

## 2019-01-01 RX ADMIN — ALLOPURINOL 100 MG: 100 TABLET ORAL at 09:06

## 2019-01-01 RX ADMIN — FERROUS SULFATE TAB 325 MG (65 MG ELEMENTAL FE) 325 MG: 325 (65 FE) TAB at 09:05

## 2019-01-01 RX ADMIN — RIFAXIMIN 550 MG: 550 TABLET ORAL at 18:09

## 2019-01-01 RX ADMIN — MORPHINE SULFATE 2 MG: 2 INJECTION, SOLUTION INTRAMUSCULAR; INTRAVENOUS at 18:43

## 2019-01-01 RX ADMIN — INSULIN LISPRO 2 UNITS: 100 INJECTION, SOLUTION INTRAVENOUS; SUBCUTANEOUS at 17:37

## 2019-01-01 RX ADMIN — LORAZEPAM 0.5 MG: 2 INJECTION INTRAMUSCULAR; INTRAVENOUS at 10:16

## 2019-01-01 RX ADMIN — LACTULOSE 30 G: 10 SOLUTION ORAL at 08:41

## 2019-01-01 RX ADMIN — HEPARIN SODIUM 3600 UNITS: 1000 INJECTION INTRAVENOUS; SUBCUTANEOUS at 12:19

## 2019-01-01 RX ADMIN — HEPARIN SODIUM 5000 UNITS: 5000 INJECTION INTRAVENOUS; SUBCUTANEOUS at 09:02

## 2019-01-01 RX ADMIN — PANTOPRAZOLE SODIUM 40 MG: 40 TABLET, DELAYED RELEASE ORAL at 05:52

## 2019-01-01 RX ADMIN — QUETIAPINE FUMARATE 50 MG: 50 TABLET, FILM COATED ORAL at 22:06

## 2019-01-01 RX ADMIN — HEPARIN SODIUM 5000 UNITS: 5000 INJECTION INTRAVENOUS; SUBCUTANEOUS at 09:06

## 2019-01-01 RX ADMIN — GLYCOPYRROLATE 0.4 MG: 0.2 INJECTION, SOLUTION INTRAMUSCULAR; INTRAVENOUS at 04:27

## 2019-01-01 RX ADMIN — GLYCOPYRROLATE 0.2 MG: 0.2 INJECTION, SOLUTION INTRAMUSCULAR; INTRAVENOUS at 00:35

## 2019-01-01 RX ADMIN — SERTRALINE 25 MG: 25 TABLET, FILM COATED ORAL at 09:01

## 2019-01-01 RX ADMIN — SODIUM CHLORIDE 1000 ML: 9 INJECTION, SOLUTION INTRAVENOUS at 17:37

## 2019-01-01 RX ADMIN — ONDANSETRON 4 MG: 2 INJECTION INTRAMUSCULAR; INTRAVENOUS at 21:29

## 2019-01-01 RX ADMIN — LIDOCAINE HYDROCHLORIDE 80 MG: 20 INJECTION, SOLUTION INFILTRATION; PERINEURAL at 09:16

## 2019-01-01 RX ADMIN — ALLOPURINOL 100 MG: 100 TABLET ORAL at 13:18

## 2019-01-01 RX ADMIN — QUETIAPINE FUMARATE 25 MG: 25 TABLET ORAL at 13:00

## 2019-01-01 RX ADMIN — Medication 1000 MCG: at 08:24

## 2019-01-01 RX ADMIN — METOPROLOL TARTRATE 12.5 MG: 25 TABLET ORAL at 21:28

## 2019-01-01 RX ADMIN — QUETIAPINE FUMARATE 50 MG: 50 TABLET, FILM COATED ORAL at 21:07

## 2019-01-01 RX ADMIN — HEPARIN SODIUM 5000 UNITS: 5000 INJECTION INTRAVENOUS; SUBCUTANEOUS at 20:42

## 2019-01-01 RX ADMIN — QUETIAPINE FUMARATE 50 MG: 25 TABLET ORAL at 20:09

## 2019-01-01 RX ADMIN — Medication 1000 MCG: at 09:05

## 2019-01-01 RX ADMIN — LACTULOSE 30 G: 10 SOLUTION ORAL at 08:40

## 2019-01-01 RX ADMIN — HEPARIN SODIUM 5000 UNITS: 5000 INJECTION INTRAVENOUS; SUBCUTANEOUS at 20:59

## 2019-01-01 RX ADMIN — INSULIN LISPRO 2 UNITS: 100 INJECTION, SOLUTION INTRAVENOUS; SUBCUTANEOUS at 22:06

## 2019-01-01 RX ADMIN — MORPHINE SULFATE 2 MG: 2 INJECTION, SOLUTION INTRAMUSCULAR; INTRAVENOUS at 13:13

## 2019-01-01 RX ADMIN — LACTULOSE 30 G: 10 SOLUTION ORAL at 21:30

## 2019-01-01 RX ADMIN — RIFAXIMIN 550 MG: 550 TABLET ORAL at 13:00

## 2019-01-01 RX ADMIN — QUETIAPINE FUMARATE 25 MG: 25 TABLET ORAL at 12:39

## 2019-01-01 RX ADMIN — BUMETANIDE 2 MG: 2 TABLET ORAL at 08:41

## 2019-01-01 RX ADMIN — FAMOTIDINE 20 MG: 20 TABLET, FILM COATED ORAL at 21:31

## 2019-01-01 RX ADMIN — MORPHINE SULFATE 2 MG: 2 INJECTION, SOLUTION INTRAMUSCULAR; INTRAVENOUS at 09:09

## 2019-01-01 RX ADMIN — FERROUS SULFATE TAB 325 MG (65 MG ELEMENTAL FE) 325 MG: 325 (65 FE) TAB at 09:06

## 2019-01-01 RX ADMIN — ALLOPURINOL 100 MG: 100 TABLET ORAL at 08:53

## 2019-01-01 RX ADMIN — INSULIN LISPRO 2 UNITS: 100 INJECTION, SOLUTION INTRAVENOUS; SUBCUTANEOUS at 13:25

## 2019-01-01 RX ADMIN — METOPROLOL TARTRATE 12.5 MG: 25 TABLET ORAL at 20:11

## 2019-01-01 RX ADMIN — PANTOPRAZOLE SODIUM 40 MG: 40 TABLET, DELAYED RELEASE ORAL at 05:33

## 2019-01-01 RX ADMIN — RIFAXIMIN 550 MG: 550 TABLET ORAL at 17:12

## 2019-01-01 RX ADMIN — RIFAXIMIN 550 MG: 550 TABLET ORAL at 14:37

## 2019-01-01 RX ADMIN — SERTRALINE 25 MG: 25 TABLET, FILM COATED ORAL at 08:24

## 2019-01-01 RX ADMIN — MORPHINE SULFATE 2 MG: 2 INJECTION, SOLUTION INTRAMUSCULAR; INTRAVENOUS at 10:15

## 2019-01-01 RX ADMIN — BUMETANIDE 2 MG: 2 TABLET ORAL at 20:09

## 2019-01-01 RX ADMIN — MORPHINE SULFATE 4 MG: 2 INJECTION, SOLUTION INTRAMUSCULAR; INTRAVENOUS at 04:27

## 2019-01-01 RX ADMIN — MORPHINE SULFATE 4 MG: 2 INJECTION, SOLUTION INTRAMUSCULAR; INTRAVENOUS at 20:43

## 2019-01-01 RX ADMIN — QUETIAPINE FUMARATE 25 MG: 25 TABLET ORAL at 13:24

## 2019-01-01 RX ADMIN — QUETIAPINE FUMARATE 50 MG: 50 TABLET, FILM COATED ORAL at 20:41

## 2019-01-01 RX ADMIN — ALLOPURINOL 100 MG: 100 TABLET ORAL at 09:02

## 2019-01-01 RX ADMIN — HEPARIN SODIUM 3600 UNITS: 1000 INJECTION INTRAVENOUS; SUBCUTANEOUS at 12:22

## 2019-01-01 RX ADMIN — SERTRALINE 25 MG: 25 TABLET, FILM COATED ORAL at 09:05

## 2019-01-01 RX ADMIN — MORPHINE SULFATE 2 MG: 2 INJECTION, SOLUTION INTRAMUSCULAR; INTRAVENOUS at 20:16

## 2019-01-01 RX ADMIN — INSULIN LISPRO 2 UNITS: 100 INJECTION, SOLUTION INTRAVENOUS; SUBCUTANEOUS at 20:42

## 2019-01-01 RX ADMIN — SODIUM CHLORIDE, PRESERVATIVE FREE 3 ML: 5 INJECTION INTRAVENOUS at 21:32

## 2019-01-01 RX ADMIN — ALLOPURINOL 100 MG: 100 TABLET ORAL at 21:31

## 2019-01-01 RX ADMIN — RIFAXIMIN 550 MG: 550 TABLET ORAL at 12:13

## 2019-01-01 RX ADMIN — SERTRALINE 25 MG: 25 TABLET, FILM COATED ORAL at 08:52

## 2019-01-01 RX ADMIN — SCOPALAMINE 1 PATCH: 1 PATCH, EXTENDED RELEASE TRANSDERMAL at 13:14

## 2019-01-01 RX ADMIN — HEPARIN SODIUM 5000 UNITS: 5000 INJECTION INTRAVENOUS; SUBCUTANEOUS at 21:00

## 2019-01-01 RX ADMIN — INSULIN LISPRO 2 UNITS: 100 INJECTION, SOLUTION INTRAVENOUS; SUBCUTANEOUS at 08:36

## 2019-01-01 RX ADMIN — GLYCOPYRROLATE 0.2 MG: 0.2 INJECTION, SOLUTION INTRAMUSCULAR; INTRAVENOUS at 20:43

## 2019-01-01 RX ADMIN — HEPARIN SODIUM 5000 UNITS: 5000 INJECTION INTRAVENOUS; SUBCUTANEOUS at 08:24

## 2019-01-01 RX ADMIN — GLYCOPYRROLATE 0.4 MG: 0.2 INJECTION, SOLUTION INTRAMUSCULAR; INTRAVENOUS at 13:13

## 2019-01-01 RX ADMIN — PANTOPRAZOLE SODIUM 40 MG: 40 TABLET, DELAYED RELEASE ORAL at 06:35

## 2019-01-01 RX ADMIN — ONDANSETRON 4 MG: 2 INJECTION INTRAMUSCULAR; INTRAVENOUS at 09:15

## 2019-01-01 RX ADMIN — ALLOPURINOL 100 MG: 100 TABLET ORAL at 21:07

## 2019-01-01 RX ADMIN — MORPHINE SULFATE 4 MG: 2 INJECTION, SOLUTION INTRAMUSCULAR; INTRAVENOUS at 13:13

## 2019-01-01 RX ADMIN — METOPROLOL TARTRATE 12.5 MG: 25 TABLET ORAL at 20:09

## 2019-01-01 RX ADMIN — Medication 1000 MCG: at 08:35

## 2019-01-01 RX ADMIN — RIFAXIMIN 550 MG: 550 TABLET ORAL at 12:27

## 2019-01-01 RX ADMIN — Medication 1000 MCG: at 08:34

## 2019-01-01 RX ADMIN — RIFAXIMIN 550 MG: 550 TABLET ORAL at 12:17

## 2019-01-01 RX ADMIN — SODIUM CHLORIDE 9 ML/HR: 9 INJECTION, SOLUTION INTRAVENOUS at 08:51

## 2019-01-01 RX ADMIN — QUETIAPINE FUMARATE 50 MG: 50 TABLET, FILM COATED ORAL at 20:53

## 2019-01-01 RX ADMIN — MORPHINE SULFATE 2 MG: 2 INJECTION, SOLUTION INTRAMUSCULAR; INTRAVENOUS at 04:19

## 2019-01-01 RX ADMIN — MORPHINE SULFATE 2 MG: 2 INJECTION, SOLUTION INTRAMUSCULAR; INTRAVENOUS at 00:37

## 2019-01-01 RX ADMIN — ALLOPURINOL 100 MG: 100 TABLET ORAL at 08:41

## 2019-01-01 RX ADMIN — GLYCOPYRROLATE 0.4 MG: 0.2 INJECTION, SOLUTION INTRAMUSCULAR; INTRAVENOUS at 09:50

## 2019-01-01 RX ADMIN — QUETIAPINE FUMARATE 50 MG: 50 TABLET, FILM COATED ORAL at 21:28

## 2019-01-01 RX ADMIN — SERTRALINE 25 MG: 25 TABLET, FILM COATED ORAL at 09:00

## 2019-01-01 RX ADMIN — Medication 5 ML: at 17:25

## 2019-01-01 RX ADMIN — SODIUM CHLORIDE 75 ML/HR: 9 INJECTION, SOLUTION INTRAVENOUS at 21:29

## 2019-01-01 RX ADMIN — QUETIAPINE FUMARATE 50 MG: 25 TABLET ORAL at 20:32

## 2019-01-01 RX ADMIN — METOPROLOL TARTRATE 12.5 MG: 25 TABLET ORAL at 20:59

## 2019-01-01 RX ADMIN — ALLOPURINOL 100 MG: 100 TABLET ORAL at 08:24

## 2019-01-01 RX ADMIN — PANTOPRAZOLE SODIUM 40 MG: 40 TABLET, DELAYED RELEASE ORAL at 06:56

## 2019-01-01 RX ADMIN — RIFAXIMIN 550 MG: 550 TABLET ORAL at 13:15

## 2019-01-01 RX ADMIN — LACTULOSE 30 G: 10 SOLUTION ORAL at 09:00

## 2019-01-01 RX ADMIN — QUETIAPINE FUMARATE 25 MG: 25 TABLET ORAL at 13:15

## 2019-01-01 RX ADMIN — BUMETANIDE 2 MG: 2 TABLET ORAL at 20:25

## 2019-01-01 RX ADMIN — ALLOPURINOL 100 MG: 100 TABLET ORAL at 09:00

## 2019-01-01 RX ADMIN — SODIUM CHLORIDE, PRESERVATIVE FREE 3 ML: 5 INJECTION INTRAVENOUS at 08:40

## 2019-01-01 RX ADMIN — LORAZEPAM 0.5 MG: 2 INJECTION INTRAMUSCULAR; INTRAVENOUS at 23:22

## 2019-01-01 RX ADMIN — RIFAXIMIN 550 MG: 550 TABLET ORAL at 12:59

## 2019-01-01 RX ADMIN — QUETIAPINE FUMARATE 50 MG: 50 TABLET, FILM COATED ORAL at 20:59

## 2019-01-01 RX ADMIN — MORPHINE SULFATE 2 MG: 2 INJECTION, SOLUTION INTRAMUSCULAR; INTRAVENOUS at 04:43

## 2019-01-01 RX ADMIN — QUETIAPINE FUMARATE 25 MG: 25 TABLET ORAL at 12:59

## 2019-01-01 RX ADMIN — Medication 1000 MCG: at 13:19

## 2019-01-01 RX ADMIN — INSULIN LISPRO 40 UNITS: 100 INJECTION, SUSPENSION SUBCUTANEOUS at 09:55

## 2019-01-01 RX ADMIN — LACTULOSE 30 G: 10 SOLUTION ORAL at 09:02

## 2019-01-01 RX ADMIN — RIFAXIMIN 550 MG: 550 TABLET ORAL at 13:24

## 2019-01-01 RX ADMIN — RIFAXIMIN 550 MG: 550 TABLET ORAL at 17:37

## 2019-01-01 RX ADMIN — RIFAXIMIN 550 MG: 550 TABLET ORAL at 17:07

## 2019-01-01 RX ADMIN — PANTOPRAZOLE SODIUM 40 MG: 40 TABLET, DELAYED RELEASE ORAL at 06:45

## 2019-01-01 RX ADMIN — ALTEPLASE: 2.2 INJECTION, POWDER, LYOPHILIZED, FOR SOLUTION INTRAVENOUS at 15:15

## 2019-01-01 RX ADMIN — RIFAXIMIN 550 MG: 550 TABLET ORAL at 12:39

## 2019-01-01 RX ADMIN — Medication 1000 MCG: at 21:31

## 2019-01-01 RX ADMIN — SCOPALAMINE 1 PATCH: 1 PATCH, EXTENDED RELEASE TRANSDERMAL at 08:30

## 2019-01-01 RX ADMIN — LORAZEPAM 0.5 MG: 2 INJECTION INTRAMUSCULAR; INTRAVENOUS at 08:55

## 2019-01-01 RX ADMIN — RIFAXIMIN 550 MG: 550 TABLET ORAL at 18:18

## 2019-01-01 RX ADMIN — FAMOTIDINE 20 MG: 10 INJECTION INTRAVENOUS at 08:49

## 2019-01-01 RX ADMIN — HEPARIN SODIUM 5000 UNITS: 5000 INJECTION INTRAVENOUS; SUBCUTANEOUS at 21:07

## 2019-01-01 RX ADMIN — ACETAMINOPHEN 650 MG: 325 TABLET, FILM COATED ORAL at 17:05

## 2019-01-01 RX ADMIN — INSULIN LISPRO 40 UNITS: 100 INJECTION, SUSPENSION SUBCUTANEOUS at 11:21

## 2019-01-01 RX ADMIN — QUETIAPINE FUMARATE 25 MG: 25 TABLET ORAL at 14:37

## 2019-04-03 PROBLEM — N18.9 ACUTE RENAL FAILURE SUPERIMPOSED ON CHRONIC KIDNEY DISEASE (HCC): Status: ACTIVE | Noted: 2019-01-01

## 2019-04-03 PROBLEM — N17.9 ACUTE RENAL FAILURE SUPERIMPOSED ON CHRONIC KIDNEY DISEASE (HCC): Status: ACTIVE | Noted: 2019-01-01

## 2019-04-03 NOTE — PROGRESS NOTES
Clinical Pharmacy Services: Medication History    Charu Bowens is a 84 y.o. female presenting to [facility] for   Chief Complaint   Patient presents with   • Fatigue       She  has a past medical history of Anxiety, Arthritis, C. difficile colitis, CHF (congestive heart failure) (CMS/HCC), Chronic kidney disease, Dementia, Diabetes mellitus (CMS/HCC), Diskitis (11/2015), Hemochromatosis, History of anemia, History of blood transfusion (2015), History of pneumonia (2016), History of sepsis (2015), Hypertension, Liver disease, HOANG (nonalcoholic steatohepatitis), Pancytopenia (CMS/HCC), Spinal stenosis, Splenomegaly, Thrombocytopenia, chronic, and TIA (transient ischemic attack).    Allergies as of 04/03/2019 - Reviewed 04/03/2019   Allergen Reaction Noted   • Ciprofloxacin  11/28/2016   • Levaquin [levofloxacin]  11/16/2016   • Promethazine hcl  11/28/2016       Medication information was obtained from: Medication list provided by [facility]  Pharmacy and Phone Number: [facility] [phone]    Prior to Admission Medications     Prescriptions Last Dose Informant Patient Reported? Taking?    acetaminophen (TYLENOL) 650 MG 8 hr tablet  Other Yes Yes    Take 650 mg by mouth Every 8 (Eight) Hours As Needed for Mild Pain (1-3).    allopurinol (ZYLOPRIM) 100 MG tablet  Other Yes Yes    Take 100 mg by mouth 2 (Two) Times a Day.    bumetanide (BUMEX) 2 MG tablet  Other No Yes    Take 1 tablet by mouth 2 (Two) Times a Day.    cholecalciferol (VITAMIN D3) 1000 units tablet  Other Yes Yes    Take 1,000 Units by mouth Daily.    ferrous sulfate 325 (65 FE) MG tablet  Other Yes Yes    Take 325 mg by mouth Daily.    guaiFENesin (MUCINEX) 600 MG 12 hr tablet  Other Yes Yes    Take 600 mg by mouth Every Night.    insulin NPH-insulin regular (Novolin 70/30) (70-30) 100 UNIT/ML injection  Other Yes Yes    Inject 40 Units under the skin into the appropriate area as directed Daily With Breakfast & Lunch.    lactulose (CHRONULAC)  10 GM/15ML solution  Other Yes Yes    Take 30 g by mouth Daily.    lidocaine (LIDODERM) 5 %  Other Yes Yes    Place 1 patch on the skin as directed by provider As Needed. Remove & Discard patch within 12 hours or as directed by MD     Melatonin 10 MG tablet  Other Yes Yes    Take 10 mg by mouth Every Night.    metoprolol tartrate (LOPRESSOR) 25 MG tablet  Other Yes Yes    Take 12.5 mg by mouth Every Night.    Multiple Vitamins-Minerals (MULTIVITAMIN WITH MINERALS) tablet tablet  Other Yes Yes    Take 1 tablet by mouth Daily.    potassium chloride (K-DUR) 10 MEQ CR tablet  Other Yes Yes    Take 10 mEq by mouth 2 (Two) Times a Day.    Probiotic Product (PROBIOTIC DAILY) capsule  Other Yes Yes    Take 1 capsule by mouth Daily.    QUEtiapine (SEROquel) 25 MG tablet  Other Yes Yes    Take 25 mg by mouth Daily With Lunch.    QUEtiapine (SEROquel) 25 MG tablet  Other Yes Yes    Take 50 mg by mouth Every Night.    rifaximin (XIFAXAN) 550 MG tablet  Other Yes Yes    Take 550 mg by mouth Daily With Lunch & Dinner.    sertraline (ZOLOFT) 25 MG tablet  Other Yes Yes    Take 25 mg by mouth Daily.    traMADol (ULTRAM) 50 MG tablet  Other Yes Yes    Take 50 mg by mouth Every Night.    vitamin B-12 (CYANOCOBALAMIN) 1000 MCG tablet  Other Yes Yes    Take 1,000 mcg by mouth Daily.            Medication notes: Removed per patient medication list: Simethicone, Temazepam, Flexeril, Zofran    Added: Melatonin    This medication list is complete to the best of my knowledge as of 4/3/2019    Please call if questions.    Karyna Connelly, Medication History Technician  4/3/2019 6:08 PM

## 2019-04-03 NOTE — ED NOTES
" reports pt having generalized weakness x 3 days. Normally ambulates with walker but was unable to get up this am,  reports concern \"because I cannot help her anymore\" pt states  has a bad back.      Mitali Rivera RN  04/03/19 7062    "

## 2019-04-03 NOTE — H&P
HISTORY AND PHYSICAL   [facility]        Patient Identification:  Name: Charu Bowens  Age: 84 y.o.  Sex: female  :  1935  MRN: [id number]                     Primary Care Physician: Chayito Paredes MD    Chief Complaint: Generalized weakness    History of Present Illness:   Mrs Bowens is a pleasant 84-year-old female who lives with past history of cirrhosis of the liver who is managed by .  She is able to keep her ammonia levels at bay with lactulose only daily in addition to Xifaxan.  But 2-3 weeks ago the spouse got diarrhea and he was placed in Flagyl and then this patient was also empirically placed on Flagyl of which she completed 2 weeks of this regimen just a couple days ago.  Apparently she had explosive diarrhea at the beginning as did her spouse but after couple doses of Flagyl would all resolved.  They declined any known knowledge of previous C. difficile issues though I see it is listed in her past medical history.  She has been getting harder and harder to help ambulate due to her generalized weakness but there is been no focal loss of function and there has been no headache dizziness or loss of consciousness.  She does have past histories of urinary tract infections but denies any dysuria.  She had previous issues with nausea and a couple bouts of emesis in a week or so prior none of recent.  In the ER patient was found to be in a little worsening acute renal failure with elevated lactate and pro calcitonin but no source of infection was identified and I also see where urinalysis has not been performed.    Past Medical History:  Past Medical History:   Diagnosis Date   • Anxiety    • Arthritis    • C. difficile colitis    • CHF (congestive heart failure) (CMS/HCC)    • Chronic kidney disease     Stage III   • Dementia    • Diabetes mellitus (CMS/HCC)     Type 2   • Diskitis 2015    L4-L5 w/abscess formation   • Hemochromatosis    • History of anemia    • History of  blood transfusion 2015   • History of pneumonia 2016   • History of sepsis 2015   • Hypertension    • Liver disease     Cirrhosis likely secondary to HOANG   • HOANG (nonalcoholic steatohepatitis)    • Pancytopenia (CMS/HCC)    • Spinal stenosis    • Splenomegaly    • Thrombocytopenia, chronic    • TIA (transient ischemic attack)      Past Surgical History:  Past Surgical History:   Procedure Laterality Date   • ABDOMINAL SURGERY     • KNEE SURGERY     • RENAL BIOPSY  10/2015   • TONSILLECTOMY     • WISDOM TOOTH EXTRACTION        Home Meds:  Medications Prior to Admission   Medication Sig Dispense Refill Last Dose   • acetaminophen (TYLENOL) 650 MG 8 hr tablet Take 650 mg by mouth Every 8 (Eight) Hours As Needed for Mild Pain (1-3).   Taking   • allopurinol (ZYLOPRIM) 100 MG tablet Take 100 mg by mouth 2 (Two) Times a Day.      • bumetanide (BUMEX) 2 MG tablet Take 1 tablet by mouth 2 (Two) Times a Day. 60 tablet 0 Taking   • cholecalciferol (VITAMIN D3) 1000 units tablet Take 1,000 Units by mouth Daily.   Taking   • ferrous sulfate 325 (65 FE) MG tablet Take 325 mg by mouth Daily.      • guaiFENesin (MUCINEX) 600 MG 12 hr tablet Take 600 mg by mouth Every Night.   Taking   • insulin NPH-insulin regular (Novolin 70/30) (70-30) 100 UNIT/ML injection Inject 40 Units under the skin into the appropriate area as directed Daily With Breakfast & Lunch.   Taking   • lactulose (CHRONULAC) 10 GM/15ML solution Take 30 g by mouth Daily.   Taking   • lidocaine (LIDODERM) 5 % Place 1 patch on the skin as directed by provider As Needed. Remove & Discard patch within 12 hours or as directed by MD    Taking   • Melatonin 10 MG tablet Take 10 mg by mouth Every Night.      • metoprolol tartrate (LOPRESSOR) 25 MG tablet Take 12.5 mg by mouth Every Night.   Taking   • Multiple Vitamins-Minerals (MULTIVITAMIN WITH MINERALS) tablet tablet Take 1 tablet by mouth Daily.      • potassium chloride (K-DUR) 10 MEQ CR tablet Take 10 mEq by mouth 2  (Two) Times a Day.      • Probiotic Product (PROBIOTIC DAILY) capsule Take 1 capsule by mouth Daily.      • QUEtiapine (SEROquel) 25 MG tablet Take 25 mg by mouth Daily With Lunch.      • QUEtiapine (SEROquel) 25 MG tablet Take 50 mg by mouth Every Night.      • rifaximin (XIFAXAN) 550 MG tablet Take 550 mg by mouth Daily With Lunch & Dinner.   Taking   • sertraline (ZOLOFT) 25 MG tablet Take 25 mg by mouth Daily.      • traMADol (ULTRAM) 50 MG tablet Take 50 mg by mouth Every Night.   Taking   • vitamin B-12 (CYANOCOBALAMIN) 1000 MCG tablet Take 1,000 mcg by mouth Daily.          Allergies:  Allergies   Allergen Reactions   • Ciprofloxacin      Itching and red streaks   • Levaquin [Levofloxacin]    • Promethazine Hcl      Itching, red streaks when given IV     Immunizations:    There is no immunization history on file for this patient.  Social History:   Social History     Social History Narrative   • Not on file     Social History     Socioeconomic History   • Marital status:      Spouse name: Kirby   • Number of children: 2   • Years of education: High School   • Highest education level: Not on file   Occupational History     Employer: RETIRED   Tobacco Use   • Smoking status: Former Smoker     Types: Cigarettes     Last attempt to quit: 1985     Years since quittin.3   • Smokeless tobacco: Never Used   • Tobacco comment: Heavy in past, but quit   Substance and Sexual Activity   • Alcohol use: No   • Drug use: No   • Sexual activity: Defer       Family History:  Family History   Problem Relation Age of Onset   • Heart disease Mother    • Diabetes Mother    • Heart disease Father    • Breast cancer Sister 45   • Hypertension Sister    • Diabetes Sister    • Colon cancer Brother 50   • Diabetes Brother         Review of Systems  See history of present illness and past medical history.  Patient admits to nausea vomiting diarrhea couple weeks prior but denies any headache or trauma or any focal  "change to vision smell taste or sound.  She denies any fever chills or night sweats.  Denies any cough shortness of breath chest pain or palpitation.  She denies any abdominal pains and there is been no further issues with diarrhea.  She admits to overall generalized weakness.  She admits to not taking lactulose daily.  She denies any focal loss of function she currently denies any dysuria as well.  Remainder of ROS is negative.    Objective:  tMax 24 hrs: Temp (24hrs), Av.5 °F (35.8 °C), Min:96.5 °F (35.8 °C), Max:96.5 °F (35.8 °C)    Vitals Ranges:   Temp:  [96.5 °F (35.8 °C)] 96.5 °F (35.8 °C)  Heart Rate:  [76-77] 76  Resp:  [18] 18  BP: (149-158)/(54-56) 149/56      Exam:  /56   Pulse 76   Temp 96.5 °F (35.8 °C) (Tympanic)   Resp 18   Ht 160 cm (63\")   Wt 94.9 kg (209 lb 3.2 oz)   SpO2 98%   BMI 37.06 kg/m²     General Appearance:    Alert, cooperative, no distress, AO, sitting quite comfortably in bed, not toxic appearing, family members x3 at bedside   Head:    Normocephalic, without obvious abnormality, atraumatic   Eyes:    PERRL, conjunctiva/corneas clear, EOM's intact, both eyes   Ears:    Normal external ear canals, both ears   Nose:   Nares normal, septum midline, mucosa normal, no drainage    or sinus tenderness   Throat:   Lips, mucosa, and tongue normal the mucous membranes are quite dry   Neck:   Supple, no meningismus or JVD       Lungs:     Clear to auscultation bilaterally, respirations unlabored   Chest Wall:    No tenderness or deformity    Heart:    Regular rate and rhythm, S1 and S2 normal   Abdomen:     Soft, non-tender, bowel sounds active all four quadrants,     no masses, truncally obese   Extremities:   No cyanosis or edema   Pulses:   2+ and symmetric all extremities           Neurologic:   CNII-XII intact, moving although globally weak without focal deficit      .    Data Review:  Labs in chart were reviewed.             Imaging Results (all)     Procedure Component " Value Units Date/Time    CT Head Without Contrast [[id number]] Collected:  04/03/19 1717     Updated:  04/03/19 1717    Narrative:       CT HEAD WITHOUT CONTRAST     HISTORY:: Weakness, difficulty walking.     COMPARISON: Comparison is made to multiple prior CT examinations of the  brain with the most recent examination being the CT examination of  07/26/2018.     FINDINGS: There is moderate atrophy. A remote right parietal infarct is  noted superolaterally similar in appearance when compared the prior CT  examination. A smaller left parietal infarct is noted medially also  unchanged. There is no evidence of intracranial hemorrhage or of a focal  area of decreased attenuation to suggest acute infarction.       Impression:       Remote biparietal infarcts with no evidence of acute  infarction or hemorrhage. Further evaluation could be performed with an  MRI examination of the brain as indicated.           Radiation dose reduction techniques were utilized, including automated  exposure control and exposure modulation based on body size.          XR Chest 1 View [[id number]] Collected:  04/03/19 1556     Updated:  04/03/19 1600    Narrative:       PORTABLE CHEST 04/03/2019 3:31 PM     CLINICAL HISTORY: Weakness.     Compared to the previous chest x-ray dated 07/26/2018.     The lungs are well-expanded and appear free of acute infiltrates. There  are no pleural effusions. The heart is mildly prominent and unchanged.     IMPRESSIONS: No evidence of acute disease within the chest.     This report was finalized on 4/3/2019 3:57 PM by Dr. Guillermo Lr M.D.               Assessment:    Acute renal failure superimposed on chronic kidney disease (CMS/HCC)    Cirrhosis of liver (CMS/HCC)    Hypersplenism    Chronic renal disease, stage 4, severely decreased glomerular filtration rate between 15-29 mL/min/1.73 square meter (CMS/HCC)    Anemia of chronic renal failure, stage 4 (severe) (CMS/HCC)    Thrombocytopenia (CMS/HCC)     DM2 (diabetes mellitus, type 2) (CMS/HCC)    Iron deficiency      Plan:  Acute renal failure superimposed upon CKD stage IV    -Gentle IVF and hold Bumex tonight   -Consult nephrology for further diuretic/IV fluid management   -add phos/Mg/uric to am labs    -Check bladder scan in a.m. that is post IV fluids as patient reports previous issues with urinary retention    Elevated pro-calcitonin/lactate   -I feel all of this is secondary to acute renal failure but given additional comorbidities, will get infectious disease to weigh in but antibiotics have been held to this point besides the Flagyl she had been on for the last 2 weeks prior to admission   -check I/O cath    -BC pending     Chronic CHRIS/TCP/coagulopathy secondary to cirrhosis of the liver   -Managed by Dr.Lazlo Astorga but will hold consultation for now since nothing acute   -repeat CBC in am     SCDs for DVT prophylaxis    DM 2 -continue with 7030 insulin in the a.m. but this was the only medication listed on her med rec.  Current blood sugars 295 will add sliding scale    Pepcid for GI prophylaxis    Further recommendations to follow his clinical course unfolds    Brice Le MD  4/3/2019  7:17 PM

## 2019-04-03 NOTE — ED NOTES
Unsuccessful attempt for 2nd set BC x 2 attempts by myself and Michael cartwright.      Mitali Rivera, RN  04/03/19 6417

## 2019-04-03 NOTE — ED NOTES
Attempted in/out cath, 2 drops after pushing on pts bladder. Dr carbone informed    Pt states she normally has trouble urinating. Pt is a/o x 4 on eval but when speaking sometimes doesn't make sense.  is a poor historian also. Pt reports she fell at home last night but wasn't using her walker, denies injury, LOC or HA.  denies pt falling.      Mitali Rivera RN  04/03/19 1613       Mitali Rivera RN  04/03/19 161

## 2019-04-04 PROBLEM — I48.91 ATRIAL FIBRILLATION (HCC): Status: ACTIVE | Noted: 2019-01-01

## 2019-04-04 NOTE — CONSULTS
Referring Provider: Brice Le MD  [address] [address]  [place] [address]    Reason for Consultation: elevated procalcitonin/acidosis    History of present illness:  Mrs Bowens is an 84 YOF with HOANG cirrhosis who I am asked to evaluate and give opinion for elevated procalcitonin/acidosis. History is obtained from the patient and review of the old medical records which I summarize/synthesize as follows: She presented to the ER on 4/3/19 with about 3-4 days of generalized weakness. No associated fevers. No cough or shortness of air.  No skin rashes or dysuria.  No diarrhea though she was reportedly on some Flagyl at home.    In the ER she was found to be in worsening renal failure. UA negative for WBCs. CXR negative for acute process. Procal 1.76 so ID consulted for recommendations. Patient has not been on antibiotics since admission since no obvious infection identified. She has been afebrile.     PMH:  OA  C diff  CKD 3  Dementia  DM2  L4-L5 discitis  HOANG  Pneumonia  TIA  Splenomegaly  Pancytopenia  Anemia  Hemachromatosis      Past Surgical History:   Procedure Laterality Date   • ABDOMINAL SURGERY     • KNEE SURGERY     • RENAL BIOPSY  10/2015   • TONSILLECTOMY     • WISDOM TOOTH EXTRACTION         Social History:    Quit smoking 1985  Homemaker    Family History:  Mom: CAD  Dad: CAD    Allergies:    cipro causes itching    Medications:    Current Facility-Administered Medications:   •  acetaminophen (TYLENOL) tablet 650 mg, 650 mg, Oral, Q4H PRN, Brice Le MD  •  allopurinol (ZYLOPRIM) tablet 100 mg, 100 mg, Oral, BID, Brice Le MD, 100 mg at 04/04/19 0835  •  dextrose (D50W) 25 g/ 50mL Intravenous Solution 25 g, 25 g, Intravenous, Q15 Min PRN, Brice Le MD  •  dextrose (GLUTOSE) oral gel 15 g, 15 g, Oral, Q15 Min PRN, Brice Le MD  •  famotidine (PEPCID) tablet 20 mg, 20 mg, Oral, Daily, Brice Le MD, 20 mg at 04/04/19 0835  •   ferrous sulfate tablet 325 mg, 325 mg, Oral, Daily, Brice Le MD, 325 mg at 04/04/19 0835  •  glucagon (human recombinant) (GLUCAGEN DIAGNOSTIC) injection 1 mg, 1 mg, Subcutaneous, PRN, Brice Le MD  •  insulin lispro (humaLOG) injection 0-7 Units, 0-7 Units, Subcutaneous, 4x Daily With Meals & Nightly, Brice Le MD, 2 Units at 04/04/19 0836  •  insulin lispro protamine-insulin lispro (humaLOG 75-25) injection 40 Units, 40 Units, Subcutaneous, Daily With Breakfast, Brice Le MD, 40 Units at 04/04/19 0837  •  lactulose (CHRONULAC) 10 GM/15ML solution 30 g, 30 g, Oral, Daily, Brice Le MD, 30 g at 04/04/19 0840  •  melatonin tablet 10 mg, 10 mg, Oral, Nightly PRN, Brice Le MD  •  metoprolol tartrate (LOPRESSOR) tablet 12.5 mg, 12.5 mg, Oral, Nightly, Brice Le MD, 12.5 mg at 04/03/19 2131  •  nitroglycerin (NITROSTAT) SL tablet 0.4 mg, 0.4 mg, Sublingual, Q5 Min PRN, Brice Le MD  •  ondansetron (ZOFRAN) tablet 4 mg, 4 mg, Oral, Q6H PRN **OR** ondansetron ODT (ZOFRAN-ODT) disintegrating tablet 4 mg, 4 mg, Oral, Q6H PRN **OR** ondansetron (ZOFRAN) injection 4 mg, 4 mg, Intravenous, Q6H PRN, Brice Le MD, 4 mg at 04/03/19 2129  •  QUEtiapine (SEROquel) tablet 25 mg, 25 mg, Oral, Daily With Lunch, Brice Le MD  •  QUEtiapine (SEROquel) tablet 50 mg, 50 mg, Oral, Nightly, Brice Le MD, 50 mg at 04/03/19 2131  •  rifaximin (XIFAXAN) tablet 550 mg, 550 mg, Oral, Daily With Lunch & Dinner, Brice Le MD  •  sertraline (ZOLOFT) tablet 25 mg, 25 mg, Oral, Daily, Brice Le MD, 25 mg at 04/04/19 0835  •  sodium chloride 0.9 % flush 3 mL, 3 mL, Intravenous, Q12H, Brice Le MD, 3 mL at 04/04/19 0840  •  sodium chloride 0.9 % flush 3-10 mL, 3-10 mL, Intravenous, PRN, Brice Le MD  •  sodium chloride 0.9 % infusion, 75 mL/hr, Intravenous, Continuous, Brice Le,  MD, Last Rate: 75 mL/hr at 04/03/19 2129, 75 mL/hr at 04/03/19 2129  •  traMADol (ULTRAM) tablet 50 mg, 50 mg, Oral, Q12H PRN, Brice Le MD  •  vitamin B-12 (CYANOCOBALAMIN) tablet 1,000 mcg, 1,000 mcg, Oral, Daily, Brice Le MD, 1,000 mcg at 04/04/19 0835    Review of Systems  All systems were reviewed and are negative unless otherwise stated above in the HPI    Objective   Vital Signs   Temp:  [96.5 °F (35.8 °C)-98 °F (36.7 °C)] 98 °F (36.7 °C)  Heart Rate:  [68-77] 74  Resp:  [16-18] 16  BP: (131-158)/(48-63) 131/63    Physical Exam:   General: awake, alert, NAD   Head: Normocephalic, atraumatic  Eyes: no scleral icterus  ENT: MMM, OP clear, no thrush. Dentures  Neck: Supple  Cardiovascular: NR,  no LE edema  Respiratory: Lungs are clear to auscultation bilaterally, no rales or wheezing; normal work of breathing on ambient air  GI: Abdomen is soft, non-tender, non-distended  : no Jimenez catheter present  Musculoskeletal: L knee scar healed, normal musculature  Skin: No rashes, lesions, or embolic phenomenon  Neurological: Alert and oriented x 3, motor strength 5/5 in all four extremities  Psychiatric: Normal mood and affect     Labs:     Lab Results   Component Value Date    WBC 2.89 (L) 04/04/2019    HGB 10.3 (L) 04/04/2019    HCT 32.9 (L) 04/04/2019    .5 (H) 04/04/2019    PLT 70 (L) 04/04/2019       Lab Results   Component Value Date    GLUCOSE 180 (H) 04/04/2019    BUN 78 (H) 04/04/2019    CREATININE 4.81 (H) 04/04/2019    EGFRIFNONA 9 (L) 04/04/2019    EGFRIFAFRI  04/04/2019      Comment:      <15 Indicative of kidney failure.    BCR 16.2 04/04/2019    CO2 13.9 (L) 04/04/2019    CALCIUM 8.3 (L) 04/04/2019    PROTENTOTREF 5.9 (L) 12/16/2015    ALBUMIN 2.40 (L) 04/04/2019    LABIL2 0.6 (L) 12/16/2015     (H) 04/03/2019    ALT 23 04/03/2019     UA 0-2 WBCs  Uric 4.9  Procal 1.76    Microbiology:  4/3 BCx: NGTD    Radiology (personally reviewed reports):  CT head negative for  acute process but radiologist mentions remove biparietal infarcts  CXR negative for pneumonia    Assessment/Plan   1. Generalized weakness  2. Elevated procalcitonin  3. Macrocytic anemia  4. Thrombocytopenia  5. Leukopenia  6. Acute kidney injury superimposed CKD 3  7. HOANG cirrhosis    It doesn't appear to me that this patient has any acute infectious process. UA negative. CXR negative. No symptoms of infection at home or since admission. I suspect her procalcitonin is elevated on account of her acute kidney injury. I will defer that workup and management to the primary team. I recommend trending her temperature curve and following up her blood cultures. ID will not follow but please feel free to reach back out to me if any new concerns for infection arise.

## 2019-04-04 NOTE — PROGRESS NOTES
" LOS: 1 day     Name: Charu Bowens  Age: 84 y.o.  Sex: female  :  1935  MRN: [id number]         Primary Care Physician: Chayito Paredes MD    Subjective   Subjective  No new complaints or overnight events.  Denies diarrhea, fevers, chills.  Asking when she can be discharged.    Objective   Vital Signs  Temp:  [96.5 °F (35.8 °C)-98 °F (36.7 °C)] 97.5 °F (36.4 °C)  Heart Rate:  [68-80] 80  Resp:  [16-18] 16  BP: (131-149)/(48-68) 135/68  Body mass index is 37.22 kg/m².    Objective:  General Appearance:  Comfortable and in no acute distress (Elderly and frail-appearing).    Vital signs: (most recent): Blood pressure 135/68, pulse 80, temperature 97.5 °F (36.4 °C), temperature source Oral, resp. rate 16, height 160 cm (63\"), weight 95.3 kg (210 lb 1.6 oz), SpO2 97 %.    Lungs:  Normal effort and normal respiratory rate.    Heart: Normal rate.  Regular rhythm.    Abdomen: Abdomen is soft.  Bowel sounds are normal.   There is no abdominal tenderness.     Extremities: There is dependent edema.  There is no local swelling.    Neurological: Patient is alert and oriented to person, place and time.    Skin:  Warm and dry.              Results Review:       I reviewed the patient's new clinical results.    Results from last 7 days   Lab Units 19  0535 19  1716   WBC 10*3/mm3 2.89* 3.48   HEMOGLOBIN g/dL 10.3* 11.3*   PLATELETS 10*3/mm3 70* 80*     Results from last 7 days   Lab Units 19  0535 19  1541   SODIUM mmol/L 142 137   POTASSIUM mmol/L 4.3 4.8   CHLORIDE mmol/L 112* 105   CO2 mmol/L 13.9* 16.5*   BUN mg/dL 78* 84*   CREATININE mg/dL 4.81* 5.14*   CALCIUM mg/dL 8.3* 9.6   GLUCOSE mg/dL 180* 295*     Results from last 7 days   Lab Units 19  1541   INR  1.88*     Scheduled Meds:     allopurinol 100 mg Oral BID   famotidine 20 mg Oral Daily   ferrous sulfate 325 mg Oral Daily   heparin (porcine) 5,000 Units Subcutaneous Q12H   insulin lispro 0-7 Units Subcutaneous 4x Daily With Meals " & Nightly   insulin lispro protamine-insulin lispro 40 Units Subcutaneous Daily With Breakfast   lactulose 30 g Oral Daily   metoprolol tartrate 12.5 mg Oral Nightly   QUEtiapine 25 mg Oral Daily With Lunch   QUEtiapine 50 mg Oral Nightly   rifaximin 550 mg Oral Daily With Lunch & Dinner   sertraline 25 mg Oral Daily   sodium chloride 3 mL Intravenous Q12H   vitamin B-12 1,000 mcg Oral Daily     PRN Meds:   •  acetaminophen  •  dextrose  •  dextrose  •  glucagon (human recombinant)  •  melatonin  •  nitroglycerin  •  ondansetron **OR** ondansetron ODT **OR** ondansetron  •  sodium chloride  •  traMADol  Continuous Infusions:    sodium chloride 75 mL/hr Last Rate: 75 mL/hr (04/03/19 2129)       Assessment/Plan   Active Hospital Problems    Diagnosis  POA   • **Acute renal failure superimposed on chronic kidney disease (CMS/HCC) [N17.9, N18.9]  Yes   • Atrial fibrillation (CMS/HCC) [I48.91]  Unknown   • Iron deficiency [E61.1]  Yes   • DM2 (diabetes mellitus, type 2) (CMS/HCC) [E11.9]  Yes   • Thrombocytopenia (CMS/HCC) [D69.6]  Yes   • Anemia of chronic renal failure, stage 4 (severe) (CMS/HCC) [N18.4, D63.1]  Yes   • 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       Assessment & Plan    -Renal function improving.  Diuretics on hold and continuing IV fluids today.  Nephrology following.  -No evidence of infectious process.  Elevated lactic acid and pro-calcitonin likely due to renal failure.  Infectious disease has signed off.  -Blood sugars controlled at this time and will continue current regimen      Forrest Avilez MD  [facility]  04/04/19  3:18 PM

## 2019-04-04 NOTE — PROGRESS NOTES
Discharge Planning Assessment  [facility]     Patient Name: Charu Bowens  MRN: [id number]  Today's Date: 4/4/2019    Admit Date: 4/3/2019    Discharge Needs Assessment     Row Name 04/04/19 1508       Living Environment    Lives With  spouse    Name(s) of Who Lives With Patient  Kirby Bowens     Current Living Arrangements  home/apartment/condo    Potentially Unsafe Housing Conditions  other (see comments)    Primary Care Provided by  spouse/significant other    Provides Primary Care For  no one, unable/limited ability to care for self    Family Caregiver if Needed  spouse;child(monica), adult    Quality of Family Relationships  involved;helpful;supportive    Able to Return to Prior Arrangements  yes       Resource/Environmental Concerns    Resource/Environmental Concerns  none    Transportation Concerns  car, none       Transition Planning    Patient/Family Anticipates Transition to  home with help/services    Patient/Family Anticipated Services at Transition  none    Transportation Anticipated  family or friend will provide       Discharge Needs Assessment    Readmission Within the Last 30 Days  no previous admission in last 30 days    Concerns to be Addressed  no discharge needs identified;denies needs/concerns at this time    Equipment Currently Used at Home  lift device;commode;walker, rolling    Anticipated Changes Related to Illness  none    Equipment Needed After Discharge  none        Discharge Plan     Row Name 04/04/19 1510       Plan    Plan  Home with [facility] and in home caregivers; follow for PT/OT eval     Patient/Family in Agreement with Plan  yes    Plan Comments  CCP met with patient, patient's spouse and patient's daughter, Gabby [phone]. CCP role explained and discharge planning discussed. Face sheet verified and IMM noted. Patient lives with her spouse. Patient uses a rolling walker, BSC, and bath lift. Patient's preferred pharmacy is [facility]. Patient's spouse and daughter expressed  concerns of safety in the home; CCP discussed SNF but patient states she does not want SNF at discharge. Patient's daughter and spouse agreeable stating she recovers better at home. CCP discussed private pay in home caregivers; patient's spouse states they are interested in it; CCP provided in home caregivers list. Patient has used [facility] in the past and would like to use them again. Referral given to [facility] and requested bath aid. CCP will follow for PT/OT eval and discuss recommendations with patient and family. Pauline Rain CSW         Destination      No service coordination in this encounter.      Durable Medical Equipment      No service coordination in this encounter.      Dialysis/Infusion      No service coordination in this encounter.      Home Medical Care      No service coordination in this encounter.      Therapy      No service coordination in this encounter.      Community Resources      No service coordination in this encounter.          Demographic Summary     Row Name 04/04/19 1506       General Information    Admission Type  inpatient    Arrived From  emergency department    Required Notices Provided  Important Message from Medicare    Referral Source  admission list    Reason for Consult  discharge planning    Preferred Language  English     Used During This Interaction  no        Functional Status     Row Name 04/04/19 1506       Functional Status    Usual Activity Tolerance  good    Current Activity Tolerance  moderate       Functional Status, IADL    Medications  assistive equipment    Meal Preparation  assistive equipment    Housekeeping  assistive equipment    Laundry  assistive equipment    Shopping  assistive equipment       Mental Status    General Appearance WDL  WDL       Mental Status Summary    Recent Changes in Mental Status/Cognitive Functioning  memory (remote)        Psychosocial    No documentation.       Abuse/Neglect    No documentation.       Legal     No documentation.       Substance Abuse    No documentation.       Patient Forms    No documentation.           FREYA Montoya

## 2019-04-04 NOTE — DISCHARGE PLACEMENT REQUEST
"Charu Bowens (84 y.o. Female)     Date of Birth Social Security Number Address Home Phone MRN    [id number]  [id number]  [facility] [id number] [phone] [id number]    Spiritism Marital Status          Hindu        Admission Date Admission Type Admitting Provider Attending Provider Department, Room/Bed    4/3/19 Emergency Brice eL MD McCracken, Robert Russell, MD [address], [address]    Discharge Date Discharge Disposition Discharge Destination                       Attending Provider:  Forrest Avilez MD    Allergies:  Ciprofloxacin, Levaquin [Levofloxacin], Promethazine Hcl    Isolation:  None   Infection:  None   Code Status:  CPR    Ht:  160 cm (63\")   Wt:  95.3 kg (210 lb 1.6 oz)    Admission Cmt:  None   Principal Problem:  Acute renal failure superimposed on chronic kidney disease (CMS/HCC) [N17.9,N18.9]                 Active Insurance as of 4/3/2019     Primary Coverage     Payor Plan Insurance Group Employer/Plan Group    MEDICARE MEDICARE A & B      Payor Plan Address Payor Plan Phone Number Payor Plan Fax Number Effective Dates    [address] [phone]  1/1/2001 - None Entered    [facility] [id number]       Subscriber Name Subscriber Birth Date Member ID       CHARU BOWENS 1935 [id number]           Secondary Coverage     Payor Plan Insurance Group Employer/Plan Group    [facility] [employer] [facility] [id number]     Payor Plan Address Payor Plan Phone Number Payor Plan Fax Number Effective Dates    [address] [phone]  1/1/2016 - None Entered    [place] [address]       Subscriber Name Subscriber Birth Date Member ID       KIRBY BOWENS 5/31/1934 [id number]                 Emergency Contacts      (Rel.) Home Phone Work Phone Mobile Phone    Kirby Bowens (Spouse) [phone] -- [phone]              "

## 2019-04-04 NOTE — PLAN OF CARE
Problem: Fall Risk (Adult)  Goal: Identify Related Risk Factors and Signs and Symptoms  Outcome: Outcome(s) achieved Date Met: 04/04/19 04/04/19 0442   Fall Risk (Adult)   Related Risk Factors (Fall Risk) environment unfamiliar;history of falls;gait/mobility problems   Signs and Symptoms (Fall Risk) presence of risk factors     Goal: Absence of Fall  Outcome: Ongoing (interventions implemented as appropriate)   04/04/19 0442   Fall Risk (Adult)   Absence of Fall making progress toward outcome       Problem: Patient Care Overview  Goal: Plan of Care Review  Outcome: Ongoing (interventions implemented as appropriate)   04/04/19 0442   Plan of Care Review   Progress improving   Coping/Psychosocial   Plan of Care Reviewed With patient   OTHER   Outcome Summary VSS. On room air. Pt does have confusion that increases during the night. She did call out for her  a few times and was reoriented and reminded that he went home earlier in the night. Pt has no complaints of pain. During the beginning of the shift pt had an episode of n/v after eating. Zofran was given and there have been no issues since. Will continue to monitor.        Problem: Renal Failure/Kidney Injury, Acute (Adult)  Goal: Signs and Symptoms of Listed Potential Problems Will be Absent, Minimized or Managed (Renal Failure/Kidney Injury, Acute)  Outcome: Ongoing (interventions implemented as appropriate)   04/04/19 0442   Goal/Outcome Evaluation   Problems Assessed (Acute Renal Failure/Kidney Injury) all   Problems Present (ARF/Kidney Injury) electrolyte imbalance;fluid imbalance;situational response;hypertension

## 2019-04-04 NOTE — CONSULTS
Referring Provider: Brice Le MD  Reason for Consultation: Evaluation of patient with acute kidney injury on chronic kidney disease    Subjective     Chief complaint   Chief Complaint   Patient presents with   • Fatigue       History of present illness:    The patient was admitted yesterday with generalized weakness, noted to have increased creatinine from her baseline.  Her baseline creatinine around 3.2 based on labs from January 2019, noted to be 5.14 yesterday and 4.81 today.  She has been quite weak having difficulty ambulating and she had significant confusion, she had increased lower extremity edema.  She is followed by my partner Dr. Navarro Hung for her chronic kidney disease.  And the patient her  and Dr. Hung had discussion about future need for dialysis and the patient expressed no desire to be on dialysis and according to her  Dr. Hung, concurred with that.  Past medical history includes chronic kidney disease stage IV, history of cirrhosis associated with Ordonez, has not been compliant with her lactulose, she had history of anxiety, degenerative joint disease, congestive heart failure, diabetes mellitus type 2, history of prior discitis of L4 and L5, hemochromatosis, chronic anemia, history of splenomegaly and chronic thrombocytopenia, history of hypertension, prior episode of TIA.  She had a renal biopsy in October 2015 revealed evidence of diabetic nephropathy also the patient has diabetic retinopathy.  The patient currently confused she is complaining of weakness and ability to ambulate, she noted that she had decreased urine output, no dysuria or gross hematuria, no orthopnea or PND, no nausea or vomiting but has poor appetite.  No seizure disorder, no syncope.      Past Medical History:   Diagnosis Date   • Anxiety    • Arthritis    • C. difficile colitis    • CHF (congestive heart failure) (CMS/[facility])    • Chronic kidney disease     Stage III   • Dementia    • Diabetes  mellitus (CMS/HCC)     Type 2   • Diskitis 2015    L4-L5 w/abscess formation   • Hemochromatosis    • History of anemia    • History of blood transfusion    • History of pneumonia    • History of sepsis    • Hypertension    • Liver disease     Cirrhosis likely secondary to HOANG   • HOANG (nonalcoholic steatohepatitis)    • Pancytopenia (CMS/HCC)    • Spinal stenosis    • Splenomegaly    • Thrombocytopenia, chronic    • TIA (transient ischemic attack)      Past Surgical History:   Procedure Laterality Date   • ABDOMINAL SURGERY     • KNEE SURGERY     • RENAL BIOPSY  10/2015   • TONSILLECTOMY     • WISDOM TOOTH EXTRACTION       Family History   Problem Relation Age of Onset   • Heart disease Mother    • Diabetes Mother    • Heart disease Father    • Breast cancer Sister 45   • Hypertension Sister    • Diabetes Sister    • Colon cancer Brother 50   • Diabetes Brother        Social History     Tobacco Use   • Smoking status: Former Smoker     Types: Cigarettes     Last attempt to quit: 1985     Years since quittin.4   • Smokeless tobacco: Never Used   • Tobacco comment: Heavy in past, but quit   Substance Use Topics   • Alcohol use: No   • Drug use: No     Medications Prior to Admission   Medication Sig Dispense Refill Last Dose   • acetaminophen (TYLENOL) 650 MG 8 hr tablet Take 650 mg by mouth Every 8 (Eight) Hours As Needed for Mild Pain (1-3).   Taking   • allopurinol (ZYLOPRIM) 100 MG tablet Take 100 mg by mouth 2 (Two) Times a Day.      • bumetanide (BUMEX) 2 MG tablet Take 1 tablet by mouth 2 (Two) Times a Day. 60 tablet 0 Taking   • cholecalciferol (VITAMIN D3) 1000 units tablet Take 1,000 Units by mouth Daily.   Taking   • ferrous sulfate 325 (65 FE) MG tablet Take 325 mg by mouth Daily.      • guaiFENesin (MUCINEX) 600 MG 12 hr tablet Take 600 mg by mouth Every Night.   Taking   • insulin NPH-insulin regular (Novolin 70/30) (70-30) 100 UNIT/ML injection Inject 40 Units under the skin  "into the appropriate area as directed Daily With Breakfast & Lunch.   Taking   • lactulose (CHRONULAC) 10 GM/15ML solution Take 30 g by mouth Daily.   Taking   • lidocaine (LIDODERM) 5 % Place 1 patch on the skin as directed by provider As Needed. Remove & Discard patch within 12 hours or as directed by MD    Taking   • Melatonin 10 MG tablet Take 10 mg by mouth Every Night.      • metoprolol tartrate (LOPRESSOR) 25 MG tablet Take 12.5 mg by mouth Every Night.   Taking   • Multiple Vitamins-Minerals (MULTIVITAMIN WITH MINERALS) tablet tablet Take 1 tablet by mouth Daily.      • potassium chloride (K-DUR) 10 MEQ CR tablet Take 10 mEq by mouth 2 (Two) Times a Day.      • Probiotic Product (PROBIOTIC DAILY) capsule Take 1 capsule by mouth Daily.      • QUEtiapine (SEROquel) 25 MG tablet Take 25 mg by mouth Daily With Lunch.      • QUEtiapine (SEROquel) 25 MG tablet Take 50 mg by mouth Every Night.      • rifaximin (XIFAXAN) 550 MG tablet Take 550 mg by mouth Daily With Lunch & Dinner.   Taking   • sertraline (ZOLOFT) 25 MG tablet Take 25 mg by mouth Daily.      • traMADol (ULTRAM) 50 MG tablet Take 50 mg by mouth Every Night.   Taking   • vitamin B-12 (CYANOCOBALAMIN) 1000 MCG tablet Take 1,000 mcg by mouth Daily.        Allergies:  Ciprofloxacin; Levaquin [levofloxacin]; and Promethazine hcl    Review of Systems  14 points review of system was performed and it was negative other than what noted above in the history of present illness          Objective     Vital Signs  Temp:  [96.5 °F (35.8 °C)-98 °F (36.7 °C)] 97.5 °F (36.4 °C)  Heart Rate:  [68-80] 80  Resp:  [16-18] 16  BP: (131-158)/(48-68) 135/68    Flowsheet Rows      First Filed Value   Admission Height  160 cm (63\") Documented at 04/03/2019 1500   Admission Weight  94.9 kg (209 lb 3.2 oz) Documented at 04/03/2019 1500           No intake/output data recorded.  I/O last 3 completed shifts:  In: 500 [IV Piggyback:500]  Out: 600 [Urine:600]    Intake/Output " Summary (Last 24 hours) at 4/4/2019 1156  Last data filed at 4/3/2019 2100  Gross per 24 hour   Intake 500 ml   Output 600 ml   Net -100 ml       Physical Exam:  General Appearance: Awake, confused and disoriented, no acute distress, obese, chronically ill  Skin: warm and dry  HEENT: pupils round and reactive to light, oral mucosa normal,   Neck: supple, no JVD, trachea midline  Lungs: Decreased breath sounds bilaterally, unlabored breathing effort  Heart: RRR, normal S1 and S2, no S3, no rub  Abdomen: soft, non-tender,  present bowel sounds to auscultation  : Questionably palpable bladder, but bladder scan showed only 146 cc  Extremities: 3+ pretibial edema, no cyanosis or clubbing  Joints: No significant deformities noted, no crepitation over the knees or the ankles.  Lymphatics: No cervical or supraclavicular lymphadenopathy  Neuro: Normal speech but the patient is confused and moving all extremities.          Results Review:  Results for orders placed or performed during the hospital encounter of 04/03/19   Blood Culture - Blood, Arm, Right   Result Value Ref Range    Blood Culture No growth at less than 24 hours    Blood Culture - Blood, Hand, Right   Result Value Ref Range    Blood Culture No growth at less than 24 hours    Ammonia   Result Value Ref Range    Ammonia 42 11 - 51 umol/L   Comprehensive Metabolic Panel   Result Value Ref Range    Glucose 295 (H) 65 - 99 mg/dL    BUN 84 (H) 8 - 23 mg/dL    Creatinine 5.14 (H) 0.57 - 1.00 mg/dL    Sodium 137 136 - 145 mmol/L    Potassium 4.8 3.5 - 5.2 mmol/L    Chloride 105 98 - 107 mmol/L    CO2 16.5 (L) 22.0 - 29.0 mmol/L    Calcium 9.6 8.6 - 10.5 mg/dL    Total Protein 6.5 6.0 - 8.5 g/dL    Albumin 2.40 (L) 3.50 - 5.20 g/dL    ALT (SGPT) 23 1 - 33 U/L    AST (SGOT) 100 (H) 1 - 32 U/L    Alkaline Phosphatase 129 (H) 39 - 117 U/L    Total Bilirubin 3.2 (H) 0.2 - 1.2 mg/dL    eGFR Non African Amer 8 (L) >60 mL/min/1.73    eGFR  African Amer  >60 mL/min/1.73     Globulin 4.1 gm/dL    A/G Ratio 0.6 g/dL    BUN/Creatinine Ratio 16.3 7.0 - 25.0    Anion Gap 15.5 mmol/L   Troponin   Result Value Ref Range    Troponin T 0.029 0.000 - 0.030 ng/mL   Lactic Acid, Plasma   Result Value Ref Range    Lactate 3.5 (C) 0.5 - 2.0 mmol/L   Procalcitonin   Result Value Ref Range    Procalcitonin 1.76 (H) 0.10 - 0.25 ng/mL   BNP   Result Value Ref Range    proBNP 1,030.0 5.0-1,800.0 pg/mL   CBC Auto Differential   Result Value Ref Range    WBC 3.48 3.40 - 10.80 10*3/mm3    RBC 3.16 (L) 3.77 - 5.28 10*6/mm3    Hemoglobin 11.3 (L) 12.0 - 15.9 g/dL    Hematocrit 35.4 34.0 - 46.6 %    .0 (H) 79.0 - 97.0 fL    MCH 35.8 (H) 26.6 - 33.0 pg    MCHC 31.9 31.5 - 35.7 g/dL    RDW 18.7 (H) 12.3 - 15.4 %    RDW-SD 74.2 (H) 37.0 - 54.0 fl    Platelets 80 (L) 140 - 450 10*3/mm3    Neutrophil % 68.1 42.7 - 76.0 %    Lymphocyte % 16.1 (L) 19.6 - 45.3 %    Monocyte % 11.2 5.0 - 12.0 %    Eosinophil % 3.7 0.3 - 6.2 %    Basophil % 0.3 0.0 - 1.5 %    Immature Grans % 0.6 (H) 0.0 - 0.5 %    Neutrophils, Absolute 2.37 1.40 - 7.00 10*3/mm3    Lymphocytes, Absolute 0.56 (L) 0.70 - 3.10 10*3/mm3    Monocytes, Absolute 0.39 0.10 - 0.90 10*3/mm3    Eosinophils, Absolute 0.13 0.00 - 0.40 10*3/mm3    Basophils, Absolute 0.01 0.00 - 0.20 10*3/mm3    Immature Grans, Absolute 0.02 0.00 - 0.05 10*3/mm3    nRBC 0.0 0.0 - 0.0 /100 WBC   Protime-INR   Result Value Ref Range    Protime 21.3 (H) 11.7 - 14.2 Seconds    INR 1.88 (H) 0.90 - 1.10   Lactic Acid, Reflex Timer (This will reflex a repeat order 3-3:15 hours after ordered.)   Result Value Ref Range    Extra Tube Hold for add-ons.    Scan Slide   Result Value Ref Range    Anisocytosis Mod/2+ None Seen    Macrocytes Mod/2+ None Seen    WBC Morphology Normal Normal    Clumped Platelets Present None Seen   Lactic Acid, Reflex   Result Value Ref Range    Lactate 2.7 (C) 0.5 - 2.0 mmol/L   Urinalysis With Culture If Indicated - Urine, Catheter In/Out   Result Value Ref  Range    Color, UA Dark Yellow (A) Yellow, Straw    Appearance, UA Clear Clear    pH, UA <=5.0 5.0 - 8.0    Specific Gravity, UA 1.016 1.005 - 1.030    Glucose, UA Negative Negative    Ketones, UA Negative Negative    Bilirubin, UA Negative Negative    Blood, UA Negative Negative    Protein, UA Negative Negative    Leuk Esterase, UA Small (1+) (A) Negative    Nitrite, UA Negative Negative    Urobilinogen, UA 0.2 E.U./dL 0.2 - 1.0 E.U./dL   Urinalysis, Microscopic Only - Urine, Catheter In/Out   Result Value Ref Range    RBC, UA 0-2 None Seen, 0-2 /HPF    WBC, UA 0-2 None Seen, 0-2 /HPF    Bacteria, UA None Seen None Seen /HPF    Squamous Epithelial Cells, UA 0-2 None Seen, 0-2 /HPF    Hyaline Casts, UA 0-2 None Seen /LPF    Methodology Automated Microscopy    CBC (No Diff)   Result Value Ref Range    WBC 2.89 (L) 3.40 - 10.80 10*3/mm3    RBC 2.95 (L) 3.77 - 5.28 10*6/mm3    Hemoglobin 10.3 (L) 12.0 - 15.9 g/dL    Hematocrit 32.9 (L) 34.0 - 46.6 %    .5 (H) 79.0 - 97.0 fL    MCH 34.9 (H) 26.6 - 33.0 pg    MCHC 31.3 (L) 31.5 - 35.7 g/dL    RDW 18.5 (H) 12.3 - 15.4 %    RDW-SD 73.8 (H) 37.0 - 54.0 fl    MPV 10.5 6.0 - 12.0 fL    Platelets 70 (L) 140 - 450 10*3/mm3   Renal Function Panel   Result Value Ref Range    Glucose 180 (H) 65 - 99 mg/dL    BUN 78 (H) 8 - 23 mg/dL    Creatinine 4.81 (H) 0.57 - 1.00 mg/dL    Sodium 142 136 - 145 mmol/L    Potassium 4.3 3.5 - 5.2 mmol/L    Chloride 112 (H) 98 - 107 mmol/L    CO2 13.9 (L) 22.0 - 29.0 mmol/L    Calcium 8.3 (L) 8.6 - 10.5 mg/dL    Albumin 2.40 (L) 3.50 - 5.20 g/dL    Phosphorus 6.1 (H) 2.5 - 4.5 mg/dL    Anion Gap 16.1 mmol/L    BUN/Creatinine Ratio 16.2 7.0 - 25.0    eGFR Non African Amer 9 (L) >60 mL/min/1.73    eGFR  African Amer  >60 mL/min/1.73   Magnesium   Result Value Ref Range    Magnesium 2.7 (H) 1.6 - 2.4 mg/dL   Uric Acid   Result Value Ref Range    Uric Acid 4.9 2.4 - 5.7 mg/dL   POC Glucose Once   Result Value Ref Range    Glucose 196 (H) 70 - 130  mg/dL   POC Glucose Once   Result Value Ref Range    Glucose 173 (H) 70 - 130 mg/dL   POC Glucose Once   Result Value Ref Range    Glucose 111 70 - 130 mg/dL   Light Blue Top   Result Value Ref Range    Extra Tube hold for add-on    Green Top (Gel)   Result Value Ref Range    Extra Tube Hold for add-ons.    Lavender Top   Result Value Ref Range    Extra Tube hold for add-on    Gold Top - SST   Result Value Ref Range    Extra Tube Hold for add-ons.      Imaging Results (last 72 hours)     Procedure Component Value Units Date/Time    CT Head Without Contrast [[id number]] Collected:  04/03/19 1717     Updated:  04/04/19 0853    Narrative:       CT HEAD WITHOUT CONTRAST     HISTORY:: Weakness, difficulty walking.     COMPARISON: Comparison is made to multiple prior CT examinations of the  brain with the most recent examination being the CT examination of  07/26/2018.     FINDINGS: There is moderate atrophy. A remote right parietal infarct is  noted superolaterally similar in appearance when compared the prior CT  examination. A smaller left parietal infarct is noted medially also  unchanged. There is no evidence of intracranial hemorrhage or of a focal  area of decreased attenuation to suggest acute infarction.       Impression:       Remote biparietal infarcts with no evidence of acute  infarction or hemorrhage. Further evaluation could be performed with an  MRI examination of the brain as indicated.           Radiation dose reduction techniques were utilized, including automated  exposure control and exposure modulation based on body size.     This report was finalized on 4/4/2019 8:50 AM by Dr. Killian Carty M.D.       XR Chest 1 View [[id number]] Collected:  04/03/19 1556     Updated:  04/03/19 1600    Narrative:       PORTABLE CHEST 04/03/2019 3:31 PM     CLINICAL HISTORY: Weakness.     Compared to the previous chest x-ray dated 07/26/2018.     The lungs are well-expanded and appear free of acute infiltrates.  There  are no pleural effusions. The heart is mildly prominent and unchanged.     IMPRESSIONS: No evidence of acute disease within the chest.     This report was finalized on 4/3/2019 3:57 PM by Dr. Guillermo Lr M.D.                 allopurinol 100 mg Oral BID   famotidine 20 mg Oral Daily   ferrous sulfate 325 mg Oral Daily   insulin lispro 0-7 Units Subcutaneous 4x Daily With Meals & Nightly   insulin lispro protamine-insulin lispro 40 Units Subcutaneous Daily With Breakfast   lactulose 30 g Oral Daily   metoprolol tartrate 12.5 mg Oral Nightly   QUEtiapine 25 mg Oral Daily With Lunch   QUEtiapine 50 mg Oral Nightly   rifaximin 550 mg Oral Daily With Lunch & Dinner   sertraline 25 mg Oral Daily   sodium chloride 3 mL Intravenous Q12H   vitamin B-12 1,000 mcg Oral Daily       sodium chloride 75 mL/hr Last Rate: 75 mL/hr (04/03/19 2129)       Assessment/Plan   1.  Acute kidney injury on chronic kidney disease, multifactorial, slight improvement since admission creatinine on admission was 5.14 and 2 this morning 4.81, we need to rule out the possibility of hepatorenal syndrome, the patient has declined to consider dialysis in the past and her  confirmed that today  2.  Chronic kidney disease stage IV at baseline associated with diabetic nephropathy, biopsy-proven.  Baseline creatinine about 3.26  3.  Diabetes mellitus type 2 with known diabetic nephropathy and retinopathy  4.  Cirrhosis, associated with HOANG, even though there is a report in the past medical history stating that there is history of hemochromatosis.  The patient had the hepatic encephalopathy associated with noncompliance with lactulose  5.  Anemia with chronic kidney disease  6.  Thrombocytopenia with known history of chronic thrombocytopenia, hemoglobin today 10.3 and platelets 70,000 also look glucose sites are 2.8  7.  History of gout  8.  Hypertension reasonably controlled  9.  Fluid excess but there is no evidence of pulmonary edema,  most likely fluid excess related to her cirrhosis          Plan:  1.  Check random urine for sodium, chloride and protein to creatinine ratio  2.  I agree with the present treatment and IV fluid  3.  Prognosis is very poor  4.  The patient has no interest in dialysis  5.  Surveillance labs      Thank you Dr. Le for asking me to see this patient in consultation      I discussed the patients findings and my recommendations with the patient's  at the bedside    Mack Pruitt MD  04/04/19  11:56 AM

## 2019-04-04 NOTE — PLAN OF CARE
Problem: Fall Risk (Adult)  Goal: Absence of Fall  Outcome: Ongoing (interventions implemented as appropriate)      Problem: Patient Care Overview  Goal: Plan of Care Review  Outcome: Ongoing (interventions implemented as appropriate)   04/04/19 4747   Plan of Care Review   Progress improving   Coping/Psychosocial   Plan of Care Reviewed With patient;spouse;daughter   OTHER   Outcome Summary Awake and alert but confused and very forgetful. At times is anxious. Eats with tray set up and encouragement. Intermittent a-fib-has had in the past and MD aware. VSS. Afebrile. Voids on bedpan. Weak. Renal function slightly better.  at BS much of day. Will continue to monitor.       Problem: Renal Failure/Kidney Injury, Acute (Adult)  Goal: Signs and Symptoms of Listed Potential Problems Will be Absent, Minimized or Managed (Renal Failure/Kidney Injury, Acute)  Outcome: Ongoing (interventions implemented as appropriate)

## 2019-04-05 NOTE — DISCHARGE PLACEMENT REQUEST
"Charu Bowens (84 y.o. Female)     Date of Birth Social Security Number Address Home Phone MRN    [id number]  [id number]   [facility] [address] [phone] [id number]    [employer] Marital Status          S[employer]ian[employer]        Admission Date Admission Type Admitting Provider Attending Provider Department, Room/Bed    4/3/19 Emergency Brice Le MD Beard, Lyle E, MD [address], E567/1    Discharge Date Discharge Disposition Discharge Destination                       Attending Provider:  Billy Goldman MD    Allergies:  Ciprofloxacin, Levaquin [Levofloxacin], Promethazine Hcl    Isolation:  None   Infection:  None   Code Status:  CPR    Ht:  160 cm (63\")   Wt:  95.3 kg (210 lb 1.6 oz)    Admission Cmt:  None   Principal Problem:  Acute renal failure superimposed on chronic kidney disease (CMS/HCC) [N17.9,N18.9]                 Active Insurance as of 4/3/2019     Primary Coverage     Payor Plan Insurance Group Employer/Plan Group    MEDICARE MEDICARE A & B      Payor Plan Address Payor Plan Phone Number Payor Plan Fax Number Effective Dates    [address] [phone]  1/1/2001 - None Entered    [facility] [id number]       Subscriber Name Subscriber Birth Date Member ID       CHARU BOWENS 1935 [id number]           Secondary Coverage     Payor Plan Insurance Group Employer/Plan Group    [facility] [id number]     Payor Plan Address Payor Plan Phone Number Payor Plan Fax Number Effective Dates    [address] [phone]  1/1/2016 - None Entered    [facility] [id number]       Subscriber Name Subscriber Birth Date Member ID       KIRBY BOWENS 5/31/1934 [id number]                 Emergency Contacts      (Rel.) Home Phone Work Phone Mobile Phone    Kirby Bowens (Spouse) [phone] -- [phone]              "

## 2019-04-05 NOTE — PROGRESS NOTES
"DAILY PROGRESS NOTE  [facility]    Patient Identification:  Name: Charu Bowens  Age: 84 y.o.  Sex: female  :  1935  MRN: [id number]         Primary Care Physician: Chayito Paredes MD    Subjective:  Interval History:She feels better.    Objective:    Scheduled Meds:  allopurinol 100 mg Oral BID   famotidine 20 mg Oral Daily   ferrous sulfate 325 mg Oral Daily   heparin (porcine) 5,000 Units Subcutaneous Q12H   insulin lispro 0-7 Units Subcutaneous 4x Daily With Meals & Nightly   insulin lispro protamine-insulin lispro 40 Units Subcutaneous Daily With Breakfast   lactulose 30 g Oral Daily   metoprolol tartrate 12.5 mg Oral Nightly   QUEtiapine 25 mg Oral Daily With Lunch   QUEtiapine 50 mg Oral Nightly   rifaximin 550 mg Oral Daily With Lunch & Dinner   sertraline 25 mg Oral Daily   vitamin B-12 1,000 mcg Oral Daily     Continuous Infusions:  sodium chloride 75 mL/hr Last Rate: 75 mL/hr (19 0909)       Vital signs in last 24 hours:  Temp:  [97.4 °F (36.3 °C)-98.3 °F (36.8 °C)] 97.8 °F (36.6 °C)  Heart Rate:  [69-79] 71  Resp:  [16-20] 20  BP: (127-150)/(54-71) 150/58    Intake/Output:    Intake/Output Summary (Last 24 hours) at 2019 1202  Last data filed at 2019 1151  Gross per 24 hour   Intake 720 ml   Output 304 ml   Net 416 ml       Exam:  /58 (BP Location: Left arm, Patient Position: Lying)   Pulse 71   Temp 97.8 °F (36.6 °C) (Oral)   Resp 20   Ht 160 cm (63\")   Wt 95.3 kg (210 lb 1.6 oz)   SpO2 98%   BMI 37.22 kg/m²     General Appearance:    Alert, cooperative, no distress   Head:    Normocephalic, without obvious abnormality, atraumatic   Eyes:       Throat:   Lips, tongue, gums normal   Neck:   Supple, symmetrical, trachea midline, no JVD   Lungs:     Clear to auscultation bilaterally, respirations unlabored   Chest Wall:    No tenderness or deformity    Heart:    Regular rate and rhythm, S1 and S2 normal, no murmur,no  Rub or gallop   Abdomen:     Soft, " non-tender, bowel sounds active, no masses, no organomegaly    Extremities:   Extremities normal, atraumatic, no cyanosis or edema   Pulses:      Skin:   Skin is warm and dry,  no rashes or palpable lesions   Neurologic:   no focal deficits noted      Lab Results (last 72 hours)     Procedure Component Value Units Date/Time    POC Glucose Once [[id number]]  (Normal) Collected:  04/05/19 0559    Specimen:  Blood Updated:  04/05/19 0601     Glucose 90 mg/dL     Comprehensive Metabolic Panel [[id number]]  (Abnormal) Collected:  04/05/19 0426    Specimen:  Blood Updated:  04/05/19 0548     Glucose 87 mg/dL      BUN 75 mg/dL      Creatinine 4.99 mg/dL      Sodium 139 mmol/L      Potassium 4.1 mmol/L      Chloride 112 mmol/L      CO2 13.0 mmol/L      Calcium 8.6 mg/dL      Total Protein 5.5 g/dL      Albumin 1.80 g/dL      ALT (SGPT) 20 U/L      AST (SGOT) 91 U/L      Alkaline Phosphatase 104 U/L      Total Bilirubin 2.7 mg/dL      eGFR Non African Amer 8 mL/min/1.73      Comment: <15 Indicative of kidney failure.        eGFR   Amer -- mL/min/1.73      Comment: <15 Indicative of kidney failure.        Globulin 3.7 gm/dL      A/G Ratio 0.5 g/dL      BUN/Creatinine Ratio 15.0     Anion Gap 14.0 mmol/L     Narrative:       GFR Normal >60  Chronic Kidney Disease <60  Kidney Failure <15    CBC & Differential [[id number]] Collected:  04/05/19 0426    Specimen:  Blood Updated:  04/05/19 0542    Narrative:       The following orders were created for panel order CBC & Differential.  Procedure                               Abnormality         Status                     ---------                               -----------         ------                     CBC Auto Differential[[id number]]        Abnormal            Final result                 Please view results for these tests on the individual orders.    CBC Auto Differential [[id number]]  (Abnormal) Collected:  04/05/19 0426    Specimen:  Blood Updated:  04/05/19 0542     WBC  3.03 10*3/mm3      RBC 3.02 10*6/mm3      Hemoglobin 10.6 g/dL      Hematocrit 33.2 %      .9 fL      MCH 35.1 pg      MCHC 31.9 g/dL      RDW 18.7 %      RDW-SD 72.5 fl      MPV -- fL      Comment: Not calc        Platelets 75 10*3/mm3      Neutrophil % 59.0 %      Lymphocyte % 21.5 %      Monocyte % 12.9 %      Eosinophil % 5.6 %      Basophil % 0.3 %      Neutrophils, Absolute 1.79 10*3/mm3      Lymphocytes, Absolute 0.65 10*3/mm3      Monocytes, Absolute 0.39 10*3/mm3      Eosinophils, Absolute 0.17 10*3/mm3      Basophils, Absolute 0.01 10*3/mm3     Uric Acid [[id number]]  (Normal) Collected:  04/05/19 0426    Specimen:  Blood Updated:  04/05/19 0542     Uric Acid 4.8 mg/dL     Phosphorus [[id number]]  (Abnormal) Collected:  04/05/19 0426    Specimen:  Blood Updated:  04/05/19 0542     Phosphorus 5.6 mg/dL     Magnesium [[id number]]  (Abnormal) Collected:  04/05/19 0426    Specimen:  Blood Updated:  04/05/19 0542     Magnesium 2.7 mg/dL     POC Glucose Once [[id number]]  (Normal) Collected:  04/04/19 2046    Specimen:  Blood Updated:  04/04/19 2048     Glucose 104 mg/dL     Blood Culture - Blood, Arm, Right [[id number]] Collected:  04/03/19 2014    Specimen:  Blood from Arm, Right Updated:  04/04/19 2030     Blood Culture No growth at 24 hours    Sodium, Urine, Random - [[id number]] Collected:  04/04/19 1817    Specimen:  Urine Updated:  04/04/19 1854     Sodium, Urine 32 mmol/L     Narrative:       Reference intervals for random urine have not been established.  Clinical usage is dependent upon physician's interpretation in combination with other laboratory tests.     Chloride, Urine, Random - [[id number]] Collected:  04/04/19 1817    Specimen:  Urine Updated:  04/04/19 1854     Chloride, Urine 40 mmol/L     Narrative:       Reference intervals for random urine have not been established.  Clinical usage is dependent upon physician's interpretation in combination with other laboratory tests.     Protein /  Creatinine Ratio, Urine - [[id number]]  (Abnormal) Collected:  04/04/19 1817    Specimen:  Urine Updated:  04/04/19 1853     Protein/Creatinine Ratio, Urine 239.5 mg/G Crea      Creatinine, Urine 66.8 mg/dL      Total Protein, Urine 16.0 mg/dL     Blood Culture - Blood, Hand, Right [[id number]] Collected:  04/03/19 1723    Specimen:  Blood from Hand, Right Updated:  04/04/19 1745     Blood Culture No growth at 24 hours    POC Glucose Once [[id number]]  (Normal) Collected:  04/04/19 1718    Specimen:  Blood Updated:  04/04/19 1720     Glucose 72 mg/dL     POC Glucose Once [[id number]]  (Normal) Collected:  04/04/19 1145    Specimen:  Blood Updated:  04/04/19 1147     Glucose 111 mg/dL     Renal Function Panel [[id number]]  (Abnormal) Collected:  04/04/19 0535    Specimen:  Blood Updated:  04/04/19 0629     Glucose 180 mg/dL      BUN 78 mg/dL      Creatinine 4.81 mg/dL      Sodium 142 mmol/L      Potassium 4.3 mmol/L      Chloride 112 mmol/L      CO2 13.9 mmol/L      Calcium 8.3 mg/dL      Albumin 2.40 g/dL      Phosphorus 6.1 mg/dL      Anion Gap 16.1 mmol/L      BUN/Creatinine Ratio 16.2     eGFR Non African Amer 9 mL/min/1.73      Comment: <15 Indicative of kidney failure.        eGFR   Amer -- mL/min/1.73      Comment: <15 Indicative of kidney failure.       Narrative:       GFR Normal >60  Chronic Kidney Disease <60  Kidney Failure <15    Magnesium [[id number]]  (Abnormal) Collected:  04/04/19 0535    Specimen:  Blood Updated:  04/04/19 0629     Magnesium 2.7 mg/dL     Uric Acid [[id number]]  (Normal) Collected:  04/04/19 0535    Specimen:  Blood Updated:  04/04/19 0629     Uric Acid 4.9 mg/dL     CBC (No Diff) [[id number]]  (Abnormal) Collected:  04/04/19 0535    Specimen:  Blood Updated:  04/04/19 0627     WBC 2.89 10*3/mm3      RBC 2.95 10*6/mm3      Hemoglobin 10.3 g/dL      Hematocrit 32.9 %      .5 fL      MCH 34.9 pg      MCHC 31.3 g/dL      RDW 18.5 %      RDW-SD 73.8 fl      MPV 10.5 fL       Platelets 70 10*3/mm3     POC Glucose Once [[id number]]  (Abnormal) Collected:  04/04/19 0616    Specimen:  Blood Updated:  04/04/19 0618     Glucose 173 mg/dL     Urinalysis With Culture If Indicated - Urine, Catheter In/Out [[id number]]  (Abnormal) Collected:  04/03/19 2220    Specimen:  Urine, Catheter In/Out Updated:  04/03/19 2300     Color, UA Dark Yellow     Appearance, UA Clear     pH, UA <=5.0     Specific Gravity, UA 1.016     Glucose, UA Negative     Ketones, UA Negative     Bilirubin, UA Negative     Blood, UA Negative     Protein, UA Negative     Leuk Esterase, UA Small (1+)     Nitrite, UA Negative     Urobilinogen, UA 0.2 E.U./dL    Urinalysis, Microscopic Only - Urine, Catheter In/Out [[id number]] Collected:  04/03/19 2220    Specimen:  Urine, Catheter In/Out Updated:  04/03/19 2300     RBC, UA 0-2 /HPF      WBC, UA 0-2 /HPF      Bacteria, UA None Seen /HPF      Squamous Epithelial Cells, UA 0-2 /HPF      Hyaline Casts, UA 0-2 /LPF      Methodology Automated Microscopy    POC Glucose Once [[id number]]  (Abnormal) Collected:  04/03/19 2124    Specimen:  Blood Updated:  04/03/19 2125     Glucose 196 mg/dL     Lactic Acid, Reflex [[id number]]  (Abnormal) Collected:  04/03/19 2014    Specimen:  Blood Updated:  04/03/19 2054     Lactate 2.7 mmol/L     Lactic Acid, Reflex Timer (This will reflex a repeat order 3-3:15 hours after ordered.) [[id number]] Collected:  04/03/19 1540    Specimen:  Blood Updated:  04/03/19 1930     Extra Tube Hold for add-ons.     Comment: Auto resulted.       CBC & Differential [[id number]] Collected:  04/03/19 1716    Specimen:  Blood Updated:  04/03/19 1809    Narrative:       The following orders were created for panel order CBC & Differential.  Procedure                               Abnormality         Status                     ---------                               -----------         ------                     Scan Slide[[id number]]                                       Final  result               CBC Auto Differential[[id number]]        Abnormal            Final result                 Please view results for these tests on the individual orders.    CBC Auto Differential [[id number]]  (Abnormal) Collected:  04/03/19 1716    Specimen:  Blood Updated:  04/03/19 1809     WBC 3.48 10*3/mm3      RBC 3.16 10*6/mm3      Hemoglobin 11.3 g/dL      Hematocrit 35.4 %      .0 fL      MCH 35.8 pg      MCHC 31.9 g/dL      RDW 18.7 %      RDW-SD 74.2 fl      Platelets 80 10*3/mm3      Neutrophil % 68.1 %      Lymphocyte % 16.1 %      Monocyte % 11.2 %      Eosinophil % 3.7 %      Basophil % 0.3 %      Immature Grans % 0.6 %      Neutrophils, Absolute 2.37 10*3/mm3      Lymphocytes, Absolute 0.56 10*3/mm3      Monocytes, Absolute 0.39 10*3/mm3      Eosinophils, Absolute 0.13 10*3/mm3      Basophils, Absolute 0.01 10*3/mm3      Immature Grans, Absolute 0.02 10*3/mm3      nRBC 0.0 /100 WBC     Scan Slide [[id number]] Collected:  04/03/19 1716    Specimen:  Blood Updated:  04/03/19 1809     Anisocytosis Mod/2+     Macrocytes Mod/2+     WBC Morphology Normal     Clumped Platelets Present    Comprehensive Metabolic Panel [[id number]]  (Abnormal) Collected:  04/03/19 1541    Specimen:  Blood Updated:  04/03/19 1648     Glucose 295 mg/dL      BUN 84 mg/dL      Creatinine 5.14 mg/dL      Sodium 137 mmol/L      Potassium 4.8 mmol/L      Chloride 105 mmol/L      CO2 16.5 mmol/L      Calcium 9.6 mg/dL      Total Protein 6.5 g/dL      Albumin 2.40 g/dL      ALT (SGPT) 23 U/L      AST (SGOT) 100 U/L      Alkaline Phosphatase 129 U/L      Total Bilirubin 3.2 mg/dL      eGFR Non African Amer 8 mL/min/1.73      Comment: <15 Indicative of kidney failure.        eGFR   Amer -- mL/min/1.73      Comment: <15 Indicative of kidney failure.        Globulin 4.1 gm/dL      A/G Ratio 0.6 g/dL      BUN/Creatinine Ratio 16.3     Anion Gap 15.5 mmol/L     Narrative:       GFR Normal >60  Chronic Kidney Disease <60  Kidney  "Failure <15    Troponin [[id number]]  (Normal) Collected:  04/03/19 1541    Specimen:  Blood Updated:  04/03/19 1648     Troponin T 0.029 ng/mL     Narrative:       Troponin T Reference Range:  <= 0.03 ng/mL-   Negative for AMI  >0.03 ng/mL-     Abnormal for myocardial necrosis.  Clinicians would have to utilize clinical acumen, EKG, Troponin and serial changes to determine if it is an Acute Myocardial Infarction or myocardial injury due to an underlying chronic condition.     Procalcitonin [[id number]]  (Abnormal) Collected:  04/03/19 1541    Specimen:  Blood Updated:  04/03/19 1648     Procalcitonin 1.76 ng/mL     Narrative:       As a Marker for Sepsis (Non-Neonates):   1. <0.5 ng/mL represents a low risk of severe sepsis and/or septic shock.  1. >2 ng/mL represents a high risk of severe sepsis and/or septic shock.    As a Marker for Lower Respiratory Tract Infections that require antibiotic therapy:  PCT on Admission     Antibiotic Therapy             6-12 Hrs later  > 0.5                Strongly Recommended            >0.25 - <0.5         Recommended  0.1 - 0.25           Discouraged                   Remeasure/reassess PCT  <0.1                 Strongly Discouraged          Remeasure/reassess PCT      As 28 day mortality risk marker: \"Change in Procalcitonin Result\" (> 80 % or <=80 %) if Day 0 (or Day 1) and Day 4 values are available. Refer to http://www.[employer]-pct-calculator.com/   Change in PCT <=80 %   A decrease of PCT levels below or equal to 80 % defines a positive change in PCT test result representing a higher risk for 28-day all-cause mortality of patients diagnosed with severe sepsis or septic shock.  Change in PCT > 80 %   A decrease of PCT levels of more than 80 % defines a negative change in PCT result representing a lower risk for 28-day all-cause mortality of patients diagnosed with severe sepsis or septic shock.                BNP [[id number]]  (Normal) Collected:  04/03/19 1541    Specimen:  " Blood Updated:  04/03/19 1646     proBNP 1,030.0 pg/mL     Narrative:       Among patients with dyspnea, NT-proBNP is highly sensitive for the detection of acute congestive heart failure. In addition NT-proBNP of <300 pg/ml effectively rules out acute congestive heart failure with 99% negative predictive value.    Brooks Draw [[id number]] Collected:  04/03/19 1541    Specimen:  Blood Updated:  04/03/19 1646    Narrative:       The following orders were created for panel order Brooks Draw.  Procedure                               Abnormality         Status                     ---------                               -----------         ------                     Light Blue Top[[id number]]                                   Final result               Green Top (Gel)[[id number]]                                  Final result               Lavender Top[[id number]]                                     Final result               Gold Top - SST[[id number]]                                   Final result                 Please view results for these tests on the individual orders.    Light Blue Top [[id number]] Collected:  04/03/19 1541    Specimen:  Blood Updated:  04/03/19 1646     Extra Tube hold for add-on     Comment: Auto resulted       Green Top (Gel) [[id number]] Collected:  04/03/19 1541    Specimen:  Blood Updated:  04/03/19 1646     Extra Tube Hold for add-ons.     Comment: Auto resulted.       Lavender Top [[id number]] Collected:  04/03/19 1541    Specimen:  Blood Updated:  04/03/19 1646     Extra Tube hold for add-on     Comment: Auto resulted       Gold Top - SST [[id number]] Collected:  04/03/19 1541    Specimen:  Blood Updated:  04/03/19 1646     Extra Tube Hold for add-ons.     Comment: Auto resulted.       Ammonia [[id number]]  (Normal) Collected:  04/03/19 1540    Specimen:  Blood Updated:  04/03/19 1641     Ammonia 42 umol/L     Lactic Acid, Plasma [[id number]]  (Abnormal) Collected:  04/03/19 1540    Specimen:  Blood  Updated:  04/03/19 1627     Lactate 3.5 mmol/L     Protime-INR [[id number]]  (Abnormal) Collected:  04/03/19 1541    Specimen:  Blood Updated:  04/03/19 1617     Protime 21.3 Seconds      INR 1.88        Data Review:  Results from last 7 days   Lab Units 04/05/19  0426 04/04/19  0535 04/03/19  1541   SODIUM mmol/L 139 142 137   POTASSIUM mmol/L 4.1 4.3 4.8   CHLORIDE mmol/L 112* 112* 105   CO2 mmol/L 13.0* 13.9* 16.5*   BUN mg/dL 75* 78* 84*   CREATININE mg/dL 4.99* 4.81* 5.14*   GLUCOSE mg/dL 87 180* 295*   CALCIUM mg/dL 8.6 8.3* 9.6     Results from last 7 days   Lab Units 04/05/19  0426 04/04/19  0535 04/03/19  1716   WBC 10*3/mm3 3.03* 2.89* 3.48   HEMOGLOBIN g/dL 10.6* 10.3* 11.3*   HEMATOCRIT % 33.2* 32.9* 35.4   PLATELETS 10*3/mm3 75* 70* 80*             Lab Results   Lab Value Date/Time    TROPONINT 0.029 04/03/2019 1541    TROPONINT 0.076 (H) 11/04/2018 1753    TROPONINT 0.117 (C) 07/26/2018 1127    TROPONINT 0.112 (C) 11/18/2016 1936    TROPONINT 0.141 (C) 11/18/2016 1229    TROPONINT 0.139 (C) 11/18/2016 0553    TROPONINT 0.104 (C) 11/17/2016 2345    TROPONINT 0.119 (C) 11/17/2016 1813    TROPONINT 0.096 (C) 11/17/2016 1209    TROPONINT 0.118 (C) 11/17/2016 0350    TROPONINT 0.128 (C) 11/17/2016 0026    TROPONINT 0.145 (C) 11/16/2016 2006    TROPONINT 0.03 02/18/2016 1645         Results from last 7 days   Lab Units 04/05/19  0426 04/03/19  1541   ALK PHOS U/L 104 129*   BILIRUBIN mg/dL 2.7* 3.2*   ALT (SGPT) U/L 20 23   AST (SGOT) U/L 91* 100*             Glucose   Date/Time Value Ref Range Status   04/05/2019 0559 90 70 - 130 mg/dL Final   04/04/2019 2046 104 70 - 130 mg/dL Final   04/04/2019 1718 72 70 - 130 mg/dL Final   04/04/2019 1145 111 70 - 130 mg/dL Final   04/04/2019 0616 173 (H) 70 - 130 mg/dL Final   04/03/2019 2124 196 (H) 70 - 130 mg/dL Final     Results from last 7 days   Lab Units 04/03/19  1541   INR  1.88*       Past Medical History:   Diagnosis Date   • Anxiety    • Arthritis    • C.  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   • HOANG (nonalcoholic steatohepatitis)    • Pancytopenia (CMS/HCC)    • Spinal stenosis    • Splenomegaly    • Thrombocytopenia, chronic    • TIA (transient ischemic attack)        Assessment:  Active Hospital Problems    Diagnosis  POA   • **Acute renal failure superimposed on chronic kidney disease (CMS/HCC) [N17.9, N18.9]  Yes   • Atrial fibrillation (CMS/HCC) [I48.91]  Unknown   • Iron deficiency [E61.1]  Yes   • DM2 (diabetes mellitus, type 2) (CMS/HCC) [E11.9]  Yes   • Thrombocytopenia (CMS/HCC) [D69.6]  Yes   • Anemia of chronic renal failure, stage 4 (severe) (CMS/HCC) [N18.4, D63.1]  Yes   • 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       Plan:  Continue with IV fluid hydration. Renal consult noted. Follow up lab.    Billy Goldman MD  4/5/2019  12:02 PM

## 2019-04-05 NOTE — PLAN OF CARE
Problem: Patient Care Overview  Goal: Plan of Care Review  Outcome: Ongoing (interventions implemented as appropriate)   04/04/19 2100 04/05/19 3043   Plan of Care Review   Progress --  improving   Coping/Psychosocial   Plan of Care Reviewed With patient --    OTHER   Outcome Summary --  VSS, episodes of confusion and anxiety needs frequent reorientation. Intake improved with encouragment. Family requesting PT consult to better determine d/c needs, will continue to monitor       Problem: Renal Failure/Kidney Injury, Acute (Adult)  Goal: Signs and Symptoms of Listed Potential Problems Will be Absent, Minimized or Managed (Renal Failure/Kidney Injury, Acute)  Outcome: Ongoing (interventions implemented as appropriate)      Problem: Skin Injury Risk (Adult)  Goal: Identify Related Risk Factors and Signs and Symptoms  Outcome: Ongoing (interventions implemented as appropriate)    Goal: Skin Health and Integrity  Outcome: Ongoing (interventions implemented as appropriate)      Problem: Nutrition, Imbalanced: Inadequate Oral Intake (Adult)  Goal: Improved Oral Intake  Outcome: Ongoing (interventions implemented as appropriate)    Goal: Prevent Further Weight Loss  Outcome: Ongoing (interventions implemented as appropriate)

## 2019-04-05 NOTE — THERAPY EVALUATION
Acute Care - Physical Therapy Initial Evaluation  [facility]     Patient Name: Charu Bowens  : 1935  MRN: [id number]  Today's Date: 2019   Onset of Illness/Injury or Date of Surgery: 19     Referring Physician: Jones      Admit Date: 4/3/2019    Visit Dx:     ICD-10-CM ICD-9-CM   1. Acute renal failure superimposed on chronic kidney disease, unspecified CKD stage, unspecified acute renal failure type (CMS/HCC) N17.9 584.9    N18.9 585.9   2. Generalized weakness R53.1 780.79   3. Elevated lactic acid level R79.89 276.2     Patient Active Problem List   Diagnosis   • Cirrhosis of liver (CMS/HCC)   • Hypersplenism   • Splenic pancytopenia syndrome (CMS/HCC)   • Chronic renal disease, stage 4, severely decreased glomerular filtration rate between 15-29 mL/min/1.73 square meter (CMS/HCC)   • Anemia of chronic renal failure, stage 4 (severe) (CMS/HCC)   • Hepatic encephalopathy (CMS/HCC)   • Thrombocytopenia (CMS/HCC)   • Leukopenia   • Acute kidney failure (CMS/HCC)   • Acute hepatic encephalopathy   • DM2 (diabetes mellitus, type 2) (CMS/HCC)   • Iron deficiency   • Acute idiopathic gout of right ankle   • Decreased mobility   • Fall   • Urinary tract infection associated with indwelling urethral catheter (CMS/HCC)   • Acute renal failure superimposed on chronic kidney disease (CMS/HCC)   • Atrial fibrillation (CMS/HCC)     Past Medical History:   Diagnosis Date   • Anxiety    • Arthritis    • C. difficile colitis    • CHF (congestive heart failure) (CMS/HCC)    • Chronic kidney disease     Stage III   • Dementia    • Diabetes mellitus (CMS/HCC)     Type 2   • Diskitis 2015    L4-L5 w/abscess formation   • Hemochromatosis    • History of anemia    • History of blood transfusion    • History of pneumonia    • History of sepsis    • Hypertension    • Liver disease     Cirrhosis likely secondary to HOANG   • HOANG (nonalcoholic steatohepatitis)    • Pancytopenia (CMS/HCC)    • Spinal  stenosis    • Splenomegaly    • Thrombocytopenia, chronic    • TIA (transient ischemic attack)      Past Surgical History:   Procedure Laterality Date   • ABDOMINAL SURGERY     • KNEE SURGERY     • RENAL BIOPSY  10/2015   • TONSILLECTOMY     • WISDOM TOOTH EXTRACTION          PT ASSESSMENT (last 12 hours)      Physical Therapy Evaluation     Row Name 04/05/19 1315          PT Evaluation Time/Intention    Subjective Information  complains of;weakness  -EE     Document Type  evaluation  -EE     Patient Effort  adequate  -EE     Symptoms Noted During/After Treatment  fatigue  -EE     Row Name 04/05/19 1315          General Information    Onset of Illness/Injury or Date of Surgery  04/05/19  -EE     Referring Physician  Goldman  -EE     Patient Observations  alert;cooperative;agree to therapy  -EE     Patient/Family Observations  Pt supine in bed in no acute distress; spouse at bedside.  -EE     Prior Level of Function  independent:;all household mobility spouse states pt began getting weaker over last week  -EE     Equipment Currently Used at Home  walker, rolling;shower chair  -EE     Pertinent History of Current Functional Problem  acute renal failure on chronic kidney disease, anemia, history of liver cirrhosis, dementia  -EE     Existing Precautions/Restrictions  fall  -EE     Limitations/Impairments  safety/cognitive  -EE     Barriers to Rehab  cognitive status;previous functional deficit  -EE     Row Name 04/05/19 1315          Resource/Environmental Concerns    Current Living Arrangements  home/apartment/condo  -EE     Row Name 04/05/19 1315          Home Main Entrance    Number of Stairs, Main Entrance  two  -EE     Stair Railings, Main Entrance  none  -EE     Row Name 04/05/19 1315          Cognitive Assessment/Interventions    Additional Documentation  Cognitive Assessment/Intervention (Group)  -EE     Row Name 04/05/19 1315          Cognitive Assessment/Intervention- PT/OT    Affect/Mental Status (Cognitive)   confused  -EE     Orientation Status (Cognition)  oriented to;person  -EE     Follows Commands (Cognition)  follows one step commands;50-74% accuracy;increased processing time needed;repetition of directions required;verbal cues/prompting required  -EE     Cognitive Function (Cognitive)  safety deficit  -EE     Safety Deficit (Cognitive)  moderate deficit;ability to follow commands;awareness of need for assistance;insight into deficits/self awareness;problem solving;safety precautions awareness  -EE     Personal Safety Interventions  fall prevention program maintained;gait belt;muscle strengthening facilitated;nonskid shoes/slippers when out of bed;supervised activity  -EE     Row Name 04/05/19 1315          Safety Issues, Functional Mobility    Safety Issues Affecting Function (Mobility)  ability to follow commands;awareness of need for assistance;insight into deficits/self awareness;problem solving;safety precaution awareness  -EE     Impairments Affecting Function (Mobility)  balance;cognition;endurance/activity tolerance;postural/trunk control;strength  -EE     Row Name 04/05/19 1315          Bed Mobility Assessment/Treatment    Bed Mobility Assessment/Treatment  supine-sit  -EE     Supine-Sit [place] (Bed Mobility)  moderate assist (50% patient effort);verbal cues;nonverbal cues (demo/gesture)  -EE     Assistive Device (Bed Mobility)  head of bed elevated;bed rails  -EE     Comment (Bed Mobility)  max cues and increased time required for bed mobility  -EE     Row Name 04/05/19 1315          Transfer Assessment/Treatment    Transfer Assessment/Treatment  sit-stand transfer;stand-sit transfer;stand pivot/stand step transfer  -EE     Sit-Stand [place] (Transfers)  minimum assist (75% patient effort);verbal cues  -EE     Stand-Sit [place] (Transfers)  minimum assist (75% patient effort);verbal cues  -EE     Row Name 04/05/19 1315          Sit-Stand Transfer    Assistive Device (Sit-Stand Transfers)   walker, front-wheeled  -EE     Row Name 04/05/19 1315          Stand Pivot/Stand Step Transfer    Stand Pivot/Stand Step [place]  moderate assist (50% patient effort);verbal cues;nonverbal cues (demo/gesture)  -EE     Assistive Device (Stand Pivot Stand Step Transfer)  -- HHA  -EE     Row Name 04/05/19 1315          Gait/Stairs Assessment/Training    Comment (Gait/Stairs)  Attempted ambulating bed to chair; pt very confused and unable to sequence ambulation with walker. Gait deferred and performed stand pivot transfer to chair instead.  -EE     Row Name 04/05/19 1315          General ROM    GENERAL ROM COMMENTS  B LE AROM WFL  -EE     Row Name 04/05/19 1315          MMT (Manual Muscle Testing)    General MMT Comments  B LEs grossly 3 to 3+/5  -EE     Row Name 04/05/19 1315          Motor Assessment/Intervention    Additional Documentation  Therapeutic Exercise Interventions (Group);Balance (Group)  -EE     Row Name 04/05/19 1315          Therapeutic Exercise    Lower Extremity (Therapeutic Exercise)  LAQ (long arc quad), bilateral;marching while seated  -EE     Lower Extremity Range of Motion (Therapeutic Exercise)  ankle dorsiflexion/plantar flexion, bilateral  -EE     Exercise Type (Therapeutic Exercise)  AROM (active range of motion)  -EE     Sets/Reps (Therapeutic Exercise)  1/10  -EE     Comment (Therapeutic Exercise)  max cues to continue with ther ex  -EE     Row Name 04/05/19 1315          Balance    Balance  static sitting balance;static standing balance  -EE     Row Name 04/05/19 1315          Static Sitting Balance    Level of [place] (Unsupported Sitting, Static Balance)  minimal assist, 75% patient effort  -EE     Sitting Position (Unsupported Sitting, Static Balance)  sitting on edge of bed  -EE     Time Able to Maintain Position (Unsupported Sitting, Static Balance)  3 to 4 minutes  -EE     Comment (Unsupported Sitting, Static Balance)  leans R and posteriorly  -EE     Row Name 04/05/19 1315           Static Standing Balance    Level of [place] (Supported Standing, Static Balance)  minimal assist, 75% patient effort;moderate assist, 50 to 74% patient effort  -EE     Time Able to Maintain Position (Supported Standing, Static Balance)  15 to 30 seconds x2 trials  -EE     Assistive Device Utilized (Supported Standing, Static Balance)  walker, rolling  -EE     Comment (Supported Standing, Static Balance)  R lean; moderate assist and verbal cues to correct  -EE     Row Name 04/05/19 1315          Pain Assessment    Additional Documentation  Pain Scale: Numbers Pre/Post-Treatment (Group)  -EE     Row Name 04/05/19 1315          Pain Scale: Numbers Pre/Post-Treatment    Pain Scale: Numbers, Pretreatment  0/10 - no pain  -EE     Row Name 04/05/19 1315          Plan of Care Review    Plan of Care Reviewed With  patient;spouse  -EE     Row Name 04/05/19 1315          Physical Therapy Clinical Impression    Patient/Family Goals Statement (PT Clinical Impression)  Get stronger, go home  -EE     Criteria for Skilled Interventions Met (PT Clinical Impression)  yes;treatment indicated  -EE     Pathology/Pathophysiology Noted (Describe Specifically for Each System)  musculoskeletal  -EE     Impairments Found (describe specific impairments)  aerobic capacity/endurance;gait, locomotion, and balance  -EE     Rehab Potential (PT Clinical Summary)  good, to achieve stated therapy goals  -EE     Row Name 04/05/19 1315          Physical Therapy Goals    Bed Mobility Goal Selection (PT)  bed mobility, PT goal 1  -EE     Transfer Goal Selection (PT)  transfer, PT goal 1  -EE     Gait Training Goal Selection (PT)  gait training, PT goal 1  -EE     Row Name 04/05/19 1315          Bed Mobility Goal 1 (PT)    Activity/Assistive Device (Bed Mobility Goal 1, PT)  bed mobility activities, all  -EE     [place] Level/Cues Needed (Bed Mobility Goal 1, PT)  contact guard assist  -EE     Time Frame (Bed Mobility Goal 1, PT)  1  week  -EE     Progress/Outcomes (Bed Mobility Goal 1, PT)  goal ongoing  -EE     Row Name 04/05/19 1315          Transfer Goal 1 (PT)    Activity/Assistive Device (Transfer Goal 1, PT)  transfers, all;walker, rolling  -EE     [place] Level/Cues Needed (Transfer Goal 1, PT)  contact guard assist  -EE     Time Frame (Transfer Goal 1, PT)  1 week  -EE     Progress/Outcome (Transfer Goal 1, PT)  goal ongoing  -EE     Row Name 04/05/19 1315          Gait Training Goal 1 (PT)    Activity/Assistive Device (Gait Training Goal 1, PT)  gait (walking locomotion);walker, rolling  -EE     [place] Level (Gait Training Goal 1, PT)  contact guard assist  -EE     Distance (Gait Goal 1, PT)  75  -EE     Time Frame (Gait Training Goal 1, PT)  1 week  -EE     Progress/Outcome (Gait Training Goal 1, PT)  goal ongoing  -EE     Row Name 04/05/19 1315          Positioning and Restraints    Pre-Treatment Position  in bed  -EE     Post Treatment Position  chair  -EE     In Chair  sitting;call light within reach;encouraged to call for assist;with family/caregiver;notified nskay RNGabby, notified alarm strip not in room  -EE     Row Name 04/05/19 1315          Living Environment    Home Accessibility  stairs to enter home  -EE       User Key  (r) = Recorded By, (t) = Taken By, (c) = Cosigned By    Initials Name Provider Type    Jasmyne Rolle PT Physical Therapist        Physical Therapy Education     Title: PT OT SLP Therapies (In Progress)     Topic: Physical Therapy (In Progress)     Point: Mobility training (In Progress)     Learning Progress Summary           Patient Acceptance, E,TB,D, NR by EE at 4/5/2019  1:50 PM                   Point: Home exercise program (In Progress)     Learning Progress Summary           Patient Acceptance, E,TB,D, NR by EE at 4/5/2019  1:50 PM                   Point: Body mechanics (In Progress)     Learning Progress Summary           Patient Acceptance, E,TB,D, NR by EE at 4/5/2019  1:50 PM                    Point: Precautions (In Progress)     Learning Progress Summary           Patient Acceptance, E,TB,D, NR by EE at 4/5/2019  1:50 PM                               User Key     Initials Effective Dates Name Provider Type Discipline     04/03/18 -  Jasmyne Lam, PT Physical Therapist PT              PT Recommendation and Plan  Anticipated Discharge Disposition (PT): skilled nursing facility  Planned Therapy Interventions (PT Eval): gait training, bed mobility training, balance training, home exercise program, patient/family education, ROM (range of motion), strengthening, stretching, transfer training  Therapy Frequency (PT Clinical Impression): daily  Outcome Summary/Treatment Plan (PT)  Anticipated Discharge Disposition (PT): skilled nursing facility  Plan of Care Reviewed With: patient  Outcome Summary: Pt is an 85 yo female who presents from home with acute renal failure on CKD, anemia, history of liver cirrhosis and dementia, which may impact progress with PT. Upon exam, pt demonstrates LE weakness, impaired balance, decreased endurance, and impaired trunk control limiting mobility. Spouse states that pt was independent with household mobility using walker prior to admission; currently requiring mod A for bed mobility and transfers. Pt unable to take steps with walker due to weakness, impaired balance, and confusion. Cognition significantly limiting pt's ability to follow commands and sequence mobility. Pt would benefit from continued PT to address impairments and increase independence with functional mobility. Recommend subacute rehab as pt currently at high risk of falls and unsafe to DC home at current functional level.   Outcome Measures     Row Name 04/05/19 1300             How much help from another person do you currently need...    Turning from your back to your side while in flat bed without using bedrails?  2  -EE      Moving from lying on back to sitting on the side of a flat bed without  bedrails?  2  -EE      Moving to and from a bed to a chair (including a wheelchair)?  2  -EE      Standing up from a chair using your arms (e.g., wheelchair, bedside chair)?  2  -EE      Climbing 3-5 steps with a railing?  1  -EE      To walk in hospital room?  1  -EE      AM-PAC 6 Clicks Score  10  -EE         Functional Assessment    Outcome Measure Options  AM-PAC 6 Clicks Basic Mobility (PT)  -EE        User Key  (r) = Recorded By, (t) = Taken By, (c) = Cosigned By    Initials Name Provider Type    EE Jasmyne Lam, PT Physical Therapist         Time Calculation:   PT Charges     Row Name 04/05/19 1353             Time Calculation    Start Time  1315  -EE      Stop Time  1339  -EE      Time Calculation (min)  24 min  -EE      PT Received On  04/05/19  -EE      PT - Next Appointment  04/06/19  -EE      PT Goal Re-Cert Due Date  04/12/19  -EE         Time Calculation- PT    Total Timed Code Minutes- PT  13 minute(s)  -EE        User Key  (r) = Recorded By, (t) = Taken By, (c) = Cosigned By    Initials Name Provider Type    EE Jasmyne Lam, PT Physical Therapist        Therapy Charges for Today     Code Description Service Date Service Provider Modifiers Qty    84304814918 HC PT EVAL MOD COMPLEXITY 2 4/5/2019 Jasmyne Lam, PT GP 1    15661657318 HC PT THER PROC EA 15 MIN 4/5/2019 Jasmyne Lam, PT GP 1          PT G-Codes  Outcome Measure Options: AM-PAC 6 Clicks Basic Mobility (PT)  AM-PAC 6 Clicks Score: 10      Jasmyne Lam PT  4/5/2019

## 2019-04-05 NOTE — PROGRESS NOTES
Continued Stay Note  [facility]     Patient Name: Charu Bowens  MRN: [id number]  Today's Date: 4/5/2019    Admit Date: 4/3/2019    Discharge Plan     Row Name 04/05/19 1430       Plan    Plan  SNF    Patient/Family in Agreement with Plan  yes    Plan Comments  Met with pt and spouse at bedside.  Discussed need for SNF.  Pt agreeable and [place] [facility].  Spoke with Beata/[facility] and Montserrat/[facility] who will follow.  RADU Abebe RN        Discharge Codes    No documentation.             Mayte Abebe

## 2019-04-05 NOTE — PLAN OF CARE
Problem: Patient Care Overview  Goal: Plan of Care Review   04/05/19 6021   Coping/Psychosocial   Plan of Care Reviewed With patient   OTHER   Outcome Summary Pt is an 83 yo female who presents from home with acute renal failure on CKD, anemia, history of liver cirrhosis and dementia, which may impact progress with PT. Upon exam, pt demonstrates LE weakness, impaired balance, decreased endurance, and impaired trunk control limiting mobility. Spouse states that pt was independent with household mobility using walker prior to admission; currently requiring mod A for bed mobility and transfers. Pt unable to take steps with walker due to weakness, impaired balance, and confusion. Cognition significantly limiting pt's ability to follow commands and sequence mobility. Pt would benefit from continued PT to address impairments and increase independence with functional mobility. Recommend subacute rehab as pt currently at high risk of falls and unsafe to DC home at current functional level.

## 2019-04-05 NOTE — PROGRESS NOTES
"   LOS: 2 days    Patient Care Team:  Chayito Paredes MD as PCP - General (Internal Medicine)  Johnson Turner DPM as PCP - Claims Attributed  Dano Art MD as Consulting Physician (Hematology and Oncology)  Navarro Hung MD as Referring Physician (Nephrology)    Chief Complaint:    Chief Complaint   Patient presents with   • Fatigue     Follow UP acute kidney injury on chronic kidney disease  Subjective     Interval History:   Patient is feeling the same, denies any chest pain or shortness of air, no orthopnea PND, no nausea or vomiting, no abdominal pain, continue to have lower extremity edema, she is currently on IV fluid.  She has low urine output      Review of Systems:   As noted above    Objective     Vital Signs  Temp:  [97.4 °F (36.3 °C)-98.3 °F (36.8 °C)] 97.8 °F (36.6 °C)  Heart Rate:  [69-79] 71  Resp:  [16-20] 20  BP: (127-150)/(54-71) 150/58    Flowsheet Rows      First Filed Value   Admission Height  160 cm (63\") Documented at 04/03/2019 1500   Admission Weight  94.9 kg (209 lb 3.2 oz) Documented at 04/03/2019 1500          I/O this shift:  In: 480 [P.O.:480]  Out: 204 [Urine:202; Stool:2]  I/O last 3 completed shifts:  In: 720 [P.O.:720]  Out: 700 [Urine:700]    Intake/Output Summary (Last 24 hours) at 4/5/2019 1332  Last data filed at 4/5/2019 1200  Gross per 24 hour   Intake 720 ml   Output 304 ml   Net 416 ml       Physical Exam:  General Appearance: Awake, confused and disoriented, no acute distress, obese, chronically ill  Skin: warm and dry  HEENT: pupils round and reactive to light, oral mucosa normal,   Neck: supple, no JVD, trachea midline  Lungs: Decreased breath sounds bilaterally, unlabored breathing effort  Heart: RRR, normal S1 and S2, no S3, no rub  Abdomen: soft, non-tender,  present bowel sounds to auscultation  : Questionably palpable bladder, but bladder scan showed only 146 cc  Extremities: 3+ pretibial edema, no cyanosis or clubbing  Neuro: Normal " speech but the patient is confused and moving all extremities.            Results Review:    Results from last 7 days   Lab Units 04/05/19  0426 04/04/19  0535 04/03/19  1541   SODIUM mmol/L 139 142 137   POTASSIUM mmol/L 4.1 4.3 4.8   CHLORIDE mmol/L 112* 112* 105   CO2 mmol/L 13.0* 13.9* 16.5*   BUN mg/dL 75* 78* 84*   CREATININE mg/dL 4.99* 4.81* 5.14*   CALCIUM mg/dL 8.6 8.3* 9.6   BILIRUBIN mg/dL 2.7*  --  3.2*   ALK PHOS U/L 104  --  129*   ALT (SGPT) U/L 20  --  23   AST (SGOT) U/L 91*  --  100*   GLUCOSE mg/dL 87 180* 295*       Estimated Creatinine Clearance: 9.4 mL/min (A) (by C-G formula based on SCr of 4.99 mg/dL (H)).    Results from last 7 days   Lab Units 04/05/19  0426 04/04/19  0535   MAGNESIUM mg/dL 2.7* 2.7*   PHOSPHORUS mg/dL 5.6* 6.1*       Results from last 7 days   Lab Units 04/05/19  0426 04/04/19  0535   URIC ACID mg/dL 4.8 4.9       Results from last 7 days   Lab Units 04/05/19  0426 04/04/19  0535 04/03/19  1716   WBC 10*3/mm3 3.03* 2.89* 3.48   HEMOGLOBIN g/dL 10.6* 10.3* 11.3*   PLATELETS 10*3/mm3 75* 70* 80*       Results from last 7 days   Lab Units 04/03/19  1541   INR  1.88*         Imaging Results (last 24 hours)     ** No results found for the last 24 hours. **          allopurinol 100 mg Oral BID   famotidine 20 mg Oral Daily   ferrous sulfate 325 mg Oral Daily   heparin (porcine) 5,000 Units Subcutaneous Q12H   insulin lispro 0-7 Units Subcutaneous 4x Daily With Meals & Nightly   insulin lispro protamine-insulin lispro 40 Units Subcutaneous Daily With Breakfast   lactulose 30 g Oral Daily   metoprolol tartrate 12.5 mg Oral Nightly   QUEtiapine 25 mg Oral Daily With Lunch   QUEtiapine 50 mg Oral Nightly   rifaximin 550 mg Oral Daily With Lunch & Dinner   sertraline 25 mg Oral Daily   vitamin B-12 1,000 mcg Oral Daily       sodium chloride 75 mL/hr Last Rate: 75 mL/hr (04/05/19 0909)       Medication Review:   Current Facility-Administered Medications   Medication Dose Route  Frequency Provider Last Rate Last Dose   • acetaminophen (TYLENOL) tablet 650 mg  650 mg Oral Q4H PRN Brice Le MD       • allopurinol (ZYLOPRIM) tablet 100 mg  100 mg Oral BID Brice Le MD   100 mg at 04/05/19 0906   • dextrose (D50W) 25 g/ 50mL Intravenous Solution 25 g  25 g Intravenous Q15 Min PRN Brice Le MD       • dextrose (GLUTOSE) oral gel 15 g  15 g Oral Q15 Min PRN Brice Le MD       • famotidine (PEPCID) tablet 20 mg  20 mg Oral Daily Brice Le MD   20 mg at 04/05/19 0905   • ferrous sulfate tablet 325 mg  325 mg Oral Daily Brice Le MD   325 mg at 04/05/19 0905   • glucagon (human recombinant) (GLUCAGEN DIAGNOSTIC) injection 1 mg  1 mg Subcutaneous PRN Brice Le MD       • heparin (porcine) 5000 UNIT/ML injection 5,000 Units  5,000 Units Subcutaneous Q12H Forrest Avilez MD   5,000 Units at 04/05/19 0906   • insulin lispro (humaLOG) injection 0-7 Units  0-7 Units Subcutaneous 4x Daily With Meals & Nightly Brice Le MD   2 Units at 04/04/19 0836   • insulin lispro protamine-insulin lispro (humaLOG 75-25) injection 40 Units  40 Units Subcutaneous Daily With Breakfast Brice Le MD   40 Units at 04/05/19 0900   • lactulose (CHRONULAC) 10 GM/15ML solution 30 g  30 g Oral Daily Brice Le MD   30 g at 04/05/19 0905   • melatonin tablet 10 mg  10 mg Oral Nightly PRN Brice Le MD       • metoprolol tartrate (LOPRESSOR) tablet 12.5 mg  12.5 mg Oral Nightly Brice Le MD   12.5 mg at 04/04/19 2107   • nitroglycerin (NITROSTAT) SL tablet 0.4 mg  0.4 mg Sublingual Q5 Min PRN Brice Le MD       • ondansetron (ZOFRAN) tablet 4 mg  4 mg Oral Q6H PRN Brice Le MD        Or   • ondansetron ODT (ZOFRAN-ODT) disintegrating tablet 4 mg  4 mg Oral Q6H PRN Brice Le MD        Or   • ondansetron (ZOFRAN) injection 4 mg  4 mg Intravenous Q6H PRN Mimi,  Brice Spears MD   4 mg at 04/03/19 2129   • QUEtiapine (SEROquel) tablet 25 mg  25 mg Oral Daily With Lunch Brice Le MD   25 mg at 04/05/19 1300   • QUEtiapine (SEROquel) tablet 50 mg  50 mg Oral Nightly Brice Le MD   50 mg at 04/04/19 2107   • rifaximin (XIFAXAN) tablet 550 mg  550 mg Oral Daily With Lunch & Dinner Brice Le MD   550 mg at 04/05/19 1300   • sertraline (ZOLOFT) tablet 25 mg  25 mg Oral Daily Brice Le MD   25 mg at 04/05/19 0905   • sodium chloride 0.9 % flush 3-10 mL  3-10 mL Intravenous PRN Brice Le MD       • sodium chloride 0.9 % infusion  75 mL/hr Intravenous Continuous Brice Le MD 75 mL/hr at 04/05/19 0909 75 mL/hr at 04/05/19 0909   • traMADol (ULTRAM) tablet 50 mg  50 mg Oral Q12H PRN Brice Le MD       • vitamin B-12 (CYANOCOBALAMIN) tablet 1,000 mcg  1,000 mcg Oral Daily Brice Le MD   1,000 mcg at 04/05/19 0905       Assessment/Plan   1.  Acute kidney injury on chronic kidney disease, multifactorial, slight improvement since admission creatinine on admission was 5.14 and 2 this morning 4.99, we need to rule out the possibility of hepatorenal syndrome, the patient has declined to consider dialysis in the past and her  confirmed that today, the urine studies does not confirm hepatorenal but does not rule it out 100%  2.  Chronic kidney disease stage IV at baseline associated with diabetic nephropathy, biopsy-proven.  Baseline creatinine about 3.26  3.  Diabetes mellitus type 2 with known diabetic nephropathy and retinopathy  4.  Cirrhosis, associated with HOANG, even though there is a report in the past medical history stating that there is history of hemochromatosis.  The patient had the hepatic encephalopathy associated with noncompliance with lactulose  5.  Anemia with chronic kidney disease  6.  Thrombocytopenia with known history of chronic thrombocytopenia, hemoglobin today 10.6 and  platelets 75,000 also leukocytes is 3.03  7.  History of gout  8.  Hypertension reasonably controlled  9.  Fluid excess but there is no evidence of pulmonary edema, most likely fluid excess related to her cirrhosis      Plan:  1.  Stop IV fluids  2.  Continue the same treatment  3.  Her prognosis very poor      I discussed the case with the patient's  at the bedside        Mack Pruitt MD  04/05/19  1:32 PM

## 2019-04-06 NOTE — THERAPY TREATMENT NOTE
Acute Care - Physical Therapy Treatment Note  [facility]     Patient Name: Charu Bowens  : 1935  MRN: [id number]  Today's Date: 2019  Onset of Illness/Injury or Date of Surgery: 19     Referring Physician: Jones    Admit Date: 4/3/2019    Visit Dx:    ICD-10-CM ICD-9-CM   1. Acute renal failure superimposed on chronic kidney disease, unspecified CKD stage, unspecified acute renal failure type (CMS/HCC) N17.9 584.9    N18.9 585.9   2. Generalized weakness R53.1 780.79   3. Elevated lactic acid level R79.89 276.2     Patient Active Problem List   Diagnosis   • Cirrhosis of liver (CMS/HCC)   • Hypersplenism   • Splenic pancytopenia syndrome (CMS/HCC)   • Chronic renal disease, stage 4, severely decreased glomerular filtration rate between 15-29 mL/min/1.73 square meter (CMS/HCC)   • Anemia of chronic renal failure, stage 4 (severe) (CMS/HCC)   • Hepatic encephalopathy (CMS/HCC)   • Thrombocytopenia (CMS/HCC)   • Leukopenia   • Acute kidney failure (CMS/HCC)   • Acute hepatic encephalopathy   • DM2 (diabetes mellitus, type 2) (CMS/HCC)   • Iron deficiency   • Acute idiopathic gout of right ankle   • Decreased mobility   • Fall   • Urinary tract infection associated with indwelling urethral catheter (CMS/HCC)   • Acute renal failure superimposed on chronic kidney disease (CMS/HCC)   • Atrial fibrillation (CMS/HCC)       Therapy Treatment    Rehabilitation Treatment Summary     Row Name 19 1033             Treatment Time/Intention    Discipline  physical therapist  -CA      Document Type  therapy note (daily note)  -CA      Subjective Information  complains of;weakness  -CA      Mode of Treatment  individual therapy;physical therapy  -CA      Patient/Family Observations  supine in bed, NAD noted at rest, spouse at bedside, pleasantly confused throughout  -CA      Patient Effort  adequate  -CA      Existing Precautions/Restrictions  fall  -CA      Recorded by [CA] Yareli Hill, PT 19  1036      Row Name 04/06/19 1033             Cognitive Assessment/Intervention- PT/OT    Affect/Mental Status (Cognitive)  confused  -CA      Orientation Status (Cognition)  oriented to;person  -CA      Follows Commands (Cognition)  follows one step commands;50-74% accuracy;increased processing time needed;repetition of directions required;verbal cues/prompting required  -CA      Cognitive Function (Cognitive)  safety deficit  -CA      Personal Safety Interventions  fall prevention program maintained;gait belt;nonskid shoes/slippers when out of bed  -CA      Recorded by [CA] Yareli Hill, PT 04/06/19 1036      Row Name 04/06/19 1033             Bed Mobility Assessment/Treatment    Bed Mobility Assessment/Treatment  supine-sit;sit-supine  -CA      Supine-Sit [place] (Bed Mobility)  moderate assist (50% patient effort)  -CA      Sit-Supine [place] (Bed Mobility)  dependent (less than 25% patient effort)  -CA      Assistive Device (Bed Mobility)  head of bed elevated;bed rails  -CA      Recorded by [CA] Yareli Hill, PT 04/06/19 1036      Row Name 04/06/19 1033             Transfer Assessment/Treatment    Transfer Assessment/Treatment  sit-stand transfer;stand-sit transfer  -CA      Comment (Transfers)  pt performs STS x 4, with minimal standing tolerance each time <15s, max cues for side steps to HOB, but unable to follow commands.   -CA      Recorded by [CA] Yareli Hill, PT 04/06/19 1036      Row Name 04/06/19 1033             Sit-Stand Transfer    Sit-Stand [place] (Transfers)  moderate assist (50% patient effort)  -CA      Assistive Device (Sit-Stand Transfers)  walker, front-wheeled  -CA      Recorded by [CA] Yareli Hill, PT 04/06/19 1036      Row Name 04/06/19 1033             Stand-Sit Transfer    Stand-Sit [place] (Transfers)  minimum assist (75% patient effort)  -CA      Assistive Device (Stand-Sit Transfers)  walker, front-wheeled  -CA      Recorded by [CA] Sergio  Yareli, PT 04/06/19 1036      Row Name 04/06/19 1033             Therapeutic Exercise    Lower Extremity (Therapeutic Exercise)  LAQ (long arc quad), bilateral  -CA      Sets/Reps (Therapeutic Exercise)  x10 ea.  -CA      Recorded by [CA] Yareli Hill, PT 04/06/19 1036      Row Name 04/06/19 1033             Static Sitting Balance    Level of [place] (Unsupported Sitting, Static Balance)  minimal assist, 75% patient effort  -CA      Sitting Position (Unsupported Sitting, Static Balance)  sitting on edge of bed  -CA      Time Able to Maintain Position (Unsupported Sitting, Static Balance)  3 to 4 minutes  -CA      Recorded by [CA] Yareli Hill, PT 04/06/19 1036      Row Name 04/06/19 1033             Positioning and Restraints    Pre-Treatment Position  in bed  -CA      Post Treatment Position  bed  -CA      In Bed  supine;call light within reach;encouraged to call for assist;exit alarm on;with family/caregiver;side rails up x2;SCD pump applied  -CA      Recorded by [CA] Yareli Hill, PT 04/06/19 1036      Row Name 04/06/19 1033             Outcome Summary/Treatment Plan (PT)    Daily Summary of Progress (PT)  progress toward functional goals is gradual  -CA      Anticipated Discharge Disposition (PT)  skilled nursing facility  -CA      Recorded by [CA] Yareli Hill, PT 04/06/19 1036        User Key  (r) = Recorded By, (t) = Taken By, (c) = Cosigned By    Initials Name Effective Dates Discipline    CA Yareli Hill, PT 06/13/18 -  PT                   Physical Therapy Education     Title: PT OT SLP Therapies (In Progress)     Topic: Physical Therapy (In Progress)     Point: Mobility training (In Progress)     Learning Progress Summary           Patient Acceptance, E, NR by CA at 4/6/2019 10:36 AM    Acceptance, E,TB,D, NR by EE at 4/5/2019  1:50 PM                   Point: Home exercise program (In Progress)     Learning Progress Summary           Patient Acceptance, E, NR by CA at 4/6/2019  10:36 AM    Acceptance, E,TB,D, NR by EE at 4/5/2019  1:50 PM                   Point: Body mechanics (In Progress)     Learning Progress Summary           Patient Acceptance, E, NR by CA at 4/6/2019 10:36 AM    Acceptance, E,TB,D, NR by EE at 4/5/2019  1:50 PM                   Point: Precautions (In Progress)     Learning Progress Summary           Patient Acceptance, E, NR by CA at 4/6/2019 10:36 AM    Acceptance, E,TB,D, NR by EE at 4/5/2019  1:50 PM                               User Key     Initials Effective Dates Name Provider Type Discipline     04/03/18 -  Jasmyne Lam, PT Physical Therapist PT    CA 06/13/18 -  Yareli Hill, PT Physical Therapist PT                PT Recommendation and Plan  Anticipated Discharge Disposition (PT): skilled nursing facility  Outcome Summary/Treatment Plan (PT)  Daily Summary of Progress (PT): progress toward functional goals is gradual  Anticipated Discharge Disposition (PT): skilled nursing facility  Plan of Care Reviewed With: patient  Progress: improving  Outcome Summary: Pt with improved activity tolerance today, x4 STS with mod A and FWW, max cues for upright posture and fwd/right weight shift, difficulty following commands. Attempted side steps to HOB but pt unable to follow commands for this at this time. Would recommend SNF at [place].  Outcome Measures     Row Name 04/06/19 1000 04/05/19 1300          How much help from another person do you currently need...    Turning from your back to your side while in flat bed without using bedrails?  2  -CA  2  -EE     Moving from lying on back to sitting on the side of a flat bed without bedrails?  2  -CA  2  -EE     Moving to and from a bed to a chair (including a wheelchair)?  2  -CA  2  -EE     Standing up from a chair using your arms (e.g., wheelchair, bedside chair)?  2  -CA  2  -EE     Climbing 3-5 steps with a railing?  1  -CA  1  -EE     To walk in hospital room?  1  -CA  1  -EE     AM-PAC 6 Clicks Score  10  -CA   10  -EE        Functional Assessment    Outcome Measure Options  AM-PAC 6 Clicks Basic Mobility (PT)  -CA  AM-PAC 6 Clicks Basic Mobility (PT)  -EE       User Key  (r) = Recorded By, (t) = Taken By, (c) = Cosigned By    Initials Name Provider Type    EE Jasmyne Lam, PT Physical Therapist    Yareli Vidales PT Physical Therapist         Time Calculation:   PT Charges     Row Name 04/06/19 1037             Time Calculation    Start Time  1013  -CA      Stop Time  1024  -CA      Time Calculation (min)  11 min  -CA      PT Received On  04/06/19  -CA      PT - Next Appointment  04/08/19  -CA         Time Calculation- PT    Total Timed Code Minutes- PT  11 minute(s)  -CA        User Key  (r) = Recorded By, (t) = Taken By, (c) = Cosigned By    Initials Name Provider Type    Yareli Vidales PT Physical Therapist        Therapy Charges for Today     Code Description Service Date Service Provider Modifiers Qty    [id number] HC PT THERAPEUTIC ACT EA 15 MIN 4/6/2019 Yareli Hill, PT GP 1          PT G-Codes  Outcome Measure Options: AM-PAC 6 Clicks Basic Mobility (PT)  AM-PAC 6 Clicks Score: 10    Yareli Hill PT  4/6/2019

## 2019-04-06 NOTE — PLAN OF CARE
Problem: Patient Care Overview  Goal: Plan of Care Review   04/06/19 1036   Plan of Care Review   Progress improving   Coping/Psychosocial   Plan of Care Reviewed With patient   OTHER   Outcome Summary Pt with improved activity tolerance today, x4 STS with mod A and FWW, max cues for upright posture and fwd/right weight shift, difficulty following commands. Attempted side steps to HOB but pt unable to follow commands for this at this time. Would recommend SNF at [place].

## 2019-04-06 NOTE — PLAN OF CARE
Problem: Patient Care Overview  Goal: Plan of Care Review  Outcome: Ongoing (interventions implemented as appropriate)   04/06/19 1981   Plan of Care Review   Progress improving   Coping/Psychosocial   Plan of Care Reviewed With patient   OTHER   Outcome Summary Vss, labs improving pt remains confused requiring frequent reorientation and redirecting. [facility] to assess for admission. much encouragement for oral intake and participation with pt

## 2019-04-06 NOTE — PROGRESS NOTES
"DAILY PROGRESS NOTE  [facility]    Patient Identification:  Name: Charu Bowens  Age: 84 y.o.  Sex: female  :  1935  MRN: [id number]         Primary Care Physician: Chayito Paredes MD    Subjective:  Interval History:She feels better.  Wants to go home.    Objective:    Scheduled Meds:    [START ON 2019] allopurinol 100 mg Oral Daily   famotidine 20 mg Oral Daily   ferrous sulfate 325 mg Oral Daily   heparin (porcine) 5,000 Units Subcutaneous Q12H   insulin lispro 0-7 Units Subcutaneous 4x Daily With Meals & Nightly   insulin lispro protamine-insulin lispro 40 Units Subcutaneous Daily With Breakfast   lactulose 30 g Oral Daily   metoprolol tartrate 12.5 mg Oral Nightly   QUEtiapine 25 mg Oral Daily With Lunch   QUEtiapine 50 mg Oral Nightly   rifaximin 550 mg Oral Daily With Lunch & Dinner   sertraline 25 mg Oral Daily   vitamin B-12 1,000 mcg Oral Daily     Continuous Infusions:     Vital signs in last 24 hours:  Temp:  [97.6 °F (36.4 °C)-98 °F (36.7 °C)] 97.8 °F (36.6 °C)  Heart Rate:  [70-88] 75  Resp:  [16-20] 16  BP: (119-142)/(53-64) 140/53    Intake/Output:    Intake/Output Summary (Last 24 hours) at 2019 1437  Last data filed at 2019 1300  Gross per 24 hour   Intake 1080 ml   Output --   Net 1080 ml       Exam:  /53   Pulse 75   Temp 97.8 °F (36.6 °C) (Oral)   Resp 16   Ht 160 cm (63\")   Wt 95.3 kg (210 lb 1.6 oz)   SpO2 98%   BMI 37.22 kg/m²     General Appearance:    Alert, cooperative, no distress   Head:    Normocephalic, without obvious abnormality, atraumatic   Eyes:       Throat:   Lips, tongue, gums normal   Neck:   Supple, symmetrical, trachea midline, no JVD   Lungs:     Clear to auscultation bilaterally, respirations unlabored   Chest Wall:    No tenderness or deformity    Heart:    Regular rate and rhythm, S1 and S2 normal, no murmur,no  Rub or gallop   Abdomen:     Soft, non-tender, bowel sounds active, no masses, no organomegaly    Extremities:  "  Extremities normal, atraumatic, no cyanosis or edema   Pulses:      Skin:   Skin is warm and dry,  no rashes or palpable lesions   Neurologic:   no focal deficits noted      Lab Results (last 72 hours)     Procedure Component Value Units Date/Time    POC Glucose Once [[id number]]  (Normal) Collected:  04/05/19 0559    Specimen:  Blood Updated:  04/05/19 0601     Glucose 90 mg/dL     Comprehensive Metabolic Panel [[id number]]  (Abnormal) Collected:  04/05/19 0426    Specimen:  Blood Updated:  04/05/19 0548     Glucose 87 mg/dL      BUN 75 mg/dL      Creatinine 4.99 mg/dL      Sodium 139 mmol/L      Potassium 4.1 mmol/L      Chloride 112 mmol/L      CO2 13.0 mmol/L      Calcium 8.6 mg/dL      Total Protein 5.5 g/dL      Albumin 1.80 g/dL      ALT (SGPT) 20 U/L      AST (SGOT) 91 U/L      Alkaline Phosphatase 104 U/L      Total Bilirubin 2.7 mg/dL      eGFR Non African Amer 8 mL/min/1.73      Comment: <15 Indicative of kidney failure.        eGFR   Amer -- mL/min/1.73      Comment: <15 Indicative of kidney failure.        Globulin 3.7 gm/dL      A/G Ratio 0.5 g/dL      BUN/Creatinine Ratio 15.0     Anion Gap 14.0 mmol/L     Narrative:       GFR Normal >60  Chronic Kidney Disease <60  Kidney Failure <15    CBC & Differential [[id number]] Collected:  04/05/19 0426    Specimen:  Blood Updated:  04/05/19 0542    Narrative:       The following orders were created for panel order CBC & Differential.  Procedure                               Abnormality         Status                     ---------                               -----------         ------                     CBC Auto Differential[[id number]]        Abnormal            Final result                 Please view results for these tests on the individual orders.    CBC Auto Differential [[id number]]  (Abnormal) Collected:  04/05/19 0426    Specimen:  Blood Updated:  04/05/19 0542     WBC 3.03 10*3/mm3      RBC 3.02 10*6/mm3      Hemoglobin 10.6 g/dL       Hematocrit 33.2 %      .9 fL      MCH 35.1 pg      MCHC 31.9 g/dL      RDW 18.7 %      RDW-SD 72.5 fl      MPV -- fL      Comment: Not calc        Platelets 75 10*3/mm3      Neutrophil % 59.0 %      Lymphocyte % 21.5 %      Monocyte % 12.9 %      Eosinophil % 5.6 %      Basophil % 0.3 %      Neutrophils, Absolute 1.79 10*3/mm3      Lymphocytes, Absolute 0.65 10*3/mm3      Monocytes, Absolute 0.39 10*3/mm3      Eosinophils, Absolute 0.17 10*3/mm3      Basophils, Absolute 0.01 10*3/mm3     Uric Acid [[id number]]  (Normal) Collected:  04/05/19 0426    Specimen:  Blood Updated:  04/05/19 0542     Uric Acid 4.8 mg/dL     Phosphorus [[id number]]  (Abnormal) Collected:  04/05/19 0426    Specimen:  Blood Updated:  04/05/19 0542     Phosphorus 5.6 mg/dL     Magnesium [[id number]]  (Abnormal) Collected:  04/05/19 0426    Specimen:  Blood Updated:  04/05/19 0542     Magnesium 2.7 mg/dL     POC Glucose Once [[id number]]  (Normal) Collected:  04/04/19 2046    Specimen:  Blood Updated:  04/04/19 2048     Glucose 104 mg/dL     Blood Culture - Blood, Arm, Right [[id number]] Collected:  04/03/19 2014    Specimen:  Blood from Arm, Right Updated:  04/04/19 2030     Blood Culture No growth at 24 hours    Sodium, Urine, Random - [[id number]] Collected:  04/04/19 1817    Specimen:  Urine Updated:  04/04/19 1854     Sodium, Urine 32 mmol/L     Narrative:       Reference intervals for random urine have not been established.  Clinical usage is dependent upon physician's interpretation in combination with other laboratory tests.     Chloride, Urine, Random - [[id number]] Collected:  04/04/19 1817    Specimen:  Urine Updated:  04/04/19 1854     Chloride, Urine 40 mmol/L     Narrative:       Reference intervals for random urine have not been established.  Clinical usage is dependent upon physician's interpretation in combination with other laboratory tests.     Protein / Creatinine Ratio, Urine - [[id number]]  (Abnormal) Collected:  04/04/19  1817    Specimen:  Urine Updated:  04/04/19 1853     Protein/Creatinine Ratio, Urine 239.5 mg/G Crea      Creatinine, Urine 66.8 mg/dL      Total Protein, Urine 16.0 mg/dL     Blood Culture - Blood, Hand, Right [[id number]] Collected:  04/03/19 1723    Specimen:  Blood from Hand, Right Updated:  04/04/19 1745     Blood Culture No growth at 24 hours    POC Glucose Once [[id number]]  (Normal) Collected:  04/04/19 1718    Specimen:  Blood Updated:  04/04/19 1720     Glucose 72 mg/dL     POC Glucose Once [[id number]]  (Normal) Collected:  04/04/19 1145    Specimen:  Blood Updated:  04/04/19 1147     Glucose 111 mg/dL     Renal Function Panel [[id number]]  (Abnormal) Collected:  04/04/19 0535    Specimen:  Blood Updated:  04/04/19 0629     Glucose 180 mg/dL      BUN 78 mg/dL      Creatinine 4.81 mg/dL      Sodium 142 mmol/L      Potassium 4.3 mmol/L      Chloride 112 mmol/L      CO2 13.9 mmol/L      Calcium 8.3 mg/dL      Albumin 2.40 g/dL      Phosphorus 6.1 mg/dL      Anion Gap 16.1 mmol/L      BUN/Creatinine Ratio 16.2     eGFR Non African Amer 9 mL/min/1.73      Comment: <15 Indicative of kidney failure.        eGFR   Amer -- mL/min/1.73      Comment: <15 Indicative of kidney failure.       Narrative:       GFR Normal >60  Chronic Kidney Disease <60  Kidney Failure <15    Magnesium [[id number]]  (Abnormal) Collected:  04/04/19 0535    Specimen:  Blood Updated:  04/04/19 0629     Magnesium 2.7 mg/dL     Uric Acid [[id number]]  (Normal) Collected:  04/04/19 0535    Specimen:  Blood Updated:  04/04/19 0629     Uric Acid 4.9 mg/dL     CBC (No Diff) [[id number]]  (Abnormal) Collected:  04/04/19 0535    Specimen:  Blood Updated:  04/04/19 0627     WBC 2.89 10*3/mm3      RBC 2.95 10*6/mm3      Hemoglobin 10.3 g/dL      Hematocrit 32.9 %      .5 fL      MCH 34.9 pg      MCHC 31.3 g/dL      RDW 18.5 %      RDW-SD 73.8 fl      MPV 10.5 fL      Platelets 70 10*3/mm3     POC Glucose Once [[id number]]  (Abnormal) Collected:   04/04/19 0616    Specimen:  Blood Updated:  04/04/19 0618     Glucose 173 mg/dL     Urinalysis With Culture If Indicated - Urine, Catheter In/Out [[id number]]  (Abnormal) Collected:  04/03/19 2220    Specimen:  Urine, Catheter In/Out Updated:  04/03/19 2300     Color, UA Dark Yellow     Appearance, UA Clear     pH, UA <=5.0     Specific Gravity, UA 1.016     Glucose, UA Negative     Ketones, UA Negative     Bilirubin, UA Negative     Blood, UA Negative     Protein, UA Negative     Leuk Esterase, UA Small (1+)     Nitrite, UA Negative     Urobilinogen, UA 0.2 E.U./dL    Urinalysis, Microscopic Only - Urine, Catheter In/Out [[id number]] Collected:  04/03/19 2220    Specimen:  Urine, Catheter In/Out Updated:  04/03/19 2300     RBC, UA 0-2 /HPF      WBC, UA 0-2 /HPF      Bacteria, UA None Seen /HPF      Squamous Epithelial Cells, UA 0-2 /HPF      Hyaline Casts, UA 0-2 /LPF      Methodology Automated Microscopy    POC Glucose Once [[id number]]  (Abnormal) Collected:  04/03/19 2124    Specimen:  Blood Updated:  04/03/19 2125     Glucose 196 mg/dL     Lactic Acid, Reflex [[id number]]  (Abnormal) Collected:  04/03/19 2014    Specimen:  Blood Updated:  04/03/19 2054     Lactate 2.7 mmol/L     Lactic Acid, Reflex Timer (This will reflex a repeat order 3-3:15 hours after ordered.) [[id number]] Collected:  04/03/19 1540    Specimen:  Blood Updated:  04/03/19 1930     Extra Tube Hold for add-ons.     Comment: Auto resulted.       CBC & Differential [[id number]] Collected:  04/03/19 1716    Specimen:  Blood Updated:  04/03/19 1809    Narrative:       The following orders were created for panel order CBC & Differential.  Procedure                               Abnormality         Status                     ---------                               -----------         ------                     Scan Slide[[id number]]                                       Final result               CBC Auto Differential[[id number]]        Abnormal             Final result                 Please view results for these tests on the individual orders.    CBC Auto Differential [[id number]]  (Abnormal) Collected:  04/03/19 1716    Specimen:  Blood Updated:  04/03/19 1809     WBC 3.48 10*3/mm3      RBC 3.16 10*6/mm3      Hemoglobin 11.3 g/dL      Hematocrit 35.4 %      .0 fL      MCH 35.8 pg      MCHC 31.9 g/dL      RDW 18.7 %      RDW-SD 74.2 fl      Platelets 80 10*3/mm3      Neutrophil % 68.1 %      Lymphocyte % 16.1 %      Monocyte % 11.2 %      Eosinophil % 3.7 %      Basophil % 0.3 %      Immature Grans % 0.6 %      Neutrophils, Absolute 2.37 10*3/mm3      Lymphocytes, Absolute 0.56 10*3/mm3      Monocytes, Absolute 0.39 10*3/mm3      Eosinophils, Absolute 0.13 10*3/mm3      Basophils, Absolute 0.01 10*3/mm3      Immature Grans, Absolute 0.02 10*3/mm3      nRBC 0.0 /100 WBC     Scan Slide [[id number]] Collected:  04/03/19 1716    Specimen:  Blood Updated:  04/03/19 1809     Anisocytosis Mod/2+     Macrocytes Mod/2+     WBC Morphology Normal     Clumped Platelets Present    Comprehensive Metabolic Panel [[id number]]  (Abnormal) Collected:  04/03/19 1541    Specimen:  Blood Updated:  04/03/19 1648     Glucose 295 mg/dL      BUN 84 mg/dL      Creatinine 5.14 mg/dL      Sodium 137 mmol/L      Potassium 4.8 mmol/L      Chloride 105 mmol/L      CO2 16.5 mmol/L      Calcium 9.6 mg/dL      Total Protein 6.5 g/dL      Albumin 2.40 g/dL      ALT (SGPT) 23 U/L      AST (SGOT) 100 U/L      Alkaline Phosphatase 129 U/L      Total Bilirubin 3.2 mg/dL      eGFR Non African Amer 8 mL/min/1.73      Comment: <15 Indicative of kidney failure.        eGFR   Amer -- mL/min/1.73      Comment: <15 Indicative of kidney failure.        Globulin 4.1 gm/dL      A/G Ratio 0.6 g/dL      BUN/Creatinine Ratio 16.3     Anion Gap 15.5 mmol/L     Narrative:       GFR Normal >60  Chronic Kidney Disease <60  Kidney Failure <15    Troponin [[id number]]  (Normal) Collected:  04/03/19 1540     "Specimen:  Blood Updated:  04/03/19 1648     Troponin T 0.029 ng/mL     Narrative:       Troponin T Reference Range:  <= 0.03 ng/mL-   Negative for AMI  >0.03 ng/mL-     Abnormal for myocardial necrosis.  Clinicians would have to utilize clinical acumen, EKG, Troponin and serial changes to determine if it is an Acute Myocardial Infarction or myocardial injury due to an underlying chronic condition.     Procalcitonin [[id number]]  (Abnormal) Collected:  04/03/19 1541    Specimen:  Blood Updated:  04/03/19 1648     Procalcitonin 1.76 ng/mL     Narrative:       As a Marker for Sepsis (Non-Neonates):   1. <0.5 ng/mL represents a low risk of severe sepsis and/or septic shock.  1. >2 ng/mL represents a high risk of severe sepsis and/or septic shock.    As a Marker for Lower Respiratory Tract Infections that require antibiotic therapy:  PCT on Admission     Antibiotic Therapy             6-12 Hrs later  > 0.5                Strongly Recommended            >0.25 - <0.5         Recommended  0.1 - 0.25           Discouraged                   Remeasure/reassess PCT  <0.1                 Strongly Discouraged          Remeasure/reassess PCT      As 28 day mortality risk marker: \"Change in Procalcitonin Result\" (> 80 % or <=80 %) if Day 0 (or Day 1) and Day 4 values are available. Refer to [email or web address]-pct-[email or web address]   Change in PCT <=80 %   A decrease of PCT levels below or equal to 80 % defines a positive change in PCT test result representing a higher risk for 28-day all-cause mortality of patients diagnosed with severe sepsis or septic shock.  Change in PCT > 80 %   A decrease of PCT levels of more than 80 % defines a negative change in PCT result representing a lower risk for 28-day all-cause mortality of patients diagnosed with severe sepsis or septic shock.                BNP [[id number]]  (Normal) Collected:  04/03/19 1541    Specimen:  Blood Updated:  04/03/19 1646     proBNP 1,030.0 pg/mL     Narrative:       " Among patients with dyspnea, NT-proBNP is highly sensitive for the detection of acute congestive heart failure. In addition NT-proBNP of <300 pg/ml effectively rules out acute congestive heart failure with 99% negative predictive value.    [facility] [[id number]] Collected:  04/03/19 1541    Specimen:  Blood Updated:  04/03/19 1646    Narrative:       The following orders were created for panel order [facility].  Procedure                               Abnormality         Status                     ---------                               -----------         ------                     Light Blue Top[[id number]]                                   Final result               Green Top (Gel)[[id number]]                                  Final result               Lavender Top[[id number]]                                     Final result               Gold Top - SST[[id number]]                                   Final result                 Please view results for these tests on the individual orders.    Light Blue Top [[id number]] Collected:  04/03/19 1541    Specimen:  Blood Updated:  04/03/19 1646     Extra Tube hold for add-on     Comment: Auto resulted       Green Top (Gel) [[id number]] Collected:  04/03/19 1541    Specimen:  Blood Updated:  04/03/19 1646     Extra Tube Hold for add-ons.     Comment: Auto resulted.       Lavender Top [[id number]] Collected:  04/03/19 1541    Specimen:  Blood Updated:  04/03/19 1646     Extra Tube hold for add-on     Comment: Auto resulted       Gold Top - SST [[id number]] Collected:  04/03/19 1541    Specimen:  Blood Updated:  04/03/19 1646     Extra Tube Hold for add-ons.     Comment: Auto resulted.       Ammonia [[id number]]  (Normal) Collected:  04/03/19 1540    Specimen:  Blood Updated:  04/03/19 1641     Ammonia 42 umol/L     Lactic Acid, Plasma [[id number]]  (Abnormal) Collected:  04/03/19 1540    Specimen:  Blood Updated:  04/03/19 1627     Lactate 3.5 mmol/L     Protime-INR [[id number]]   (Abnormal) Collected:  04/03/19 1541    Specimen:  Blood Updated:  04/03/19 1617     Protime 21.3 Seconds      INR 1.88        Data Review:  Results from last 7 days   Lab Units 04/06/19 0437 04/05/19 0426 04/04/19  0535   SODIUM mmol/L 139 139 142   POTASSIUM mmol/L 4.2 4.1 4.3   CHLORIDE mmol/L 111* 112* 112*   CO2 mmol/L 13.3* 13.0* 13.9*   BUN mg/dL 77* 75* 78*   CREATININE mg/dL 4.68* 4.99* 4.81*   GLUCOSE mg/dL 130* 87 180*   CALCIUM mg/dL 7.9* 8.6 8.3*     Results from last 7 days   Lab Units 04/06/19 0437 04/05/19 0426 04/04/19  0535   WBC 10*3/mm3 2.81* 3.03* 2.89*   HEMOGLOBIN g/dL 10.7* 10.6* 10.3*   HEMATOCRIT % 34.6 33.2* 32.9*   PLATELETS 10*3/mm3 73* 75* 70*             Lab Results   Lab Value Date/Time    TROPONINT 0.029 04/03/2019 1541    TROPONINT 0.076 (H) 11/04/2018 1753    TROPONINT 0.117 (C) 07/26/2018 1127    TROPONINT 0.112 (C) 11/18/2016 1936    TROPONINT 0.141 (C) 11/18/2016 1229    TROPONINT 0.139 (C) 11/18/2016 0553    TROPONINT 0.104 (C) 11/17/2016 2345    TROPONINT 0.119 (C) 11/17/2016 1813    TROPONINT 0.096 (C) 11/17/2016 1209    TROPONINT 0.118 (C) 11/17/2016 0350    TROPONINT 0.128 (C) 11/17/2016 0026    TROPONINT 0.145 (C) 11/16/2016 2006    TROPONINT 0.03 02/18/2016 1645         Results from last 7 days   Lab Units 04/06/19 0437 04/05/19 0426 04/03/19  1541   ALK PHOS U/L 118* 104 129*   BILIRUBIN mg/dL 2.8* 2.7* 3.2*   ALT (SGPT) U/L 18 20 23   AST (SGOT) U/L 97* 91* 100*             Glucose   Date/Time Value Ref Range Status   04/06/2019 1152 157 (H) 70 - 130 mg/dL Final   04/06/2019 0650 108 70 - 130 mg/dL Final   04/05/2019 2028 82 70 - 130 mg/dL Final   04/05/2019 1700 101 70 - 130 mg/dL Final   04/05/2019 1232 84 70 - 130 mg/dL Final   04/05/2019 0559 90 70 - 130 mg/dL Final   04/04/2019 2046 104 70 - 130 mg/dL Final   04/04/2019 1718 72 70 - 130 mg/dL Final     Results from last 7 days   Lab Units 04/03/19  1541   INR  1.88*       Past Medical History:   Diagnosis  Date   • Anxiety    • Arthritis    • C. 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   • HOANG (nonalcoholic steatohepatitis)    • Pancytopenia (CMS/HCC)    • Spinal stenosis    • Splenomegaly    • Thrombocytopenia, chronic    • TIA (transient ischemic attack)        Assessment:  Active Hospital Problems    Diagnosis  POA   • **Acute renal failure superimposed on chronic kidney disease (CMS/HCC) [N17.9, N18.9]  Yes   • Atrial fibrillation (CMS/HCC) [I48.91]  Unknown   • Iron deficiency [E61.1]  Yes   • DM2 (diabetes mellitus, type 2) (CMS/HCC) [E11.9]  Yes   • Thrombocytopenia (CMS/HCC) [D69.6]  Yes   • Anemia of chronic renal failure, stage 4 (severe) (CMS/HCC) [N18.4, D63.1]  Yes   • 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       Plan:  Continue off IV fluid hydration. Renal consult noted. Follow up lab.  Up with PT.  May need SNU for rehab.    Billy Goldman MD  4/6/2019  2:37 PM

## 2019-04-06 NOTE — PLAN OF CARE
Problem: Patient Care Overview  Goal: Plan of Care Review  Outcome: Ongoing (interventions implemented as appropriate)   04/06/19 0579   Plan of Care Review   Progress no change   Coping/Psychosocial   Plan of Care Reviewed With patient   OTHER   Outcome Summary VSS, continued episodes of confusion needing frequent reorientation. Increased oral intake encouraged, suggested to family they bring in food/drinks she likes. IVF d/c. Rested through night, will continue to monitor.

## 2019-04-06 NOTE — PROGRESS NOTES
"   LOS: 3 days    Patient Care Team:  Chayito Paredes MD as PCP - General (Internal Medicine)  Johnson Turner DPM as PCP - Claims Attributed  Dano Art MD as Consulting Physician (Hematology and Oncology)  Navarro Hung MD as Referring Physician (Nephrology)    Chief Complaint:    Chief Complaint   Patient presents with   • Fatigue     Follow UP acute kidney injury on chronic kidney disease  Subjective     Interval History:   Patient is feeling the better, denies any chest pain or shortness of air, no orthopnea PND, no nausea or vomiting, no abdominal pain, continue to have lower extremity edema, urine output is not measured    Review of Systems:   As noted above    Objective     Vital Signs  Temp:  [97.6 °F (36.4 °C)-98 °F (36.7 °C)] 97.8 °F (36.6 °C)  Heart Rate:  [70-88] 75  Resp:  [16-20] 16  BP: (119-142)/(53-64) 140/53    Flowsheet Rows      First Filed Value   Admission Height  160 cm (63\") Documented at 04/03/2019 1500   Admission Weight  94.9 kg (209 lb 3.2 oz) Documented at 04/03/2019 1500          I/O this shift:  In: 240 [P.O.:240]  Out: -   I/O last 3 completed shifts:  In: 1320 [P.O.:720; I.V.:600]  Out: 254 [Urine:252; Stool:2]    Intake/Output Summary (Last 24 hours) at 4/6/2019 1401  Last data filed at 4/6/2019 1300  Gross per 24 hour   Intake 1080 ml   Output --   Net 1080 ml       Physical Exam:  General Appearance: Awake, confused and disoriented, no acute distress, obese, chronically ill  Skin: warm and dry  HEENT: pupils round and reactive to light, oral mucosa normal,   Neck: supple, no JVD, trachea midline  Lungs: Decreased breath sounds bilaterally, unlabored breathing effort  Heart: RRR, normal S1 and S2, no S3, no rub  Abdomen: soft, non-tender,  present bowel sounds to auscultation  :  No palpable bladder, no CVA tenderness  Extremities: 3+ pretibial edema, no cyanosis or clubbing  Neuro: Normal speech but the patient is confused and moving all " extremities.            Results Review:    Results from last 7 days   Lab Units 04/06/19 0437 04/05/19  0426 04/04/19  0535 04/03/19  1541   SODIUM mmol/L 139 139 142 137   POTASSIUM mmol/L 4.2 4.1 4.3 4.8   CHLORIDE mmol/L 111* 112* 112* 105   CO2 mmol/L 13.3* 13.0* 13.9* 16.5*   BUN mg/dL 77* 75* 78* 84*   CREATININE mg/dL 4.68* 4.99* 4.81* 5.14*   CALCIUM mg/dL 7.9* 8.6 8.3* 9.6   BILIRUBIN mg/dL 2.8* 2.7*  --  3.2*   ALK PHOS U/L 118* 104  --  129*   ALT (SGPT) U/L 18 20  --  23   AST (SGOT) U/L 97* 91*  --  100*   GLUCOSE mg/dL 130* 87 180* 295*       Estimated Creatinine Clearance: 10 mL/min (A) (by C-G formula based on SCr of 4.68 mg/dL (H)).    Results from last 7 days   Lab Units 04/06/19 0437 04/05/19  0426 04/04/19  0535   MAGNESIUM mg/dL 2.6* 2.7* 2.7*   PHOSPHORUS mg/dL 5.9* 5.6* 6.1*       Results from last 7 days   Lab Units 04/06/19 0437 04/05/19  0426 04/04/19  0535   URIC ACID mg/dL 4.5 4.8 4.9       Results from last 7 days   Lab Units 04/06/19  0437 04/05/19  0426 04/04/19  0535 04/03/19  1716   WBC 10*3/mm3 2.81* 3.03* 2.89* 3.48   HEMOGLOBIN g/dL 10.7* 10.6* 10.3* 11.3*   PLATELETS 10*3/mm3 73* 75* 70* 80*       Results from last 7 days   Lab Units 04/03/19  1541   INR  1.88*         Imaging Results (last 24 hours)     ** No results found for the last 24 hours. **          allopurinol 100 mg Oral BID   famotidine 20 mg Oral Daily   ferrous sulfate 325 mg Oral Daily   heparin (porcine) 5,000 Units Subcutaneous Q12H   insulin lispro 0-7 Units Subcutaneous 4x Daily With Meals & Nightly   insulin lispro protamine-insulin lispro 40 Units Subcutaneous Daily With Breakfast   lactulose 30 g Oral Daily   metoprolol tartrate 12.5 mg Oral Nightly   QUEtiapine 25 mg Oral Daily With Lunch   QUEtiapine 50 mg Oral Nightly   rifaximin 550 mg Oral Daily With Lunch & Dinner   sertraline 25 mg Oral Daily   vitamin B-12 1,000 mcg Oral Daily          Medication Review:   Current Facility-Administered Medications    Medication Dose Route Frequency Provider Last Rate Last Dose   • acetaminophen (TYLENOL) tablet 650 mg  650 mg Oral Q4H PRN Brice Le MD       • allopurinol (ZYLOPRIM) tablet 100 mg  100 mg Oral BID Brice Le MD   100 mg at 04/06/19 0906   • dextrose (D50W) 25 g/ 50mL Intravenous Solution 25 g  25 g Intravenous Q15 Min PRN Brice Le MD       • dextrose (GLUTOSE) oral gel 15 g  15 g Oral Q15 Min PRN Brice Le MD       • famotidine (PEPCID) tablet 20 mg  20 mg Oral Daily Brice Le MD   20 mg at 04/06/19 0906   • ferrous sulfate tablet 325 mg  325 mg Oral Daily Brice Le MD   325 mg at 04/06/19 0906   • glucagon (human recombinant) (GLUCAGEN DIAGNOSTIC) injection 1 mg  1 mg Subcutaneous PRN Brice Le MD       • heparin (porcine) 5000 UNIT/ML injection 5,000 Units  5,000 Units Subcutaneous Q12H Forrest Avilez MD   5,000 Units at 04/06/19 0906   • insulin lispro (humaLOG) injection 0-7 Units  0-7 Units Subcutaneous 4x Daily With Meals & Nightly Brice Le MD   2 Units at 04/04/19 0836   • insulin lispro protamine-insulin lispro (humaLOG 75-25) injection 40 Units  40 Units Subcutaneous Daily With Breakfast Brice Le MD   40 Units at 04/06/19 1121   • lactulose (CHRONULAC) 10 GM/15ML solution 30 g  30 g Oral Daily Brice Le MD   30 g at 04/06/19 0906   • melatonin tablet 10 mg  10 mg Oral Nightly PRN Brice Le MD       • metoprolol tartrate (LOPRESSOR) tablet 12.5 mg  12.5 mg Oral Nightly Brice Le MD   12.5 mg at 04/05/19 2059   • nitroglycerin (NITROSTAT) SL tablet 0.4 mg  0.4 mg Sublingual Q5 Min PRN Brice Le MD       • ondansetron (ZOFRAN) tablet 4 mg  4 mg Oral Q6H PRN Brice Le MD        Or   • ondansetron ODT (ZOFRAN-ODT) disintegrating tablet 4 mg  4 mg Oral Q6H PRN Brice Le MD        Or   • ondansetron (ZOFRAN) injection 4 mg  4 mg  Intravenous Q6H PRN Brice Le MD   4 mg at 04/06/19 0915   • QUEtiapine (SEROquel) tablet 25 mg  25 mg Oral Daily With Lunch Brice Le MD   25 mg at 04/06/19 1315   • QUEtiapine (SEROquel) tablet 50 mg  50 mg Oral Nightly Brice Le MD   50 mg at 04/05/19 2059   • rifaximin (XIFAXAN) tablet 550 mg  550 mg Oral Daily With Lunch & Dinner Brice Le MD   550 mg at 04/06/19 1315   • sertraline (ZOLOFT) tablet 25 mg  25 mg Oral Daily Brice Le MD   25 mg at 04/06/19 0906   • sodium chloride 0.9 % flush 3-10 mL  3-10 mL Intravenous PRN Brice Le MD       • traMADol (ULTRAM) tablet 50 mg  50 mg Oral Q12H PRN Brice Le MD       • vitamin B-12 (CYANOCOBALAMIN) tablet 1,000 mcg  1,000 mcg Oral Daily Brice Le MD   1,000 mcg at 04/06/19 0906       Assessment/Plan   1.  Acute kidney injury on chronic kidney disease, multifactorial, slight improvement since admission creatinine on admission was 5.14 and 2 this morning 4.68, we need to rule out the possibility of hepatorenal syndrome, the patient has declined to consider dialysis in the past and her  confirmed that today, the urine studies does not confirm hepatorenal but does not rule it out 100%, the creatinine slightly improved  2.  Chronic kidney disease stage IV at baseline associated with diabetic nephropathy, biopsy-proven.  Baseline creatinine about 3.26  3.  Diabetes mellitus type 2 with known diabetic nephropathy and retinopathy  4.  Cirrhosis, associated with HOANG, even though there is a report in the past medical history stating that there is history of hemochromatosis.  The patient had the hepatic encephalopathy associated with noncompliance with lactulose  5.  Anemia with chronic kidney disease  6.  Thrombocytopenia with known history of chronic thrombocytopenia, hemoglobin today 10.7 and platelets 73,000 also leukocytes is 2.81  7.  History of gout  8.  Hypertension  reasonably controlled  9.  Fluid excess but there is no evidence of pulmonary edema, most likely fluid excess related to her cirrhosis      Plan:  1.  Continue the same treatment, except we will decrease allopurinol to 100 mg daily and restrict sodium intake to 2 g daily  2.  Surveillance lab  3.  Her prognosis very poor      I discussed the case with the patient's  at the bedside        Mack Pruitt MD  04/06/19  2:01 PM

## 2019-04-07 NOTE — PLAN OF CARE
Problem: Patient Care Overview  Goal: Plan of Care Review  Outcome: Ongoing (interventions implemented as appropriate)   04/07/19 3338   Plan of Care Review   Progress improving   Coping/Psychosocial   Plan of Care Reviewed With patient   OTHER   Outcome Summary vss, pt remains confused, kidney function worsening, nephrology recommends hd, plan for tunnelled catheter placement tomorrow 4/8/2019 and then hd afterwards. pt and family apprehensive and would like to discuss with dr. kolb prior to finalizing decisions, will remain NPO incase they agree to proceed.

## 2019-04-07 NOTE — PLAN OF CARE
Problem: Patient Care Overview  Goal: Plan of Care Review  Outcome: Ongoing (interventions implemented as appropriate)   04/07/19 0638   Plan of Care Review   Progress improving   Coping/Psychosocial   Plan of Care Reviewed With patient   OTHER   Outcome Summary VSS, confusion remains. Rested through night, will continue to monitor

## 2019-04-07 NOTE — PROGRESS NOTES
"   LOS: 4 days    Patient Care Team:  Chayito Paredes MD as PCP - General (Internal Medicine)  Johnson Turner DPM as PCP - Claims Attributed  Dano Art MD as Consulting Physician (Hematology and Oncology)  Navarro Hung MD as Referring Physician (Nephrology)    Chief Complaint:    Chief Complaint   Patient presents with   • Fatigue     Follow UP acute kidney injury on chronic kidney disease  Subjective     Interval History:   Patient is feeling borderline today not feeling well, appetite is decreasing, denies any chest pain or shortness of air, no orthopnea PND, no nausea or vomiting, no abdominal pain, continue to have lower extremity edema, urine output is not measured  Now she expressed desire to be on dialysis if that would prolong her life.    Review of Systems:   As noted above    Objective     Vital Signs  Temp:  [97.7 °F (36.5 °C)-98.1 °F (36.7 °C)] 97.7 °F (36.5 °C)  Heart Rate:  [68-80] 73  Resp:  [16-18] 16  BP: (106-165)/(56-94) 152/61    Flowsheet Rows      First Filed Value   Admission Height  160 cm (63\") Documented at 04/03/2019 1500   Admission Weight  94.9 kg (209 lb 3.2 oz) Documented at 04/03/2019 1500          I/O this shift:  In: 120 [P.O.:120]  Out: -   I/O last 3 completed shifts:  In: 360 [P.O.:360]  Out: -     Intake/Output Summary (Last 24 hours) at 4/7/2019 1429  Last data filed at 4/7/2019 0851  Gross per 24 hour   Intake 240 ml   Output --   Net 240 ml       Physical Exam:  General Appearance: Awake, confused and disoriented, no acute distress, obese, chronically ill  Skin: warm and dry  HEENT: pupils round and reactive to light, oral mucosa normal,   Neck: supple, no JVD, trachea midline  Lungs: Decreased breath sounds bilaterally, unlabored breathing effort  Heart: RRR, normal S1 and S2, no S3, no rub  Abdomen: soft, non-tender,  present bowel sounds to auscultation  :  No palpable bladder, no CVA tenderness  Extremities: 3+ pretibial edema, no cyanosis " or clubbing  Neuro: Normal speech but the patient is confused and moving all extremities.            Results Review:    Results from last 7 days   Lab Units 04/07/19 0615 04/06/19 0437 04/05/19 0426 04/03/19  1541   SODIUM mmol/L 140 139 139   < > 137   POTASSIUM mmol/L 3.9 4.2 4.1   < > 4.8   CHLORIDE mmol/L 111* 111* 112*   < > 105   CO2 mmol/L 13.3* 13.3* 13.0*   < > 16.5*   BUN mg/dL 78* 77* 75*   < > 84*   CREATININE mg/dL 5.04* 4.68* 4.99*   < > 5.14*   CALCIUM mg/dL 8.0* 7.9* 8.6   < > 9.6   BILIRUBIN mg/dL  --  2.8* 2.7*  --  3.2*   ALK PHOS U/L  --  118* 104  --  129*   ALT (SGPT) U/L  --  18 20  --  23   AST (SGOT) U/L  --  97* 91*  --  100*   GLUCOSE mg/dL 96 130* 87   < > 295*    < > = values in this interval not displayed.       Estimated Creatinine Clearance: 9.3 mL/min (A) (by C-G formula based on SCr of 5.04 mg/dL (H)).    Results from last 7 days   Lab Units 04/07/19 0615 04/06/19 0437 04/05/19  0426   MAGNESIUM mg/dL 2.6* 2.6* 2.7*   PHOSPHORUS mg/dL 6.2* 5.9* 5.6*       Results from last 7 days   Lab Units 04/07/19 0615 04/06/19 0437 04/05/19 0426 04/04/19  0535   URIC ACID mg/dL 4.9 4.5 4.8 4.9       Results from last 7 days   Lab Units 04/07/19 0615 04/06/19 0437 04/05/19 0426 04/04/19  0535 04/03/19  1716   WBC 10*3/mm3 2.73* 2.81* 3.03* 2.89* 3.48   HEMOGLOBIN g/dL 10.7* 10.7* 10.6* 10.3* 11.3*   PLATELETS 10*3/mm3 80* 73* 75* 70* 80*       Results from last 7 days   Lab Units 04/03/19  1541   INR  1.88*         Imaging Results (last 24 hours)     ** No results found for the last 24 hours. **          allopurinol 100 mg Oral Daily   famotidine 20 mg Oral Daily   ferrous sulfate 325 mg Oral Daily   heparin (porcine) 5,000 Units Subcutaneous Q12H   insulin lispro 0-7 Units Subcutaneous 4x Daily With Meals & Nightly   insulin lispro protamine-insulin lispro 40 Units Subcutaneous Daily With Breakfast   lactulose 30 g Oral Daily   metoprolol tartrate 12.5 mg Oral Nightly   QUEtiapine 25  mg Oral Daily With Lunch   QUEtiapine 50 mg Oral Nightly   rifaximin 550 mg Oral Daily With Lunch & Dinner   sertraline 25 mg Oral Daily   vitamin B-12 1,000 mcg Oral Daily          Medication Review:   Current Facility-Administered Medications   Medication Dose Route Frequency Provider Last Rate Last Dose   • acetaminophen (TYLENOL) tablet 650 mg  650 mg Oral Q4H PRN Brice Le MD       • allopurinol (ZYLOPRIM) tablet 100 mg  100 mg Oral Daily Mack Pruitt MD   100 mg at 04/07/19 0906   • dextrose (D50W) 25 g/ 50mL Intravenous Solution 25 g  25 g Intravenous Q15 Min PRN Brice Le MD       • dextrose (GLUTOSE) oral gel 15 g  15 g Oral Q15 Min PRN Brice Le MD       • famotidine (PEPCID) tablet 20 mg  20 mg Oral Daily Brice Le MD   20 mg at 04/07/19 0906   • ferrous sulfate tablet 325 mg  325 mg Oral Daily Brice Le MD   325 mg at 04/07/19 0906   • glucagon (human recombinant) (GLUCAGEN DIAGNOSTIC) injection 1 mg  1 mg Subcutaneous PRN Brice Le MD       • heparin (porcine) 5000 UNIT/ML injection 5,000 Units  5,000 Units Subcutaneous Q12H Forrest Avilez MD   5,000 Units at 04/07/19 0906   • insulin lispro (humaLOG) injection 0-7 Units  0-7 Units Subcutaneous 4x Daily With Meals & Nightly Brice Le MD   2 Units at 04/06/19 2042   • insulin lispro protamine-insulin lispro (humaLOG 75-25) injection 40 Units  40 Units Subcutaneous Daily With Breakfast Brice Le MD   40 Units at 04/07/19 0955   • lactulose (CHRONULAC) 10 GM/15ML solution 30 g  30 g Oral Daily Brice Le MD   30 g at 04/07/19 0906   • melatonin tablet 10 mg  10 mg Oral Nightly PRN Brice Le MD       • metoprolol tartrate (LOPRESSOR) tablet 12.5 mg  12.5 mg Oral Nightly Brice Le MD   12.5 mg at 04/06/19 2042   • nitroglycerin (NITROSTAT) SL tablet 0.4 mg  0.4 mg Sublingual Q5 Min PRN Brice Le MD       •  ondansetron (ZOFRAN) tablet 4 mg  4 mg Oral Q6H PRN Brice Le MD        Or   • ondansetron ODT (ZOFRAN-ODT) disintegrating tablet 4 mg  4 mg Oral Q6H PRN Brice Le MD        Or   • ondansetron (ZOFRAN) injection 4 mg  4 mg Intravenous Q6H PRN Brice Le MD   4 mg at 04/06/19 0915   • QUEtiapine (SEROquel) tablet 25 mg  25 mg Oral Daily With Lunch Brice Le MD   25 mg at 04/07/19 1201   • QUEtiapine (SEROquel) tablet 50 mg  50 mg Oral Nightly Brice Le MD   50 mg at 04/06/19 2041   • rifaximin (XIFAXAN) tablet 550 mg  550 mg Oral Daily With Lunch & Dinner Brice Le MD   550 mg at 04/07/19 1201   • sertraline (ZOLOFT) tablet 25 mg  25 mg Oral Daily Brice Le MD   25 mg at 04/07/19 0906   • sodium chloride 0.9 % flush 3-10 mL  3-10 mL Intravenous PRN Brice Le MD       • traMADol (ULTRAM) tablet 50 mg  50 mg Oral Q12H PRN Brice Le MD       • vitamin B-12 (CYANOCOBALAMIN) tablet 1,000 mcg  1,000 mcg Oral Daily Brice Le MD   1,000 mcg at 04/07/19 0906       Assessment/Plan   1.  Acute kidney injury on chronic kidney disease, multifactorial, slight improvement since admission creatinine on admission was 5.14 and this morning it is 5.04, GFR 8 mL/min, patient has early uremic symptoms  2.  Chronic kidney disease stage V at baseline associated with diabetic nephropathy, biopsy-proven.  Baseline creatinine about 3.26  3.  Diabetes mellitus type 2 with known diabetic nephropathy and retinopathy  4.  Cirrhosis, associated with HOANG, even though there is a report in the past medical history stating that there is history of hemochromatosis.  The patient had the hepatic encephalopathy associated with noncompliance with lactulose  5.  Anemia with chronic kidney disease  6.  Thrombocytopenia with known history of chronic thrombocytopenia, hemoglobin today 10.7 and platelets 80,000 also leukocytes is 2.73  7.  History of  gout  8.  Hypertension reasonably controlled  9.  Fluid excess but there is no evidence of pulmonary edema, most likely fluid excess related to her cirrhosis      Plan:  1.  The patient need to start on dialysis and she expressed her desire to be on dialysis that would prolong her life, I would ask vascular surgery to place tunneled dialysis catheter and will initiate dialysis tomorrow  2.  Surveillance lab        I discussed the case with the patient's  at the bedside        Mack Pruitt MD  04/07/19  2:29 PM

## 2019-04-07 NOTE — PROGRESS NOTES
"DAILY PROGRESS NOTE  [facility]    Patient Identification:  Name: Cahru Bowens  Age: 84 y.o.  Sex: female  :  1935  MRN: [id number]         Primary Care Physician: Chayito Paredes MD    Subjective:  Interval History:She feels better.  Wants to go home.    Objective:    Scheduled Meds:    allopurinol 100 mg Oral Daily   famotidine 20 mg Oral Daily   ferrous sulfate 325 mg Oral Daily   heparin (porcine) 5,000 Units Subcutaneous Q12H   insulin lispro 0-7 Units Subcutaneous 4x Daily With Meals & Nightly   insulin lispro protamine-insulin lispro 40 Units Subcutaneous Daily With Breakfast   lactulose 30 g Oral Daily   metoprolol tartrate 12.5 mg Oral Nightly   QUEtiapine 25 mg Oral Daily With Lunch   QUEtiapine 50 mg Oral Nightly   rifaximin 550 mg Oral Daily With Lunch & Dinner   sertraline 25 mg Oral Daily   vitamin B-12 1,000 mcg Oral Daily     Continuous Infusions:     Vital signs in last 24 hours:  Temp:  [97.7 °F (36.5 °C)-98.1 °F (36.7 °C)] 97.7 °F (36.5 °C)  Heart Rate:  [68-80] 73  Resp:  [16-18] 16  BP: (106-165)/(56-94) 152/61    Intake/Output:    Intake/Output Summary (Last 24 hours) at 2019 1340  Last data filed at 2019 0851  Gross per 24 hour   Intake 240 ml   Output --   Net 240 ml       Exam:  /61 (BP Location: Left arm, Patient Position: Lying)   Pulse 73   Temp 97.7 °F (36.5 °C) (Oral)   Resp 16   Ht 160 cm (63\")   Wt 95.3 kg (210 lb 1.6 oz)   SpO2 (!) 87%   BMI 37.22 kg/m²     General Appearance:    Alert, cooperative, no distress   Head:    Normocephalic, without obvious abnormality, atraumatic   Eyes:       Throat:   Lips, tongue, gums normal   Neck:   Supple, symmetrical, trachea midline, no JVD   Lungs:     Clear to auscultation bilaterally, respirations unlabored   Chest Wall:    No tenderness or deformity    Heart:    Regular rate and rhythm, S1 and S2 normal, no murmur,no  Rub or gallop   Abdomen:     Soft, non-tender, bowel sounds active, no masses, " no organomegaly    Extremities:   Extremities normal, atraumatic, no cyanosis or edema   Pulses:      Skin:   Skin is warm and dry,  no rashes or palpable lesions   Neurologic:   no focal deficits noted      Lab Results (last 72 hours)     Procedure Component Value Units Date/Time    POC Glucose Once [[id number]]  (Normal) Collected:  04/05/19 0559    Specimen:  Blood Updated:  04/05/19 0601     Glucose 90 mg/dL     Comprehensive Metabolic Panel [[id number]]  (Abnormal) Collected:  04/05/19 0426    Specimen:  Blood Updated:  04/05/19 0548     Glucose 87 mg/dL      BUN 75 mg/dL      Creatinine 4.99 mg/dL      Sodium 139 mmol/L      Potassium 4.1 mmol/L      Chloride 112 mmol/L      CO2 13.0 mmol/L      Calcium 8.6 mg/dL      Total Protein 5.5 g/dL      Albumin 1.80 g/dL      ALT (SGPT) 20 U/L      AST (SGOT) 91 U/L      Alkaline Phosphatase 104 U/L      Total Bilirubin 2.7 mg/dL      eGFR Non African Amer 8 mL/min/1.73      Comment: <15 Indicative of kidney failure.        eGFR   Amer -- mL/min/1.73      Comment: <15 Indicative of kidney failure.        Globulin 3.7 gm/dL      A/G Ratio 0.5 g/dL      BUN/Creatinine Ratio 15.0     Anion Gap 14.0 mmol/L     Narrative:       GFR Normal >60  Chronic Kidney Disease <60  Kidney Failure <15    CBC & Differential [[id number]] Collected:  04/05/19 0426    Specimen:  Blood Updated:  04/05/19 0542    Narrative:       The following orders were created for panel order CBC & Differential.  Procedure                               Abnormality         Status                     ---------                               -----------         ------                     CBC Auto Differential[[id number]]        Abnormal            Final result                 Please view results for these tests on the individual orders.    CBC Auto Differential [[id number]]  (Abnormal) Collected:  04/05/19 0426    Specimen:  Blood Updated:  04/05/19 0542     WBC 3.03 10*3/mm3      RBC 3.02 10*6/mm3       Hemoglobin 10.6 g/dL      Hematocrit 33.2 %      .9 fL      MCH 35.1 pg      MCHC 31.9 g/dL      RDW 18.7 %      RDW-SD 72.5 fl      MPV -- fL      Comment: Not calc        Platelets 75 10*3/mm3      Neutrophil % 59.0 %      Lymphocyte % 21.5 %      Monocyte % 12.9 %      Eosinophil % 5.6 %      Basophil % 0.3 %      Neutrophils, Absolute 1.79 10*3/mm3      Lymphocytes, Absolute 0.65 10*3/mm3      Monocytes, Absolute 0.39 10*3/mm3      Eosinophils, Absolute 0.17 10*3/mm3      Basophils, Absolute 0.01 10*3/mm3     Uric Acid [[id number]]  (Normal) Collected:  04/05/19 0426    Specimen:  Blood Updated:  04/05/19 0542     Uric Acid 4.8 mg/dL     Phosphorus [[id number]]  (Abnormal) Collected:  04/05/19 0426    Specimen:  Blood Updated:  04/05/19 0542     Phosphorus 5.6 mg/dL     Magnesium [[id number]]  (Abnormal) Collected:  04/05/19 0426    Specimen:  Blood Updated:  04/05/19 0542     Magnesium 2.7 mg/dL     POC Glucose Once [[id number]]  (Normal) Collected:  04/04/19 2046    Specimen:  Blood Updated:  04/04/19 2048     Glucose 104 mg/dL     Blood Culture - Blood, Arm, Right [[id number]] Collected:  04/03/19 2014    Specimen:  Blood from Arm, Right Updated:  04/04/19 2030     Blood Culture No growth at 24 hours    Sodium, Urine, Random - [[id number]] Collected:  04/04/19 1817    Specimen:  Urine Updated:  04/04/19 1854     Sodium, Urine 32 mmol/L     Narrative:       Reference intervals for random urine have not been established.  Clinical usage is dependent upon physician's interpretation in combination with other laboratory tests.     Chloride, Urine, Random - [[id number]] Collected:  04/04/19 1817    Specimen:  Urine Updated:  04/04/19 1854     Chloride, Urine 40 mmol/L     Narrative:       Reference intervals for random urine have not been established.  Clinical usage is dependent upon physician's interpretation in combination with other laboratory tests.     Protein / Creatinine Ratio, Urine - [[id number]]   (Abnormal) Collected:  04/04/19 1817    Specimen:  Urine Updated:  04/04/19 1853     Protein/Creatinine Ratio, Urine 239.5 mg/G Crea      Creatinine, Urine 66.8 mg/dL      Total Protein, Urine 16.0 mg/dL     Blood Culture - Blood, Hand, Right [[id number]] Collected:  04/03/19 1723    Specimen:  Blood from Hand, Right Updated:  04/04/19 1745     Blood Culture No growth at 24 hours    POC Glucose Once [[id number]]  (Normal) Collected:  04/04/19 1718    Specimen:  Blood Updated:  04/04/19 1720     Glucose 72 mg/dL     POC Glucose Once [[id number]]  (Normal) Collected:  04/04/19 1145    Specimen:  Blood Updated:  04/04/19 1147     Glucose 111 mg/dL     Renal Function Panel [[id number]]  (Abnormal) Collected:  04/04/19 0535    Specimen:  Blood Updated:  04/04/19 0629     Glucose 180 mg/dL      BUN 78 mg/dL      Creatinine 4.81 mg/dL      Sodium 142 mmol/L      Potassium 4.3 mmol/L      Chloride 112 mmol/L      CO2 13.9 mmol/L      Calcium 8.3 mg/dL      Albumin 2.40 g/dL      Phosphorus 6.1 mg/dL      Anion Gap 16.1 mmol/L      BUN/Creatinine Ratio 16.2     eGFR Non African Amer 9 mL/min/1.73      Comment: <15 Indicative of kidney failure.        eGFR   Amer -- mL/min/1.73      Comment: <15 Indicative of kidney failure.       Narrative:       GFR Normal >60  Chronic Kidney Disease <60  Kidney Failure <15    Magnesium [[id number]]  (Abnormal) Collected:  04/04/19 0535    Specimen:  Blood Updated:  04/04/19 0629     Magnesium 2.7 mg/dL     Uric Acid [[id number]]  (Normal) Collected:  04/04/19 0535    Specimen:  Blood Updated:  04/04/19 0629     Uric Acid 4.9 mg/dL     CBC (No Diff) [[id number]]  (Abnormal) Collected:  04/04/19 0535    Specimen:  Blood Updated:  04/04/19 0627     WBC 2.89 10*3/mm3      RBC 2.95 10*6/mm3      Hemoglobin 10.3 g/dL      Hematocrit 32.9 %      .5 fL      MCH 34.9 pg      MCHC 31.3 g/dL      RDW 18.5 %      RDW-SD 73.8 fl      MPV 10.5 fL      Platelets 70 10*3/mm3     POC Glucose Once  [[id number]]  (Abnormal) Collected:  04/04/19 0616    Specimen:  Blood Updated:  04/04/19 0618     Glucose 173 mg/dL     Urinalysis With Culture If Indicated - Urine, Catheter In/Out [[id number]]  (Abnormal) Collected:  04/03/19 2220    Specimen:  Urine, Catheter In/Out Updated:  04/03/19 2300     Color, UA Dark Yellow     Appearance, UA Clear     pH, UA <=5.0     Specific Gravity, UA 1.016     Glucose, UA Negative     Ketones, UA Negative     Bilirubin, UA Negative     Blood, UA Negative     Protein, UA Negative     Leuk Esterase, UA Small (1+)     Nitrite, UA Negative     Urobilinogen, UA 0.2 E.U./dL    Urinalysis, Microscopic Only - Urine, Catheter In/Out [[id number]] Collected:  04/03/19 2220    Specimen:  Urine, Catheter In/Out Updated:  04/03/19 2300     RBC, UA 0-2 /HPF      WBC, UA 0-2 /HPF      Bacteria, UA None Seen /HPF      Squamous Epithelial Cells, UA 0-2 /HPF      Hyaline Casts, UA 0-2 /LPF      Methodology Automated Microscopy    POC Glucose Once [[id number]]  (Abnormal) Collected:  04/03/19 2124    Specimen:  Blood Updated:  04/03/19 2125     Glucose 196 mg/dL     Lactic Acid, Reflex [[id number]]  (Abnormal) Collected:  04/03/19 2014    Specimen:  Blood Updated:  04/03/19 2054     Lactate 2.7 mmol/L     Lactic Acid, Reflex Timer (This will reflex a repeat order 3-3:15 hours after ordered.) [[id number]] Collected:  04/03/19 1540    Specimen:  Blood Updated:  04/03/19 1930     Extra Tube Hold for add-ons.     Comment: Auto resulted.       CBC & Differential [[id number]] Collected:  04/03/19 1716    Specimen:  Blood Updated:  04/03/19 1809    Narrative:       The following orders were created for panel order CBC & Differential.  Procedure                               Abnormality         Status                     ---------                               -----------         ------                     Scan Slide[[id number]]                                       Final result               CBC Auto  Differential[[id number]]        Abnormal            Final result                 Please view results for these tests on the individual orders.    CBC Auto Differential [[id number]]  (Abnormal) Collected:  04/03/19 1716    Specimen:  Blood Updated:  04/03/19 1809     WBC 3.48 10*3/mm3      RBC 3.16 10*6/mm3      Hemoglobin 11.3 g/dL      Hematocrit 35.4 %      .0 fL      MCH 35.8 pg      MCHC 31.9 g/dL      RDW 18.7 %      RDW-SD 74.2 fl      Platelets 80 10*3/mm3      Neutrophil % 68.1 %      Lymphocyte % 16.1 %      Monocyte % 11.2 %      Eosinophil % 3.7 %      Basophil % 0.3 %      Immature Grans % 0.6 %      Neutrophils, Absolute 2.37 10*3/mm3      Lymphocytes, Absolute 0.56 10*3/mm3      Monocytes, Absolute 0.39 10*3/mm3      Eosinophils, Absolute 0.13 10*3/mm3      Basophils, Absolute 0.01 10*3/mm3      Immature Grans, Absolute 0.02 10*3/mm3      nRBC 0.0 /100 WBC     Scan Slide [[id number]] Collected:  04/03/19 1716    Specimen:  Blood Updated:  04/03/19 1809     Anisocytosis Mod/2+     Macrocytes Mod/2+     WBC Morphology Normal     Clumped Platelets Present    Comprehensive Metabolic Panel [[id number]]  (Abnormal) Collected:  04/03/19 1541    Specimen:  Blood Updated:  04/03/19 1648     Glucose 295 mg/dL      BUN 84 mg/dL      Creatinine 5.14 mg/dL      Sodium 137 mmol/L      Potassium 4.8 mmol/L      Chloride 105 mmol/L      CO2 16.5 mmol/L      Calcium 9.6 mg/dL      Total Protein 6.5 g/dL      Albumin 2.40 g/dL      ALT (SGPT) 23 U/L      AST (SGOT) 100 U/L      Alkaline Phosphatase 129 U/L      Total Bilirubin 3.2 mg/dL      eGFR Non African Amer 8 mL/min/1.73      Comment: <15 Indicative of kidney failure.        eGFR   Amer -- mL/min/1.73      Comment: <15 Indicative of kidney failure.        Globulin 4.1 gm/dL      A/G Ratio 0.6 g/dL      BUN/Creatinine Ratio 16.3     Anion Gap 15.5 mmol/L     Narrative:       GFR Normal >60  Chronic Kidney Disease <60  Kidney Failure <15    Troponin  "[[id number]]  (Normal) Collected:  04/03/19 1541    Specimen:  Blood Updated:  04/03/19 1648     Troponin T 0.029 ng/mL     Narrative:       Troponin T Reference Range:  <= 0.03 ng/mL-   Negative for AMI  >0.03 ng/mL-     Abnormal for myocardial necrosis.  Clinicians would have to utilize clinical acumen, EKG, Troponin and serial changes to determine if it is an Acute Myocardial Infarction or myocardial injury due to an underlying chronic condition.     Procalcitonin [[id number]]  (Abnormal) Collected:  04/03/19 1541    Specimen:  Blood Updated:  04/03/19 1648     Procalcitonin 1.76 ng/mL     Narrative:       As a Marker for Sepsis (Non-Neonates):   1. <0.5 ng/mL represents a low risk of severe sepsis and/or septic shock.  1. >2 ng/mL represents a high risk of severe sepsis and/or septic shock.    As a Marker for Lower Respiratory Tract Infections that require antibiotic therapy:  PCT on Admission     Antibiotic Therapy             6-12 Hrs later  > 0.5                Strongly Recommended            >0.25 - <0.5         Recommended  0.1 - 0.25           Discouraged                   Remeasure/reassess PCT  <0.1                 Strongly Discouraged          Remeasure/reassess PCT      As 28 day mortality risk marker: \"Change in Procalcitonin Result\" (> 80 % or <=80 %) if Day 0 (or Day 1) and Day 4 values are available. Refer to http://www.[employer]-pct-calculator.com/   Change in PCT <=80 %   A decrease of PCT levels below or equal to 80 % defines a positive change in PCT test result representing a higher risk for 28-day all-cause mortality of patients diagnosed with severe sepsis or septic shock.  Change in PCT > 80 %   A decrease of PCT levels of more than 80 % defines a negative change in PCT result representing a lower risk for 28-day all-cause mortality of patients diagnosed with severe sepsis or septic shock.                BNP [[id number]]  (Normal) Collected:  04/03/19 1541    Specimen:  Blood Updated:  04/03/19 " 1646     proBNP 1,030.0 pg/mL     Narrative:       Among patients with dyspnea, NT-proBNP is highly sensitive for the detection of acute congestive heart failure. In addition NT-proBNP of <300 pg/ml effectively rules out acute congestive heart failure with 99% negative predictive value.    [place] Draw [[id number]] Collected:  04/03/19 1541    Specimen:  Blood Updated:  04/03/19 1646    Narrative:       The following orders were created for panel order [place] Draw.  Procedure                               Abnormality         Status                     ---------                               -----------         ------                     Light Blue Top[[id number]]                                   Final result               Green Top (Gel)[[id number]]                                  Final result               Lavender Top[[id number]]                                     Final result               Gold Top - SST[[id number]]                                   Final result                 Please view results for these tests on the individual orders.    Light Blue Top [[id number]] Collected:  04/03/19 1541    Specimen:  Blood Updated:  04/03/19 1646     Extra Tube hold for add-on     Comment: Auto resulted       Green Top (Gel) [[id number]] Collected:  04/03/19 1541    Specimen:  Blood Updated:  04/03/19 1646     Extra Tube Hold for add-ons.     Comment: Auto resulted.       Lavender Top [[id number]] Collected:  04/03/19 1541    Specimen:  Blood Updated:  04/03/19 1646     Extra Tube hold for add-on     Comment: Auto resulted       Gold Top - SST [[id number]] Collected:  04/03/19 1541    Specimen:  Blood Updated:  04/03/19 1646     Extra Tube Hold for add-ons.     Comment: Auto resulted.       Ammonia [[id number]]  (Normal) Collected:  04/03/19 1540    Specimen:  Blood Updated:  04/03/19 1641     Ammonia 42 umol/L     Lactic Acid, Plasma [[id number]]  (Abnormal) Collected:  04/03/19 1540    Specimen:  Blood Updated:  04/03/19 1627      Lactate 3.5 mmol/L     Protime-INR [[id number]]  (Abnormal) Collected:  04/03/19 1541    Specimen:  Blood Updated:  04/03/19 1617     Protime 21.3 Seconds      INR 1.88        Data Review:  Results from last 7 days   Lab Units 04/07/19 0615 04/06/19 0437 04/05/19 0426   SODIUM mmol/L 140 139 139   POTASSIUM mmol/L 3.9 4.2 4.1   CHLORIDE mmol/L 111* 111* 112*   CO2 mmol/L 13.3* 13.3* 13.0*   BUN mg/dL 78* 77* 75*   CREATININE mg/dL 5.04* 4.68* 4.99*   GLUCOSE mg/dL 96 130* 87   CALCIUM mg/dL 8.0* 7.9* 8.6     Results from last 7 days   Lab Units 04/07/19 0615 04/06/19 0437 04/05/19  0426   WBC 10*3/mm3 2.73* 2.81* 3.03*   HEMOGLOBIN g/dL 10.7* 10.7* 10.6*   HEMATOCRIT % 33.8* 34.6 33.2*   PLATELETS 10*3/mm3 80* 73* 75*             Lab Results   Lab Value Date/Time    TROPONINT 0.029 04/03/2019 1541    TROPONINT 0.076 (H) 11/04/2018 1753    TROPONINT 0.117 (C) 07/26/2018 1127    TROPONINT 0.112 (C) 11/18/2016 1936    TROPONINT 0.141 (C) 11/18/2016 1229    TROPONINT 0.139 (C) 11/18/2016 0553    TROPONINT 0.104 (C) 11/17/2016 2345    TROPONINT 0.119 (C) 11/17/2016 1813    TROPONINT 0.096 (C) 11/17/2016 1209    TROPONINT 0.118 (C) 11/17/2016 0350    TROPONINT 0.128 (C) 11/17/2016 0026    TROPONINT 0.145 (C) 11/16/2016 2006    TROPONINT 0.03 02/18/2016 1645         Results from last 7 days   Lab Units 04/06/19 0437 04/05/19 0426 04/03/19  1541   ALK PHOS U/L 118* 104 129*   BILIRUBIN mg/dL 2.8* 2.7* 3.2*   ALT (SGPT) U/L 18 20 23   AST (SGOT) U/L 97* 91* 100*             Glucose   Date/Time Value Ref Range Status   04/07/2019 1137 123 70 - 130 mg/dL Final   04/07/2019 0643 87 70 - 130 mg/dL Final   04/06/2019 2010 187 (H) 70 - 130 mg/dL Final   04/06/2019 1628 221 (H) 70 - 130 mg/dL Final   04/06/2019 1152 157 (H) 70 - 130 mg/dL Final   04/06/2019 0650 108 70 - 130 mg/dL Final   04/05/2019 2028 82 70 - 130 mg/dL Final   04/05/2019 1700 101 70 - 130 mg/dL Final     Results from last 7 days   Lab Units  04/03/19  1541   INR  1.88*       Past Medical History:   Diagnosis Date   • Anxiety    • Arthritis    • C. 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   • HOANG (nonalcoholic steatohepatitis)    • Pancytopenia (CMS/HCC)    • Spinal stenosis    • Splenomegaly    • Thrombocytopenia, chronic    • TIA (transient ischemic attack)        Assessment:  Active Hospital Problems    Diagnosis  POA   • **Acute renal failure superimposed on chronic kidney disease (CMS/HCC) [N17.9, N18.9]  Yes   • Atrial fibrillation (CMS/HCC) [I48.91]  Unknown   • Iron deficiency [E61.1]  Yes   • DM2 (diabetes mellitus, type 2) (CMS/HCC) [E11.9]  Yes   • Thrombocytopenia (CMS/HCC) [D69.6]  Yes   • Anemia of chronic renal failure, stage 4 (severe) (CMS/HCC) [N18.4, D63.1]  Yes   • 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       Plan:  Continue off IV fluid hydration. Renal consult noted.  May need to start dialysis.   Follow up lab.  Up with PT.  May need SNU for rehab.  Family wants acute rehab evaluation.    Billy Goldman MD  4/7/2019  1:40 PM

## 2019-04-08 NOTE — PROGRESS NOTES
"DAILY PROGRESS NOTE  [facility]    Patient Identification:  Name: Charu Bowens  Age: 84 y.o.  Sex: female  :  1935  MRN: [id number]         Primary Care Physician: Chayito Paredes MD    Subjective:  Interval History:She feels better.  Wants to go home.  Got tunnel catheter and getting dialysis.    Objective:    Scheduled Meds:    [MAR Hold] allopurinol 100 mg Oral Daily   famotidine 20 mg Oral Daily   [MAR Hold] ferrous sulfate 325 mg Oral Daily   [MAR Hold] heparin (porcine) 5,000 Units Subcutaneous Q12H   [MAR Hold] insulin lispro 0-7 Units Subcutaneous 4x Daily With Meals & Nightly   [MAR Hold] insulin lispro protamine-insulin lispro 40 Units Subcutaneous Daily With Breakfast   [MAR Hold] lactulose 30 g Oral Daily   metoprolol tartrate 12.5 mg Oral Nightly   [MAR Hold] QUEtiapine 25 mg Oral Daily With Lunch   [MAR Hold] QUEtiapine 50 mg Oral Nightly   [MAR Hold] rifaximin 550 mg Oral Daily With Lunch & Dinner   [MAR Hold] sertraline 25 mg Oral Daily   [MAR Hold] vitamin B-12 1,000 mcg Oral Daily     Continuous Infusions:    sodium chloride 9 mL/hr Last Rate: 9 mL/hr (19 0851)       Vital signs in last 24 hours:  Temp:  [97.3 °F (36.3 °C)-98 °F (36.7 °C)] 97.8 °F (36.6 °C)  Heart Rate:  [73-83] 83  Resp:  [16-18] 18  BP: (133-164)/(51-78) 146/53    Intake/Output:    Intake/Output Summary (Last 24 hours) at 2019 1314  Last data filed at 2019 0937  Gross per 24 hour   Intake 415 ml   Output 350 ml   Net 65 ml       Exam:  /53 (BP Location: Right arm, Patient Position: Lying)   Pulse 83   Temp 97.8 °F (36.6 °C) (Oral)   Resp 18   Ht 160 cm (63\")   Wt 96.4 kg (212 lb 8.4 oz)   SpO2 99%   BMI 37.65 kg/m²     General Appearance:    Alert, cooperative, no distress   Head:    Normocephalic, without obvious abnormality, atraumatic   Eyes:       Throat:   Lips, tongue, gums normal   Neck:   Supple, symmetrical, trachea midline, no JVD   Lungs:     Clear to auscultation " bilaterally, respirations unlabored   Chest Wall:    No tenderness or deformity    Heart:    Regular rate and rhythm, S1 and S2 normal, no murmur,no  Rub or gallop   Abdomen:     Soft, non-tender, bowel sounds active, no masses, no organomegaly    Extremities:   Extremities normal, atraumatic, no cyanosis or edema   Pulses:      Skin:   Skin is warm and dry,  no rashes or palpable lesions   Neurologic:   no focal deficits noted      Lab Results (last 72 hours)     Procedure Component Value Units Date/Time    POC Glucose Once [[id number]]  (Normal) Collected:  04/05/19 0559    Specimen:  Blood Updated:  04/05/19 0601     Glucose 90 mg/dL     Comprehensive Metabolic Panel [[id number]]  (Abnormal) Collected:  04/05/19 0426    Specimen:  Blood Updated:  04/05/19 0548     Glucose 87 mg/dL      BUN 75 mg/dL      Creatinine 4.99 mg/dL      Sodium 139 mmol/L      Potassium 4.1 mmol/L      Chloride 112 mmol/L      CO2 13.0 mmol/L      Calcium 8.6 mg/dL      Total Protein 5.5 g/dL      Albumin 1.80 g/dL      ALT (SGPT) 20 U/L      AST (SGOT) 91 U/L      Alkaline Phosphatase 104 U/L      Total Bilirubin 2.7 mg/dL      eGFR Non African Amer 8 mL/min/1.73      Comment: <15 Indicative of kidney failure.        eGFR   Amer -- mL/min/1.73      Comment: <15 Indicative of kidney failure.        Globulin 3.7 gm/dL      A/G Ratio 0.5 g/dL      BUN/Creatinine Ratio 15.0     Anion Gap 14.0 mmol/L     Narrative:       GFR Normal >60  Chronic Kidney Disease <60  Kidney Failure <15    CBC & Differential [[id number]] Collected:  04/05/19 0426    Specimen:  Blood Updated:  04/05/19 0542    Narrative:       The following orders were created for panel order CBC & Differential.  Procedure                               Abnormality         Status                     ---------                               -----------         ------                     CBC Auto Differential[[id number]]        Abnormal            Final result                  Please view results for these tests on the individual orders.    CBC Auto Differential [[id number]]  (Abnormal) Collected:  04/05/19 0426    Specimen:  Blood Updated:  04/05/19 0542     WBC 3.03 10*3/mm3      RBC 3.02 10*6/mm3      Hemoglobin 10.6 g/dL      Hematocrit 33.2 %      .9 fL      MCH 35.1 pg      MCHC 31.9 g/dL      RDW 18.7 %      RDW-SD 72.5 fl      MPV -- fL      Comment: Not calc        Platelets 75 10*3/mm3      Neutrophil % 59.0 %      Lymphocyte % 21.5 %      Monocyte % 12.9 %      Eosinophil % 5.6 %      Basophil % 0.3 %      Neutrophils, Absolute 1.79 10*3/mm3      Lymphocytes, Absolute 0.65 10*3/mm3      Monocytes, Absolute 0.39 10*3/mm3      Eosinophils, Absolute 0.17 10*3/mm3      Basophils, Absolute 0.01 10*3/mm3     Uric Acid [[id number]]  (Normal) Collected:  04/05/19 0426    Specimen:  Blood Updated:  04/05/19 0542     Uric Acid 4.8 mg/dL     Phosphorus [[id number]]  (Abnormal) Collected:  04/05/19 0426    Specimen:  Blood Updated:  04/05/19 0542     Phosphorus 5.6 mg/dL     Magnesium [[id number]]  (Abnormal) Collected:  04/05/19 0426    Specimen:  Blood Updated:  04/05/19 0542     Magnesium 2.7 mg/dL     POC Glucose Once [[id number]]  (Normal) Collected:  04/04/19 2046    Specimen:  Blood Updated:  04/04/19 2048     Glucose 104 mg/dL     Blood Culture - Blood, Arm, Right [[id number]] Collected:  04/03/19 2014    Specimen:  Blood from Arm, Right Updated:  04/04/19 2030     Blood Culture No growth at 24 hours    Sodium, Urine, Random - [[id number]] Collected:  04/04/19 1817    Specimen:  Urine Updated:  04/04/19 1854     Sodium, Urine 32 mmol/L     Narrative:       Reference intervals for random urine have not been established.  Clinical usage is dependent upon physician's interpretation in combination with other laboratory tests.     Chloride, Urine, Random - [[id number]] Collected:  04/04/19 1817    Specimen:  Urine Updated:  04/04/19 1854     Chloride, Urine 40 mmol/L     Narrative:        Reference intervals for random urine have not been established.  Clinical usage is dependent upon physician's interpretation in combination with other laboratory tests.     Protein / Creatinine Ratio, Urine - [[id number]]  (Abnormal) Collected:  04/04/19 1817    Specimen:  Urine Updated:  04/04/19 1853     Protein/Creatinine Ratio, Urine 239.5 mg/G Crea      Creatinine, Urine 66.8 mg/dL      Total Protein, Urine 16.0 mg/dL     Blood Culture - Blood, Hand, Right [[id number]] Collected:  04/03/19 1723    Specimen:  Blood from Hand, Right Updated:  04/04/19 1745     Blood Culture No growth at 24 hours    POC Glucose Once [[id number]]  (Normal) Collected:  04/04/19 1718    Specimen:  Blood Updated:  04/04/19 1720     Glucose 72 mg/dL     POC Glucose Once [[id number]]  (Normal) Collected:  04/04/19 1145    Specimen:  Blood Updated:  04/04/19 1147     Glucose 111 mg/dL     Renal Function Panel [[id number]]  (Abnormal) Collected:  04/04/19 0535    Specimen:  Blood Updated:  04/04/19 0629     Glucose 180 mg/dL      BUN 78 mg/dL      Creatinine 4.81 mg/dL      Sodium 142 mmol/L      Potassium 4.3 mmol/L      Chloride 112 mmol/L      CO2 13.9 mmol/L      Calcium 8.3 mg/dL      Albumin 2.40 g/dL      Phosphorus 6.1 mg/dL      Anion Gap 16.1 mmol/L      BUN/Creatinine Ratio 16.2     eGFR Non African Amer 9 mL/min/1.73      Comment: <15 Indicative of kidney failure.        eGFR   Amer -- mL/min/1.73      Comment: <15 Indicative of kidney failure.       Narrative:       GFR Normal >60  Chronic Kidney Disease <60  Kidney Failure <15    Magnesium [[id number]]  (Abnormal) Collected:  04/04/19 0535    Specimen:  Blood Updated:  04/04/19 0629     Magnesium 2.7 mg/dL     Uric Acid [[id number]]  (Normal) Collected:  04/04/19 0535    Specimen:  Blood Updated:  04/04/19 0629     Uric Acid 4.9 mg/dL     CBC (No Diff) [[id number]]  (Abnormal) Collected:  04/04/19 0535    Specimen:  Blood Updated:  04/04/19 0627     WBC 2.89 10*3/mm3       RBC 2.95 10*6/mm3      Hemoglobin 10.3 g/dL      Hematocrit 32.9 %      .5 fL      MCH 34.9 pg      MCHC 31.3 g/dL      RDW 18.5 %      RDW-SD 73.8 fl      MPV 10.5 fL      Platelets 70 10*3/mm3     POC Glucose Once [[id number]]  (Abnormal) Collected:  04/04/19 0616    Specimen:  Blood Updated:  04/04/19 0618     Glucose 173 mg/dL     Urinalysis With Culture If Indicated - Urine, Catheter In/Out [[id number]]  (Abnormal) Collected:  04/03/19 2220    Specimen:  Urine, Catheter In/Out Updated:  04/03/19 2300     Color, UA Dark Yellow     Appearance, UA Clear     pH, UA <=5.0     Specific Gravity, UA 1.016     Glucose, UA Negative     Ketones, UA Negative     Bilirubin, UA Negative     Blood, UA Negative     Protein, UA Negative     Leuk Esterase, UA Small (1+)     Nitrite, UA Negative     Urobilinogen, UA 0.2 E.U./dL    Urinalysis, Microscopic Only - Urine, Catheter In/Out [[id number]] Collected:  04/03/19 2220    Specimen:  Urine, Catheter In/Out Updated:  04/03/19 2300     RBC, UA 0-2 /HPF      WBC, UA 0-2 /HPF      Bacteria, UA None Seen /HPF      Squamous Epithelial Cells, UA 0-2 /HPF      Hyaline Casts, UA 0-2 /LPF      Methodology Automated Microscopy    POC Glucose Once [[id number]]  (Abnormal) Collected:  04/03/19 2124    Specimen:  Blood Updated:  04/03/19 2125     Glucose 196 mg/dL     Lactic Acid, Reflex [[id number]]  (Abnormal) Collected:  04/03/19 2014    Specimen:  Blood Updated:  04/03/19 2054     Lactate 2.7 mmol/L     Lactic Acid, Reflex Timer (This will reflex a repeat order 3-3:15 hours after ordered.) [[id number]] Collected:  04/03/19 1540    Specimen:  Blood Updated:  04/03/19 1930     Extra Tube Hold for add-ons.     Comment: Auto resulted.       CBC & Differential [[id number]] Collected:  04/03/19 1716    Specimen:  Blood Updated:  04/03/19 1809    Narrative:       The following orders were created for panel order CBC & Differential.  Procedure                               Abnormality          Status                     ---------                               -----------         ------                     Scan Slide[[id number]]                                       Final result               CBC Auto Differential[[id number]]        Abnormal            Final result                 Please view results for these tests on the individual orders.    CBC Auto Differential [[id number]]  (Abnormal) Collected:  04/03/19 1716    Specimen:  Blood Updated:  04/03/19 1809     WBC 3.48 10*3/mm3      RBC 3.16 10*6/mm3      Hemoglobin 11.3 g/dL      Hematocrit 35.4 %      .0 fL      MCH 35.8 pg      MCHC 31.9 g/dL      RDW 18.7 %      RDW-SD 74.2 fl      Platelets 80 10*3/mm3      Neutrophil % 68.1 %      Lymphocyte % 16.1 %      Monocyte % 11.2 %      Eosinophil % 3.7 %      Basophil % 0.3 %      Immature Grans % 0.6 %      Neutrophils, Absolute 2.37 10*3/mm3      Lymphocytes, Absolute 0.56 10*3/mm3      Monocytes, Absolute 0.39 10*3/mm3      Eosinophils, Absolute 0.13 10*3/mm3      Basophils, Absolute 0.01 10*3/mm3      Immature Grans, Absolute 0.02 10*3/mm3      nRBC 0.0 /100 WBC     Scan Slide [[id number]] Collected:  04/03/19 1716    Specimen:  Blood Updated:  04/03/19 1809     Anisocytosis Mod/2+     Macrocytes Mod/2+     WBC Morphology Normal     Clumped Platelets Present    Comprehensive Metabolic Panel [[id number]]  (Abnormal) Collected:  04/03/19 1541    Specimen:  Blood Updated:  04/03/19 1648     Glucose 295 mg/dL      BUN 84 mg/dL      Creatinine 5.14 mg/dL      Sodium 137 mmol/L      Potassium 4.8 mmol/L      Chloride 105 mmol/L      CO2 16.5 mmol/L      Calcium 9.6 mg/dL      Total Protein 6.5 g/dL      Albumin 2.40 g/dL      ALT (SGPT) 23 U/L      AST (SGOT) 100 U/L      Alkaline Phosphatase 129 U/L      Total Bilirubin 3.2 mg/dL      eGFR Non African Amer 8 mL/min/1.73      Comment: <15 Indicative of kidney failure.        eGFR   Amer -- mL/min/1.73      Comment: <15 Indicative of kidney failure.  "       Globulin 4.1 gm/dL      A/G Ratio 0.6 g/dL      BUN/Creatinine Ratio 16.3     Anion Gap 15.5 mmol/L     Narrative:       GFR Normal >60  Chronic Kidney Disease <60  Kidney Failure <15    Troponin [[id number]]  (Normal) Collected:  04/03/19 1541    Specimen:  Blood Updated:  04/03/19 1648     Troponin T 0.029 ng/mL     Narrative:       Troponin T Reference Range:  <= 0.03 ng/mL-   Negative for AMI  >0.03 ng/mL-     Abnormal for myocardial necrosis.  Clinicians would have to utilize clinical acumen, EKG, Troponin and serial changes to determine if it is an Acute Myocardial Infarction or myocardial injury due to an underlying chronic condition.     Procalcitonin [[id number]]  (Abnormal) Collected:  04/03/19 1541    Specimen:  Blood Updated:  04/03/19 1648     Procalcitonin 1.76 ng/mL     Narrative:       As a Marker for Sepsis (Non-Neonates):   1. <0.5 ng/mL represents a low risk of severe sepsis and/or septic shock.  1. >2 ng/mL represents a high risk of severe sepsis and/or septic shock.    As a Marker for Lower Respiratory Tract Infections that require antibiotic therapy:  PCT on Admission     Antibiotic Therapy             6-12 Hrs later  > 0.5                Strongly Recommended            >0.25 - <0.5         Recommended  0.1 - 0.25           Discouraged                   Remeasure/reassess PCT  <0.1                 Strongly Discouraged          Remeasure/reassess PCT      As 28 day mortality risk marker: \"Change in Procalcitonin Result\" (> 80 % or <=80 %) if Day 0 (or Day 1) and Day 4 values are available. Refer to http://[email or web address]-pct-[email or web address]/   Change in PCT <=80 %   A decrease of PCT levels below or equal to 80 % defines a positive change in PCT test result representing a higher risk for 28-day all-cause mortality of patients diagnosed with severe sepsis or septic shock.  Change in PCT > 80 %   A decrease of PCT levels of more than 80 % defines a negative change in PCT result representing a lower " risk for 28-day all-cause mortality of patients diagnosed with severe sepsis or septic shock.                BNP [[id number]]  (Normal) Collected:  04/03/19 1541    Specimen:  Blood Updated:  04/03/19 1646     proBNP 1,030.0 pg/mL     Narrative:       Among patients with dyspnea, NT-proBNP is highly sensitive for the detection of acute congestive heart failure. In addition NT-proBNP of <300 pg/ml effectively rules out acute congestive heart failure with 99% negative predictive value.    [place] Draw [[id number]] Collected:  04/03/19 1541    Specimen:  Blood Updated:  04/03/19 1646    Narrative:       The following orders were created for panel order [place] Draw.  Procedure                               Abnormality         Status                     ---------                               -----------         ------                     Light Blue Top[[id number]]                                   Final result               Green Top (Gel)[[id number]]                                  Final result               Lavender Top[[id number]]                                     Final result               Gold Top - SST[[id number]]                                   Final result                 Please view results for these tests on the individual orders.    Light Blue Top [[id number]] Collected:  04/03/19 1541    Specimen:  Blood Updated:  04/03/19 1646     Extra Tube hold for add-on     Comment: Auto resulted       Green Top (Gel) [[id number]] Collected:  04/03/19 1541    Specimen:  Blood Updated:  04/03/19 1646     Extra Tube Hold for add-ons.     Comment: Auto resulted.       Lavender Top [[id number]] Collected:  04/03/19 1541    Specimen:  Blood Updated:  04/03/19 1646     Extra Tube hold for add-on     Comment: Auto resulted       Gold Top - SST [[id number]] Collected:  04/03/19 1541    Specimen:  Blood Updated:  04/03/19 1646     Extra Tube Hold for add-ons.     Comment: Auto resulted.       Ammonia [[id number]]  (Normal) Collected:   04/03/19 1540    Specimen:  Blood Updated:  04/03/19 1641     Ammonia 42 umol/L     Lactic Acid, Plasma [[id number]]  (Abnormal) Collected:  04/03/19 1540    Specimen:  Blood Updated:  04/03/19 1627     Lactate 3.5 mmol/L     Protime-INR [[id number]]  (Abnormal) Collected:  04/03/19 1541    Specimen:  Blood Updated:  04/03/19 1617     Protime 21.3 Seconds      INR 1.88        Data Review:  Results from last 7 days   Lab Units 04/08/19  0459 04/07/19  0615 04/06/19  0437   SODIUM mmol/L 138 140 139   POTASSIUM mmol/L 3.9 3.9 4.2   CHLORIDE mmol/L 109* 111* 111*   CO2 mmol/L 10.7* 13.3* 13.3*   BUN mg/dL 78* 78* 77*   CREATININE mg/dL 5.01* 5.04* 4.68*   GLUCOSE mg/dL 101* 96 130*   CALCIUM mg/dL 8.5* 8.0* 7.9*     Results from last 7 days   Lab Units 04/08/19  0459 04/07/19  0615 04/06/19  0437   WBC 10*3/mm3 2.67* 2.73* 2.81*   HEMOGLOBIN g/dL 10.6* 10.7* 10.7*   HEMATOCRIT % 33.4* 33.8* 34.6   PLATELETS 10*3/mm3 70* 80* 73*             Lab Results   Lab Value Date/Time    TROPONINT 0.029 04/03/2019 1541    TROPONINT 0.076 (H) 11/04/2018 1753    TROPONINT 0.117 (C) 07/26/2018 1127    TROPONINT 0.112 (C) 11/18/2016 1936    TROPONINT 0.141 (C) 11/18/2016 1229    TROPONINT 0.139 (C) 11/18/2016 0553    TROPONINT 0.104 (C) 11/17/2016 2345    TROPONINT 0.119 (C) 11/17/2016 1813    TROPONINT 0.096 (C) 11/17/2016 1209    TROPONINT 0.118 (C) 11/17/2016 0350    TROPONINT 0.128 (C) 11/17/2016 0026    TROPONINT 0.145 (C) 11/16/2016 2006    TROPONINT 0.03 02/18/2016 1645         Results from last 7 days   Lab Units 04/06/19  0437 04/05/19  0426 04/03/19  1541   ALK PHOS U/L 118* 104 129*   BILIRUBIN mg/dL 2.8* 2.7* 3.2*   ALT (SGPT) U/L 18 20 23   AST (SGOT) U/L 97* 91* 100*             Glucose   Date/Time Value Ref Range Status   04/08/2019 0840 83 70 - 130 mg/dL Final   04/08/2019 0628 93 70 - 130 mg/dL Final   04/07/2019 2049 150 (H) 70 - 130 mg/dL Final   04/07/2019 1617 113 70 - 130 mg/dL Final   04/07/2019 1137 123 70 - 130  mg/dL Final   04/07/2019 0643 87 70 - 130 mg/dL Final   04/06/2019 2010 187 (H) 70 - 130 mg/dL Final   04/06/2019 1628 221 (H) 70 - 130 mg/dL Final     Results from last 7 days   Lab Units 04/03/19  1541   INR  1.88*       Past Medical History:   Diagnosis Date   • Anxiety    • Arthritis    • C. 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   • HOANG (nonalcoholic steatohepatitis)    • Pancytopenia (CMS/HCC)    • Spinal stenosis    • Splenomegaly    • Thrombocytopenia, chronic    • TIA (transient ischemic attack)        Assessment:  Active Hospital Problems    Diagnosis  POA   • **Acute renal failure superimposed on chronic kidney disease (CMS/HCC) [N17.9, N18.9]  Yes   • Atrial fibrillation (CMS/HCC) [I48.91]  Unknown   • Iron deficiency [E61.1]  Yes   • DM2 (diabetes mellitus, type 2) (CMS/HCC) [E11.9]  Yes   • Thrombocytopenia (CMS/HCC) [D69.6]  Yes   • Anemia of chronic renal failure, stage 4 (severe) (CMS/HCC) [N18.4, D63.1]  Yes   • 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       Plan:  Continue off IV fluid hydration. Renal consult noted.   started dialysis.   Follow up lab.  Up with PT.  May need SNU for rehab.  Family wants acute rehab evaluation.    Billy Goldman MD  4/8/2019  1:14 PM

## 2019-04-08 NOTE — PLAN OF CARE
Problem: Patient Care Overview  Goal: Plan of Care Review  Outcome: Ongoing (interventions implemented as appropriate)   04/08/19 5300   Plan of Care Review   Progress improving   Coping/Psychosocial   Plan of Care Reviewed With patient;spouse;son   OTHER   Outcome Summary VSS. Oriented to self and knows she is in the hospital. Right chest port placed and dialysis completed today. No complaints at this time, will continue to monitor.

## 2019-04-08 NOTE — ANESTHESIA PREPROCEDURE EVALUATION
Anesthesia Evaluation     Patient summary reviewed and Nursing notes reviewed   NPO Solid Status: > 8 hours  NPO Liquid Status: > 8 hours           Airway   Mallampati: II  Neck ROM: full  No difficulty expected  Dental    (+) edentulous    Pulmonary     breath sounds clear to auscultation  Cardiovascular     Rhythm: regular    (+) hypertension, dysrhythmias Atrial Fib, CHF,       Neuro/Psych  (+) TIA, psychiatric history, dementia,     GI/Hepatic/Renal/Endo    (+) obesity,   hepatitis, liver disease, renal disease dialysis, diabetes mellitus using insulin,     Musculoskeletal     Abdominal   (+) obese,    Substance History      OB/GYN          Other   (+) arthritis                     Anesthesia Plan    ASA 3     general     intravenous induction   Anesthetic plan, all risks, benefits, and alternatives have been provided, discussed and informed consent has been obtained with: patient.

## 2019-04-08 NOTE — PROGRESS NOTES
Discussed with the patient and her  this morning. They are agreeable to tunneled catheter placement and will get this done today.

## 2019-04-08 NOTE — SIGNIFICANT NOTE
04/08/19 1314   Rehab Treatment   Discipline physical therapy assistant   Reason Treatment Not Performed unavailable for treatment  (Pt off floor in the PM)   Recommendation   PT - Next Appointment 04/09/19

## 2019-04-08 NOTE — OP NOTE
Charu Bowens   1935 04/08/19  Vincent Jean-Baptiste MD      Preoperative Diagnosis: End Stage Renal Disease  Postoperative Diagnosis: same as above    Procedure Performed:   1) Ultrasound Guided access to the Right Internal Jugular vein.  (94973)   2) Placement of 23 cm Palindrome    3) Use of fluoroscopy for catheter placement (24593)    Insert tunneled CVC without port/pump (35426)    Surgeon: Vincent Jean-Baptiste MD    Assistant: Caroline NEGRETE and they provided critical assistance during the case including suctioning, exposure, retraction, and reduction of blood loss.    Anesthesia: Local/MAC    Estimated Blood Loss: minimal    Specimen: none    Complications: none    Dispo: to PACU    Indication for procedure: 84 y.o. female  with End Stage Renal Disease in need of access    Description of procedure: The patient was taken to the operating room and placed in the supine position with a shoulder roll oriented horizontally at the level of the shoulder blades.  The Right neck and chest was prepped and draped in the usual sterile fashion.  A timeout was observed.  Preoperative antibiotics had been given.  Using an ultrasound the Right Internal Jugular vein was accessed and the guidewire was passed centrally.  This was guided using fluoroscopy into the .   Next the catheter track was planned on the Right neck and chest using fluoroscopic guidance.  Under local anesthesia a nick was made with an 11 blade scalpel in the skin and the tunneling device was used to track the catheter to the incision.    Sequential dilators were used to dilate the subcutaneous tract from the access site using fluoroscopic guidance.  The peel-away sheath was placed.  The dilator and the wire were removed and the catheter was placed down through the peel-away sheath and then the sheath was removed.  The catheter was pulled back until there was no kink.  The tip was in the Atrial-caval junction.  All lumens flushed and delmar  back dark red blood easily.  They were flushed with heparinized saline and then locked with high-dose heparin and the caps were placed.  The catheter was stitched twice to the patient using 3-0 Vicryl suture. The stab incision was closed using a 3-0 Vicryl.  Dermabond was used and a sterile dressing was placed.    The patient tolerated the procedure well and all of the counts were correct.      Vincent Jean-Baptiste MD  04/08/19

## 2019-04-08 NOTE — PLAN OF CARE
Problem: Patient Care Overview  Goal: Plan of Care Review  Outcome: Ongoing (interventions implemented as appropriate)   04/08/19 0659   Plan of Care Review   Progress improving   Coping/Psychosocial   Plan of Care Reviewed With patient   OTHER   Outcome Summary VSS, pt is confused to situation. Pt and family to speak with MD this morning regarding tunnel catheter placement and HD. Pt has been NPO since midnight. Will continue to monitor pt.       Problem: Skin Injury Risk (Adult)  Goal: Skin Health and Integrity  Outcome: Ongoing (interventions implemented as appropriate)   04/08/19 0659   Skin Injury Risk (Adult)   Skin Health and Integrity making progress toward outcome       Problem: Nutrition, Imbalanced: Inadequate Oral Intake (Adult)  Goal: Prevent Further Weight Loss  Outcome: Ongoing (interventions implemented as appropriate)   04/08/19 0659   Nutrition, Imbalanced: Inadequate Oral Intake (Adult)   Prevent Further Weight Loss making progress toward outcome

## 2019-04-08 NOTE — ANESTHESIA POSTPROCEDURE EVALUATION
"Patient: Charu Bowens    Procedure Summary     Date:  04/08/19 Room / Location:  [facility] OR 18 [facility] HYBRID OR 18/19    Anesthesia Start:  0905 Anesthesia Stop:  0940    Procedure:  HEMODIALYSIS CATHETER INSERTION (N/A ) Diagnosis:      Surgeon:  Vincent Jean-Baptiste MD Provider:  Johnson Dale MD    Anesthesia Type:  general ASA Status:  3          Anesthesia Type: general  Last vitals  BP   139/54 (04/08/19 0945)   Temp   36.5 °C (97.7 °F) (04/08/19 0940)   Pulse   80 (04/08/19 0945)   Resp   16 (04/08/19 0945)     SpO2   98 % (04/08/19 0945)     Post Anesthesia Care and Evaluation    Patient location during evaluation: bedside  Patient participation: complete - patient participated  Level of consciousness: awake and alert  Pain management: adequate  Airway patency: patent  Anesthetic complications: No anesthetic complications    Cardiovascular status: acceptable  Respiratory status: acceptable  Hydration status: acceptable    Comments: /54 (BP Location: Left arm, Patient Position: Lying)   Pulse 80   Temp 36.5 °C (97.7 °F) (Oral)   Resp 16   Ht 160 cm (63\")   Wt 96.4 kg (212 lb 8.4 oz)   SpO2 98%   BMI 37.65 kg/m²       "

## 2019-04-09 NOTE — PROGRESS NOTES
Name: Charu Bowens ADMIT: 4/3/2019   : 1935  PCP: Chayito Paredes MD    MRN: [id number] LOS: 6 days   AGE/SEX: 84 y.o. female  ROOM: [facility]     Subjective   Subjective   CC: lethargy  No acute events.  Patient had TDC placement and dialysis yesterday, tolerated well.  Taking PO.  No f/c/n/v/d.    Objective   Objective   Vital Signs  Temp:  [97.4 °F (36.3 °C)-98.2 °F (36.8 °C)] 98 °F (36.7 °C)  Heart Rate:  [61-86] 76  Resp:  [16-20] 20  BP: (125-151)/(53-59) 141/57  SpO2:  [96 %] 96 %  on   ;   Device (Oxygen Therapy): room air  Body mass index is 37.57 kg/m².  Physical Exam   Constitutional: No distress.   HENT:   Head: Normocephalic and atraumatic.   Mouth/Throat: Oropharynx is clear and moist.   Eyes: Conjunctivae are normal. Pupils are equal, round, and reactive to light.   Neck: Normal range of motion. Neck supple. No JVD present.   Cardiovascular: Normal rate, regular rhythm and intact distal pulses.   Pulmonary/Chest: Effort normal and breath sounds normal.   Abdominal: Soft. Bowel sounds are normal. There is no tenderness.   Musculoskeletal: She exhibits edema (3+ BLE). She exhibits no tenderness.   Neurological: She is alert.   Skin: Skin is warm and dry. She is not diaphoretic.   Psychiatric: She has a normal mood and affect. Her behavior is normal. Cognition and memory are impaired (known dementia).   Nursing note and vitals reviewed.      Results Review:       I reviewed the patient's new clinical results.  Results from last 7 days   Lab Units 19  0459 19  0459 19  0615 19  0437   WBC 10*3/mm3 2.67*  2.67* 2.67* 2.73* 2.81*   HEMOGLOBIN g/dL 11.1* 10.6* 10.7* 10.7*   PLATELETS 10*3/mm3 62* 70* 80* 73*     Results from last 7 days   Lab Units 19  0630 19  0459 19  0615 19  0437   SODIUM mmol/L 139 138 140 139   POTASSIUM mmol/L 4.1 3.9 3.9 4.2   CHLORIDE mmol/L 109* 109* 111* 111*   CO2 mmol/L 16.7* 10.7* 13.3* 13.3*   BUN mg/dL 50* 78* 78* 77*    CREATININE mg/dL 3.81* 5.01* 5.04* 4.68*   GLUCOSE mg/dL 72 101* 96 130*   Estimated Creatinine Clearance: 12.3 mL/min (A) (by C-G formula based on SCr of 3.81 mg/dL (H)).  Results from last 7 days   Lab Units 04/09/19 0630 04/08/19 0459 04/07/19 0615 04/06/19 0437 04/05/19  0426  04/03/19  1541   ALBUMIN g/dL 2.10* 1.80* 2.40* 2.20* 1.80*   < > 2.40*   BILIRUBIN mg/dL  --   --   --  2.8* 2.7*  --  3.2*   ALK PHOS U/L  --   --   --  118* 104  --  129*   AST (SGOT) U/L  --   --   --  97* 91*  --  100*   ALT (SGPT) U/L  --   --   --  18 20  --  23    < > = values in this interval not displayed.     Results from last 7 days   Lab Units 04/09/19 0630 04/08/19 0459 04/07/19 0615 04/06/19 0437 04/05/19  0426 04/04/19  0535   CALCIUM mg/dL 8.1* 8.5* 8.0* 7.9* 8.6 8.3*   ALBUMIN g/dL 2.10* 1.80* 2.40* 2.20* 1.80* 2.40*   MAGNESIUM mg/dL  --   --  2.6* 2.6* 2.7* 2.7*   PHOSPHORUS mg/dL 4.6* 6.1* 6.2* 5.9* 5.6* 6.1*     Results from last 7 days   Lab Units 04/03/19 2014 04/03/19  1541 04/03/19  1540   PROCALCITONIN ng/mL  --  1.76*  --    LACTATE mmol/L 2.7*  --  3.5*     Glucose   Date/Time Value Ref Range Status   04/09/2019 1150 127 70 - 130 mg/dL Final   04/09/2019 0521 70 70 - 130 mg/dL Final   04/08/2019 2011 108 70 - 130 mg/dL Final   04/08/2019 1639 68 (L) 70 - 130 mg/dL Final   04/08/2019 0840 83 70 - 130 mg/dL Final   04/08/2019 0628 93 70 - 130 mg/dL Final   04/07/2019 2049 150 (H) 70 - 130 mg/dL Final         allopurinol 100 mg Oral Daily   heparin (porcine) 5,000 Units Subcutaneous Q12H   insulin lispro 0-7 Units Subcutaneous 4x Daily With Meals & Nightly   insulin lispro protamine-insulin lispro 40 Units Subcutaneous Daily With Breakfast   lactulose 30 g Oral Daily   metoprolol tartrate 12.5 mg Oral Nightly   pantoprazole 40 mg Oral Q AM   QUEtiapine 25 mg Oral Daily With Lunch   QUEtiapine 50 mg Oral Nightly   rifaximin 550 mg Oral Daily With Lunch & Dinner   sertraline 25 mg Oral Daily   vitamin  B-12 1,000 mcg Oral Daily       sodium chloride 9 mL/hr Last Rate: 9 mL/hr (04/08/19 0851)   Diet Regular; Low Sodium, Low Potassium       Assessment/Plan     Active Hospital Problems    Diagnosis  POA   • **Acute renal failure superimposed on chronic kidney disease (CMS/HCC) [N17.9, N18.9]  Yes   • Atrial fibrillation (CMS/HCC) [I48.91]  Yes   • Iron deficiency [E61.1]  Yes   • 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   COLEMAN/CKD  - complications are volume overload and acidosis  - s/p TDC placement 4/8/19 with hemodialysis   - nephrology is following, appreciate recs    Atrial Fibrillation  - controlled  - continue on metoprolol    Type 2 DM  - complicated by chronic kidney disease  - hold 75/25 for now as BG have been low  - cover with ssi     TCP  - likely due to cirrhosis  - stable, will monitor    VTE Prophylaxis - heparin 5000units every 12 hours  Code Status - Full code  Disposition - TBD      Sourav Tamayo MD  [facility]  04/09/19  2:35 PM

## 2019-04-09 NOTE — PLAN OF CARE
Problem: Fall Risk (Adult)  Goal: Absence of Fall  Outcome: Ongoing (interventions implemented as appropriate)   04/09/19 0848   Fall Risk (Adult)   Absence of Fall making progress toward outcome       Problem: Patient Care Overview  Goal: Plan of Care Review  Outcome: Ongoing (interventions implemented as appropriate)   04/09/19 0848   Plan of Care Review   Progress no change   Coping/Psychosocial   Plan of Care Reviewed With patient;family   OTHER   Outcome Summary VSS, pt is s/p day 1 of R tunnel catheter placement and HD. Pt c/o being tired and rested quietly in bed, with eyes closed for most of the evening. Pt continues to be confused and calls out fo her  frequently. Pt has no c/o pain. Urine output decreased- only 50 ml out throughout the shift. R tunnel cath in place and a small about of bloody, dried drainage was noted under dressing. Both lumens are wrapped with gauze and taped. Labs ordered for the AM, will contine to monitor pt.        Problem: Skin Injury Risk (Adult)  Goal: Skin Health and Integrity  Outcome: Ongoing (interventions implemented as appropriate)   04/09/19 0848   Skin Injury Risk (Adult)   Skin Health and Integrity making progress toward outcome       Problem: Nutrition, Imbalanced: Inadequate Oral Intake (Adult)  Goal: Improved Oral Intake  Outcome: Ongoing (interventions implemented as appropriate)   04/09/19 0848   Nutrition, Imbalanced: Inadequate Oral Intake (Adult)   Improved Oral Intake making progress toward outcome

## 2019-04-09 NOTE — PROGRESS NOTES
Continued Stay Note  [facility]     Patient Name: Charu Bowens  MRN: [id number]  Today's Date: 4/9/2019    Admit Date: 4/3/2019    Discharge Plan     Row Name 04/09/19 1517       Plan    Plan  acute vs subacute rehab    Patient/Family in Agreement with Plan  yes    Plan Comments  Referrals pending for [facility], [facility], and [facility].  Pt started HD yesterday.  Await determination on long term plan for dilaysis.  RADU Abebe RN        Discharge Codes    No documentation.             Mayte Abebe

## 2019-04-09 NOTE — PROGRESS NOTES
Nephrology:  Addendum    Long d/w pt's  and two kids at bedside.  Shared my concerns about pt's tenuous cognitive fxn and limited understanding of what is going on during HD (she tried to get out of bed multiple times during HD yesterday, and asked repeatedly that HD be stopped).  Will plan another HD tomorrow tentatively, with low threshold to discontinue it if pt's safety in any jeopardy.     --Navarro Hung MD

## 2019-04-09 NOTE — NURSING NOTE
Family request referral to our acute rehab program. Eval started. Talked with pt and spouse. Pt confused throughout our conversation. Will follow up, after therapy sessions, with rehab team. Thanks, Ruiz WIGGINS rehab admission nurse [phone]

## 2019-04-09 NOTE — PROGRESS NOTES
"   LOS: 5 days    Patient Care Team:  Chayito Paredes MD as PCP - General (Internal Medicine)  Johnson Turner DPM as PCP - Claims Attributed  Dano Art MD as Consulting Physician (Hematology and Oncology)  Navarro Hung MD as Referring Physician (Nephrology)    Chief Complaint:    Chief Complaint   Patient presents with   • Fatigue     Follow up acute kidney injury on chronic kidney disease  Subjective     Interval History:   Patient had a tunneled dialysis catheter placed earlier today and had her first dialysis session soon afterward; she required constant supervision d/t restlessness and confusion, and she requested to stop dialysis multiple times during the treatment; she is currently confused; has eaten poorly this evening; denies any pain    Review of Systems:   As noted above    Objective     Vital Signs  Temp:  [97.3 °F (36.3 °C)-98.1 °F (36.7 °C)] 97.4 °F (36.3 °C)  Heart Rate:  [73-83] 81  Resp:  [16-18] 16  BP: (133-153)/(51-60) 147/55    Flowsheet Rows      First Filed Value   Admission Height  160 cm (63\") Documented at 04/03/2019 1500   Admission Weight  94.9 kg (209 lb 3.2 oz) Documented at 04/03/2019 1500          No intake/output data recorded.  I/O last 3 completed shifts:  In: 655 [P.O.:605; I.V.:50]  Out: 1450 [Urine:450; Other:1000]    Intake/Output Summary (Last 24 hours) at 4/8/2019 2003  Last data filed at 4/8/2019 1900  Gross per 24 hour   Intake 170 ml   Output 1350 ml   Net -1180 ml       Physical Exam:  General Appearance: Awake, confused and disoriented, no acute distress, obese, chronically ill  Skin: warm and dry  HEENT: pupils round and reactive to light, oral mucosa normal   Neck: supple, no JVD, trachea midline  Lungs: Decreased breath sounds bilaterally, unlabored breathing effort  Heart: RRR, normal S1 and S2, no S3, no rub  Abdomen: soft, non-tender, present bowel sounds to auscultation; +distended  :  No palpable bladder, no CVA " tenderness  Extremities: 3+ pretibial edema extending to her hips, no cyanosis or clubbing  Neuro: Normal speech but the patient is confused and moving all extremities; +asterixis  Skin: right chest TDC            Results Review:    Results from last 7 days   Lab Units 04/08/19 0459 04/07/19 0615 04/06/19 0437 04/05/19  0426  04/03/19  1541   SODIUM mmol/L 138 140 139 139   < > 137   POTASSIUM mmol/L 3.9 3.9 4.2 4.1   < > 4.8   CHLORIDE mmol/L 109* 111* 111* 112*   < > 105   CO2 mmol/L 10.7* 13.3* 13.3* 13.0*   < > 16.5*   BUN mg/dL 78* 78* 77* 75*   < > 84*   CREATININE mg/dL 5.01* 5.04* 4.68* 4.99*   < > 5.14*   CALCIUM mg/dL 8.5* 8.0* 7.9* 8.6   < > 9.6   BILIRUBIN mg/dL  --   --  2.8* 2.7*  --  3.2*   ALK PHOS U/L  --   --  118* 104  --  129*   ALT (SGPT) U/L  --   --  18 20  --  23   AST (SGOT) U/L  --   --  97* 91*  --  100*   GLUCOSE mg/dL 101* 96 130* 87   < > 295*    < > = values in this interval not displayed.       Estimated Creatinine Clearance: 9.4 mL/min (A) (by C-G formula based on SCr of 5.01 mg/dL (H)).    Results from last 7 days   Lab Units 04/08/19 0459 04/07/19 0615 04/06/19 0437 04/05/19  0426   MAGNESIUM mg/dL  --  2.6* 2.6* 2.7*   PHOSPHORUS mg/dL 6.1* 6.2* 5.9* 5.6*       Results from last 7 days   Lab Units 04/07/19 0615 04/06/19 0437 04/05/19  0426 04/04/19  0535   URIC ACID mg/dL 4.9 4.5 4.8 4.9       Results from last 7 days   Lab Units 04/08/19 0459 04/07/19 0615 04/06/19  0437 04/05/19  0426 04/04/19  0535   WBC 10*3/mm3 2.67* 2.73* 2.81* 3.03* 2.89*   HEMOGLOBIN g/dL 10.6* 10.7* 10.7* 10.6* 10.3*   PLATELETS 10*3/mm3 70* 80* 73* 75* 70*       Results from last 7 days   Lab Units 04/03/19  1541   INR  1.88*         Imaging Results (last 24 hours)     Procedure Component Value Units Date/Time    XR Chest Post CVA Port [[id number]] Collected:  04/08/19 1022     Updated:  04/08/19 1027    Narrative:       Portable chest radiograph     HISTORY:Tunneled catheter placement      TECHNIQUE: Single AP portable radiograph of the chest     COMPARISON:Chest radiograph 04/03/2019     FINDINGS:  Right internal jugular sheath is present terminating near the superior  cavoatrial junction.     The lungs are hypoinflated with left basilar pulmonary opacification  which is mildly worsened since 04/03/2019. There is asymmetric elevation  right hemidiaphragm.. There is pulmonary vascular congestion  bilaterally. No pneumothorax is seen. Heart size is accentuated by low  lung volumes..        Impression:       1.  Left basilar pulmonary opacification which has worsened since  04/03/2019 representing atelectasis and/or developing pneumonia and  correlation with patient history is recommended.  2.  No findings of immediate complication following placement of right  internal jugular sheath     This report was finalized on 4/8/2019 10:24 AM by Dr. Rickey Lincoln M.D.       Arteriogram (Autofinal[employer]) [[id number]] Resulted:  04/08/19 0936     Updated:  04/08/19 0936    Narrative:       This procedure was auto-finalized with no dictation required.          allopurinol 100 mg Oral Daily   famotidine 20 mg Oral Daily   ferrous sulfate 325 mg Oral Daily   heparin (porcine) 5,000 Units Subcutaneous Q12H   insulin lispro 0-7 Units Subcutaneous 4x Daily With Meals & Nightly   insulin lispro protamine-insulin lispro 40 Units Subcutaneous Daily With Breakfast   lactulose 30 g Oral Daily   metoprolol tartrate 12.5 mg Oral Nightly   QUEtiapine 25 mg Oral Daily With Lunch   QUEtiapine 50 mg Oral Nightly   rifaximin 550 mg Oral Daily With Lunch & Dinner   sertraline 25 mg Oral Daily   vitamin B-12 1,000 mcg Oral Daily       sodium chloride 9 mL/hr Last Rate: 9 mL/hr (04/08/19 0851)       Medication Review:   Current Facility-Administered Medications   Medication Dose Route Frequency Provider Last Rate Last Dose   • acetaminophen (TYLENOL) tablet 650 mg  650 mg Oral Q4H PRN Brice Le MD   650 mg at 04/08/19 8356    • albumin human 25 % IV SOLN 12.5 g  12.5 g Intravenous PRN Mack Pruitt MD       • allopurinol (ZYLOPRIM) tablet 100 mg  100 mg Oral Daily Mack Pruitt MD   100 mg at 04/07/19 0906   • dextrose (D50W) 25 g/ 50mL Intravenous Solution 25 g  25 g Intravenous Q15 Min PRN Brice Le MD       • dextrose (GLUTOSE) oral gel 15 g  15 g Oral Q15 Min PRN Brice Le MD       • famotidine (PEPCID) tablet 20 mg  20 mg Oral Daily Brice Le MD   20 mg at 04/07/19 0906   • ferrous sulfate tablet 325 mg  325 mg Oral Daily Brice Le MD   325 mg at 04/07/19 0906   • glucagon (human recombinant) (GLUCAGEN DIAGNOSTIC) injection 1 mg  1 mg Subcutaneous PRN Brice Le MD       • heparin (porcine) 5000 UNIT/ML injection 5,000 Units  5,000 Units Subcutaneous Q12H Forrest Avilez MD   5,000 Units at 04/07/19 0906   • insulin lispro (humaLOG) injection 0-7 Units  0-7 Units Subcutaneous 4x Daily With Meals & Nightly Brice Le MD   2 Units at 04/06/19 2042   • insulin lispro protamine-insulin lispro (humaLOG 75-25) injection 40 Units  40 Units Subcutaneous Daily With Breakfast Brice Le MD   40 Units at 04/07/19 0955   • lactulose (CHRONULAC) 10 GM/15ML solution 30 g  30 g Oral Daily Brice Le MD   30 g at 04/07/19 0906   • melatonin tablet 10 mg  10 mg Oral Nightly PRN Brice Le MD       • metoprolol tartrate (LOPRESSOR) tablet 12.5 mg  12.5 mg Oral Nightly Brice Le MD   12.5 mg at 04/07/19 2128   • nitroglycerin (NITROSTAT) SL tablet 0.4 mg  0.4 mg Sublingual Q5 Min PRN Brice Le MD       • ondansetron (ZOFRAN) tablet 4 mg  4 mg Oral Q6H PRN Brice eL MD        Or   • ondansetron ODT (ZOFRAN-ODT) disintegrating tablet 4 mg  4 mg Oral Q6H PRN Brice Le MD        Or   • ondansetron (ZOFRAN) injection 4 mg  4 mg Intravenous Q6H PRN Brice Le MD   4 mg at 04/06/19  0915   • QUEtiapine (SEROquel) tablet 25 mg  25 mg Oral Daily With Lunch Brice Le MD   25 mg at 04/07/19 1201   • QUEtiapine (SEROquel) tablet 50 mg  50 mg Oral Nightly Brice Le MD   50 mg at 04/07/19 2128   • rifaximin (XIFAXAN) tablet 550 mg  550 mg Oral Daily With Lunch & Dinner Brice Le MD   550 mg at 04/08/19 1809   • sertraline (ZOLOFT) tablet 25 mg  25 mg Oral Daily Brice Le MD   25 mg at 04/07/19 0906   • sodium chloride 0.9 % bolus 1,000 mL  1,000 mL Intravenous PRN Mack Pruitt MD       • sodium chloride 0.9 % flush 3-10 mL  3-10 mL Intravenous PRN Brice Le MD       • sodium chloride 0.9 % infusion  9 mL/hr Intravenous Continuous Johnson Dale MD 9 mL/hr at 04/08/19 0851     • traMADol (ULTRAM) tablet 50 mg  50 mg Oral Q12H PRN Brice Le MD       • vitamin B-12 (CYANOCOBALAMIN) tablet 1,000 mcg  1,000 mcg Oral Daily Brice Le MD   1,000 mcg at 04/07/19 0906       Assessment/Plan   1.  Acute kidney injury on chronic kidney disease, multifactorial and progressive.  Vol excess; significant acidosis; stable K.  Had first HD earlier today  2.  Chronic kidney disease stage V at baseline associated with diabetic nephropathy, biopsy-proven.  Baseline creatinine had been in 3-3.5 range  3.  Diabetes mellitus type 2 with known diabetic nephropathy and retinopathy  4.  Cirrhosis, associated with HOANG, even though there is a report in the past medical history stating that there is history of hemochromatosis.  The patient had hepatic encephalopathy often associated with noncompliance with lactulose  5.  Anemia with chronic kidney disease  6.  Thrombocytopenia with known history of chronic thrombocytopenia  7.  History of gout  8.  Hypertension reasonably controlled        Plan:  1.  When she was lucid and could grasp gravity of her multiple medical problems, she had elected not to pursue any dialysis if/when ESRD  reached  2.   is not at bedside, but patient's son is in the room.  I discussed with him my reservations regarding patient's ability to do well on dialysis.  Although I think dialysis can prolong her life, I think this will be at the expense of quality of life.  I will discuss further with the patient's  tomorrow; I will discuss with him the option of comfort-based care rather than the aggressive course that is currently underway  3.  No HD tomorrow        I discussed the case with the patient's son at the bedside        Navarro Hung MD  04/08/19  8:03 PM

## 2019-04-09 NOTE — PROGRESS NOTES
"   LOS: 6 days    Patient Care Team:  Chayito Paredes MD as PCP - General (Internal Medicine)  Johnson Turner DPM as PCP - Claims Attributed  Dano Art MD as Consulting Physician (Hematology and Oncology)  Navarro Hung MD as Referring Physician (Nephrology)    Chief Complaint:    Chief Complaint   Patient presents with   • Fatigue     Follow up acute kidney injury on chronic kidney disease  Subjective     Interval History:   Confused this AM, more so than yesterday; breathing is fine, she states; not hungry    Review of Systems:   As noted above    Objective     Vital Signs  Temp:  [97.7 °F (36.5 °C)-98.2 °F (36.8 °C)] 98 °F (36.7 °C)  Heart Rate:  [61-86] 76  Resp:  [16-20] 20  BP: (125-151)/(53-59) 141/57    Flowsheet Rows      First Filed Value   Admission Height  160 cm (63\") Documented at 04/03/2019 1500   Admission Weight  94.9 kg (209 lb 3.2 oz) Documented at 04/03/2019 1500          No intake/output data recorded.  I/O last 3 completed shifts:  In: 535 [P.O.:485; I.V.:50]  Out: 1500 [Urine:500; Other:1000]    Intake/Output Summary (Last 24 hours) at 4/9/2019 1540  Last data filed at 4/9/2019 1408  Gross per 24 hour   Intake 360 ml   Output 150 ml   Net 210 ml       Physical Exam:  General Appearance: Awake, confused and disoriented, no acute distress, obese, chronically ill  Skin: warm and dry  HEENT: pupils round and reactive to light, oral mucosa normal   Neck: supple, no JVD, trachea midline  Lungs: Decreased breath sounds bilaterally, unlabored breathing effort  Heart: RRR, normal S1 and S2, no S3, no rub  Abdomen: soft, non-tender, present bowel sounds to auscultation; +distended  :  No palpable bladder, no CVA tenderness  Extremities: 3+ pretibial edema extending to her hips, no cyanosis or clubbing  Neuro: Normal speech but the patient is confused; moving all extremities; +asterixis  Skin: right chest TDC            Results Review:    Results from last 7 days   Lab Units " 04/09/19 0630 04/08/19 0459 04/07/19 0615 04/06/19 0437 04/05/19 0426  04/03/19  1541   SODIUM mmol/L 139 138 140 139 139   < > 137   POTASSIUM mmol/L 4.1 3.9 3.9 4.2 4.1   < > 4.8   CHLORIDE mmol/L 109* 109* 111* 111* 112*   < > 105   CO2 mmol/L 16.7* 10.7* 13.3* 13.3* 13.0*   < > 16.5*   BUN mg/dL 50* 78* 78* 77* 75*   < > 84*   CREATININE mg/dL 3.81* 5.01* 5.04* 4.68* 4.99*   < > 5.14*   CALCIUM mg/dL 8.1* 8.5* 8.0* 7.9* 8.6   < > 9.6   BILIRUBIN mg/dL  --   --   --  2.8* 2.7*  --  3.2*   ALK PHOS U/L  --   --   --  118* 104  --  129*   ALT (SGPT) U/L  --   --   --  18 20  --  23   AST (SGOT) U/L  --   --   --  97* 91*  --  100*   GLUCOSE mg/dL 72 101* 96 130* 87   < > 295*    < > = values in this interval not displayed.       Estimated Creatinine Clearance: 12.3 mL/min (A) (by C-G formula based on SCr of 3.81 mg/dL (H)).    Results from last 7 days   Lab Units 04/09/19 0630 04/08/19 0459 04/07/19 0615 04/06/19 0437 04/05/19  0426   MAGNESIUM mg/dL  --   --  2.6* 2.6* 2.7*   PHOSPHORUS mg/dL 4.6* 6.1* 6.2* 5.9* 5.6*       Results from last 7 days   Lab Units 04/07/19 0615 04/06/19 0437 04/05/19 0426 04/04/19  0535   URIC ACID mg/dL 4.9 4.5 4.8 4.9       Results from last 7 days   Lab Units 04/09/19 0459 04/08/19 0459 04/07/19 0615 04/06/19  0437 04/05/19  0426   WBC 10*3/mm3 2.67*  2.67* 2.67* 2.73* 2.81* 3.03*   HEMOGLOBIN g/dL 11.1* 10.6* 10.7* 10.7* 10.6*   PLATELETS 10*3/mm3 62* 70* 80* 73* 75*       Results from last 7 days   Lab Units 04/03/19  1541   INR  1.88*         Imaging Results (last 24 hours)     ** No results found for the last 24 hours. **          allopurinol 100 mg Oral Daily   heparin (porcine) 5,000 Units Subcutaneous Q12H   insulin lispro 0-7 Units Subcutaneous 4x Daily With Meals & Nightly   lactulose 30 g Oral Daily   metoprolol tartrate 12.5 mg Oral Nightly   pantoprazole 40 mg Oral Q AM   QUEtiapine 25 mg Oral Daily With Lunch   QUEtiapine 50 mg Oral Nightly   rifaximin  550 mg Oral Daily With Lunch & Dinner   sertraline 25 mg Oral Daily   vitamin B-12 1,000 mcg Oral Daily       sodium chloride 9 mL/hr Last Rate: 9 mL/hr (04/08/19 0851)       Medication Review:   Current Facility-Administered Medications   Medication Dose Route Frequency Provider Last Rate Last Dose   • acetaminophen (TYLENOL) tablet 650 mg  650 mg Oral Q4H PRN Brice Le MD   650 mg at 04/08/19 1705   • allopurinol (ZYLOPRIM) tablet 100 mg  100 mg Oral Daily Mack Pruitt MD   100 mg at 04/09/19 0902   • dextrose (D50W) 25 g/ 50mL Intravenous Solution 25 g  25 g Intravenous Q15 Min PRN Brice Le MD       • dextrose (GLUTOSE) oral gel 15 g  15 g Oral Q15 Min PRN Brice Le MD       • glucagon (human recombinant) (GLUCAGEN DIAGNOSTIC) injection 1 mg  1 mg Subcutaneous PRN Brice Le MD       • heparin (porcine) 5000 UNIT/ML injection 5,000 Units  5,000 Units Subcutaneous Q12H Forrest Avilez MD   5,000 Units at 04/09/19 0902   • insulin lispro (humaLOG) injection 0-7 Units  0-7 Units Subcutaneous 4x Daily With Meals & Nightly Brice Le MD   2 Units at 04/06/19 2042   • lactulose (CHRONULAC) 10 GM/15ML solution 30 g  30 g Oral Daily Brice Le MD   30 g at 04/09/19 0902   • melatonin tablet 10 mg  10 mg Oral Nightly PRN Brice Le MD       • metoprolol tartrate (LOPRESSOR) tablet 12.5 mg  12.5 mg Oral Nightly Brice Le MD   12.5 mg at 04/08/19 2016   • nitroglycerin (NITROSTAT) SL tablet 0.4 mg  0.4 mg Sublingual Q5 Min PRN Brice Le MD       • ondansetron (ZOFRAN) tablet 4 mg  4 mg Oral Q6H PRN Brice Le MD        Or   • ondansetron ODT (ZOFRAN-ODT) disintegrating tablet 4 mg  4 mg Oral Q6H PRN Brice Le MD        Or   • ondansetron (ZOFRAN) injection 4 mg  4 mg Intravenous Q6H PRN Brice Le MD   4 mg at 04/06/19 0915   • pantoprazole (PROTONIX) EC tablet 40 mg  40 mg Oral  Q AM Navarro Hung MD   40 mg at 04/09/19 0519   • QUEtiapine (SEROquel) tablet 25 mg  25 mg Oral Daily With Lunch Brice Le MD   25 mg at 04/09/19 1214   • QUEtiapine (SEROquel) tablet 50 mg  50 mg Oral Nightly Brice Le MD   50 mg at 04/08/19 2016   • rifaximin (XIFAXAN) tablet 550 mg  550 mg Oral Daily With Lunch & Dinner Brice Le MD   550 mg at 04/09/19 1213   • sertraline (ZOLOFT) tablet 25 mg  25 mg Oral Daily Brice Le MD   25 mg at 04/09/19 0901   • sodium chloride 0.9 % flush 3-10 mL  3-10 mL Intravenous PRN Brice Le MD       • sodium chloride 0.9 % infusion  9 mL/hr Intravenous Continuous Johnson Dale MD 9 mL/hr at 04/08/19 0851     • traMADol (ULTRAM) tablet 50 mg  50 mg Oral Q12H PRN Brice Le MD       • vitamin B-12 (CYANOCOBALAMIN) tablet 1,000 mcg  1,000 mcg Oral Daily Brice Le MD   1,000 mcg at 04/09/19 0901       Assessment/Plan   1.  Acute kidney injury on chronic kidney disease, multifactorial and progressive.  Vol excess; significant acidosis; stable K.  Had first HD yesterday  2.  Chronic kidney disease stage V at baseline associated with diabetic nephropathy, biopsy-proven.  Baseline creatinine had been in 3-3.5 range  3.  Diabetes mellitus type 2 with known diabetic nephropathy and retinopathy  4.  Cirrhosis, associated with HOANG, even though there is a report in the past medical history stating that there is history of hemochromatosis.  The patient has had hepatic encephalopathy often associated with noncompliance with lactulose  5.  Anemia with chronic kidney disease  6.  Thrombocytopenia with known history of chronic thrombocytopenia  7.  History of gout  8.  Hypertension reasonably controlled        Plan:  1.  When she was lucid and could grasp gravity of her multiple medical problems, she had elected not to pursue any dialysis if/when ESRD reached  2.  Will come by later today when   present  3.  Likely HD tomorrow, tho await meeting with  later this afternoon          Navarro Hung MD  04/09/19  3:40 PM

## 2019-04-09 NOTE — PLAN OF CARE
Problem: Fall Risk (Adult)  Goal: Absence of Fall  Outcome: Ongoing (interventions implemented as appropriate)   04/09/19 1047   Fall Risk (Adult)   Absence of Fall making progress toward outcome       Problem: Patient Care Overview  Goal: Plan of Care Review  Outcome: Ongoing (interventions implemented as appropriate)    VSS on RA. A/O x2, disoriented to time and situation. Denied any pain. Pt s/p HD on 4/8.  Patient receiving lactulose. Pt with hypoglycemia with glucose of  70 this am; withheld am dose of insulin. Pt s/p vascular access site for dialysis, dressing C/D/I.     04/09/19 1047   Plan of Care Review   Progress improving   Coping/Psychosocial   Plan of Care Reviewed With patient       Problem: Renal Failure/Kidney Injury, Acute (Adult)  Goal: Signs and Symptoms of Listed Potential Problems Will be Absent, Minimized or Managed (Renal Failure/Kidney Injury, Acute)  Outcome: Ongoing (interventions implemented as appropriate)   04/09/19 1047   Goal/Outcome Evaluation   Problems Assessed (Acute Renal Failure/Kidney Injury) all   Problems Present (ARF/Kidney Injury) situational response       Problem: Skin Injury Risk (Adult)  Goal: Identify Related Risk Factors and Signs and Symptoms  Outcome: Outcome(s) achieved Date Met: 04/09/19 04/09/19 1047   Skin Injury Risk (Adult)   Related Risk Factors (Skin Injury Risk) advanced age;mobility impaired     Goal: Skin Health and Integrity  Outcome: Ongoing (interventions implemented as appropriate)   04/09/19 1047   Skin Injury Risk (Adult)   Skin Health and Integrity making progress toward outcome       Problem: Nutrition, Imbalanced: Inadequate Oral Intake (Adult)  Goal: Improved Oral Intake  Outcome: Ongoing (interventions implemented as appropriate)   04/09/19 1047   Nutrition, Imbalanced: Inadequate Oral Intake (Adult)   Improved Oral Intake making progress toward outcome     Goal: Prevent Further Weight Loss  Outcome: Ongoing (interventions implemented as  appropriate)   04/09/19 1047   Nutrition, Imbalanced: Inadequate Oral Intake (Adult)   Prevent Further Weight Loss making progress toward outcome

## 2019-04-09 NOTE — PROGRESS NOTES
84-year-old female postoperative day #1 status post tunneled dialysis catheter placement for these.  Denies pain  The access site has no hematoma, bleeding.    Will see how she handles dialysis and then discuss further dialysis options in the future.

## 2019-04-09 NOTE — THERAPY TREATMENT NOTE
Acute Care - Physical Therapy Treatment Note  [facility]     Patient Name: Charu Bowens  : 1935  MRN: [id number]  Today's Date: 2019  Onset of Illness/Injury or Date of Surgery: 19     Referring Physician: Jones    Admit Date: 4/3/2019    Visit Dx:    ICD-10-CM ICD-9-CM   1. Acute renal failure superimposed on chronic kidney disease, unspecified CKD stage, unspecified acute renal failure type (CMS/HCC) N17.9 584.9    N18.9 585.9   2. Generalized weakness R53.1 780.79   3. 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)   • Iron deficiency   • Acute idiopathic gout of right ankle   • Decreased mobility   • Fall   • Urinary tract infection associated with indwelling urethral catheter (CMS/HCC)   • Acute renal failure superimposed on chronic kidney disease (CMS/HCC)   • Atrial fibrillation (CMS/HCC)       Therapy Treatment    Rehabilitation Treatment Summary     Row Name 19 1500             Treatment Time/Intention    Discipline  physical therapy assistant  -CW      Document Type  therapy note (daily note)  -CW      Subjective Information  complains of;weakness;fatigue  -CW      Mode of Treatment  physical therapy  -CW      Therapy Frequency (PT Clinical Impression)  daily  -CW      Patient Effort  adequate  -CW      Existing Precautions/Restrictions  fall  -CW      Recorded by [CW] Jaylon Pina, PTA 19 1547      Row Name 19 7528              Vital Signs    O2 Delivery Pre Treatment  room air  -CW      Recorded by [CW] Jaylon Pina, PTA 04/09/19 1547      Row Name 04/09/19 1500             Cognitive Assessment/Intervention- PT/OT    Orientation Status (Cognition)  oriented to;person  -CW      Follows Commands (Cognition)  follows one step commands;50-74% accuracy;increased processing time needed;repetition of directions required;verbal cues/prompting required  -CW      Cognitive Function (Cognitive)  safety deficit  -CW      Safety Deficit (Cognitive)  moderate deficit;awareness of need for assistance;insight into deficits/self awareness;safety precautions follow-through/compliance  -CW      Personal Safety Interventions  fall prevention program maintained;gait belt;muscle strengthening facilitated;nonskid shoes/slippers when out of bed  -CW      Recorded by [CW] Jaylon Pina, PTA 04/09/19 1547      Row Name 04/09/19 1500             Bed Mobility Assessment/Treatment    Bed Mobility Assessment/Treatment  supine-sit  -CW      Supine-Sit [place] (Bed Mobility)  moderate assist (50% patient effort)  -CW      Sit-Supine [place] (Bed Mobility)  not tested  -CW      Assistive Device (Bed Mobility)  bed rails  -CW      Comment (Bed Mobility)  up to chair  -CW      Recorded by [CW] Jaylon Pina, PTA 04/09/19 1547      Row Name 04/09/19 1500             Transfer Assessment/Treatment    Transfer Assessment/Treatment  sit-stand transfer;stand-sit transfer  -CW      Recorded by [CW] Jaylon Pina, PTA 04/09/19 1547      Row Name 04/09/19 1500             Sit-Stand Transfer    Sit-Stand [place] (Transfers)  contact guard;2 person assist  -CW      Assistive Device (Sit-Stand Transfers)  walker, front-wheeled  -CW      Recorded by [CW] Jaylon Pina, PTA 04/09/19 1547      Row Name 04/09/19 1500             Stand-Sit Transfer    Stand-Sit [place] (Transfers)  contact guard;2 person assist  -CW      Assistive Device  (Stand-Sit Transfers)  walker, front-wheeled  -CW      Recorded by [CW] Jaylon Pina, PTA 04/09/19 1547      Row Name 04/09/19 1500             Gait/Stairs Assessment/Training    [place] Level (Gait)  contact guard;2 person assist  -CW      Assistive Device (Gait)  walker, front-wheeled  -CW      Distance in Feet (Gait)  4  -CW      Pattern (Gait)  step-to  -CW      Deviations/Abnormal Patterns (Gait)  antalgic;albert decreased;gait speed decreased;stride length decreased  -CW      Recorded by [CW] Jaylon Pina, PTA 04/09/19 1547      Row Name 04/09/19 1500             Therapeutic Exercise    Lower Extremity (Therapeutic Exercise)  gluteal sets;LAQ (long arc quad), bilateral;marching while seated  -CW      Lower Extremity Range of Motion (Therapeutic Exercise)  ankle dorsiflexion/plantar flexion, bilateral  -CW      Exercise Type (Therapeutic Exercise)  AROM (active range of motion)  -CW      Position (Therapeutic Exercise)  seated  -CW      Sets/Reps (Therapeutic Exercise)  10  -CW      Recorded by [CW] Jaylon Pina, PTA 04/09/19 1547      Row Name 04/09/19 1500             Positioning and Restraints    Pre-Treatment Position  in bed  -CW      Post Treatment Position  chair  -CW      In Chair  notified nsg;reclined;call light within reach;encouraged to call for assist;exit alarm on;with family/caregiver  -CW      Recorded by [CW] Jaylon Pina, PTA 04/09/19 1547      Row Name                Wound 04/08/19 0930 Right neck incision    Wound - Properties Group Date first assessed: 04/08/19 [AC] Time first assessed: 0930 [AC] Side: Right [AC] Location: neck [AC] Type: incision [AC] Recorded by:  [AC] Mickie Monte RN 04/08/19 0930    Row Name                Wound 04/08/19 0930 Right chest incision    Wound - Properties Group Date first assessed: 04/08/19 [AC] Time first assessed: 0930 [AC] Side: Right [AC] Location: chest [AC] Type: incision [AC] Recorded by:  [AC] Mickie Monte  ROSALIE CHACON 04/08/19 0930    Row Name 04/09/19 1500             Outcome Summary/Treatment Plan (PT)    Anticipated Discharge Disposition (PT)  skilled nursing facility  -CW      Recorded by [CW] Jaylon Pina, PTA 04/09/19 6883        User Key  (r) = Recorded By, (t) = Taken By, (c) = Cosigned By    Initials Name Effective Dates Discipline    AC Mickie Monte RN 06/16/16 -  Nurse    CW Jaylon Pina, PTA 03/07/18 -  PT          Wound 04/08/19 0930 Right neck incision (Active)   Dressing Appearance intact;no drainage 4/9/2019  2:08 PM   Closure Approximated;Liquid skin adhesive 4/9/2019  2:08 PM   Base pink;dry;clean 4/9/2019  2:08 PM   Periwound intact;dry 4/9/2019  2:08 PM   Periwound Temperature warm 4/9/2019  2:08 PM   Periwound Skin Turgor soft 4/9/2019  2:08 PM   Drainage Amount none 4/9/2019  2:08 PM   Dressing Care, Wound open to air 4/9/2019  9:00 AM   Periwound Care, Wound dry periwound area maintained 4/9/2019  9:00 AM   Wound Output (mL) 0 4/9/2019  2:08 PM       Wound 04/08/19 0930 Right chest incision (Active)   Dressing Appearance dry;intact;dried drainage 4/9/2019  2:08 PM   Closure TIM 4/9/2019  2:08 PM   Base dressing in place, unable to visualize 4/9/2019  2:08 PM   Periwound intact;dry;pink;warm 4/9/2019  2:08 PM   Periwound Temperature warm 4/9/2019  2:08 PM   Periwound Skin Turgor soft 4/9/2019  2:08 PM   Drainage Characteristics/Odor sanguineous 4/9/2019  4:00 AM   Drainage Amount none 4/9/2019  2:08 PM   Dressing Care, Wound gauze;border dressing 4/9/2019  9:00 AM   Periwound Care, Wound dry periwound area maintained 4/9/2019  9:00 AM   Wound Output (mL) 0 4/9/2019  2:08 PM           Physical Therapy Education     Title: PT OT SLP Therapies (In Progress)     Topic: Physical Therapy (Done)     Point: Mobility training (Done)     Learning Progress Summary           Patient Acceptance, E,KOLTON AMATO,DU by IZABELA at 4/9/2019  3:47 PM    Acceptance, EJOHN by CA at 4/6/2019 10:36 AM    Acceptance,  E,TB,D, NR by  at 4/5/2019  1:50 PM                   Point: Home exercise program (Done)     Learning Progress Summary           Patient Acceptance, E,TB, VU,DU by  at 4/9/2019  3:47 PM    Acceptance, E, NR by CA at 4/6/2019 10:36 AM    Acceptance, E,TB,D, NR by EE at 4/5/2019  1:50 PM                   Point: Body mechanics (Done)     Learning Progress Summary           Patient Acceptance, E,TB, VU,DU by  at 4/9/2019  3:47 PM    Acceptance, E, NR by CA at 4/6/2019 10:36 AM    Acceptance, E,TB,D, NR by EE at 4/5/2019  1:50 PM                   Point: Precautions (Done)     Learning Progress Summary           Patient Acceptance, E,TB, VU,DU by  at 4/9/2019  3:47 PM    Acceptance, E, NR by CA at 4/6/2019 10:36 AM    Acceptance, E,TB,D, NR by  at 4/5/2019  1:50 PM                               User Key     Initials Effective Dates Name Provider Type Discipline     04/03/18 -  Jasmyne Lam, PT Physical Therapist PT     03/07/18 -  Jaylon Pina, PTA Physical Therapy Assistant PT    CA 06/13/18 -  Yareli Hill, PT Physical Therapist PT                PT Recommendation and Plan  Anticipated Discharge Disposition (PT): skilled nursing facility  Therapy Frequency (PT Clinical Impression): daily  Outcome Summary/Treatment Plan (PT)  Anticipated Discharge Disposition (PT): skilled nursing facility  Plan of Care Reviewed With: patient  Progress: improving  Outcome Summary: Pt increasing with bed mobility and sitting balance at EOB and improved gait to chair from EOB with RWX  Outcome Measures     Row Name 04/09/19 1500             How much help from another person do you currently need...    Turning from your back to your side while in flat bed without using bedrails?  3  -CW      Moving from lying on back to sitting on the side of a flat bed without bedrails?  3  -CW      Moving to and from a bed to a chair (including a wheelchair)?  3  -CW      Standing up from a chair using your arms (e.g.,  wheelchair, bedside chair)?  3  -CW      Climbing 3-5 steps with a railing?  1  -CW      To walk in hospital room?  3  -CW      AM-PAC 6 Clicks Score  16  -CW         Functional Assessment    Outcome Measure Options  AM-PAC 6 Clicks Basic Mobility (PT)  -CW        User Key  (r) = Recorded By, (t) = Taken By, (c) = Cosigned By    Initials Name Provider Type    Jaylon Robison PTA Physical Therapy Assistant         Time Calculation:   PT Charges     Row Name 04/09/19 1548             Time Calculation    Start Time  1510  -CW      Stop Time  1528  -CW      Time Calculation (min)  18 min  -CW      PT Received On  04/09/19  -CW      PT - Next Appointment  04/10/19  -CW        User Key  (r) = Recorded By, (t) = Taken By, (c) = Cosigned By    Initials Name Provider Type    Jaylon Robison PTA Physical Therapy Assistant        Therapy Charges for Today     Code Description Service Date Service Provider Modifiers Qty    81584589208 HC PT THER PROC EA 15 MIN 4/9/2019 Jaylon Pina PTA GP 1    25499885913 HC PT THER SUPP EA 15 MIN 4/9/2019 Jaylon Pina PTA GP 1          PT G-Codes  Outcome Measure Options: AM-PAC 6 Clicks Basic Mobility (PT)  AM-PAC 6 Clicks Score: 16    Jaylon Pina PTA  4/9/2019

## 2019-04-09 NOTE — PLAN OF CARE
Problem: Patient Care Overview  Goal: Plan of Care Review  Outcome: Ongoing (interventions implemented as appropriate)   04/09/19 9928   Plan of Care Review   Progress improving   Coping/Psychosocial   Plan of Care Reviewed With patient   OTHER   Outcome Summary Pt increasing with bed mobility and sitting balance at EOB and improved gait to chair from EOB with RWX

## 2019-04-10 NOTE — PLAN OF CARE
Problem: Patient Care Overview  Goal: Plan of Care Review  Outcome: Ongoing (interventions implemented as appropriate)   04/10/19 1712   Plan of Care Review   Progress improving   Coping/Psychosocial   Plan of Care Reviewed With patient   OTHER   Outcome Summary VSS, alert and oriented x2 denies pain,hemodialysis today, skin care, q2 turn,will continue to monitor pt.        Problem: Renal Failure/Kidney Injury, Acute (Adult)  Goal: Signs and Symptoms of Listed Potential Problems Will be Absent, Minimized or Managed (Renal Failure/Kidney Injury, Acute)  Outcome: Ongoing (interventions implemented as appropriate)   04/10/19 1712   Goal/Outcome Evaluation   Problems Assessed (Acute Renal Failure/Kidney Injury) all   Problems Present (ARF/Kidney Injury) situational response;fluid imbalance;electrolyte imbalance       Problem: Skin Injury Risk (Adult)  Goal: Skin Health and Integrity  Outcome: Ongoing (interventions implemented as appropriate)   04/10/19 1712   Skin Injury Risk (Adult)   Skin Health and Integrity making progress toward outcome       Problem: Nutrition, Imbalanced: Inadequate Oral Intake (Adult)  Goal: Improved Oral Intake  Outcome: Ongoing (interventions implemented as appropriate)   04/10/19 1712   Nutrition, Imbalanced: Inadequate Oral Intake (Adult)   Improved Oral Intake making progress toward outcome     Goal: Prevent Further Weight Loss  Outcome: Ongoing (interventions implemented as appropriate)   04/10/19 1712   Nutrition, Imbalanced: Inadequate Oral Intake (Adult)   Prevent Further Weight Loss making progress toward outcome

## 2019-04-10 NOTE — THERAPY EVALUATION
Acute Care - Occupational Therapy Initial Evaluation  [facility]     Patient Name: Charu Bowens  : 1935  MRN: [id number]  Today's Date: 4/10/2019  Onset of Illness/Injury or Date of Surgery: 19     Referring Physician: Jones    Admit Date: 4/3/2019       ICD-10-CM ICD-9-CM   1. Acute renal failure superimposed on chronic kidney disease, unspecified CKD stage, unspecified acute renal failure type (CMS/HCC) N17.9 584.9    N18.9 585.9   2. Generalized weakness R53.1 780.79   3. 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)   • Iron deficiency   • Acute idiopathic gout of right ankle   • Decreased mobility   • Fall   • Urinary tract infection associated with indwelling urethral catheter (CMS/HCC)   • Acute renal failure superimposed on chronic kidney disease (CMS/HCC)   • Atrial fibrillation (CMS/HCC)     Past Medical History:   Diagnosis Date   • Anxiety    • Arthritis    • C. difficile colitis    • CHF (congestive heart failure) (CMS/HCC)    • Chronic kidney disease     Stage III   • Dementia    • Diabetes mellitus (CMS/HCC)     Type 2   • Diskitis 2015    L4-L5 w/abscess formation   • Hemochromatosis    • History of anemia    • History of blood transfusion    • History of pneumonia    • History of sepsis    • Hypertension    • Liver disease     Cirrhosis likely secondary to HOANG   • HOANG (nonalcoholic steatohepatitis)    • Pancytopenia (CMS/HCC)    •  Spinal stenosis    • Splenomegaly    • Thrombocytopenia, chronic    • TIA (transient ischemic attack)      Past Surgical History:   Procedure Laterality Date   • ABDOMINAL SURGERY     • INSERTION HEMODIALYSIS CATHETER N/A 4/8/2019    Procedure: HEMODIALYSIS CATHETER INSERTION;  Surgeon: Vincent Jean-Baptiste MD;  Location: [facility] 18/19;  Service: Vascular   • KNEE SURGERY     • RENAL BIOPSY  10/2015   • TONSILLECTOMY     • WISDOM TOOTH EXTRACTION            OT ASSESSMENT FLOWSHEET (last 12 hours)      Occupational Therapy Evaluation     Row Name 04/10/19 0856                   OT Evaluation Time/Intention    Subjective Information  complains of;weakness  -RP        Document Type  evaluation  -RP        Mode of Treatment  occupational therapy  -RP        Patient Effort  good  -RP        Symptoms Noted During/After Treatment  fatigue  -RP           General Information    Patient Profile Reviewed?  yes  -RP        Patient Observations  alert;cooperative;agree to therapy  -RP        General Observations of Patient  pt semi-supine in bed w/ no signs of acute distress  -RP        Prior Level of Function  independent:;ADL's per pt report  -RP        Equipment Currently Used at Home  shower chair;cane, straight per pt report  -RP        Pertinent History of Current Functional Problem  acute renal failure; h/o cirrhosis of liver  -RP        Existing Precautions/Restrictions  fall  -RP        Risks Reviewed  patient:  -RP        Benefits Reviewed  patient:  -RP           Relationship/Environment    Lives With  spouse  -RP           Resource/Environmental Concerns    Current Living Arrangements  home/apartment/condo  -RP           Cognitive Assessment/Intervention- PT/OT    Orientation Status (Cognition)  oriented to;person;place  -RP        Follows Commands (Cognition)  follows one step commands;over 90% accuracy;repetition of directions required  -RP        Cognitive Function (Cognitive)  safety deficit  -RP         Safety Deficit (Cognitive)  mild deficit;insight into deficits/self awareness;safety precautions awareness  -RP        Personal Safety Interventions  fall prevention program maintained;nonskid shoes/slippers when out of bed  -RP           Bed Mobility Assessment/Treatment    Bed Mobility Assessment/Treatment  supine-sit;sit-supine  -RP        Supine-Sit [place] (Bed Mobility)  maximum assist (25% patient effort);verbal cues;nonverbal cues (demo/gesture)  -RP        Sit-Supine [place] (Bed Mobility)  moderate assist (50% patient effort);verbal cues;nonverbal cues (demo/gesture)  -RP        Bed Mobility, Safety Issues  decreased use of arms for pushing/pulling;decreased use of legs for bridging/pushing  -RP        Assistive Device (Bed Mobility)  bed rails;head of bed elevated  -RP           ADL Assessment/Intervention    BADL Assessment/Intervention  lower body dressing;grooming  -RP           Lower Body Dressing Assessment/Training    Lower Body Dressing [place] Level  doff;don;socks;dependent (less than 25% patient effort);verbal cues  -RP        Lower Body Dressing Position  edge of bed sitting  -RP           Grooming Assessment/Training    [place] Level (Grooming)  grooming skills;wash face, hands;set up;supervision  -RP        Grooming Position  edge of bed sitting  -RP           General ROM    GENERAL ROM COMMENTS  B UE AROM WFL   -RP           MMT (Manual Muscle Testing)    General MMT Comments  B UE MMT: grossly approx. 3/5  -RP           Motor Assessment/Interventions    Additional Documentation  Balance (Group)  -RP           Balance    Balance  static sitting balance;static standing balance  -RP           Static Sitting Balance    Level of [place] (Unsupported Sitting, Static Balance)  standby assist  -RP        Sitting Position (Unsupported Sitting, Static Balance)  sitting on edge of bed  -RP        Time Able to Maintain Position (Unsupported Sitting, Static Balance)  4 to 5  minutes  -RP        Comment (Unsupported Sitting, Static Balance)  VCs and min A for positioning at EOB, then able to sit w/ SBA; began c/o R hip pain, requested to lay back down   -RP           Positioning and Restraints    Pre-Treatment Position  in bed  -RP        Post Treatment Position  bed  -RP        In Bed  fowlers;call light within reach;encouraged to call for assist;exit alarm on  -RP           Pain Assessment    Additional Documentation  Pain Scale: Numbers Pre/Post-Treatment (Group)  -RP           Pain Scale: Numbers Pre/Post-Treatment    Pain Scale: Numbers, Pretreatment  0/10 - no pain  -RP        Pain Scale: Numbers, Post-Treatment  0/10 - no pain  -RP           Wound 04/08/19 0930 Right neck incision    Wound - Properties Group Date first assessed: 04/08/19  -AC Time first assessed: 0930  -AC Side: Right  -AC Location: neck  -AC Type: incision  -AC       Wound 04/08/19 0930 Right chest incision    Wound - Properties Group Date first assessed: 04/08/19  -AC Time first assessed: 0930  -AC Side: Right  -AC Location: chest  -AC Type: incision  -AC       Coping    Observed Emotional State  accepting;calm;cooperative  -RP        Verbalized Emotional State  acceptance  -RP           Plan of Care Review    Plan of Care Reviewed With  patient  -RP           Clinical Impression (OT)    OT Diagnosis  Pt presents w/ decreased strength, impaired balance, and decreased activity tolerance, impacting indep and safety w/ self-care tasks  -RP        Criteria for Skilled Therapeutic Interventions Met (OT Eval)  yes;treatment indicated  -RP        Rehab Potential (OT Eval)  good, to achieve stated therapy goals  -RP        Therapy Frequency (OT Eval)  5 times/wk  -RP        Care Plan Review (OT)  evaluation/treatment results reviewed;care plan/treatment goals reviewed;patient/other agree to care plan  -RP        Anticipated Discharge Disposition (OT)  skilled nursing facility  -RP           Planned OT Interventions     Planned Therapy Interventions (OT Eval)  activity tolerance training;BADL retraining;functional balance retraining;transfer/mobility retraining;strengthening exercise;ROM/therapeutic exercise;patient/caregiver education/training  -RP           OT Goals    Transfer Goal Selection (OT)  transfer, OT goal 1  -RP        Dressing Goal Selection (OT)  dressing, OT goal 1  -RP        Endurance Goal Selection (OT)  endurance, OT goal 1  -RP        Additional Documentation  Endurance Goal Selection (OT) (Row)  -RP           Transfer Goal 1 (OT)    Activity/Assistive Device (Transfer Goal 1, OT)  toilet  -RP        [place] Level/Cues Needed (Transfer Goal 1, OT)  minimum assist (75% or more patient effort)  -RP        Time Frame (Transfer Goal 1, OT)  long term goal (LTG)  -RP        Progress/Outcome (Transfer Goal 1, OT)  goal ongoing  -RP           Dressing Goal 1 (OT)    Activity/Assistive Device (Dressing Goal 1, OT)  lower body dressing  -RP        [place]/Cues Needed (Dressing Goal 1, OT)  moderate assist (50-74% patient effort)  -RP        Time Frame (Dressing Goal 1, OT)  long term goal (LTG)  -RP        Progress/Outcome (Dressing Goal 1, OT)  goal ongoing  -RP            Endurance Goal 1 (OT)    Endurance Goal 1 (OT)  Pt will increase functional endurance to assist w/ ADLs  -RP        Activity Level (Endurance Goal 1, OT)  to increase;endurance 2 good -  -RP        Time Frame (Endurance Goal 1, OT)  long term goal (LTG)  -RP        Progress/Outcome (Endurance Goal 1, OT)  goal ongoing  -RP          User Key  (r) = Recorded By, (t) = Taken By, (c) = Cosigned By    Initials Name Effective Dates    Mickie Diaz RN 06/16/16 -     RP Carrol Slade OT 05/03/18 -          Occupational Therapy Education     Title: PT OT SLP Therapies (In Progress)     Topic: Occupational Therapy (In Progress)     Point: ADL training (Done)     Description: Instruct learner(s) on proper safety adaptation and remediation  techniques during self care or transfers.   Instruct in proper use of assistive devices.    Learning Progress Summary           Patient Acceptance, E, VU by GONZALEZ at 4/10/2019 12:02 PM    Comment:  OT educ on OT role in therapeutic process and pt's POC.                               User Key     Initials Effective Dates Name Provider Type Discipline    GONZALEZ 05/03/18 -  Carrol Slade, OT Occupational Therapist OT                  OT Recommendation and Plan  Outcome Summary/Treatment Plan (OT)  Anticipated Discharge Disposition (OT): skilled nursing facility  Planned Therapy Interventions (OT Eval): activity tolerance training, BADL retraining, functional balance retraining, transfer/mobility retraining, strengthening exercise, ROM/therapeutic exercise, patient/caregiver education/training  Therapy Frequency (OT Eval): 5 times/wk  Plan of Care Review  Plan of Care Reviewed With: patient  Plan of Care Reviewed With: patient  Outcome Summary: Pt is an 84 year old female admitted w/ acute renal failture w/ h/o liver cirrhosis and dementia. Pt lives w/ spouse and reports indeo w/ ADLs @ PLOF, although may not be reliable historian. Pt presents to OT eval w/ decreased strength, impaired balance and activity tolerance, impacting indep and safety w/ ADLs. Pt required total A for LB dressing task, able to complete grooming tasks while seated @ EOB w/ SBA. Pt may benefit from skilled OT services to address deficits and maximize indep and safety w/ ADLs.    Outcome Measures     Row Name 04/10/19 1200 04/09/19 1500          How much help from another person do you currently need...    Turning from your back to your side while in flat bed without using bedrails?  --  3  -CW     Moving from lying on back to sitting on the side of a flat bed without bedrails?  --  3  -CW     Moving to and from a bed to a chair (including a wheelchair)?  --  3  -CW     Standing up from a chair using your arms (e.g., wheelchair, bedside chair)?  --  3   -CW     Climbing 3-5 steps with a railing?  --  1  -CW     To walk in hospital room?  --  3  -CW     AM-PAC 6 Clicks Score  --  16  -CW        How much help from another is currently needed...    Putting on and taking off regular lower body clothing?  1  -RP  --     Bathing (including washing, rinsing, and drying)  2  -RP  --     Toileting (which includes using toilet bed pan or urinal)  1  -RP  --     Putting on and taking off regular upper body clothing  3  -RP  --     Taking care of personal grooming (such as brushing teeth)  3  -RP  --     Eating meals  2  -RP  --     Score  12  -RP  --        Functional Assessment    Outcome Measure Options  AM-PAC 6 Clicks Daily Activity (OT)  -RP  AM-PAC 6 Clicks Basic Mobility (PT)  -CW       User Key  (r) = Recorded By, (t) = Taken By, (c) = Cosigned By    Initials Name Provider Type    CW Jaylon Pina, PTA Physical Therapy Assistant    RP Carrol Slade, OT Occupational Therapist          Time Calculation:   Time Calculation- OT     Row Name 04/10/19 1207             Time Calculation- OT    OT Start Time  0840  -RP      OT Stop Time  0856  -RP      OT Time Calculation (min)  16 min  -RP      Total Timed Code Minutes- OT  9 minute(s)  -RP      OT Received On  04/10/19  -        User Key  (r) = Recorded By, (t) = Taken By, (c) = Cosigned By    Initials Name Provider Type    RP Carrol Slade, OT Occupational Therapist        Therapy Charges for Today     Code Description Service Date Service Provider Modifiers Qty    [id number]  OT EVAL MOD COMPLEXITY 2 4/10/2019 Carrol Slade OT GO 1    [id number]  OT SELF CARE/MGMT/TRAIN EA 15 MIN 4/10/2019 Carrol Slade OT GO 1               Carrol Slade OT  4/10/2019

## 2019-04-10 NOTE — PLAN OF CARE
Problem: Fall Risk (Adult)  Goal: Absence of Fall  Outcome: Ongoing (interventions implemented as appropriate)   04/10/19 0359   Fall Risk (Adult)   Absence of Fall making progress toward outcome       Problem: Renal Failure/Kidney Injury, Acute (Adult)  Goal: Signs and Symptoms of Listed Potential Problems Will be Absent, Minimized or Managed (Renal Failure/Kidney Injury, Acute)  Outcome: Ongoing (interventions implemented as appropriate)      Problem: Skin Injury Risk (Adult)  Goal: Skin Health and Integrity  Outcome: Ongoing (interventions implemented as appropriate)      Problem: Nutrition, Imbalanced: Inadequate Oral Intake (Adult)  Goal: Improved Oral Intake  Outcome: Ongoing (interventions implemented as appropriate)

## 2019-04-10 NOTE — PROGRESS NOTES
"   LOS: 7 days    Patient Care Team:  Chayito Paredes MD as PCP - General (Internal Medicine)  Johnson Turner DPM as PCP - Claims Attributed  Dano Art MD as Consulting Physician (Hematology and Oncology)  Navarro Hung MD as Referring Physician (Nephrology)    Chief Complaint:    Chief Complaint   Patient presents with   • Fatigue     Follow up acute kidney injury on chronic kidney disease  Subjective     Interval History:   Confused this AM; non-sensical speech, but she does recognize me; breathing is fine; not hungry    Review of Systems:   As noted above    Objective     Vital Signs  Temp:  [97.4 °F (36.3 °C)-98.2 °F (36.8 °C)] 97.4 °F (36.3 °C)  Heart Rate:  [76-88] 88  Resp:  [16-20] 16  BP: (124-152)/(50-86) 132/56    Flowsheet Rows      First Filed Value   Admission Height  160 cm (63\") Documented at 04/03/2019 1500   Admission Weight  94.9 kg (209 lb 3.2 oz) Documented at 04/03/2019 1500          No intake/output data recorded.  I/O last 3 completed shifts:  In: 1400 [P.O.:1400]  Out: 50 [Urine:50]    Intake/Output Summary (Last 24 hours) at 4/10/2019 0919  Last data filed at 4/10/2019 0557  Gross per 24 hour   Intake 1160 ml   Output 0 ml   Net 1160 ml       Physical Exam:  General Appearance: Awake, confused and disoriented tho still pleasant, no acute distress, obese, chronically ill  Skin: warm and dry  HEENT: pupils round and reactive to light, oral mucosa normal   Neck: supple, no JVD, trachea midline  Lungs: Decreased breath sounds bilaterally, unlabored breathing effort  Heart: RRR, normal S1 and S2, no S3, no rub  Abdomen: soft, non-tender, present bowel sounds to auscultation; +distended  :  No palpable bladder, no CVA tenderness  Extremities: 3+ pretibial edema extending to her hips, no cyanosis or clubbing  Neuro: Normal speech but the patient is confused; moving all extremities; +asterixis  Skin: right chest TDC            Results Review:    Results from last 7 " days   Lab Units 04/10/19  0612 04/09/19  0630 04/08/19  0459  04/06/19  0437 04/05/19 0426 04/03/19  1541   SODIUM mmol/L 136 139 138   < > 139 139   < > 137   POTASSIUM mmol/L 3.8 4.1 3.9   < > 4.2 4.1   < > 4.8   CHLORIDE mmol/L 105 109* 109*   < > 111* 112*   < > 105   CO2 mmol/L 15.2* 16.7* 10.7*   < > 13.3* 13.0*   < > 16.5*   BUN mg/dL 56* 50* 78*   < > 77* 75*   < > 84*   CREATININE mg/dL 3.98* 3.81* 5.01*   < > 4.68* 4.99*   < > 5.14*   CALCIUM mg/dL 8.1* 8.1* 8.5*   < > 7.9* 8.6   < > 9.6   BILIRUBIN mg/dL  --   --   --   --  2.8* 2.7*  --  3.2*   ALK PHOS U/L  --   --   --   --  118* 104  --  129*   ALT (SGPT) U/L  --   --   --   --  18 20  --  23   AST (SGOT) U/L  --   --   --   --  97* 91*  --  100*   GLUCOSE mg/dL 132* 72 101*   < > 130* 87   < > 295*    < > = values in this interval not displayed.       Estimated Creatinine Clearance: 11.8 mL/min (A) (by C-G formula based on SCr of 3.98 mg/dL (H)).    Results from last 7 days   Lab Units 04/09/19  0630 04/08/19 0459 04/07/19  0615 04/06/19  0437 04/05/19  0426   MAGNESIUM mg/dL  --   --  2.6* 2.6* 2.7*   PHOSPHORUS mg/dL 4.6* 6.1* 6.2* 5.9* 5.6*       Results from last 7 days   Lab Units 04/07/19  0615 04/06/19  0437 04/05/19  0426 04/04/19  0535   URIC ACID mg/dL 4.9 4.5 4.8 4.9       Results from last 7 days   Lab Units 04/09/19  0459 04/08/19  0459 04/07/19  0615 04/06/19  0437 04/05/19  0426   WBC 10*3/mm3 2.67*  2.67* 2.67* 2.73* 2.81* 3.03*   HEMOGLOBIN g/dL 11.1* 10.6* 10.7* 10.7* 10.6*   PLATELETS 10*3/mm3 62* 70* 80* 73* 75*       Results from last 7 days   Lab Units 04/03/19  1541   INR  1.88*         Imaging Results (last 24 hours)     ** No results found for the last 24 hours. **          allopurinol 100 mg Oral Daily   heparin (porcine) 5,000 Units Subcutaneous Q12H   insulin lispro 0-7 Units Subcutaneous 4x Daily With Meals & Nightly   lactulose 30 g Oral Daily   metoprolol tartrate 12.5 mg Oral Nightly   pantoprazole 40 mg Oral Q AM    QUEtiapine 25 mg Oral Daily With Lunch   QUEtiapine 50 mg Oral Nightly   rifaximin 550 mg Oral Daily With Lunch & Dinner   sertraline 25 mg Oral Daily   vitamin B-12 1,000 mcg Oral Daily       sodium chloride 9 mL/hr Last Rate: 9 mL/hr (04/08/19 0851)       Medication Review:   Current Facility-Administered Medications   Medication Dose Route Frequency Provider Last Rate Last Dose   • acetaminophen (TYLENOL) tablet 650 mg  650 mg Oral Q4H PRN Brice Le MD   650 mg at 04/08/19 1705   • allopurinol (ZYLOPRIM) tablet 100 mg  100 mg Oral Daily Mack Pruitt MD   100 mg at 04/10/19 0834   • dextrose (D50W) 25 g/ 50mL Intravenous Solution 25 g  25 g Intravenous Q15 Min PRN Brice Le MD       • dextrose (GLUTOSE) oral gel 15 g  15 g Oral Q15 Min PRN Brice Le MD       • glucagon (human recombinant) (GLUCAGEN DIAGNOSTIC) injection 1 mg  1 mg Subcutaneous PRN Brice Le MD       • heparin (porcine) 5000 UNIT/ML injection 5,000 Units  5,000 Units Subcutaneous Q12H Forrest Avilez MD   5,000 Units at 04/10/19 0834   • insulin lispro (humaLOG) injection 0-7 Units  0-7 Units Subcutaneous 4x Daily With Meals & Nightly Brice Le MD   2 Units at 04/09/19 2206   • lactulose (CHRONULAC) 10 GM/15ML solution 30 g  30 g Oral Daily Brice Le MD   30 g at 04/10/19 0834   • melatonin tablet 10 mg  10 mg Oral Nightly PRN Brice Le MD       • metoprolol tartrate (LOPRESSOR) tablet 12.5 mg  12.5 mg Oral Nightly Brice Le MD   12.5 mg at 04/09/19 2011   • nitroglycerin (NITROSTAT) SL tablet 0.4 mg  0.4 mg Sublingual Q5 Min PRN Brice Le MD       • ondansetron (ZOFRAN) tablet 4 mg  4 mg Oral Q6H PRN Brice Le MD        Or   • ondansetron ODT (ZOFRAN-ODT) disintegrating tablet 4 mg  4 mg Oral Q6H PRN Brice Le MD        Or   • ondansetron (ZOFRAN) injection 4 mg  4 mg Intravenous Q6H PRN Brice Le  MD Regan   4 mg at 04/06/19 0915   • pantoprazole (PROTONIX) EC tablet 40 mg  40 mg Oral Q AM Navarro Hung MD   40 mg at 04/10/19 0552   • QUEtiapine (SEROquel) tablet 25 mg  25 mg Oral Daily With Lunch Brice Le MD   25 mg at 04/09/19 1214   • QUEtiapine (SEROquel) tablet 50 mg  50 mg Oral Nightly Brice Le MD   50 mg at 04/09/19 2206   • rifaximin (XIFAXAN) tablet 550 mg  550 mg Oral Daily With Lunch & Dinner Brice Le MD   550 mg at 04/09/19 1737   • sertraline (ZOLOFT) tablet 25 mg  25 mg Oral Daily Brice Le MD   25 mg at 04/10/19 0834   • sodium chloride 0.9 % flush 3-10 mL  3-10 mL Intravenous PRN Brice Le MD       • sodium chloride 0.9 % infusion  9 mL/hr Intravenous Continuous Johnson Dale MD 9 mL/hr at 04/08/19 0851     • traMADol (ULTRAM) tablet 50 mg  50 mg Oral Q12H PRN Brice Le MD       • vitamin B-12 (CYANOCOBALAMIN) tablet 1,000 mcg  1,000 mcg Oral Daily Brice Le MD   1,000 mcg at 04/10/19 0834       Assessment/Plan   1.  Acute kidney injury on chronic kidney disease, multifactorial and progressive.  Vol excess; significant acidosis; stable K.  Had first HD 4.8.19  2.  Chronic kidney disease stage V at baseline associated with diabetic nephropathy, biopsy-proven.  Baseline creatinine had been in 3-3.5 range  3.  Diabetes mellitus type 2 with known diabetic nephropathy and retinopathy  4.  Cirrhosis, associated with HOANG, even though there is a report in the past medical history stating that there is history of hemochromatosis.  The patient has had hepatic encephalopathy often associated with noncompliance with lactulose  5.  Anemia with chronic kidney disease  6.  Thrombocytopenia with known history of chronic thrombocytopenia  7.  History of gout  8.  Hypertension reasonably controlled        Plan:  1.  HD again today for fluid and waste removal; will see how well she can tolerate it--both from BP  standpoint and from behavioral standpoint  2.  D/w family yesterday re option of hospice care in event dialysis not pursued ultimately  3.  Surveillance labs        Navarro Hung MD  04/10/19  9:19 AM

## 2019-04-10 NOTE — PROGRESS NOTES
Name: Charu Bowens ADMIT: 4/3/2019   : 1935  PCP: Chayito Paredes MD    MRN: [id number] LOS: 7 days   AGE/SEX: 84 y.o. female  ROOM: [facility]     Subjective   Subjective   CC: lethargy  No acute events.  No new complaints. Taking PO.  No f/c/n/v/d.  Confused, attempting to climb out of bed during dialysis.    Objective   Objective   Vital Signs  Temp:  [95.7 °F (35.4 °C)-97.8 °F (36.6 °C)] 95.7 °F (35.4 °C)  Heart Rate:  [76-89] 81  Resp:  [16-18] 18  BP: (124-158)/(50-65) 158/65  SpO2:  [97 %-98 %] 98 %  on   ;   Device (Oxygen Therapy): room air  Body mass index is 37.57 kg/m².  Physical Exam   Constitutional: No distress.   HENT:   Head: Normocephalic and atraumatic.   Mouth/Throat: Oropharynx is clear and moist.   Eyes: Conjunctivae are normal. Pupils are equal, round, and reactive to light.   Neck: Normal range of motion. Neck supple. No JVD present.   Cardiovascular: Normal rate, regular rhythm and intact distal pulses.   Pulmonary/Chest: Effort normal and breath sounds normal.   Abdominal: Soft. Bowel sounds are normal. There is no tenderness.   Musculoskeletal: She exhibits edema (3+ BLE). She exhibits no tenderness.   Neurological: She is alert.   Skin: Skin is warm and dry. She is not diaphoretic.   Psychiatric: She has a normal mood and affect. Her behavior is normal. Cognition and memory are impaired (known dementia).   Nursing note and vitals reviewed.      Results Review:       I reviewed the patient's new clinical results.  Results from last 7 days   Lab Units 19  0459 19  0459 19  0615 19  0437   WBC 10*3/mm3 2.67*  2.67* 2.67* 2.73* 2.81*   HEMOGLOBIN g/dL 11.1* 10.6* 10.7* 10.7*   PLATELETS 10*3/mm3 62* 70* 80* 73*     Results from last 7 days   Lab Units 04/10/19  0612 19  0630 19  0459 19  0615   SODIUM mmol/L 136 139 138 140   POTASSIUM mmol/L 3.8 4.1 3.9 3.9   CHLORIDE mmol/L 105 109* 109* 111*   CO2 mmol/L 15.2* 16.7* 10.7* 13.3*   BUN  mg/dL 56* 50* 78* 78*   CREATININE mg/dL 3.98* 3.81* 5.01* 5.04*   GLUCOSE mg/dL 132* 72 101* 96   Estimated Creatinine Clearance: 11.8 mL/min (A) (by C-G formula based on SCr of 3.98 mg/dL (H)).  Results from last 7 days   Lab Units 04/09/19  0630 04/08/19 0459 04/07/19  0615 04/06/19  0437 04/05/19  0426   ALBUMIN g/dL 2.10* 1.80* 2.40* 2.20* 1.80*   BILIRUBIN mg/dL  --   --   --  2.8* 2.7*   ALK PHOS U/L  --   --   --  118* 104   AST (SGOT) U/L  --   --   --  97* 91*   ALT (SGPT) U/L  --   --   --  18 20     Results from last 7 days   Lab Units 04/10/19  0612 04/09/19 0630 04/08/19 0459 04/07/19 0615 04/06/19 0437 04/05/19  0426 04/04/19  0535   CALCIUM mg/dL 8.1* 8.1* 8.5* 8.0* 7.9* 8.6 8.3*   ALBUMIN g/dL  --  2.10* 1.80* 2.40* 2.20* 1.80* 2.40*   MAGNESIUM mg/dL  --   --   --  2.6* 2.6* 2.7* 2.7*   PHOSPHORUS mg/dL  --  4.6* 6.1* 6.2* 5.9* 5.6* 6.1*     Results from last 7 days   Lab Units 04/03/19 2014   LACTATE mmol/L 2.7*     Glucose   Date/Time Value Ref Range Status   04/10/2019 1115 154 (H) 70 - 130 mg/dL Final   04/10/2019 0628 129 70 - 130 mg/dL Final   04/09/2019 2034 182 (H) 70 - 130 mg/dL Final   04/09/2019 1636 163 (H) 70 - 130 mg/dL Final   04/09/2019 1150 127 70 - 130 mg/dL Final   04/09/2019 0521 70 70 - 130 mg/dL Final   04/08/2019 2011 108 70 - 130 mg/dL Final         allopurinol 100 mg Oral Daily   heparin (porcine) 5,000 Units Subcutaneous Q12H   insulin lispro 0-7 Units Subcutaneous 4x Daily With Meals & Nightly   lactulose 30 g Oral Daily   metoprolol tartrate 12.5 mg Oral Nightly   pantoprazole 40 mg Oral Q AM   QUEtiapine 25 mg Oral Daily With Lunch   QUEtiapine 50 mg Oral Nightly   rifaximin 550 mg Oral Daily With Lunch & Dinner   sertraline 25 mg Oral Daily   vitamin B-12 1,000 mcg Oral Daily       sodium chloride 9 mL/hr Last Rate: 9 mL/hr (04/08/19 0851)   Diet Regular; Low Sodium, Low Potassium       Assessment/Plan     Active Hospital Problems    Diagnosis  POA   • **Acute  renal failure superimposed on chronic kidney disease (CMS/HCC) [N17.9, N18.9]  Yes   • Atrial fibrillation (CMS/HCC) [I48.91]  Yes   • Iron deficiency [E61.1]  Yes   • 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   COLEMAN/CKD  - complications are volume overload and acidosis  - s/p TDC placement 4/8/19 with hemodialysis   - nephrology is following, appreciate recs    Atrial Fibrillation  - controlled  - continue on metoprolol    Type 2 DM  - complicated by chronic kidney disease  - hold 75/25 for now as BG have been low  - cover with ssi     TCP  - likely due to cirrhosis  - stable, will monitor    VTE Prophylaxis - heparin 5000units every 12 hours  Code Status - Full code  Disposition - TBD-may need to discuss hospice if she is intolerant of hemodialysis      Sourav Tamayo MD  [facility]  04/10/19  6:16 PM

## 2019-04-10 NOTE — PLAN OF CARE
Problem: Patient Care Overview  Goal: Plan of Care Review  Outcome: Ongoing (interventions implemented as appropriate)   04/10/19 1203   Plan of Care Review   Progress improving   Coping/Psychosocial   Plan of Care Reviewed With patient   OTHER   Outcome Summary Pt is an 84 year old female admitted w/ acute renal failture w/ h/o liver cirrhosis and dementia. Pt lives w/ spouse and reports indeo w/ ADLs @ PLOF, although may not be reliable historian. Pt presents to OT eval w/ decreased strength, impaired balance and activity tolerance, impacting indep and safety w/ ADLs. Pt required total A for LB dressing task, able to complete grooming tasks while seated @ EOB w/ SBA. Pt may benefit from skilled OT services to address deficits and maximize indep and safety w/ ADLs.

## 2019-04-10 NOTE — NURSING NOTE
Reviewed with rehab team. Based on prior level of function and current level, rec subacute level of rehab. Will F/U with CCP. Ruiz Francisco RN rehab admission nurse [phone]

## 2019-04-11 NOTE — PLAN OF CARE
Problem: Patient Care Overview  Goal: Plan of Care Review  Outcome: Ongoing (interventions implemented as appropriate)   04/11/19 1545   Plan of Care Review   Progress improving   Coping/Psychosocial   Plan of Care Reviewed With patient   OTHER   Outcome Summary VSS, alert and oriented x2, denies pain, Creatinine 3.42, skin care, q2 turn, dialysis orders called in for tomorrow, will continue to monitor pt.       Problem: Renal Failure/Kidney Injury, Acute (Adult)  Goal: Signs and Symptoms of Listed Potential Problems Will be Absent, Minimized or Managed (Renal Failure/Kidney Injury, Acute)  Outcome: Ongoing (interventions implemented as appropriate)   04/11/19 1545   Goal/Outcome Evaluation   Problems Assessed (Acute Renal Failure/Kidney Injury) all   Problems Present (ARF/Kidney Injury) situational response      04/11/19 1545   Goal/Outcome Evaluation   Problems Assessed (Acute Renal Failure/Kidney Injury) all   Problems Present (ARF/Kidney Injury) situational response;acid-base imbalance;electrolyte imbalance       Problem: Skin Injury Risk (Adult)  Goal: Skin Health and Integrity  Outcome: Ongoing (interventions implemented as appropriate)   04/11/19 1545   Skin Injury Risk (Adult)   Skin Health and Integrity making progress toward outcome       Problem: Nutrition, Imbalanced: Inadequate Oral Intake (Adult)  Goal: Improved Oral Intake  Outcome: Ongoing (interventions implemented as appropriate)   04/11/19 1545   Nutrition, Imbalanced: Inadequate Oral Intake (Adult)   Improved Oral Intake making progress toward outcome     Goal: Prevent Further Weight Loss  Outcome: Ongoing (interventions implemented as appropriate)   04/11/19 1545   Nutrition, Imbalanced: Inadequate Oral Intake (Adult)   Prevent Further Weight Loss making progress toward outcome

## 2019-04-11 NOTE — PROGRESS NOTES
Name: Charu Bowens ADMIT: 4/3/2019   : 1935  PCP: Chayito Paredes MD    MRN: [id number] LOS: 8 days   AGE/SEX: 84 y.o. female  ROOM: [facility]     Subjective   Subjective   CC: lethargy  No acute events.  No new complaints. She refused lactulose today but is willing to take it now.  Taking PO.  No f/c/n/v.  +BM.    Objective   Objective   Vital Signs  Temp:  [97.4 °F (36.3 °C)-98.1 °F (36.7 °C)] 97.4 °F (36.3 °C)  Heart Rate:  [75-87] 87  Resp:  [16-20] 20  BP: (125-145)/(47-66) 145/66  SpO2:  [98 %] 98 %  on   ;   Device (Oxygen Therapy): room air  Body mass index is 37.02 kg/m².  Physical Exam   Constitutional: No distress.   HENT:   Head: Normocephalic and atraumatic.   Mouth/Throat: Oropharynx is clear and moist.   Eyes: Conjunctivae are normal. Pupils are equal, round, and reactive to light.   Neck: Normal range of motion. Neck supple. No JVD present.   Cardiovascular: Normal rate, regular rhythm and intact distal pulses.   Pulmonary/Chest: Effort normal and breath sounds normal.   Abdominal: Soft. Bowel sounds are normal. There is no tenderness.   Musculoskeletal: She exhibits edema (3+ BLE). She exhibits no tenderness.   Neurological: She is alert.   Skin: Skin is warm and dry. She is not diaphoretic.   Psychiatric: She has a normal mood and affect. Her behavior is normal. Cognition and memory are impaired (known dementia).   Nursing note and vitals reviewed.      Results Review:       I reviewed the patient's new clinical results.  Results from last 7 days   Lab Units 19  0707 19  0459 19  0459 19  0615   WBC 10*3/mm3 2.95* 2.67*  2.67* 2.67* 2.73*   HEMOGLOBIN g/dL 11.0* 11.1* 10.6* 10.7*   PLATELETS 10*3/mm3 53* 62* 70* 80*     Results from last 7 days   Lab Units 19  0707 04/10/19  0612 19  0630 19  0459   SODIUM mmol/L 140 136 139 138   POTASSIUM mmol/L 3.8 3.8 4.1 3.9   CHLORIDE mmol/L 103 105 109* 109*   CO2 mmol/L 21.1* 15.2* 16.7* 10.7*   BUN mg/dL  38* 56* 50* 78*   CREATININE mg/dL 3.42* 3.98* 3.81* 5.01*   GLUCOSE mg/dL 101* 132* 72 101*   Estimated Creatinine Clearance: 13.7 mL/min (A) (by C-G formula based on SCr of 3.42 mg/dL (H)).  Results from last 7 days   Lab Units 04/11/19  0707 04/09/19  0630 04/08/19 0459 04/07/19 0615 04/06/19 0437 04/05/19  0426   ALBUMIN g/dL 2.20*  2.30* 2.10* 1.80* 2.40* 2.20* 1.80*   BILIRUBIN mg/dL 3.9*  --   --   --  2.8* 2.7*   ALK PHOS U/L 114  --   --   --  118* 104   AST (SGOT) U/L 124*  --   --   --  97* 91*   ALT (SGPT) U/L 14  --   --   --  18 20     Results from last 7 days   Lab Units 04/11/19  0707 04/10/19  0612 04/09/19  0630 04/08/19 0459 04/07/19 0615 04/06/19 0437 04/05/19  0426   CALCIUM mg/dL 7.9* 8.1* 8.1* 8.5* 8.0* 7.9* 8.6   ALBUMIN g/dL 2.20*  2.30*  --  2.10* 1.80* 2.40* 2.20* 1.80*   MAGNESIUM mg/dL 2.2  --   --   --  2.6* 2.6* 2.7*   PHOSPHORUS mg/dL 4.1  --  4.6* 6.1* 6.2* 5.9* 5.6*         Glucose   Date/Time Value Ref Range Status   04/11/2019 1102 135 (H) 70 - 130 mg/dL Final   04/11/2019 0653 92 70 - 130 mg/dL Final   04/10/2019 2203 137 (H) 70 - 130 mg/dL Final   04/10/2019 1115 154 (H) 70 - 130 mg/dL Final   04/10/2019 0628 129 70 - 130 mg/dL Final   04/09/2019 2034 182 (H) 70 - 130 mg/dL Final   04/09/2019 1636 163 (H) 70 - 130 mg/dL Final         allopurinol 100 mg Oral Daily   heparin (porcine) 5,000 Units Subcutaneous Q12H   insulin lispro 0-7 Units Subcutaneous 4x Daily With Meals & Nightly   lactulose 30 g Oral Daily   metoprolol tartrate 12.5 mg Oral Nightly   pantoprazole 40 mg Oral Q AM   QUEtiapine 25 mg Oral Daily With Lunch   QUEtiapine 50 mg Oral Nightly   rifaximin 550 mg Oral Daily With Lunch & Dinner   sertraline 25 mg Oral Daily   vitamin B-12 1,000 mcg Oral Daily       sodium chloride 9 mL/hr Last Rate: 9 mL/hr (04/08/19 0851)   Diet Regular; Low Sodium, Low Potassium       Assessment/Plan     Active Hospital Problems    Diagnosis  POA   • **Acute renal failure  superimposed on chronic kidney disease (CMS/HCC) [N17.9, N18.9]  Yes   • Atrial fibrillation (CMS/HCC) [I48.91]  Yes   • Iron deficiency [E61.1]  Yes   • 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   COLEMAN/CKD  - complications are volume overload and acidosis  - s/p TDC placement 4/8/19 with hemodialysis   - nephrology is following, appreciate recs    Atrial Fibrillation  - controlled  - continue on metoprolol    Type 2 DM  - complicated by chronic kidney disease  - hold 75/25 for now as BG have been low  - cover with ssi     Cirrhosis  - continue on lactulose to control PSE  - volume management with HD    TCP  - likely due to cirrhosis  - stable, will monitor    VTE Prophylaxis - heparin 5000units every 12 hours  Code Status - Full code  Disposition - TBD-may need to discuss hospice if she is intolerant of hemodialysis      Sourav Tamayo MD  [facility]  04/11/19  4:29 PM

## 2019-04-11 NOTE — PROGRESS NOTES
Discharge Planning Assessment  [facility]     Patient Name: Charu Bowens  MRN: [id number]  Today's Date: 4/11/2019    Admit Date: 4/3/2019    Discharge Needs Assessment    No documentation.       Discharge Plan     Row Name 04/11/19 1642       Plan    Plan  [facility] with [facility]    Patient/Family in Agreement with Plan  yes    Plan Comments  Physician notes reviewed.  Will watch for possible hospice consult.  If pursues rehab, wants [facility].  Spoke with Carrol/ARMIDA who states pt accepted for subacute rehab.  Faxed dialysis admission request to  [facility] with clinical information.  Requested [facility] as that is the facilty pt's at [facility] use.  Discussed with  who is agreeable.  Spoke with Wendy/[facility] who confirms receipt of fax and will notify [facility] once approved.  RADU hare RN        Destination      Service Provider Request Status Selected Services Address Phone Number Fax Number    Critical access hospital Pending - Request Sent N/A [address] [address] [phone]    [facility] Pending - Request Sent N/A [address] [facility] [place] [address] [phone]      Durable Medical Equipment      No service coordination in this encounter.      Dialysis/Infusion      No service coordination in this encounter.      Home Medical Care      Service Provider Request Status Selected Services Address Phone Number Fax Number    [facility]A [address] [address] [phone]      Therapy      No service coordination in this encounter.      Community Resources      No service coordination in this encounter.          Demographic Summary    No documentation.       Functional Status    No documentation.       Psychosocial    No documentation.       Abuse/Neglect    No documentation.       Legal    No documentation.       Substance Abuse    No documentation.        Patient Forms    No documentation.           Mayte Abebe

## 2019-04-11 NOTE — PLAN OF CARE
Problem: Fall Risk (Adult)  Goal: Absence of Fall  Outcome: Ongoing (interventions implemented as appropriate)      Problem: Patient Care Overview  Goal: Plan of Care Review  Outcome: Ongoing (interventions implemented as appropriate)   04/11/19 0638   Plan of Care Review   Progress no change   Coping/Psychosocial   Plan of Care Reviewed With patient   OTHER   Outcome Summary VSS, HD yesterday, right tunnel cath CDI, turn Q 2hours, oriented to self, will continue to monitor       Problem: Renal Failure/Kidney Injury, Acute (Adult)  Goal: Signs and Symptoms of Listed Potential Problems Will be Absent, Minimized or Managed (Renal Failure/Kidney Injury, Acute)  Outcome: Ongoing (interventions implemented as appropriate)      Problem: Skin Injury Risk (Adult)  Goal: Skin Health and Integrity  Outcome: Ongoing (interventions implemented as appropriate)      Problem: Nutrition, Imbalanced: Inadequate Oral Intake (Adult)  Goal: Improved Oral Intake  Outcome: Ongoing (interventions implemented as appropriate)    Goal: Prevent Further Weight Loss  Outcome: Ongoing (interventions implemented as appropriate)

## 2019-04-11 NOTE — THERAPY TREATMENT NOTE
Acute Care - Physical Therapy Treatment Note  [facility]     Patient Name: Charu Bowens  : 1935  MRN: [id number]  Today's Date: 2019  Onset of Illness/Injury or Date of Surgery: 19     Referring Physician: Jones    Admit Date: 4/3/2019    Visit Dx:    ICD-10-CM ICD-9-CM   1. Acute renal failure superimposed on chronic kidney disease, unspecified CKD stage, unspecified acute renal failure type (CMS/HCC) N17.9 584.9    N18.9 585.9   2. Generalized weakness R53.1 780.79   3. 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)   • Iron deficiency   • Acute idiopathic gout of right ankle   • Decreased mobility   • Fall   • Urinary tract infection associated with indwelling urethral catheter (CMS/HCC)   • Acute renal failure superimposed on chronic kidney disease (CMS/HCC)   • Atrial fibrillation (CMS/HCC)       Therapy Treatment    Rehabilitation Treatment Summary     Row Name 19 7095             Treatment Time/Intention    Discipline  physical therapy assistant  -CW      Document Type  therapy note (daily note)  -CW      Subjective Information  complains of;weakness;fatigue  -CW      Mode of Treatment  physical therapy  -CW      Therapy Frequency (PT Clinical Impression)  daily  -CW      Patient Effort  adequate  -CW      Existing Precautions/Restrictions  fall  -CW      Recorded by [CW] Jaylon Pina, PTA 19 0479      Row Name 19 0766              Vital Signs    O2 Delivery Pre Treatment  room air  -CW      Recorded by [CW] Jaylon Pina, PTA 04/11/19 1557      Row Name 04/11/19 1545             Cognitive Assessment/Intervention- PT/OT    Orientation Status (Cognition)  oriented to;person  -CW      Follows Commands (Cognition)  follows one step commands;50-74% accuracy;increased processing time needed;repetition of directions required;verbal cues/prompting required  -CW      Cognitive Function (Cognitive)  safety deficit  -CW      Personal Safety Interventions  fall prevention program maintained;gait belt;muscle strengthening facilitated;nonskid shoes/slippers when out of bed  -CW      Recorded by [CW] Jaylon Pina, PTA 04/11/19 1557      Row Name 04/11/19 1545             Bed Mobility Assessment/Treatment    Bed Mobility Assessment/Treatment  supine-sit  -CW      Supine-Sit [place] (Bed Mobility)  moderate assist (50% patient effort)  -CW      Sit-Supine [place] (Bed Mobility)  not tested  -CW      Assistive Device (Bed Mobility)  bed rails  -CW      Comment (Bed Mobility)  up to chair  -CW      Recorded by [CW] Jaylon Pina, PTA 04/11/19 1557      Row Name 04/11/19 1545             Transfer Assessment/Treatment    Transfer Assessment/Treatment  sit-stand transfer;stand-sit transfer  -CW      Recorded by [CW] Jaylon Pina, PTA 04/11/19 1557      Row Name 04/11/19 1545             Sit-Stand Transfer    Sit-Stand [place] (Transfers)  contact guard  -CW      Assistive Device (Sit-Stand Transfers)  walker, front-wheeled  -CW      Recorded by [CW] Jaylon Pina, PTA 04/11/19 1557      Row Name 04/11/19 1545             Stand-Sit Transfer    Stand-Sit [place] (Transfers)  contact guard  -CW      Assistive Device (Stand-Sit Transfers)  walker, front-wheeled  -CW      Recorded by [CW] Jaylon Pina, PTA 04/11/19 1557      Row Name 04/11/19 1545             Gait/Stairs Assessment/Training    [place]  Level (Gait)  minimum assist (75% patient effort);moderate assist (50% patient effort)  -CW      Assistive Device (Gait)  walker, front-wheeled  -CW      Distance in Feet (Gait)  4  -CW      Pattern (Gait)  step-to  -CW      Deviations/Abnormal Patterns (Gait)  antalgic;albert decreased;gait speed decreased;stride length decreased  -CW      Comment (Gait/Stairs)  pt stated she was going to sit and was not even close to chair  -CW      Recorded by [CW] Jaylon Pina, PTA 04/11/19 1557      Row Name 04/11/19 1545             Therapeutic Exercise    Lower Extremity (Therapeutic Exercise)  gluteal sets;LAQ (long arc quad), bilateral;marching while seated  -CW      Lower Extremity Range of Motion (Therapeutic Exercise)  ankle dorsiflexion/plantar flexion, bilateral  -CW      Exercise Type (Therapeutic Exercise)  AROM (active range of motion)  -CW      Position (Therapeutic Exercise)  seated  -CW      Sets/Reps (Therapeutic Exercise)  10  -CW      Recorded by [CW] Jaylon Pina PTA 04/11/19 1557      Row Name 04/11/19 1546             Positioning and Restraints    Pre-Treatment Position  in bed  -CW      Post Treatment Position  chair  -CW      In Chair  notified nsg;reclined;call light within reach;encouraged to call for assist;exit alarm on;with family/caregiver  -CW      Recorded by [CW] Jaylon Pina, PTA 04/11/19 1557      Row Name                Wound 04/08/19 0930 Right neck incision    Wound - Properties Group Date first assessed: 04/08/19 [AC] Time first assessed: 0930 [AC] Side: Right [AC] Location: neck [AC] Type: incision [AC] Recorded by:  [AC] Mickie Monte RN 04/08/19 0930    Row Name                Wound 04/08/19 0930 Right chest incision    Wound - Properties Group Date first assessed: 04/08/19 [AC] Time first assessed: 0930 [AC] Side: Right [AC] Location: chest [AC] Type: incision [AC] Recorded by:  [AC] Mickie Monte RN 04/08/19 0930    Row Name 04/11/19 1540              Outcome Summary/Treatment Plan (PT)    Anticipated Discharge Disposition (PT)  skilled nursing facility  -CW      Recorded by [CW] Jaylon Pina P, PTA 04/11/19 5875        User Key  (r) = Recorded By, (t) = Taken By, (c) = Cosigned By    Initials Name Effective Dates Discipline    AC Mickie Monte RN 06/16/16 -  Nurse    CW Jaylon Pina, PTA 03/07/18 -  PT          Wound 04/08/19 0930 Right neck incision (Active)   Dressing Appearance open to air 4/11/2019  2:50 PM   Closure Approximated;Liquid skin adhesive 4/11/2019  2:50 PM   Base pink;dry;clean 4/11/2019  2:50 PM   Periwound intact;dry 4/11/2019  2:50 PM   Periwound Temperature warm 4/11/2019  2:50 PM   Periwound Skin Turgor soft 4/11/2019  2:50 PM   Drainage Amount none 4/11/2019  2:50 PM   Dressing Care, Wound open to air 4/11/2019  2:50 PM           Physical Therapy Education     Title: PT OT SLP Therapies (In Progress)     Topic: Physical Therapy (Done)     Point: Mobility training (Done)     Learning Progress Summary           Patient Acceptance, E,TB, VU,DU by CW at 4/11/2019  3:57 PM    Acceptance, E,TB, VU,DU by CW at 4/9/2019  3:47 PM    Acceptance, E, NR by CA at 4/6/2019 10:36 AM    Acceptance, E,TB,D, NR by EE at 4/5/2019  1:50 PM   Family Acceptance, E,TB, VU,DU by CW at 4/11/2019  3:57 PM                   Point: Home exercise program (Done)     Learning Progress Summary           Patient Acceptance, E,TB, VU,DU by CW at 4/11/2019  3:57 PM    Acceptance, E,TB, VU,DU by CW at 4/9/2019  3:47 PM    Acceptance, E, NR by CA at 4/6/2019 10:36 AM    Acceptance, E,TB,D, NR by EE at 4/5/2019  1:50 PM   Family Acceptance, E,TB, VU,DU by CW at 4/11/2019  3:57 PM                   Point: Body mechanics (Done)     Learning Progress Summary           Patient Acceptance, E,TB, VU,DU by CW at 4/11/2019  3:57 PM    Acceptance, ELIMA, KOLTON,DU by IZABELA at 4/9/2019  3:47 PM    Acceptance, E, NR by CA at 4/6/2019 10:36 AM    Acceptance, LIMA BUITRAGO D, NR by JAZMYN at  4/5/2019  1:50 PM   Family Acceptance, E,TB, VU,DU by CW at 4/11/2019  3:57 PM                   Point: Precautions (Done)     Learning Progress Summary           Patient Acceptance, E,TB, VU,DU by CW at 4/11/2019  3:57 PM    Acceptance, E,TB, VU,DU by CW at 4/9/2019  3:47 PM    Acceptance, E, NR by CA at 4/6/2019 10:36 AM    Acceptance, E,TB,D, NR by  at 4/5/2019  1:50 PM   Family Acceptance, E,TB, VU,DU by CW at 4/11/2019  3:57 PM                               User Key     Initials Effective Dates Name Provider Type Discipline     04/03/18 -  Jasmyne Lam, PT Physical Therapist PT     03/07/18 -  Jaylon Pina, PTA Physical Therapy Assistant PT    CA 06/13/18 -  Yareli Hill, PT Physical Therapist PT                PT Recommendation and Plan  Anticipated Discharge Disposition (PT): skilled nursing facility  Therapy Frequency (PT Clinical Impression): daily  Outcome Summary/Treatment Plan (PT)  Anticipated Discharge Disposition (PT): skilled nursing facility  Plan of Care Reviewed With: patient  Progress: improving  Outcome Summary: Pt increasing with strength and baalnce with use of RWX but required vc's to maintain safety when transferring to the chair from the bed.  Pt became unsafe when going to the chair and required more A to maintain saferty  Outcome Measures     Row Name 04/11/19 1500 04/10/19 1200 04/09/19 1500       How much help from another person do you currently need...    Turning from your back to your side while in flat bed without using bedrails?  3  -CW  --  3  -CW    Moving from lying on back to sitting on the side of a flat bed without bedrails?  3  -CW  --  3  -CW    Moving to and from a bed to a chair (including a wheelchair)?  3  -CW  --  3  -CW    Standing up from a chair using your arms (e.g., wheelchair, bedside chair)?  3  -CW  --  3  -CW    Climbing 3-5 steps with a railing?  1  -CW  --  1  -CW    To walk in hospital room?  3  -CW  --  3  -CW    AM-PAC 6 Clicks Score  16   -CW  --  16  -CW       How much help from another is currently needed...    Putting on and taking off regular lower body clothing?  --  1  -RP  --    Bathing (including washing, rinsing, and drying)  --  2  -RP  --    Toileting (which includes using toilet bed pan or urinal)  --  1  -RP  --    Putting on and taking off regular upper body clothing  --  3  -RP  --    Taking care of personal grooming (such as brushing teeth)  --  3  -RP  --    Eating meals  --  2  -RP  --    Score  --  12  -RP  --       Functional Assessment    Outcome Measure Options  AM-PAC 6 Clicks Basic Mobility (PT)  -CW  AM-PAC 6 Clicks Daily Activity (OT)  -RP  AM-PAC 6 Clicks Basic Mobility (PT)  -CW      User Key  (r) = Recorded By, (t) = Taken By, (c) = Cosigned By    Initials Name Provider Type    Jaylon Robison PTA Physical Therapy Assistant    Carrol Laura, OT Occupational Therapist         Time Calculation:   PT Charges     Row Name 04/11/19 1558             Time Calculation    Start Time  1544  -CW      Stop Time  1559  -CW      Time Calculation (min)  15 min  -CW      PT Received On  04/11/19  -CW      PT - Next Appointment  04/12/19  -CW        User Key  (r) = Recorded By, (t) = Taken By, (c) = Cosigned By    Initials Name Provider Type    Jaylon Robison PTA Physical Therapy Assistant        Therapy Charges for Today     Code Description Service Date Service Provider Modifiers Qty    23891944980 HC PT THER PROC EA 15 MIN 4/11/2019 Jaylon Pina PTA GP 1          PT G-Codes  Outcome Measure Options: AM-PAC 6 Clicks Basic Mobility (PT)  AM-PAC 6 Clicks Score: 16  Score: 12    Jaylon Pina PTA  4/11/2019

## 2019-04-11 NOTE — PLAN OF CARE
Problem: Patient Care Overview  Goal: Plan of Care Review  Outcome: Ongoing (interventions implemented as appropriate)   04/11/19 8238   Plan of Care Review   Progress improving   Coping/Psychosocial   Plan of Care Reviewed With patient   OTHER   Outcome Summary Pt increasing with strength and balance with use of RWX but required VC's to maintain safety when transferring to the chair from the bed. Pt became unsafe when going to the chair and required more A to maintain safety

## 2019-04-11 NOTE — PROGRESS NOTES
"   LOS: 8 days    Patient Care Team:  Chayito Paredes MD as PCP - General (Internal Medicine)  Johnson Turner DPM as PCP - Claims Attributed  Dano Art MD as Consulting Physician (Hematology and Oncology)  Navarro Hung MD as Referring Physician (Nephrology)    Chief Complaint:    Chief Complaint   Patient presents with   • Fatigue     Follow up acute kidney injury on chronic kidney disease  Subjective     Interval History:   Confused but pleasant this AM; does not remember having HD yesterday; 2 liters removed during HD yesterday; no SOB; not hungry    Review of Systems:   As noted above    Objective     Vital Signs  Temp:  [95.7 °F (35.4 °C)-98.1 °F (36.7 °C)] 98.1 °F (36.7 °C)  Heart Rate:  [75-89] 75  Resp:  [16-18] 16  BP: (125-158)/(47-65) 130/47    Flowsheet Rows      First Filed Value   Admission Height  160 cm (63\") Documented at 04/03/2019 1500   Admission Weight  94.9 kg (209 lb 3.2 oz) Documented at 04/03/2019 1500          No intake/output data recorded.  I/O last 3 completed shifts:  In: 510 [P.O.:510]  Out: 2101 [Urine:100; Other:2000; Stool:1]    Intake/Output Summary (Last 24 hours) at 4/11/2019 1032  Last data filed at 4/10/2019 1800  Gross per 24 hour   Intake 210 ml   Output 2001 ml   Net -1791 ml       Physical Exam:  General Appearance: Awake, confused and disoriented tho very pleasant, no acute distress, obese, chronically ill  Skin: warm and dry  HEENT: pupils round and reactive to light, oral mucosa normal   Neck: supple, no JVD, trachea midline  Lungs: Decreased breath sounds bilaterally, unlabored breathing effort  Heart: irreg irreg, normal S1 and S2, no S3, no rub  Abdomen: soft, non-tender, present bowel sounds to auscultation; +distended  :  No palpable bladder, no CVA tenderness  Extremities: 2+ pretibial edema extending to her hips, no cyanosis or clubbing  Neuro: Normal speech but the patient is confused; moving all extremities  Skin: right chest TDC " with mild bruising            Results Review:    Results from last 7 days   Lab Units 04/11/19  0707 04/10/19  0612 04/09/19  0630 04/06/19 0437 04/05/19  0426   SODIUM mmol/L 140 136 139   < > 139 139   POTASSIUM mmol/L 3.8 3.8 4.1   < > 4.2 4.1   CHLORIDE mmol/L 103 105 109*   < > 111* 112*   CO2 mmol/L 21.1* 15.2* 16.7*   < > 13.3* 13.0*   BUN mg/dL 38* 56* 50*   < > 77* 75*   CREATININE mg/dL 3.42* 3.98* 3.81*   < > 4.68* 4.99*   CALCIUM mg/dL 7.9* 8.1* 8.1*   < > 7.9* 8.6   BILIRUBIN mg/dL  --   --   --   --  2.8* 2.7*   ALK PHOS U/L  --   --   --   --  118* 104   ALT (SGPT) U/L  --   --   --   --  18 20   AST (SGOT) U/L  --   --   --   --  97* 91*   GLUCOSE mg/dL 101* 132* 72   < > 130* 87    < > = values in this interval not displayed.       Estimated Creatinine Clearance: 13.7 mL/min (A) (by C-G formula based on SCr of 3.42 mg/dL (H)).    Results from last 7 days   Lab Units 04/11/19  0707 04/09/19  0630 04/08/19 0459 04/07/19 0615 04/06/19 0437   MAGNESIUM mg/dL 2.2  --   --  2.6* 2.6*   PHOSPHORUS mg/dL 4.1 4.6* 6.1* 6.2* 5.9*       Results from last 7 days   Lab Units 04/07/19  0615 04/06/19  0437 04/05/19  0426   URIC ACID mg/dL 4.9 4.5 4.8       Results from last 7 days   Lab Units 04/11/19  0707 04/09/19  0459 04/08/19  0459 04/07/19 0615 04/06/19 0437   WBC 10*3/mm3 2.95* 2.67*  2.67* 2.67* 2.73* 2.81*   HEMOGLOBIN g/dL 11.0* 11.1* 10.6* 10.7* 10.7*   PLATELETS 10*3/mm3 53* 62* 70* 80* 73*               Imaging Results (last 24 hours)     ** No results found for the last 24 hours. **          allopurinol 100 mg Oral Daily   heparin (porcine) 5,000 Units Subcutaneous Q12H   insulin lispro 0-7 Units Subcutaneous 4x Daily With Meals & Nightly   lactulose 30 g Oral Daily   metoprolol tartrate 12.5 mg Oral Nightly   pantoprazole 40 mg Oral Q AM   QUEtiapine 25 mg Oral Daily With Lunch   QUEtiapine 50 mg Oral Nightly   rifaximin 550 mg Oral Daily With Lunch & Dinner   sertraline 25 mg Oral Daily    vitamin B-12 1,000 mcg Oral Daily       sodium chloride 9 mL/hr Last Rate: 9 mL/hr (04/08/19 0851)       Medication Review:   Current Facility-Administered Medications   Medication Dose Route Frequency Provider Last Rate Last Dose   • acetaminophen (TYLENOL) tablet 650 mg  650 mg Oral Q4H PRN Brice Le MD   650 mg at 04/08/19 1705   • allopurinol (ZYLOPRIM) tablet 100 mg  100 mg Oral Daily Mack Pruitt MD   100 mg at 04/11/19 0824   • dextrose (D50W) 25 g/ 50mL Intravenous Solution 25 g  25 g Intravenous Q15 Min PRN Brice Le MD       • dextrose (GLUTOSE) oral gel 15 g  15 g Oral Q15 Min PRN Brice Le MD       • glucagon (human recombinant) (GLUCAGEN DIAGNOSTIC) injection 1 mg  1 mg Subcutaneous PRN Brice Le MD       • heparin (porcine) 5000 UNIT/ML injection 5,000 Units  5,000 Units Subcutaneous Q12H Forrest Avilez MD   5,000 Units at 04/11/19 0824   • insulin lispro (humaLOG) injection 0-7 Units  0-7 Units Subcutaneous 4x Daily With Meals & Nightly Brice Le MD   2 Units at 04/10/19 1325   • lactulose (CHRONULAC) 10 GM/15ML solution 30 g  30 g Oral Daily Brice Le MD   30 g at 04/10/19 0834   • melatonin tablet 10 mg  10 mg Oral Nightly PRN Brice Le MD       • metoprolol tartrate (LOPRESSOR) tablet 12.5 mg  12.5 mg Oral Nightly Brice Le MD   12.5 mg at 04/10/19 2059   • nitroglycerin (NITROSTAT) SL tablet 0.4 mg  0.4 mg Sublingual Q5 Min PRN Brice Le MD       • ondansetron (ZOFRAN) tablet 4 mg  4 mg Oral Q6H PRN Brice Le MD        Or   • ondansetron ODT (ZOFRAN-ODT) disintegrating tablet 4 mg  4 mg Oral Q6H PRN Brice Le MD        Or   • ondansetron (ZOFRAN) injection 4 mg  4 mg Intravenous Q6H PRN Brice Le MD   4 mg at 04/06/19 0915   • pantoprazole (PROTONIX) EC tablet 40 mg  40 mg Oral Q AM Navarro Hung MD   40 mg at 04/11/19 0645   •  QUEtiapine (SEROquel) tablet 25 mg  25 mg Oral Daily With Lunch Brice Le MD   25 mg at 04/10/19 1324   • QUEtiapine (SEROquel) tablet 50 mg  50 mg Oral Nightly Brice Le MD   50 mg at 04/10/19 2303   • rifaximin (XIFAXAN) tablet 550 mg  550 mg Oral Daily With Lunch & Dinner Brice Le MD   550 mg at 04/10/19 1324   • sertraline (ZOLOFT) tablet 25 mg  25 mg Oral Daily Brice Le MD   25 mg at 04/11/19 0824   • sodium chloride 0.9 % flush 3-10 mL  3-10 mL Intravenous PRN Brice Le MD       • sodium chloride 0.9 % infusion  9 mL/hr Intravenous Continuous Johnson Dale MD 9 mL/hr at 04/08/19 0851     • traMADol (ULTRAM) tablet 50 mg  50 mg Oral Q12H PRN Brice Le MD       • vitamin B-12 (CYANOCOBALAMIN) tablet 1,000 mcg  1,000 mcg Oral Daily Brice Le MD   1,000 mcg at 04/11/19 0824       Assessment/Plan   1.  Acute kidney injury on chronic kidney disease, multifactorial and progressive to likely ESRD.  Vol excess; improving acidosis; stable K.  Had first HD 4.8.19 and second yesterday  2.  Chronic kidney disease stage V at baseline associated with diabetic nephropathy, biopsy-proven.  Baseline creatinine had been in 3-3.5 range  3.  Diabetes mellitus type 2 with known diabetic nephropathy and retinopathy  4.  Cirrhosis, associated with HOANG, and questionable hemochromatosis.  The patient has had hepatic encephalopathy often associated with noncompliance with lactulose  5.  Anemia with chronic kidney disease  6.  Thrombocytopenia with known history of chronic thrombocytopenia  7.  History of gout  8.  Hypertension reasonably controlled  9.  Dementia       Plan:  1.  HD again tomorrow for fluid and waste removal.  I told  last night, tho, that her dementia and cirrhosis pose significant challenges (and outright risk) to patient in terms of ability to undergo safely HD (staying in chair; not pulling at catheter or eventual needles,  etc).  I recommended to him that he and family meet with palliative team to explore comfort-based care as an alternative to dialysis.  He is not sure what to do and wishes to talk more to [facility]  2.  Surveillance labs  3.  Will make referral for outpt HD spot in event family continues to want aggressive care        Navarro Hung MD  04/11/19  10:32 AM

## 2019-04-12 NOTE — THERAPY TREATMENT NOTE
Acute Care - Physical Therapy Treatment Note  [facility]     Patient Name: Charu Bowens  : 1935  MRN: [id number]  Today's Date: 2019  Onset of Illness/Injury or Date of Surgery: 19     Referring Physician: Jones    Admit Date: 4/3/2019    Visit Dx:    ICD-10-CM ICD-9-CM   1. Acute renal failure superimposed on chronic kidney disease, unspecified CKD stage, unspecified acute renal failure type (CMS/HCC) N17.9 584.9    N18.9 585.9   2. Generalized weakness R53.1 780.79   3. 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)   • Iron deficiency   • Acute idiopathic gout of right ankle   • Decreased mobility   • Fall   • Urinary tract infection associated with indwelling urethral catheter (CMS/HCC)   • Acute renal failure superimposed on chronic kidney disease (CMS/HCC)   • Atrial fibrillation (CMS/HCC)       Therapy Treatment    Rehabilitation Treatment Summary     Row Name 19 0160             Treatment Time/Intention    Discipline  physical therapist  -EE      Document Type  therapy note (daily note)  -EE      Subjective Information  no complaints  -EE      Mode of Treatment  physical therapy;individual therapy  -EE      Patient/Family Observations  Pt supine in bed in no acute distress  -EE      Patient Effort  good  -EE      Existing Precautions/Restrictions  fall  -EE      Recorded by [EE] Jasmyne Lam, PT 19 162      Row Name 19 7842              Cognitive Assessment/Intervention- PT/OT    Affect/Mental Status (Cognitive)  confused  -EE      Orientation Status (Cognition)  oriented to;person  -EE      Follows Commands (Cognition)  does not follow one step commands;75-90% accuracy;increased processing time needed;repetition of directions required;verbal cues/prompting required  -EE      Cognitive Function (Cognitive)  safety deficit  -EE      Safety Deficit (Cognitive)  mild deficit;awareness of need for assistance;insight into deficits/self awareness;safety precautions awareness  -EE      Personal Safety Interventions  fall prevention program maintained;gait belt;muscle strengthening facilitated;nonskid shoes/slippers when out of bed;supervised activity  -EE      Recorded by [EE] Jasmyne Lam, PT 04/12/19 1628      Row Name 04/12/19 1614             Safety Issues, Functional Mobility    Safety Issues Affecting Function (Mobility)  ability to follow commands;awareness of need for assistance;insight into deficits/self awareness  -EE      Impairments Affecting Function (Mobility)  balance;cognition;endurance/activity tolerance;strength  -EE      Recorded by [EE] Jasmyne Lam, PT 04/12/19 1628      Row Name 04/12/19 1614             Bed Mobility Assessment/Treatment    Supine-Sit [place] (Bed Mobility)  minimum assist (75% patient effort);verbal cues  -EE      Assistive Device (Bed Mobility)  bed rails;head of bed elevated  -EE      Recorded by [EE] Jasmyne Lam, PT 04/12/19 1628      Row Name 04/12/19 1614             Sit-Stand Transfer    Sit-Stand [place] (Transfers)  contact guard;verbal cues  -EE      Assistive Device (Sit-Stand Transfers)  walker, front-wheeled  -EE      Recorded by [EE] Jasmyne Lam, PT 04/12/19 1628      Row Name 04/12/19 1614             Stand-Sit Transfer    Stand-Sit [place] (Transfers)  contact guard;2 person assist;verbal cues  -EE      Assistive Device (Stand-Sit Transfers)  walker, front-wheeled  -EE      Recorded  by [EE] Jasmyne Lam, PT 04/12/19 1628      Row Name 04/12/19 1614             Gait/Stairs Assessment/Training    Sneads Level (Gait)  minimum assist (75% patient effort);2 person assist;verbal cues;nonverbal cues (demo/gesture)  -EE      Assistive Device (Gait)  walker, front-wheeled  -EE      Distance in Feet (Gait)  4  -EE      Pattern (Gait)  step-to  -EE      Deviations/Abnormal Patterns (Gait)  albert decreased;stride length decreased;festinating/shuffling  -EE      Bilateral Gait Deviations  forward flexed posture;heel strike decreased  -EE      Comment (Gait/Stairs)  Min A x2 and max cues required for safety; pt attempting to sit before close enough to chair  -EE      Recorded by [EE] Jasmyne Lam, PT 04/12/19 1628      Row Name 04/12/19 1614             Therapeutic Exercise    Lower Extremity (Therapeutic Exercise)  LAQ (long arc quad), bilateral;marching while seated  -EE      Lower Extremity Range of Motion (Therapeutic Exercise)  hip abduction/adduction, bilateral;ankle dorsiflexion/plantar flexion, bilateral  -EE      Exercise Type (Therapeutic Exercise)  AROM (active range of motion)  -EE      Position (Therapeutic Exercise)  seated  -EE      Sets/Reps (Therapeutic Exercise)  1/10  -EE      Recorded by [EE] Jasmyne Lam, PT 04/12/19 1628      Row Name 04/12/19 1614             Positioning and Restraints    Pre-Treatment Position  in bed  -EE      Post Treatment Position  chair  -EE      In Chair  reclined;call light within reach;encouraged to call for assist;exit alarm on;with family/caregiver;legs elevated;notified nsg  -EE      Recorded by [EE] Jasmyne Lam, PT 04/12/19 1628      Row Name 04/12/19 1614             Pain Scale: Numbers Pre/Post-Treatment    Pain Scale: Numbers, Pretreatment  0/10 - no pain  -EE      Pain Scale: Numbers, Post-Treatment  0/10 - no pain  -EE      Recorded by [EE] Jasmyne Lam, PT 04/12/19 1628      Row Name                Wound 04/08/19 0919 Right neck incision     Wound - Properties Group Date first assessed: 04/08/19 [AC] Time first assessed: 0930 [AC] Side: Right [AC] Location: neck [AC] Type: incision [AC] Recorded by:  [AC] Mickie Monte RN 04/08/19 0930    Row Name                Wound 04/08/19 0930 Right chest incision    Wound - Properties Group Date first assessed: 04/08/19 [AC] Time first assessed: 0930 [AC] Side: Right [AC] Location: chest [AC] Type: incision [AC] Recorded by:  [JIMENEZ] Mickie Monte RN 04/08/19 0930      User Key  (r) = Recorded By, (t) = Taken By, (c) = Cosigned By    Initials Name Effective Dates Discipline    AC Mickie Monte RN 06/16/16 -  Nurse    EE Jasmyne Lam, PT 04/03/18 -  PT          Wound 04/08/19 0930 Right neck incision (Active)   Dressing Appearance open to air 4/12/2019  1:15 PM   Drainage Amount none 4/12/2019  1:15 PM   Dressing Care, Wound open to air 4/12/2019  1:15 PM       Wound 04/08/19 0930 Right chest incision (Active)   Dressing Appearance dry;intact 4/12/2019  1:15 PM   Base dressing in place, unable to visualize 4/12/2019  1:15 PM   Dressing Care, Wound border dressing 4/11/2019  8:51 PM       Rehab Goal Summary     Row Name 04/12/19 0800             Bed Mobility Goal 1 (PT)    Time Frame (Bed Mobility Goal 1, PT)  1 week  -EE      Progress/Outcomes (Bed Mobility Goal 1, PT)  goal ongoing;progress slower than expected  -EE         Transfer Goal 1 (PT)    Progress/Outcome (Transfer Goal 1, PT)  goal met  -EE         Gait Training Goal 1 (PT)    Time Frame (Gait Training Goal 1, PT)  1 week  -EE      Progress/Outcome (Gait Training Goal 1, PT)  goal ongoing;progress slower than expected  -EE        User Key  (r) = Recorded By, (t) = Taken By, (c) = Cosigned By    Initials Name Provider Type Discipline    EE Jasmyne Lam, PT Physical Therapist PT          Physical Therapy Education     Title: PT OT SLP Therapies (In Progress)     Topic: Physical Therapy (In Progress)     Point: Mobility training (In Progress)      Learning Progress Summary           Patient Acceptance, E,TB, NR by EE at 4/12/2019  4:28 PM    Acceptance, E,TB, VU,DU by CW at 4/11/2019  3:57 PM    Acceptance, E,TB, VU,DU by CW at 4/9/2019  3:47 PM    Acceptance, E, NR by CA at 4/6/2019 10:36 AM    Acceptance, E,TB,D, NR by EE at 4/5/2019  1:50 PM   Family Acceptance, E,TB, VU,DU by CW at 4/11/2019  3:57 PM                   Point: Home exercise program (In Progress)     Learning Progress Summary           Patient Acceptance, E,TB, NR by EE at 4/12/2019  4:28 PM    Acceptance, E,TB, VU,DU by CW at 4/11/2019  3:57 PM    Acceptance, E,TB, VU,DU by CW at 4/9/2019  3:47 PM    Acceptance, E, NR by CA at 4/6/2019 10:36 AM    Acceptance, E,TB,D, NR by EE at 4/5/2019  1:50 PM   Family Acceptance, E,TB, VU,DU by CW at 4/11/2019  3:57 PM                   Point: Body mechanics (In Progress)     Learning Progress Summary           Patient Acceptance, E,TB, NR by EE at 4/12/2019  4:28 PM    Acceptance, E,TB, VU,DU by CW at 4/11/2019  3:57 PM    Acceptance, E,TB, VU,DU by CW at 4/9/2019  3:47 PM    Acceptance, E, NR by CA at 4/6/2019 10:36 AM    Acceptance, E,TB,D, NR by EE at 4/5/2019  1:50 PM   Family Acceptance, E,TB, VU,DU by CW at 4/11/2019  3:57 PM                   Point: Precautions (In Progress)     Learning Progress Summary           Patient Acceptance, E,TB, NR by EE at 4/12/2019  4:28 PM    Acceptance, E,TB, VU,DU by CW at 4/11/2019  3:57 PM    Acceptance, E,TB, VU,DU by CW at 4/9/2019  3:47 PM    Acceptance, E, NR by CA at 4/6/2019 10:36 AM    Acceptance, E,TB,D, NR by EE at 4/5/2019  1:50 PM   Family Acceptance, E,TB, VU,DU by CW at 4/11/2019  3:57 PM                               User Key     Initials Effective Dates Name Provider Type Discipline    EE 04/03/18 -  Jasmyne Lam, PT Physical Therapist PT    CW 03/07/18 -  Jaylon Pina, PTA Physical Therapy Assistant PT    CA 06/13/18 -  Yareli Hill, PT Physical Therapist PT                PT  Recommendation and Plan  Anticipated Discharge Disposition (PT): skilled nursing facility  Planned Therapy Interventions (PT Eval): gait training, bed mobility training, balance training, home exercise program, patient/family education, ROM (range of motion), strengthening, stretching, transfer training  Therapy Frequency (PT Clinical Impression): daily  Outcome Summary/Treatment Plan (PT)  Anticipated Discharge Disposition (PT): [facility] nursing facility  Plan of Care Reviewed With: patient  Outcome Summary: Pt demonstrates minimal progress with PT today. Requiring less assistance with bed mobility; however, continues to require increased assist for safety cues and assist with balance. Confusion limiting; pt demonstrates decreased safety awareness and attempts to sit when unsafe to do so. Good tolerance of seated LE ther ex. Will continue to progress activity as tolerated.   Outcome Measures     Row Name 04/12/19 1600 04/11/19 1500 04/10/19 1200       How much help from another person do you currently need...    Turning from your back to your side while in flat bed without using bedrails?  3  -EE  3  -CW  --    Moving from lying on back to sitting on the side of a flat bed without bedrails?  3  -EE  3  -CW  --    Moving to and from a bed to a chair (including a wheelchair)?  3  -EE  3  -CW  --    Standing up from a chair using your arms (e.g., wheelchair, bedside chair)?  3  -EE  3  -CW  --    Climbing 3-5 steps with a railing?  1  -EE  1  -CW  --    To walk in hospital room?  2  -EE  3  -CW  --    AM-PAC 6 Clicks Score  15  -EE  16  -CW  --       How much help from another is currently needed...    Putting on and taking off regular lower body clothing?  --  --  1  -RP    Bathing (including washing, rinsing, and drying)  --  --  2  -RP    Toileting (which includes using toilet bed pan or urinal)  --  --  1  -RP    Putting on and taking off regular upper body clothing  --  --  3  -RP    Taking care of personal  grooming (such as brushing teeth)  --  --  3  -RP    Eating meals  --  --  2  -RP    Score  --  --  12  -RP       Functional Assessment    Outcome Measure Options  AM-PAC 6 Clicks Basic Mobility (PT)  -EE  AM-PAC 6 Clicks Basic Mobility (PT)  -CW  AM-PAC 6 Clicks Daily Activity (OT)  -RP      User Key  (r) = Recorded By, (t) = Taken By, (c) = Cosigned By    Initials Name Provider Type    Jasmyne Rolle, PT Physical Therapist    CW Jaylon Pina, PTA Physical Therapy Assistant    RP Carrol Slade, OT Occupational Therapist         Time Calculation:   PT Charges     Row Name 04/12/19 1631 04/12/19 0852          Time Calculation    Start Time  1614  -EE  --     Stop Time  1628  -EE  --     Time Calculation (min)  14 min  -EE  --     PT Received On  04/12/19  -EE  --     PT - Next Appointment  04/13/19  -EE  --     PT Goal Re-Cert Due Date  --  04/19/19  -EE        Time Calculation- PT    Total Timed Code Minutes- PT  14 minute(s)  -EE  --       User Key  (r) = Recorded By, (t) = Taken By, (c) = Cosigned By    Initials Name Provider Type    Jasmyne Rolle, PT Physical Therapist        Therapy Charges for Today     Code Description Service Date Service Provider Modifiers Qty    14052553310 HC PT THER PROC EA 15 MIN 4/12/2019 Jasmyne Lam, PT GP 1    45362646409 HC PT THER SUPP EA 15 MIN 4/12/2019 Jasmyne Lam, PT GP 1          PT G-Codes  Outcome Measure Options: AM-PAC 6 Clicks Basic Mobility (PT)  AM-PAC 6 Clicks Score: 15  Score: 12    Jasmyne Lam PT  4/12/2019

## 2019-04-12 NOTE — PLAN OF CARE
Problem: Patient Care Overview  Goal: Plan of Care Review   04/12/19 8624   Coping/Psychosocial   Plan of Care Reviewed With patient   OTHER   Outcome Summary Pt demonstrates minimal progress with PT today. Requiring less assistance with bed mobility; however, continues to require increased assist for safety cues and assist with balance. Confusion limiting; pt demonstrates decreased safety awareness and attempts to sit when unsafe to do so. Good tolerance of seated LE ther ex. Will continue to progress activity as tolerated.

## 2019-04-12 NOTE — PROGRESS NOTES
Name: Charu Bowens ADMIT: 4/3/2019   : 1935  PCP: Chayito Paredes MD    MRN: [id number] LOS: 9 days   AGE/SEX: 84 y.o. female  ROOM: [facility]     Subjective   Subjective   CC: lethargy  Patient had bradycardia and low BP with HD today.  She has no recollection of having had HD.  No new complaints. She refused lactulose today but is willing to take it now.  Taking PO.  No f/c/n/v.  +BM.    Objective   Objective   Vital Signs  Temp:  [97.1 °F (36.2 °C)-97.7 °F (36.5 °C)] 97.7 °F (36.5 °C)  Heart Rate:  [82-93] 93  Resp:  [16-20] 16  BP: (107-138)/(33-78) 107/78  SpO2:  [99 %] 99 %  on   ;   Device (Oxygen Therapy): room air  Body mass index is 36.86 kg/m².  Physical Exam   Constitutional: No distress.   HENT:   Head: Normocephalic and atraumatic.   Mouth/Throat: Oropharynx is clear and moist.   Eyes: Conjunctivae are normal. Pupils are equal, round, and reactive to light.   Neck: Normal range of motion. Neck supple. No JVD present.   Cardiovascular: Normal rate, regular rhythm and intact distal pulses.   Pulmonary/Chest: Effort normal and breath sounds normal.   Abdominal: Soft. Bowel sounds are normal. There is no tenderness.   Musculoskeletal: She exhibits edema (3+ BLE). She exhibits no tenderness.   Neurological: She is alert.   Skin: Skin is warm and dry. She is not diaphoretic.   Psychiatric: She has a normal mood and affect. Her behavior is normal. Cognition and memory are impaired (known dementia).   Nursing note and vitals reviewed.      Results Review:       I reviewed the patient's new clinical results.  Results from last 7 days   Lab Units 19  0707 19  0459 19  0459 19  0615   WBC 10*3/mm3 2.95* 2.67*  2.67* 2.67* 2.73*   HEMOGLOBIN g/dL 11.0* 11.1* 10.6* 10.7*   PLATELETS 10*3/mm3 53* 62* 70* 80*     Results from last 7 days   Lab Units 19  0631 19  0707 04/10/19  0612 19  0630   SODIUM mmol/L 133* 140 136 139   POTASSIUM mmol/L 3.5 3.8 3.8 4.1    CHLORIDE mmol/L 98 103 105 109*   CO2 mmol/L 19.1* 21.1* 15.2* 16.7*   BUN mg/dL 48* 38* 56* 50*   CREATININE mg/dL 4.29* 3.42* 3.98* 3.81*   GLUCOSE mg/dL 128* 101* 132* 72   Estimated Creatinine Clearance: 10.9 mL/min (A) (by C-G formula based on SCr of 4.29 mg/dL (H)).  Results from last 7 days   Lab Units 04/12/19 0631 04/11/19 0707 04/09/19 0630 04/08/19 0459 04/06/19  0437   ALBUMIN g/dL 2.30* 2.20*  2.30* 2.10* 1.80*   < > 2.20*   BILIRUBIN mg/dL  --  3.9*  --   --   --  2.8*   ALK PHOS U/L  --  114  --   --   --  118*   AST (SGOT) U/L  --  124*  --   --   --  97*   ALT (SGPT) U/L  --  14  --   --   --  18    < > = values in this interval not displayed.     Results from last 7 days   Lab Units 04/12/19  0631 04/11/19  0707 04/10/19  0612 04/09/19  0630 04/08/19  0459 04/07/19  0615 04/06/19  0437   CALCIUM mg/dL 8.0* 7.9* 8.1* 8.1* 8.5* 8.0* 7.9*   ALBUMIN g/dL 2.30* 2.20*  2.30*  --  2.10* 1.80* 2.40* 2.20*   MAGNESIUM mg/dL  --  2.2  --   --   --  2.6* 2.6*   PHOSPHORUS mg/dL 5.2* 4.1  --  4.6* 6.1* 6.2* 5.9*         Glucose   Date/Time Value Ref Range Status   04/12/2019 1725 136 (H) 70 - 130 mg/dL Final   04/12/2019 0621 116 70 - 130 mg/dL Final   04/11/2019 2017 179 (H) 70 - 130 mg/dL Final   04/11/2019 1627 171 (H) 70 - 130 mg/dL Final   04/11/2019 1102 135 (H) 70 - 130 mg/dL Final   04/11/2019 0653 92 70 - 130 mg/dL Final   04/10/2019 2203 137 (H) 70 - 130 mg/dL Final         allopurinol 100 mg Oral Daily   heparin (porcine) 5,000 Units Subcutaneous Q12H   insulin lispro 0-7 Units Subcutaneous 4x Daily With Meals & Nightly   lactulose 30 g Oral Daily   metoprolol tartrate 12.5 mg Oral Nightly   pantoprazole 40 mg Oral Q AM   QUEtiapine 25 mg Oral Daily With Lunch   QUEtiapine 50 mg Oral Nightly   rifaximin 550 mg Oral Daily With Lunch & Dinner   sertraline 25 mg Oral Daily   vitamin B-12 1,000 mcg Oral Daily       sodium chloride 9 mL/hr Last Rate: 9 mL/hr (04/08/19 0851)   Diet Regular; Low  Sodium, Low Potassium       Assessment/Plan     Active Hospital Problems    Diagnosis  POA   • **Acute renal failure superimposed on chronic kidney disease (CMS/HCC) [N17.9, N18.9]  Yes   • Atrial fibrillation (CMS/HCC) [I48.91]  Yes   • Iron deficiency [E61.1]  Yes   • 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   COLEMAN/CKD  - complications are volume overload and acidosis  - s/p TDC placement 4/8/19 with hemodialysis   - nephrology is following, appreciate recs    Atrial Fibrillation  - controlled  - continue on metoprolol    Type 2 DM  - complicated by chronic kidney disease  - held 75/25 for now  - cover with ssi     Cirrhosis  - continue on lactulose to control PSE  - volume management with HD    TCP  - likely due to cirrhosis  - stable    VTE Prophylaxis - heparin 5000units every 12 hours  Code Status - Full code  Disposition - TBD    Overall very poor prognosis.  The patient's dementia and other medical issues make dialysis a challenge and prolonging her life at this point is certainly at the expense of quality and I reiterated this to the family. They are going to meet with palliative care hopefully tomorrow to discuss the potential for comfort measures.    D/w Dr. Abhilash Tamayo MD  [facility]  04/12/19  7:11 PM

## 2019-04-12 NOTE — PLAN OF CARE
Problem: Patient Care Overview  Goal: Plan of Care Review  Outcome: Ongoing (interventions implemented as appropriate)   04/12/19 0089   Plan of Care Review   Progress improving   Coping/Psychosocial   Plan of Care Reviewed With patient   OTHER   Outcome Summary VSS, am labs, baseline confusion, turn Q2, slept well, dialysis today

## 2019-04-13 NOTE — PLAN OF CARE
Problem: Fall Risk (Adult)  Goal: Absence of Fall  Outcome: Ongoing (interventions implemented as appropriate)      Problem: Patient Care Overview  Goal: Plan of Care Review  Outcome: Ongoing (interventions implemented as appropriate)   04/13/19 0626   Plan of Care Review   Progress no change   Coping/Psychosocial   Plan of Care Reviewed With patient   OTHER   Outcome Summary HR SR with frequent PACs, otherwise stable, tunnel cath CDI, turn Q 2 hours, up to chair with assist, Hospice consulted, will continue to monitor       Problem: Renal Failure/Kidney Injury, Acute (Adult)  Goal: Signs and Symptoms of Listed Potential Problems Will be Absent, Minimized or Managed (Renal Failure/Kidney Injury, Acute)  Outcome: Ongoing (interventions implemented as appropriate)      Problem: Skin Injury Risk (Adult)  Goal: Skin Health and Integrity  Outcome: Ongoing (interventions implemented as appropriate)      Problem: Nutrition, Imbalanced: Inadequate Oral Intake (Adult)  Goal: Identify Related Risk Factors and Signs and Symptoms  Outcome: Outcome(s) achieved Date Met: 04/13/19    Goal: Improved Oral Intake  Outcome: Ongoing (interventions implemented as appropriate)    Goal: Prevent Further Weight Loss  Outcome: Ongoing (interventions implemented as appropriate)

## 2019-04-13 NOTE — CONSULTS
Met with patient, spouse, son, and daughter at bedside. Explanation of services provided with focus on home vs GIP/SB. Answered all family questions, discussed medications, code status, and goals of care.     [facility] GIP/SB services elected. All consent forms signed via spouse. Estephanie (RN) notified for discharge/readmit and transfer to  orders. MD agreeable to enter orders today.     Please call with any questions, concerns, or change in patient status.     Thank you for allowing us to participate in the care of this pleasant patient and family.    Mariana Pierre, RN  Admission Nurse  [facility]   [phone]

## 2019-04-13 NOTE — PLAN OF CARE
Problem: Patient Care Overview  Goal: Plan of Care Review  Outcome: Ongoing (interventions implemented as appropriate)   04/13/19 9369   Coping/Psychosocial   Plan of Care Reviewed With patient;family   Plan of Care Review   Progress declining   OTHER   Outcome Summary Pt transferred to [facility] for palliative care. Per discussion with patient and family, treatment preferences are comfort measures only and goal of care is comfort care. Pt denies any discomfort, she just states she is very tired. Family at d. Will monitor.       Problem: Dying Patient, Actively (Adult)  Goal: Identify Related Risk Factors and Signs and Symptoms  Outcome: Outcome(s) achieved Date Met: 04/13/19    Goal: Comfort/Pain Control  Outcome: Ongoing (interventions implemented as appropriate)    Goal: Peace/Preservation of Dignity During the Dying Process  Outcome: Ongoing (interventions implemented as appropriate)

## 2019-04-14 PROBLEM — Z51.5 PALLIATIVE CARE ENCOUNTER: Status: ACTIVE | Noted: 2019-01-01

## 2019-04-14 PROBLEM — N18.6 END STAGE RENAL DISEASE (HCC): Status: ACTIVE | Noted: 2019-01-01

## 2019-04-14 PROBLEM — F10.27 DEMENTIA ASSOCIATED WITH ALCOHOLISM WITHOUT BEHAVIORAL DISTURBANCE (HCC): Status: ACTIVE | Noted: 2019-01-01

## 2019-04-14 NOTE — PLAN OF CARE
Problem: Patient Care Overview  Goal: Plan of Care Review  Outcome: Ongoing (interventions implemented as appropriate)   04/14/19 1952   Coping/Psychosocial   Plan of Care Reviewed With patient;family   Plan of Care Review   Progress no change   OTHER   Outcome Summary Ativan given x1 early for restlessness/agitation with relief. Family visited for much of the day. Pt up to bsc with max assist. Pt also sat up in the chair for several hours. She ate about half of all her meals. No complaints of pain. Will monitor.

## 2019-04-14 NOTE — PROGRESS NOTES
[facility] Visit Report    Charu Bowens  [id number]  4/14/2019    Admission R/T [facility] Dx: YES      Reason for [facility] Admission: ESRD, Cirrhosis of the liver, metabolic encephalopathy, senile degeneration of the brain.      Symptom  Management: Restlessness      Nursing/Medication Recommendations: No recommendations at this time      Psychosocial Issues and Recommendations: Provide support to patient and family      Spiritual Concerns and Recommendations: None at present      [facility] Discharge Plans:  None at present, continue to monitor for decline. Daily RN assessment required for symptom management of restlessness using IV medications. Patient has stopped dialysis for ESRD and will also monitor for increased edema and soa. Patient is meeting criteria for GIP as a [facility] scattered bed patient at this time.      Review of Visit: Close monitoring for safety, daily RN assessment for symptom management of restlessness, comfort care for declining patient. Patient awake, answered simple questions but gets confused easily. Denies any pain or discomfort at present. Sitting up in chair, eating small amount of dinner. Color slightly dusky, edema noted to bilat LE. VS 97.6-82-/60 02 sat 96% on RA. Lungs clear to diminished. F/C intact with small amount of dark urine noted. Son and daughter at bedside and discussed declining status with them. They are aware of decline that will come with stopping dialysis. Emotional support provided. Discussed care with staff ROSALIE Newman and patient was restless this am and had IV Ativan 0.5mg and slept after for several hours. Patient has right chest dialysis catheter in place and right forearm IV site that is wrapped in Kerlix. RN reports that patient was able to take routine meds today without signs of swallowing difficulty. Will continue to see daily to assess needs, monitor status, offer support and facilitate any disposition needs that may arise.        Yanet Hart,  ROSALIE  [facility] Scattered Bed Nurse

## 2019-04-14 NOTE — PLAN OF CARE
Problem: Fall Risk (Adult)  Goal: Absence of Fall  Outcome: Ongoing (interventions implemented as appropriate)      Problem: Patient Care Overview  Goal: Plan of Care Review  Outcome: Ongoing (interventions implemented as appropriate)   04/13/19 1818 04/13/19 2107 04/14/19 0541   Coping/Psychosocial   Plan of Care Reviewed With --  patient;daughter;son --    Plan of Care Review   Progress declining --  --    OTHER   Outcome Summary --  --  Pt was very restless tonight. Asked for bedpan several times with zero stool. Increasingly confused. Pt had a hard time swallowing pills tonight (coughed for some time after). Educated family on what to expect. Continue comfort care.      Goal: Individualization and Mutuality  Outcome: Ongoing (interventions implemented as appropriate)    Goal: Discharge Needs Assessment  Outcome: Ongoing (interventions implemented as appropriate)      Problem: Dying Patient, Actively (Adult)  Goal: Comfort/Pain Control  Outcome: Ongoing (interventions implemented as appropriate)    Goal: Peace/Preservation of Dignity During the Dying Process  Outcome: Ongoing (interventions implemented as appropriate)

## 2019-04-15 NOTE — CONSULTS
[facility] Visit Report    Charu Bowens  [id number]  4/15/2019    Admission R/T [facility] Dx: Yes    Reason for [facility] Admission: ESRD, Cirrhosis of the liver, metabolic encephalopathy, senile degeneration of the brain.    Symptom  Management: Agitation and restlessness    Nursing/Medication Recommendations: Continue to provide comfort care as ordered. Contact [facility] 24/7 at [phone] with any questions or concerns.    Psychosocial Issues and Recommendations:    Spiritual Concerns and Recommendations:    [facility] Discharge Plans:No [facility] discharge plans at this time. Daily RN assessments required for symptom management of restlessness using IV meds. Pt is currently meeting GIP criteria as a [facility] scatter bed Pt.       Review of Visit (Include All Collaboration- including names of hospital and family involved during admission/visit):  Daily RN Visit: [facility] RN reports Pt is currently alone, oriented to self, has bed bound, eating/drinking-fair and has received prn IV meds. Meds given since midnight: Morphine 2mg IV Q1 prn x 1 and Ativan 0.5mg IV Q1 prn x 2. VS: T 98.1, HR 88 RR 18, /47, 93% room air. Pts PPs is at 30%. Upon entering room Pt is alone and resting comfortably with no pain, restlessness or soa noted. During my assessment Pt responds to name but otherwise drowsy/lethargic. Pt is slightly flushed and skin is warm to touch. Bruising noted to bilateral arms/hands. Trace edema noted to bilateral feet. Respirations are even and unlabored, lungs are CTA but diminished. Jimenez catheter in place, secure and draining dark yellow urine to BSD. Will continue to monitor with daily RN visits.      Ana Laura Conteh RN   Scatter Bed Team.

## 2019-04-15 NOTE — PROGRESS NOTES
"   LOS: 2 days    Patient Care Team:  Chayito Paredes MD as PCP - General (Internal Medicine)  Johnson Turner DPM as PCP - Claims Attributed  Dano Art MD as Consulting Physician (Hematology and Oncology)  Navarro Hung MD as Referring Physician (Nephrology)    Chief Complaint:    No chief complaint on file.    Follow up acute kidney injury on chronic kidney disease  Subjective     Interval History:   Very groggy having received both morphine and Ativan early this morning after patient experienced leg cramps; has eaten poorly today due to lethargy; denies SOB    Review of Systems:   As noted above    Objective     Vital Signs  Temp:  [98.1 °F (36.7 °C)] 98.1 °F (36.7 °C)  Heart Rate:  [85-88] 88  Resp:  [16-18] 18  BP: (128-136)/(47-56) 136/47    Flowsheet Rows      First Filed Value   Admission Height  160 cm (63\") Documented at 04/03/2019 1500   Admission Weight  94.9 kg (209 lb 3.2 oz) Documented at 04/03/2019 1500          No intake/output data recorded.  I/O last 3 completed shifts:  In: 480 [P.O.:480]  Out: 350 [Urine:350]    Intake/Output Summary (Last 24 hours) at 4/15/2019 1651  Last data filed at 4/15/2019 1233  Gross per 24 hour   Intake 360 ml   Output 350 ml   Net 10 ml       Physical Exam:  General Appearance:  Lethargic but does awaken and is conversant, no acute distress, obese, chronically ill  Skin: warm and dry  HEENT: pupils round and reactive to light, oral mucosa normal   Neck: supple, no JVD, trachea midline  Lungs: Decreased breath sounds bilaterally, unlabored breathing effort  Heart: irreg irreg, normal S1 and S2, no S3, no rub  Abdomen: soft, non-tender, present bowel sounds to auscultation; +distended  :  No palpable bladder, no CVA tenderness  Extremities: 2+ pretibial edema extending to her hips, no cyanosis or clubbing  Neuro: Not very verbal due to lethargy; moving all extremities  Skin: right chest TDC with mild bruising            Results Review:  "   Results from last 7 days   Lab Units 04/12/19  0631 04/11/19  0707 04/10/19  0612   SODIUM mmol/L 133* 140 136   POTASSIUM mmol/L 3.5 3.8 3.8   CHLORIDE mmol/L 98 103 105   CO2 mmol/L 19.1* 21.1* 15.2*   BUN mg/dL 48* 38* 56*   CREATININE mg/dL 4.29* 3.42* 3.98*   CALCIUM mg/dL 8.0* 7.9* 8.1*   BILIRUBIN mg/dL  --  3.9*  --    ALK PHOS U/L  --  114  --    ALT (SGPT) U/L  --  14  --    AST (SGOT) U/L  --  124*  --    GLUCOSE mg/dL 128* 101* 132*       Estimated Creatinine Clearance: 10.8 mL/min (A) (by C-G formula based on SCr of 4.29 mg/dL (H)).    Results from last 7 days   Lab Units 04/12/19  0631 04/11/19  0707 04/09/19  0630   MAGNESIUM mg/dL  --  2.2  --    PHOSPHORUS mg/dL 5.2* 4.1 4.6*             Results from last 7 days   Lab Units 04/11/19  0707 04/09/19  0459   WBC 10*3/mm3 2.95* 2.67*  2.67*   HEMOGLOBIN g/dL 11.0* 11.1*   PLATELETS 10*3/mm3 53* 62*               Imaging Results (last 24 hours)     ** No results found for the last 24 hours. **          allopurinol 100 mg Oral Daily   bumetanide 2 mg Oral BID   lactulose 30 g Oral Daily   metoprolol tartrate 12.5 mg Oral Nightly   pantoprazole 40 mg Oral Q AM   QUEtiapine 25 mg Oral Daily With Lunch   QUEtiapine 50 mg Oral Nightly   rifaximin 550 mg Oral Daily With Lunch & Dinner   sertraline 25 mg Oral Daily          Medication Review:   Current Facility-Administered Medications   Medication Dose Route Frequency Provider Last Rate Last Dose   • acetaminophen (TYLENOL) tablet 650 mg  650 mg Oral Q4H PRN Sourav Tamayo MD        Or   • acetaminophen (TYLENOL) 160 MG/5ML solution 650 mg  650 mg Oral Q4H PRN Sourav Tamayo MD        Or   • acetaminophen (TYLENOL) suppository 650 mg  650 mg Rectal Q4H PRN Sourav Tamayo MD       • allopurinol (ZYLOPRIM) tablet 100 mg  100 mg Oral Daily Sourav Tamayo MD   100 mg at 04/14/19 0853   • bumetanide (BUMEX) tablet 2 mg  2 mg Oral BID Sourav Tamayo MD   2 mg at 04/14/19 2025   •  diphenoxylate-atropine (LOMOTIL) 2.5-0.025 MG per tablet 1 tablet  1 tablet Oral Q2H PRN Sourav Tamayo MD       • Glycerin-Hypromellose- (ARTIFICIAL TEARS) 0.2-0.2-1 % ophthalmic solution solution 1 drop  1 drop Both Eyes Q30 Min PRN Sourav Tamayo MD       • glycopyrrolate (ROBINUL) injection 0.2 mg  0.2 mg Intravenous Q2H PRN Sourav Tamayo MD        Or   • glycopyrrolate (ROBINUL) injection 0.2 mg  0.2 mg Subcutaneous Q2H PRN Sourav Tamayo MD        Or   • glycopyrrolate (ROBINUL) injection 0.4 mg  0.4 mg Intravenous Q2H PRN Sourav Tamayo MD        Or   • glycopyrrolate (ROBINUL) injection 0.4 mg  0.4 mg Subcutaneous Q2H PRSourav Guerrero MD       • HYDROmorphone (DILAUDID) injection 0.5 mg  0.5 mg Intravenous Q1H PRN Sourav Tamayo MD        Or   • morphine injection 2 mg  2 mg Intravenous Q1H PRN Sourav Tamayo MD   2 mg at 04/15/19 0909    Or   • morphine concentrated solution solution 5 mg  5 mg Sublingual Q1H PRN Sourav Tamayo MD       • HYDROmorphone (DILAUDID) injection 1 mg  1 mg Intravenous Q1H PRSourav Guerrero MD        Or   • morphine injection 4 mg  4 mg Intravenous Q1H PRSourav Guerrero MD        Or   • morphine concentrated solution solution 10 mg  10 mg Sublingual Q1H PRSourav Guerrero MD       • HYDROmorphone (DILAUDID) injection 1.5 mg  1.5 mg Intravenous Q1H PRSourav Guerrero MD        Or   • Morphine injection 6 mg  6 mg Intravenous Q1H PRSourav Guerrero MD        Or   • morphine concentrated solution solution 20 mg  20 mg Sublingual Q1H PRN Sourav Tamayo MD       • lactulose (CHRONULAC) 10 GM/15ML solution 30 g  30 g Oral Daily Sourav Tamayo MD       • LORazepam (ATIVAN) injection 0.5 mg  0.5 mg Intravenous Q1H PRN Sourav Tamayo MD   0.5 mg at 04/15/19 0909    Or   • LORazepam (ATIVAN) 2 MG/ML concentrated solution 0.5 mg  0.5 mg Sublingual Q1H PRN Sourav Tamayo MD       • LORazepam (ATIVAN) injection  1 mg  1 mg Intravenous Q1H PRN Sourav Tamayo MD        Or   • LORazepam (ATIVAN) 2 MG/ML concentrated solution 1 mg  1 mg Sublingual Q1H PRN Sourav Tamayo MD       • LORazepam (ATIVAN) injection 2 mg  2 mg Intravenous Q1H PRN Sourav Tamayo MD        Or   • LORazepam (ATIVAN) 2 MG/ML concentrated solution 2 mg  2 mg Sublingual Q1H PRN Sourav Tamayo MD       • magic mouthwash oral supsension 5 mL  5 mL Swish & Spit Q4H PRN Sourav Tamayo MD   5 mL at 04/14/19 1725   • metoprolol tartrate (LOPRESSOR) tablet 12.5 mg  12.5 mg Oral Nightly Sourav Tamayo MD   12.5 mg at 04/14/19 2025   • miconazole (MICOTIN) 2 % powder 1 application  1 application Topical BID PRN Sourav Tamayo MD       • pantoprazole (PROTONIX) EC tablet 40 mg  40 mg Oral Q AM Sourav Tamayo MD   40 mg at 04/15/19 0533   • QUEtiapine (SEROquel) tablet 25 mg  25 mg Oral Daily With Lunch Sourav Tamayo MD   25 mg at 04/14/19 1437   • QUEtiapine (SEROquel) tablet 50 mg  50 mg Oral Nightly Sourav Tamayo MD   50 mg at 04/14/19 2025   • rifaximin (XIFAXAN) tablet 550 mg  550 mg Oral Daily With Lunch & Dinner Sourav Tamayo MD   550 mg at 04/14/19 1700   • Scopolamine (TRANSDERM-SCOP) 1.5 MG/3DAYS patch 1 patch  1 patch Transdermal Q72H PRN Sourav Tamayo MD       • sertraline (ZOLOFT) tablet 25 mg  25 mg Oral Daily Sourav Tamayo MD   25 mg at 04/14/19 0852       Assessment/Plan   1.  Acute kidney injury on chronic kidney disease, multifactorial and progressive to likely ESRD.  Vol excess.  No further dialysis  2.  Chronic kidney disease stage V at baseline associated with diabetic nephropathy, biopsy-proven.  Baseline creatinine had been in 3-3.5 range  3.  Diabetes mellitus type 2 with known diabetic nephropathy and retinopathy  4.  Cirrhosis, associated with HOANG and questionable hemochromatosis.  The patient has had hepatic encephalopathy often associated with noncompliance with lactulose  5.  Anemia  with chronic kidney disease  6.  Thrombocytopenia with known history of chronic thrombocytopenia  7.  History of gout  8.  Hypertension reasonably controlled  9.  Dementia       Plan:  1.  Palliative care underway  2.  Discussed with nursing re minimizing use of morphine and Ativan given very poor removal of each in the setting of cirrhosis--though, of course, primary goal is to keep the patient comfortable  3.  D/w patient's family at the bedside        Navarro Hung MD  04/15/19  4:51 PM

## 2019-04-15 NOTE — PROGRESS NOTES
Name: Charu Bowens ADMIT: 2019   : 1935  PCP: Chayito Paredes MD    MRN: [id number] LOS: 2 days   AGE/SEX: 84 y.o. female    ROOM: [facility]   Subjective   Patient is weak    Brief hospital course since admission:  The patient is a 84 y.o. 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  Please see admission H&P from 4/3/19 for further details.  She was followed by nephrology and failed to improve with conservative measures.  Much discussion was had with the family on whether or not to initiate dialysis given her advanced dementia and multiple other medical issues. Ultimately it was decided to attempt dialysis and she had a tunneled catheter placed on 19 with dialysis initiated thereafter.  She had multiple issues with dialysis mainly related to her attempting to climb out of bed as she did not realize what was going on.  Additionally she had some bradycardia and low blood pressure with this.  Further discussions were had with the family regarding the dangers of continued dialysis given her condition as well as the decline in quality of life with this.  The family met with [facility] and they decided to cease dialysis and pursue comfort measures.         I have reviewed past medical history, family history, social history and allergies.  No changes from admission note.      Review of Systems   Unable to perform ROS: Acuity of condition          Objective   Vital Signs  Temp:  [98.1 °F (36.7 °C)] 98.1 °F (36.7 °C)  Heart Rate:  [85-88] 88  Resp:  [16-18] 18  BP: (128-136)/(47-56) 136/47  SpO2:  [93 %-96 %] 93 %  on   ;   Device (Oxygen Therapy): room air  There is no height or weight on file to calculate BMI.    Intake/Output Summary (Last 24 hours) at 4/15/2019 1707  Last data filed at 4/15/2019 1233  Gross per 24 hour   Intake 360  ml   Output 350 ml   Net 10 ml       Physical Exam   Constitutional: She is oriented to person, place, and time. She appears lethargic. No distress.   HENT:   Head: Normocephalic and atraumatic.   Eyes: Pupils are equal, round, and reactive to light. No scleral icterus.   Cardiovascular: Normal rate and regular rhythm.   Pulmonary/Chest: Effort normal. No respiratory distress. She has decreased breath sounds. She has no wheezes.   Abdominal: Soft. Bowel sounds are normal. There is no hepatosplenomegaly.   Neurological: She is oriented to person, place, and time. She appears lethargic.   Skin: No cyanosis. Nails show no clubbing.   Psychiatric: She has a normal mood and affect. Her behavior is normal.       Results Review:    Results from last 7 days   Lab Units 04/11/19  0707 04/09/19  0459   WBC 10*3/mm3 2.95* 2.67*  2.67*   HEMOGLOBIN g/dL 11.0* 11.1*   HEMATOCRIT % 33.9* 33.2*   PLATELETS 10*3/mm3 53* 62*     Results from last 7 days   Lab Units 04/12/19  0631 04/11/19  0707 04/10/19  0612   SODIUM mmol/L 133* 140 136   POTASSIUM mmol/L 3.5 3.8 3.8   CHLORIDE mmol/L 98 103 105   CO2 mmol/L 19.1* 21.1* 15.2*   BUN mg/dL 48* 38* 56*   CREATININE mg/dL 4.29* 3.42* 3.98*   GLUCOSE mg/dL 128* 101* 132*   CALCIUM mg/dL 8.0* 7.9* 8.1*         Hemoglobin A1C:  Lab Results   Component Value Date    HGBA1C 8.60 (H) 08/02/2018     Glucose Range:  Glucose   Date/Time Value Ref Range Status   04/13/2019 1136 177 (H) 70 - 130 mg/dL Final   04/13/2019 0621 123 70 - 130 mg/dL Final   04/12/2019 2032 169 (H) 70 - 130 mg/dL Final   04/12/2019 1725 136 (H) 70 - 130 mg/dL Final         allopurinol 100 mg Oral Daily   bumetanide 2 mg Oral BID   lactulose 30 g Oral Daily   metoprolol tartrate 12.5 mg Oral Nightly   pantoprazole 40 mg Oral Q AM   QUEtiapine 25 mg Oral Daily With Lunch   QUEtiapine 50 mg Oral Nightly   rifaximin 550 mg Oral Daily With Lunch & Dinner   sertraline 25 mg Oral Daily      Diet Regular  Assessment/Plan        Assessment/Plan      Active Hospital Problems    Diagnosis  POA   • **Palliative care encounter [Z51.5]  Not Applicable   • Dementia associated with alcoholism without behavioral disturbance (CMS/HCC) [F10.27]  Yes   • End stage renal disease (CMS/HCC) [N18.6]  Yes   • Hepatic encephalopathy (CMS/HCC) [K72.90]  Yes   • Cirrhosis of liver (CMS/HCC) [K74.60]  Yes      Resolved Hospital Problems   No resolved problems to display.       Continue comfort measures.        Chris Brown MD  [facility]  04/15/19

## 2019-04-15 NOTE — PLAN OF CARE
Problem: Fall Risk (Adult)  Goal: Absence of Fall  Outcome: Ongoing (interventions implemented as appropriate)      Problem: Patient Care Overview  Goal: Plan of Care Review  Outcome: Ongoing (interventions implemented as appropriate)   04/15/19 0892   Coping/Psychosocial   Plan of Care Reviewed With patient;family   Plan of Care Review   Progress declining   OTHER   Outcome Summary Pt alert but confused this day shift. During 9AM turns patient screamed out in leg pain with repositioning. RN asked patient if she wanted pain medication and she requested some. RN medicated with 2mg of morphine for pain and 0.5 mg of ativan for anxiety, restlessness. RN did not medicate for the rest of the shift, but during turns patient continued to grimace and show signs of discomfort. When asking patient if she'd like anything for pain - she denied. RN was told by NA that son was upset that patient was medicated because she was not as awake as she had been. Dr. Navarro Hung spoke with son and communicated with nurse to use least sedating medication to treat pain per sons request. Patient's spouse and daughter were at bedside earlier in shift and showed signs of acceptance towards RN for medicating patient with morphine to treat leg pain. Daughter even shared information explaining to RN that patient had history of gout that could be causing her mother's discomfort in her legs. Patient complained of a 5/10 pain level in her back and legs at 5PM turns, but denied pain medication when RN asked patient. Patient sleeping for most of the day, but arousable and could answer some questions. Pt currently resting. Will continue to monitor.        Problem: Dying Patient, Actively (Adult)  Goal: Comfort/Pain Control  Outcome: Ongoing (interventions implemented as appropriate)    Goal: Peace/Preservation of Dignity During the Dying Process  Outcome: Ongoing (interventions implemented as appropriate)

## 2019-04-15 NOTE — PROGRESS NOTES
Case Management Discharge Note    Final Note: The patient was admitted to a [facility] scattered bed on 4/12/19. SHREYA Mariano Rn, CCP.     Destination - Selection Complete      Service Provider Request Status Selected Services Address Phone Number Fax Number    [facility] [phone] [facility] [id number] [phone]      Durable Medical Equipment      No service has been selected for the patient.      Dialysis/Infusion      No service has been selected for the patient.      Home Medical Care      No service has been selected for the patient.      Therapy      No service has been selected for the patient.      Community Resources      No service has been selected for the patient.             Final Discharge Disposition Code: 51 - hospice medical facility

## 2019-04-15 NOTE — NURSING NOTE
Day shift RN discussed with daughter and son regarding GOC for 20-30 minutes. Family expressed their concerns with using less sedating medications like oral tylenol for pain instead of oral or IV morphine. RN explained patient's liver failure and effects that will have on metabolizing medication and that diagnosis would also eventually impair patient's orientation. RN also explained that patient verbalized and showed signs/symptoms of pain throughout entire shift today with turns. RN did not medicate at 1PM and 5PM because patient denied intervention. I explained as her orientation continues to fail her as the dying process progresses she may deny interventions, but still show signs/symptoms that nursing can observe as positive for pain. RN recommended that patient's children discuss GOC with their dad, the patient's spouse and possibly have another meeting with Kimi Bonner or [facility] regarding GOC.     At this time family wants nursing to only use tylenol to treat pain in hopes of patient regaining full orientation. RN discussed with nightshift RNDivya.

## 2019-04-15 NOTE — PLAN OF CARE
Problem: Fall Risk (Adult)  Goal: Absence of Fall  Outcome: Ongoing (interventions implemented as appropriate)      Problem: Patient Care Overview  Goal: Plan of Care Review  Outcome: Ongoing (interventions implemented as appropriate)   04/15/19 0752   Coping/Psychosocial   Plan of Care Reviewed With patient   Plan of Care Review   Progress no change   OTHER   Outcome Summary Pt has required ativan x2 tonight due to restlessness and anxiety with good effect. Pt otherwise has slept well between care. Taking pills whole with water. Dressing changed to dialysi cath. Will monitorl       Problem: Dying Patient, Actively (Adult)  Goal: Comfort/Pain Control  Outcome: Ongoing (interventions implemented as appropriate)    Goal: Peace/Preservation of Dignity During the Dying Process  Outcome: Ongoing (interventions implemented as appropriate)

## 2019-04-15 NOTE — PROGRESS NOTES
Case Management Discharge Note    Final Note: The patient was admitted to a [facility] scattered bed on 4/13/19. SHREYA Mariano Rn, CCP.     Destination - Selection Complete      Service Provider Request Status Selected Services Address Phone Number Fax Number    [facility] [id number] [facility] [id number] [phone]      Durable Medical Equipment      No service has been selected for the patient.      Dialysis/Infusion      No service has been selected for the patient.      Home Medical Care      No service has been selected for the patient.      Therapy      No service has been selected for the patient.      Community Resources      No service has been selected for the patient.             Final Discharge Disposition Code: 51 - hospice medical facility

## 2019-04-15 NOTE — PROGRESS NOTES
Discharge Planning Assessment  [facility]     Patient Name: Charu Bowens  MRN: [id number]  Today's Date: 4/15/2019    Admit Date: 4/3/2019    Discharge Needs Assessment    No documentation.       Discharge Plan     Row Name 04/15/19 1024       Plan    Plan Comments  The patient was transferred to [facility] from [facility] on 4/13/19. The patient is palliative. [facility] met with the family and they signed consents for a [facility] scattered bed on 4/13/19. SHREYA Mariano RN, CCP    Row Name 04/15/19 1019       Plan    Final Discharge Disposition Code  51 - hospice medical facility    Final Note  The patient was admitted to a [facility] scattered bed on 4/13/19. SHREYA Mariano Rn, CCP.         Destination - Selection Complete      Service Provider Request Status Selected Services Address Phone Number Fax Number    [facility] [address] [facility] [address] [phone]    [facility] Pending - Request Sent N/A [address] [facility] [address] [phone]    [facility] Pending - Request Sent N/A [address] [facility] [address] [phone]      Durable Medical Equipment      No service coordination in this encounter.      Dialysis/Infusion      No service coordination in this encounter.      Home Medical Care      Service Provider Request Status Selected Services Address Phone Number Fax Number    [facility] [address] [address] [phone]      Therapy      No service coordination in this encounter.      Community Resources      No service coordination in this encounter.        Expected Discharge Date and Time     Expected Discharge Date Expected Discharge Time    Apr 13, 2019  4:53 PM        Demographic Summary    No documentation.       Functional Status    No documentation.       Psychosocial    No  documentation.       Abuse/Neglect    No documentation.       Legal    No documentation.       Substance Abuse    No documentation.       Patient Forms    No documentation.           Lisa Mariano RN

## 2019-04-15 NOTE — H&P
Patient Name:  Charu Bowens  YOB: 1935  MRN:  [id number]  Admit Date:  4/13/2019  Patient Care Team:  Chayito Paredes MD as PCP - General (Internal Medicine)  Johnson Turner DPM as PCP - Claims Attributed  Dano Art MD as Consulting Physician (Hematology and Oncology)  Navarro Hung MD as Referring Physician (Nephrology)      Subjective   History Present Illness   Chief Complaint: Generalized weakness  The patient is a 84 y.o. 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  Please see admission H&P from 4/3/19 for further details.  She was followed by nephrology and failed to improve with conservative measures.  Much discussion was had with the family on whether or not to initiate dialysis given her advanced dementia and multiple other medical issues. Ultimately it was decided to attempt dialysis and she had a tunneled catheter placed on 4/8/19 with dialysis initiated thereafter.  She had multiple issues with dialysis mainly related to her attempting to climb out of bed as she did not realize what was going on.  Additionally she had some bradycardia and low blood pressure with this.  Further discussions were had with the family regarding the dangers of continued dialysis given her condition as well as the decline in quality of life with this.  [facility] and [facility] decided to cease dialysis and pursue comfort measures.  She will be discharged to a [facility] scattered bed.      History of Present Illness  Review of Systems   Unable to perform ROS: Dementia    History is unreliable though she denies most complaints.    Personal History     Past Medical History:   Diagnosis Date   • Anxiety    • Arthritis    • C. difficile colitis    • CHF (congestive heart failure) (CMS/[facility])    • Chronic  kidney disease     Stage III   • Dementia    • Diabetes mellitus (CMS/HCC)     Type 2   • Diskitis 2015    L4-L5 w/abscess formation   • Hemochromatosis    • History of anemia    • History of blood transfusion    • History of pneumonia    • History of sepsis    • Hypertension    • Liver disease     Cirrhosis likely secondary to HOANG   • HOANG (nonalcoholic steatohepatitis)    • Pancytopenia (CMS/HCC)    • Spinal stenosis    • Splenomegaly    • Thrombocytopenia, chronic    • TIA (transient ischemic attack)      Past Surgical History:   Procedure Laterality Date   • ABDOMINAL SURGERY     • INSERTION HEMODIALYSIS CATHETER N/A 2019    Procedure: HEMODIALYSIS CATHETER INSERTION;  Surgeon: Vincent Jean-Baptiste MD;  Location: [facility] ;  Service: Vascular   • KNEE SURGERY     • RENAL BIOPSY  10/2015   • TONSILLECTOMY     • WISDOM TOOTH EXTRACTION       Family History   Problem Relation Age of Onset   • Heart disease Mother    • Diabetes Mother    • Heart disease Father    • Breast cancer Sister 45   • Hypertension Sister    • Diabetes Sister    • Colon cancer Brother 50   • Diabetes Brother      Social History     Tobacco Use   • Smoking status: Former Smoker     Types: Cigarettes     Last attempt to quit: 1985     Years since quittin.4   • Smokeless tobacco: Never Used   • Tobacco comment: Heavy in past, but quit   Substance Use Topics   • Alcohol use: No   • Drug use: No     Medications Prior to Admission   Medication Sig Dispense Refill Last Dose   • acetaminophen (TYLENOL) 650 MG 8 hr tablet Take 650 mg by mouth Every 8 (Eight) Hours As Needed for Mild Pain (1-3).   Taking   • allopurinol (ZYLOPRIM) 100 MG tablet Take 100 mg by mouth 2 (Two) Times a Day.      • bumetanide (BUMEX) 2 MG tablet Take 1 tablet by mouth 2 (Two) Times a Day. 60 tablet 0 Taking   • cholecalciferol (VITAMIN D3) 1000 units tablet Take 1,000 Units by mouth Daily.   Taking   • ferrous sulfate 325 (65  FE) MG tablet Take 325 mg by mouth Daily.      • guaiFENesin (MUCINEX) 600 MG 12 hr tablet Take 600 mg by mouth Every Night.   Taking   • insulin NPH-insulin regular (Novolin 70/30) (70-30) 100 UNIT/ML injection Inject 40 Units under the skin into the appropriate area as directed Daily With Breakfast & Lunch.   Taking   • lactulose (CHRONULAC) 10 GM/15ML solution Take 30 g by mouth Daily.   Taking   • lidocaine (LIDODERM) 5 % Place 1 patch on the skin as directed by provider As Needed. Remove & Discard patch within 12 hours or as directed by MD    Taking   • Melatonin 10 MG tablet Take 10 mg by mouth Every Night.      • metoprolol tartrate (LOPRESSOR) 25 MG tablet Take 12.5 mg by mouth Every Night.   Taking   • Multiple Vitamins-Minerals (MULTIVITAMIN WITH MINERALS) tablet tablet Take 1 tablet by mouth Daily.      • potassium chloride (K-DUR) 10 MEQ CR tablet Take 10 mEq by mouth 2 (Two) Times a Day.      • Probiotic Product (PROBIOTIC DAILY) capsule Take 1 capsule by mouth Daily.      • QUEtiapine (SEROquel) 25 MG tablet Take 25 mg by mouth Daily With Lunch.      • QUEtiapine (SEROquel) 25 MG tablet Take 50 mg by mouth Every Night.      • rifaximin (XIFAXAN) 550 MG tablet Take 550 mg by mouth Daily With Lunch & Dinner.   Taking   • sertraline (ZOLOFT) 25 MG tablet Take 25 mg by mouth Daily.      • traMADol (ULTRAM) 50 MG tablet Take 50 mg by mouth Every Night.   Taking   • vitamin B-12 (CYANOCOBALAMIN) 1000 MCG tablet Take 1,000 mcg by mouth Daily.        Allergies:    Allergies   Allergen Reactions   • Ciprofloxacin      Itching and red streaks   • Levaquin [Levofloxacin]    • Promethazine Hcl      Itching, red streaks when given IV       Objective    Objective     Vital Signs  Temp:  [97.6 °F (36.4 °C)-98.1 °F (36.7 °C)] 98.1 °F (36.7 °C)  Heart Rate:  [82-85] 85  Resp:  [16] 16  BP: (114-128)/(56-60) 128/56  SpO2:  [96 %] 96 %  on   ;   Device (Oxygen Therapy): room air  There is no height or weight on file to  calculate BMI.    Physical Exam  Constitutional: No distress.   HENT:  Head: Normocephalic and atraumatic.   Mouth/Throat: Oropharynx is clear and moist.   Eyes: Conjunctivae are normal. Pupils are equal, round, and reactive to light.   Neck: Normal range of motion. Neck supple. No JVD present.   Cardiovascular: Normal rate, regular rhythm and intact distal pulses.   Pulmonary/Chest: Effort normal and breath sounds normal.   Abdominal: Soft. Bowel sounds are normal. There is no tenderness.   Musculoskeletal: She exhibits edema (3+ BLE). She exhibits no tenderness.   Neurological: She is alert.   Skin: Skin is warm and dry. She is not diaphoretic.   Psychiatric: She has a normal mood and affect. Her behavior is normal. Cognition and memory are impaired (known dementia).   Nursing note and vitals reviewed.      Results Review:  I reviewed the patient's new clinical results.  I reviewed the patient's new imaging results and agree with the interpretation.  I reviewed the patient's other test results and agree with the interpretation    Lab Results (last 24 hours)     ** No results found for the last 24 hours. **          Imaging Results (last 24 hours)     ** No results found for the last 24 hours. **          Results for orders placed during the hospital encounter of 11/16/16   Adult Transthoracic Echo Complete    Narrative · All left ventricular wall segments contract normally.  · Left atrial cavity size is borderline dilated.  · Mild-to-moderate mitral valve regurgitation is present  · Mild to moderate tricuspid valve regurgitation is present.  · Left Ventricle: Left ventricle not well visualized. Calculated EF =   53.6%.  · There is no evidence of pericardial effusion.          No orders to display        Assessment/Plan     Active Hospital Problems    Diagnosis POA   • **Palliative care encounter [Z51.5] Not Applicable   • Dementia associated with alcoholism without behavioral disturbance (CMS/[facility]) [F10.27] Yes   •  End stage renal disease (CMS/HCC) [N18.6] Yes   • Hepatic encephalopathy (CMS/HCC) [K72.90] Yes   • Cirrhosis of liver (CMS/HCC) [K74.60] Yes     Continue comfort measures.  Will see what her care requirements will be and plan disposition from there.  Based on PPS of 30% life expectancy is in the weeks range.    Sourav Tamayo MD  [facility]  04/14/19  8:25 PM

## 2019-04-16 NOTE — PROGRESS NOTES
Name: Charu Bowens ADMIT: 2019   : 1935  PCP: Chayito Paredes MD    MRN: [id number] LOS: 3 days   AGE/SEX: 84 y.o. female    ROOM: [facility]   Subjective   Patient is awake and alert  Son at bed side and the patient is eating     Brief hospital course since admission:  The patient is a 84 y.o. 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  Please see admission H&P from 4/3/19 for further details.  She was followed by nephrology and failed to improve with conservative measures.  Much discussion was had with the family on whether or not to initiate dialysis given her advanced dementia and multiple other medical issues. Ultimately it was decided to attempt dialysis and she had a tunneled catheter placed on 19 with dialysis initiated thereafter.  She had multiple issues with dialysis mainly related to her attempting to climb out of bed as she did not realize what was going on.  Additionally she had some bradycardia and low blood pressure with this.  Further discussions were had with the family regarding the dangers of continued dialysis given her condition as well as the decline in quality of life with this.  The family met with [facility] and they decided to cease dialysis and pursue comfort measures.       I have reviewed past medical history, family history, social history and allergies.  No changes from admission note.      Review of Systems   Constitutional: Positive for fatigue. Negative for fever.   Respiratory: Negative for shortness of breath and wheezing.    Cardiovascular: Positive for leg swelling. Negative for chest pain.          Objective   Vital Signs  Temp:  [97.1 °F (36.2 °C)-97.9 °F (36.6 °C)] 97.3 °F (36.3 °C)  Heart Rate:  [87-92] 87  Resp:  [16-20] 20  BP: (103-127)/(48-57) 127/57  SpO2:  [93 %-98 %] 93 %  on   ;    Device (Oxygen Therapy): room air  There is no height or weight on file to calculate BMI.    Intake/Output Summary (Last 24 hours) at 4/16/2019 1738  Last data filed at 4/16/2019 1714  Gross per 24 hour   Intake 720 ml   Output 240 ml   Net 480 ml       Physical Exam   Constitutional: She is oriented to person, place, and time. She appears well-developed and well-nourished. No distress.   HENT:   Head: Normocephalic and atraumatic.   Eyes: Pupils are equal, round, and reactive to light. No scleral icterus.   Cardiovascular: Normal rate and regular rhythm.   Pulmonary/Chest: Effort normal. No respiratory distress. She has decreased breath sounds. She has no wheezes.   Abdominal: Soft. Bowel sounds are normal. There is no hepatosplenomegaly.   Musculoskeletal: She exhibits edema.   Neurological: She is alert and oriented to person, place, and time.   Skin: No cyanosis. Nails show no clubbing.   Psychiatric: She has a normal mood and affect. Her behavior is normal.       Results Review:    Results from last 7 days   Lab Units 04/11/19  0707   WBC 10*3/mm3 2.95*   HEMOGLOBIN g/dL 11.0*   HEMATOCRIT % 33.9*   PLATELETS 10*3/mm3 53*     Results from last 7 days   Lab Units 04/12/19  0631 04/11/19  0707 04/10/19  0612   SODIUM mmol/L 133* 140 136   POTASSIUM mmol/L 3.5 3.8 3.8   CHLORIDE mmol/L 98 103 105   CO2 mmol/L 19.1* 21.1* 15.2*   BUN mg/dL 48* 38* 56*   CREATININE mg/dL 4.29* 3.42* 3.98*   GLUCOSE mg/dL 128* 101* 132*   CALCIUM mg/dL 8.0* 7.9* 8.1*         Hemoglobin A1C:  Lab Results   Component Value Date    HGBA1C 8.60 (H) 08/02/2018     Glucose Range:  No results found for: POCGLU      allopurinol 100 mg Oral Daily   bumetanide 2 mg Oral BID   lactulose 30 g Oral Daily   metoprolol tartrate 12.5 mg Oral Nightly   pantoprazole 40 mg Oral Q AM   QUEtiapine 25 mg Oral Daily With Lunch   QUEtiapine 50 mg Oral Nightly   rifaximin 550 mg Oral Daily With Lunch & Dinner   sertraline 25 mg Oral Daily      Diet  Regular  Assessment/Plan       Assessment/Plan      Active Hospital Problems    Diagnosis  POA   • **Palliative care encounter [Z51.5]  Not Applicable   • Dementia associated with alcoholism without behavioral disturbance (CMS/HCC) [F10.27]  Yes   • End stage renal disease (CMS/HCC) [N18.6]  Yes   • Hepatic encephalopathy (CMS/HCC) [K72.90]  Yes   • Cirrhosis of liver (CMS/HCC) [K74.60]  Yes      Resolved Hospital Problems   No resolved problems to display.       Continue comfort measures.  No labs        Chris Brown MD  [facility]  04/16/19

## 2019-04-16 NOTE — CONSULTS
"[facility] Visit Report    Charu Bowens  [id number]  4/16/2019    Admission R/T [facility] Dx: Yes    Reason for [facility] Admission: ESRD, Cirrhosis of the liver, metabolic encephalopathy, senile degeneration of the brain.      Symptom  Management: Agitation and restlessness      Nursing/Medication Recommendations: Continue to provide comfort care as ordered. Contact [facility] 24/7 at [phone] with any questions or concerns.      Psychosocial Issues and Recommendations:    Spiritual Concerns and Recommendations:    [facility]:No [facility] discharge plans at this time. Daily RN assessments required for symptom management of restlessness using IV meds. Pt is currently not meeting GIP criteria as a [facility] scatter bed Pt. D/T Pts son refusing IV pain/agitation medication. Wants his mom \"less sedated\" only wants us to treat pain with PO Tylenol. Will follow up with Pts  who is at bedside.         Review of Visit (Include All Collaboration- including names of hospital and family involved during admission/visit): Daily RN Visit: Ani- facility RN reports Pt is more awake and alert today and oriented to self. Eating and drinking about 25% of last nights dinner and this mornings breakfast. RN reports Pts son wants his mom \"less sedated\" and  to only treat pain with PO Tylenol. RN asked if I could provide further education regarding meds and clarify goals of care. Meds ordered but not given: Morphine 2mg IV Q1 prn x 0 and Ativan 0.5mg IV Q1 prn x 0.  VS: T 97.1, HR 92 RR 16, /48 and 93% room air. Pts PPs is at 30%. Upon entering room Pt  at bedside feeding Pt lunch. Pt resting comfortably with no pain, restlessness or soa noted. During my assessment Pt is oriented to self and is asking to get up to go to the restroom. Pts RN at bedside to place pt on bedpan. During this time  and I stepped out of the room and talked about his goals of plan regarding his wife care. Explained that " Pt was only being treated with PO Tylenol  for pain per sons request.  then decided to call his son for further explanation . During this time I stepped back into Pts room and continued my assessment. Pt currently denies any pain and is slightly flushed and jaundice. Skin is warm to touch, bruising noted to bilateral arms/hands. Trace edema noted to bilateral feet. Respirations are even and unlabored, lungs are CTA but diminished. Jimenez catheter in place, secure and draining dark yellow urine to BSD. Pts  back in room and stated that he had talked to his son and that we could resume with previous plan, which was to keep pt pain free and comfortable using IV meds. Pts  aware of pts slow decline but accepting. Updated RN Ani and Kimi -palliative care coordinator- V/U. Will continue to monitor with daily RN visits.      Ana Laura Conteh RN   Scatter Bed Team.

## 2019-04-16 NOTE — PLAN OF CARE
Problem: Patient Care Overview  Goal: Plan of Care Review  Outcome: Ongoing (interventions implemented as appropriate)   04/16/19 3466   Coping/Psychosocial   Plan of Care Reviewed With family   Plan of Care Review   Progress no change   OTHER   Outcome Summary Pt more alert this afternoon, goals of care/medicating for pain discussed with spouse per hospice nurse  instructs us to give her whatever we feel she needs. Pt has repeatedly denied pain, with non-verbal indicators absent. Family at bedside most of day. Will continue to monitor for comfort.      Goal: Individualization and Mutuality  Outcome: Ongoing (interventions implemented as appropriate)      Problem: Dying Patient, Actively (Adult)  Goal: Comfort/Pain Control  Outcome: Ongoing (interventions implemented as appropriate)    Goal: Peace/Preservation of Dignity During the Dying Process  Outcome: Ongoing (interventions implemented as appropriate)

## 2019-04-16 NOTE — PLAN OF CARE
Problem: Fall Risk (Adult)  Goal: Absence of Fall  Outcome: Ongoing (interventions implemented as appropriate)      Problem: Patient Care Overview  Goal: Plan of Care Review  Outcome: Ongoing (interventions implemented as appropriate)   04/16/19 0441   Coping/Psychosocial   Plan of Care Reviewed With patient;daughter   Plan of Care Review   Progress declining   OTHER   Outcome Summary Patient slept between care. Patient medicated for pain x1 per her request. Daughter went home for the night. Maintained comfort measures per palliative care protocol. Will continue to monitor vital signs and comfort.     Goal: Discharge Needs Assessment  Outcome: Ongoing (interventions implemented as appropriate)    Goal: Interprofessional Rounds/Family Conf  Outcome: Ongoing (interventions implemented as appropriate)      Problem: Dying Patient, Actively (Adult)  Goal: Comfort/Pain Control  Outcome: Ongoing (interventions implemented as appropriate)    Goal: Peace/Preservation of Dignity During the Dying Process  Outcome: Ongoing (interventions implemented as appropriate)

## 2019-04-17 NOTE — CONSULTS
[facility] Visit Report    Charu Bowens  [id number]  4/17/2019    Admission R/T [facility] Dx: Yes    Reason for [facility] Admission: ESRD, Cirrhosis of the liver, metabolic encephalopathy, senile degeneration of the brain.      Symptom  Management: Agitation and restlessness      Nursing/Medication Recommendations: Continue to provide comfort care as ordered. Contact [facility] 24/7 at [phone] with any questions or concerns.       Psychosocial Issues and Recommendations:    Spiritual Concerns and Recommendations:    [facility] Discharge Plans:  No [facility] discharge plans at this time. Daily RN assessments required for symptom management of restlessness using IV meds. Pt is currently meeting GIP criteria as a [facility] scatter bed Pt.      Review of Visit (Include All Collaboration- including names of hospital and family involved during admission/visit): Daily RN Visit: Jasmyne - facility RN reports Pt has been sleeping with no concerns at this time. Meds given since midnight: Morphine 2mg IV Q1 prn x 1 and Ativan 0.5mg IV Q1 prn x 1.  VS: T 98.6  HR 80, RR 18, /38 and 96% room air. Pts PPs is at 30% Upon entering room Pt is resting comfortably with no pain, restlessness or soa noted. Pt is more drowsy/lethargic today and is only mumbling incomprehensible words. Pts face  is slightly flushed and jaundice. Skin is warm to touch, bruising noted to bilateral arms/hands. 1+ edema noted to bilateral feet. Respirations are even and unlabored, lungs are CTA but diminished. Jimenez catheter in place, secure and draining- dark yellow urine to BSD. Pts  and daughter at bedside. Daughter reports Pt is only taking a few bites of food with minimal sips of fluids. Otherwise no other concerns reported at this time. Will continue to monitor with daily RN visits.      Ana Laura Conteh RN   Scatter Bed Team

## 2019-04-17 NOTE — PROGRESS NOTES
Name: Charu Bowens ADMIT: 2019   : 1935  PCP: Chayito Paredes MD    MRN: [id number] LOS: 4 days   AGE/SEX: 84 y.o. female    ROOM: [facility]   Subjective   Patient is not awake today had only soup in the afternoon  She is lethargic but comfortable not in any pain and distress    Brief hospital course since admission:  The patient is a 84 y.o. 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  Please see admission H&P from 4/3/19 for further details.  She was followed by nephrology and failed to improve with conservative measures.  Much discussion was had with the family on whether or not to initiate dialysis given her advanced dementia and multiple other medical issues. Ultimately it was decided to attempt dialysis and she had a tunneled catheter placed on 19 with dialysis initiated thereafter.  She had multiple issues with dialysis mainly related to her attempting to climb out of bed as she did not realize what was going on.  Additionally she had some bradycardia and low blood pressure with this.  Further discussions were had with the family regarding the dangers of continued dialysis given her condition as well as the decline in quality of life with this.  The family met with \[facility]\"" and they decided to cease dialysis and pursue comfort measures.       I have reviewed past medical history, family history, social history and allergies.  No changes from admission note.      Review of Systems   Constitutional: Positive for fatigue. Negative for fever.   Respiratory: Negative for shortness of breath and wheezing.    Cardiovascular: Positive for leg swelling. Negative for chest pain.          Objective   Vital Signs  Temp:  [97.4 °F (36.3 °C)-98.6 °F (37 °C)] 97.4 °F (36.3 °C)  Heart Rate:  [80-85] 85  Resp:  [16-18] 16  BP:  (108-114)/(38-49) 114/49  SpO2:  [96 %] 96 %  on   ;   Device (Oxygen Therapy): room air  There is no height or weight on file to calculate BMI.    Intake/Output Summary (Last 24 hours) at 4/17/2019 1852  Last data filed at 4/17/2019 1637  Gross per 24 hour   Intake --   Output 350 ml   Net -350 ml       Physical Exam   Constitutional: She appears well-developed and well-nourished. She appears lethargic. No distress.   HENT:   Head: Normocephalic and atraumatic.   Eyes: Pupils are equal, round, and reactive to light. No scleral icterus.   Cardiovascular: Normal rate and regular rhythm.   Pulmonary/Chest: Effort normal. No respiratory distress. She has decreased breath sounds. She has no wheezes.   Abdominal: Soft. Bowel sounds are normal. There is no hepatosplenomegaly.   Musculoskeletal: She exhibits edema.   Neurological: She appears lethargic.   Skin: No cyanosis. Nails show no clubbing.       Results Review:    Results from last 7 days   Lab Units 04/11/19  0707   WBC 10*3/mm3 2.95*   HEMOGLOBIN g/dL 11.0*   HEMATOCRIT % 33.9*   PLATELETS 10*3/mm3 53*     Results from last 7 days   Lab Units 04/12/19  0631 04/11/19  0707   SODIUM mmol/L 133* 140   POTASSIUM mmol/L 3.5 3.8   CHLORIDE mmol/L 98 103   CO2 mmol/L 19.1* 21.1*   BUN mg/dL 48* 38*   CREATININE mg/dL 4.29* 3.42*   GLUCOSE mg/dL 128* 101*   CALCIUM mg/dL 8.0* 7.9*         Hemoglobin A1C:  Lab Results   Component Value Date    HGBA1C 8.60 (H) 08/02/2018     Glucose Range:  No results found for: POCGLU      allopurinol 100 mg Oral Daily   bumetanide 2 mg Oral BID   lactulose 30 g Oral Daily   metoprolol tartrate 12.5 mg Oral Nightly   pantoprazole 40 mg Oral Q AM   QUEtiapine 25 mg Oral Daily With Lunch   QUEtiapine 50 mg Oral Nightly   rifaximin 550 mg Oral Daily With Lunch & Dinner   sertraline 25 mg Oral Daily      Diet Regular  Assessment/Plan       Assessment/Plan      Active Hospital Problems    Diagnosis  POA   • **Palliative care encounter [Z51.5]   Not Applicable   • Dementia associated with alcoholism without behavioral disturbance (CMS/HCC) [F10.27]  Yes   • End stage renal disease (CMS/HCC) [N18.6]  Yes   • Hepatic encephalopathy (CMS/HCC) [K72.90]  Yes   • Cirrhosis of liver (CMS/HCC) [K74.60]  Yes      Resolved Hospital Problems   No resolved problems to display.       Continue comfort measures.  No labs        Chris Brown MD  [facility]  04/17/19

## 2019-04-17 NOTE — PLAN OF CARE
Problem: Fall Risk (Adult)  Goal: Absence of Fall  Outcome: Ongoing (interventions implemented as appropriate)      Problem: Patient Care Overview  Goal: Plan of Care Review   04/17/19 0451   Coping/Psychosocial   Plan of Care Reviewed With patient   Plan of Care Review   Progress no change   OTHER   Outcome Summary pt medicated with ativan x1, q4 turns, Hemodialysis dressing changed. will continue to monitor and keep comfortable      Goal: Individualization and Mutuality  Outcome: Ongoing (interventions implemented as appropriate)   04/17/19 0451   Individualization   Patient Specific Goals (Include Timeframe) to keep comfortable    Patient Specific Interventions PRN pain meds, q4 turns, oral and sips        Problem: Dying Patient, Actively (Adult)  Goal: Comfort/Pain Control  Outcome: Ongoing (interventions implemented as appropriate)    Goal: Peace/Preservation of Dignity During the Dying Process  Outcome: Ongoing (interventions implemented as appropriate)

## 2019-04-17 NOTE — PROGRESS NOTES
Palliative Care Sw met with pt and pt's  at bedside. Pt resting when Sw arrived.  voiced that he has not been doing very well and that it has been difficult to see his wife in this state but is able to accept the situation more in the past few days.  discussed that his adult children have been having a difficult time accepting that patient is nearing end of life as well.  further explained that their son has been struggling the most emotionally with acceptance of situation and that their daughter is in the medical field so she has more understanding of the situation.  expressed positive quality of relationships and support with his children. Sw provided psychosocial support, encouragement, validation and normalization of his emotions, and positive reinforcement of his caregiving role. Sw advised  of availability and will continue to follow as needed.

## 2019-04-18 NOTE — PLAN OF CARE
Problem: Patient Care Overview  Goal: Plan of Care Review  Outcome: Ongoing (interventions implemented as appropriate)   04/17/19 2022   Coping/Psychosocial   Plan of Care Reviewed With patient;family   OTHER   Outcome Summary pt medicated prior to turns with morphine. Pt given x1 dose of ativan in the AM for agitation. Will continue to monitor and keep comfortable

## 2019-04-18 NOTE — PLAN OF CARE
Problem: Patient Care Overview  Goal: Plan of Care Review  Continue symptom management and comfort care   04/17/19 0451 04/17/19 2255 04/18/19 0353   Coping/Psychosocial   Plan of Care Reviewed With --  patient;family --    Plan of Care Review   Progress no change --  --    OTHER   Outcome Summary --  --  Patient rested well overnight. Medicated prior to turns for comfort. No family at bedside. Will continue to monitor.

## 2019-04-19 NOTE — PROGRESS NOTES
Name: Charu Bowens ADMIT: 2019   : 1935  PCP: Chayito Paredes MD    MRN: [id number] LOS: 6 days   AGE/SEX: 84 y.o. female  ROOM: [facility]     Subjective   Subjective   CC/reason for follow-up: palliative care  No acute events.  Patient rested most of the evening and today.  She is getting medicated with turns.  She has not taken anything by mouth.  She is sleeping this AM and I did not wake her.  Her  is at bedside.    Objective   Objective   Vital Signs  Temp:  [98 °F (36.7 °C)-99 °F (37.2 °C)] 98 °F (36.7 °C)  Heart Rate:  [77-91] 91  Resp:  [16] 16  BP: (118-148)/(55-72) 118/55  SpO2:  [94 %-99 %] 94 %  on   ;   Device (Oxygen Therapy): room air  There is no height or weight on file to calculate BMI.  Physical Exam   Constitutional: She appears lethargic. No distress.   HENT:   Head: Normocephalic and atraumatic.   Mouth/Throat: Oropharynx is clear and moist.   Neck: Neck supple. No JVD present.   Cardiovascular: Normal rate, regular rhythm and intact distal pulses.   Pulmonary/Chest: Effort normal and breath sounds normal. She has no rales.   Abdominal: Soft. Bowel sounds are normal.   Musculoskeletal: She exhibits edema (2+ BLE). She exhibits no tenderness.   Neurological: She appears lethargic.   Skin: Skin is warm and dry. She is not diaphoretic.   Nursing note and vitals reviewed.      Results Review:       I reviewed the patient's new clinical results.      Estimated Creatinine Clearance: 10.8 mL/min (A) (by C-G formula based on SCr of 4.29 mg/dL (H)).        No results found for: HGBA1C, POCGLU      allopurinol 100 mg Oral Daily   bumetanide 2 mg Oral BID   lactulose 30 g Oral Daily   metoprolol tartrate 12.5 mg Oral Nightly   pantoprazole 40 mg Oral Q AM   QUEtiapine 25 mg Oral Daily With Lunch   QUEtiapine 50 mg Oral Nightly   rifaximin 550 mg Oral Daily With Lunch & Dinner   sertraline 25 mg Oral Daily      Diet Regular       Assessment/Plan     Active Hospital Problems     Diagnosis  POA   • **Palliative care encounter [Z51.5]  Not Applicable   • Dementia associated with alcoholism without behavioral disturbance (CMS/HCC) [F10.27]  Yes   • End stage renal disease (CMS/HCC) [N18.6]  Yes   • Hepatic encephalopathy (CMS/HCC) [K72.90]  Yes   • Cirrhosis of liver (CMS/HCC) [K74.60]  Yes      Resolved Hospital Problems   No resolved problems to display.   Continue Comfort Measures.  Disposition TBD based on clinical course.       Sourav Tamayo MD  [facility]  04/19/19  11:27 AM

## 2019-04-19 NOTE — PLAN OF CARE
Problem: Fall Risk (Adult)  Goal: Absence of Fall  Outcome: Ongoing (interventions implemented as appropriate)      Problem: Patient Care Overview  Goal: Plan of Care Review  Outcome: Ongoing (interventions implemented as appropriate)   04/19/19 3438   Coping/Psychosocial   Plan of Care Reviewed With patient   Plan of Care Review   Progress declining   OTHER   Outcome Summary patient medicated prior to turns. fmaily at bedside this afternoon and supportive of patient. robinul added for congestion with good results. will conitue to monitor.        Problem: Dying Patient, Actively (Adult)  Goal: Comfort/Pain Control  Outcome: Ongoing (interventions implemented as appropriate)    Goal: Peace/Preservation of Dignity During the Dying Process  Outcome: Ongoing (interventions implemented as appropriate)

## 2019-04-19 NOTE — CONSULTS
[facility] Visit Report    Charu Bowens  [id number]  4/19/2019    Admission R/T [facility] Dx: Yes    Reason for [facility] Admission: ESRD, Cirrhosis of the liver, metabolic encephalopathy, senile degeneration of the brain.    Symptom  Management:Agitation and restlessness    Nursing/Medication Recommendations: Continue to provide comfort care as ordered. Contact [facility] 24/7 at [phone] with any questions or concerns.    Psychosocial Issues and Recommendations:    Spiritual Concerns and Recommendations:    [facility] Discharge Plans:   No [facility] discharge plans at this time. Pt is steadily declining and is not safe to transport. Daily RN assessments required for symptom management of restlessness using IV meds. Pt is currently meeting GIP criteria as a [facility] scatter bed Pt.    Review of Visit (Include All Collaboration- including names of hospital and family involved during admission/visit): Daily RN Visit: [facility] - facility RN reports Pt is reponsive to to pain and is being medicated with turns using IV Morphine. Meds given since midnight: Morphine 2mg IV Q1 prn x 4, Robinul 0.2mg IV Q2 prn x 1 and Scopolamine patch 1.5mg/72hrs. VS: T 98  HR 91, RR 16, /55 and 94% room air. Pts PPs is at 10%. Last PO/fluid intake was reported 2 days ago/Pts daughter. Upon entering room Pt is resting comfortably with no pain, restlessness or soa noted. Pt is minimally responsive to physical stimulation only otherwise unresponsive. Pt is slightly pale with a slight yellowish under tone. Skin is warm to touch, bruising noted to bilateral arms/hands. 1+ edema noted to bilateral feet. Respirations are even, unlabored and slightly shallow. Lungs:crackles noted throughout and diminished. Jimenez catheter in place, secure and draining- dark yellow urine to BSD. Pts son and daughter at bedside with no questions or concerns at this time. Aware and accepting of Pts declining status. Will continue to monitor with daily RN  visits.      Ana Laura Conteh RN   Scatter Bed Team

## 2019-04-19 NOTE — PLAN OF CARE
Problem: Patient Care Overview  Goal: Plan of Care Review   04/17/19 0451 04/18/19 0353 04/18/19 2156   Coping/Psychosocial   Plan of Care Reviewed With --  --  patient;family   Plan of Care Review   Progress no change --  --    OTHER   Outcome Summary --  Patient rested well overnight. Medicated prior to turns for comfort. No family at bedside. Will continue to monitor. --

## 2019-04-19 NOTE — PROGRESS NOTES
Name: Charu Bowens ADMIT: 2019   : 1935  PCP: Chayito Paredes MD    MRN: [id number] LOS: 5 days   AGE/SEX: 84 y.o. female    ROOM: [facility]   Subjective   Patient is not awake today had only soup in the afternoon  She is lethargic but comfortable not in any pain and distress    Brief hospital course since admission:  The patient is a 84 y.o. 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  Please see admission H&P from 4/3/19 for further details.  She was followed by nephrology and failed to improve with conservative measures.  Much discussion was had with the family on whether or not to initiate dialysis given her advanced dementia and multiple other medical issues. Ultimately it was decided to attempt dialysis and she had a tunneled catheter placed on 19 with dialysis initiated thereafter.  She had multiple issues with dialysis mainly related to her attempting to climb out of bed as she did not realize what was going on.  Additionally she had some bradycardia and low blood pressure with this.  Further discussions were had with the family regarding the dangers of continued dialysis given her condition as well as the decline in quality of life with this.  The family met with [facility] and they decided to cease dialysis and pursue comfort measures.       I have reviewed past medical history, family history, social history and allergies.  No changes from admission note.      Review of Systems   Unable to perform ROS: Acuity of condition   Constitutional: Negative for fever.          Objective   Vital Signs  Temp:  [97.6 °F (36.4 °C)-99 °F (37.2 °C)] 99 °F (37.2 °C)  Heart Rate:  [71-77] 77  Resp:  [16] 16  BP: (133-148)/(59-72) 148/72  SpO2:  [96 %-99 %] 99 %  on   ;   Device (Oxygen Therapy): room air  There is no height or weight  on file to calculate BMI.    Intake/Output Summary (Last 24 hours) at 4/18/2019 2120  Last data filed at 4/18/2019 0357  Gross per 24 hour   Intake --   Output 225 ml   Net -225 ml       Physical Exam   Constitutional: She appears well-developed and well-nourished. She appears lethargic. No distress.   HENT:   Head: Normocephalic and atraumatic.   Eyes: Pupils are equal, round, and reactive to light. No scleral icterus.   Cardiovascular: Normal rate and regular rhythm.   Pulmonary/Chest: Effort normal. No respiratory distress. She has decreased breath sounds. She has no wheezes.   Abdominal: Soft. Bowel sounds are normal. There is no hepatosplenomegaly.   Musculoskeletal: She exhibits edema.   Neurological: She appears lethargic.       Results Review:        Results from last 7 days   Lab Units 04/12/19  0631   SODIUM mmol/L 133*   POTASSIUM mmol/L 3.5   CHLORIDE mmol/L 98   CO2 mmol/L 19.1*   BUN mg/dL 48*   CREATININE mg/dL 4.29*   GLUCOSE mg/dL 128*   CALCIUM mg/dL 8.0*         Hemoglobin A1C:  Lab Results   Component Value Date    HGBA1C 8.60 (H) 08/02/2018     Glucose Range:  No results found for: POCGLU      allopurinol 100 mg Oral Daily   bumetanide 2 mg Oral BID   lactulose 30 g Oral Daily   metoprolol tartrate 12.5 mg Oral Nightly   pantoprazole 40 mg Oral Q AM   QUEtiapine 25 mg Oral Daily With Lunch   QUEtiapine 50 mg Oral Nightly   rifaximin 550 mg Oral Daily With Lunch & Dinner   sertraline 25 mg Oral Daily      Diet Regular  Assessment/Plan       Assessment/Plan      Active Hospital Problems    Diagnosis  POA   • **Palliative care encounter [Z51.5]  Not Applicable   • Dementia associated with alcoholism without behavioral disturbance (CMS/HCC) [F10.27]  Yes   • End stage renal disease (CMS/HCC) [N18.6]  Yes   • Hepatic encephalopathy (CMS/HCC) [K72.90]  Yes   • Cirrhosis of liver (CMS/HCC) [K74.60]  Yes      Resolved Hospital Problems   No resolved problems to display.       Continue comfort  measures.  No labs        Chris Brown MD  [facility]  04/18/19

## 2019-04-20 NOTE — PLAN OF CARE
Problem: Fall Risk (Adult)  Goal: Absence of Fall  Outcome: Ongoing (interventions implemented as appropriate)      Problem: Patient Care Overview  Goal: Plan of Care Review  Outcome: Ongoing (interventions implemented as appropriate)   04/19/19 1629 04/20/19 0506   Coping/Psychosocial   Plan of Care Reviewed With patient --    Plan of Care Review   Progress declining --    OTHER   Outcome Summary --  Pt rested well. Medicated prior to Q4 turns. Congestion is getting worse (set up bedside suction). Continue comfort care.      Goal: Individualization and Mutuality  Outcome: Ongoing (interventions implemented as appropriate)    Goal: Discharge Needs Assessment  Outcome: Ongoing (interventions implemented as appropriate)      Problem: Dying Patient, Actively (Adult)  Goal: Comfort/Pain Control  Outcome: Ongoing (interventions implemented as appropriate)    Goal: Peace/Preservation of Dignity During the Dying Process  Outcome: Ongoing (interventions implemented as appropriate)

## 2019-04-20 NOTE — PROGRESS NOTES
Name: Charu Bowens ADMIT: 2019   : 1935  PCP: Chayito Paredes MD    MRN: [id number] LOS: 7 days   AGE/SEX: 84 y.o. female  ROOM: [facility]/     Subjective   Subjective   CC/reason for follow-up: palliative care  No acute events.  Patient rested most of the evening and today.  She is getting medicated with turns.  She has not taken anything by mouth.  She is sleeping this AM and I did not wake her.  No family at bedside this AM.    Objective   Objective   Vital Signs  Temp:  [97.7 °F (36.5 °C)-98.8 °F (37.1 °C)] 98.8 °F (37.1 °C)  Heart Rate:  [74-89] 89  Resp:  [14-20] 14  BP: (85-94)/(42-47) 85/42  SpO2:  [91 %-93 %] 93 %  on   ;   Device (Oxygen Therapy): room air  There is no height or weight on file to calculate BMI.  Physical Exam   Constitutional: She appears lethargic. No distress.   HENT:   Head: Normocephalic and atraumatic.   Mouth/Throat: Oropharynx is clear and moist.   Neck: Neck supple. No JVD present.   Cardiovascular: Normal rate, regular rhythm and intact distal pulses.   Pulmonary/Chest: Effort normal and breath sounds normal. She has no rales.   Abdominal: Soft. Bowel sounds are normal.   Musculoskeletal: She exhibits edema (2+ BLE). She exhibits no tenderness.   Neurological: She appears lethargic.   Skin: Skin is warm and dry. She is not diaphoretic.   Nursing note and vitals reviewed.      Results Review:       I reviewed the patient's new clinical results.      Estimated Creatinine Clearance: 10.8 mL/min (A) (by C-G formula based on SCr of 4.29 mg/dL (H)).        No results found for: HGBA1C, POCGLU        Diet Regular       Assessment/Plan     Active Hospital Problems    Diagnosis  POA   • **Palliative care encounter [Z51.5]  Not Applicable   • Dementia associated with alcoholism without behavioral disturbance (CMS/HCC) [F10.27]  Yes   • End stage renal disease (CMS/HCC) [N18.6]  Yes   • Hepatic encephalopathy (CMS/HCC) [K72.90]  Yes   • Cirrhosis of liver (CMS/HCC) [K74.60]   Yes      Resolved Hospital Problems   No resolved problems to display.   Continue Comfort Measures.  Life expectancy is hours to days.       Sourav Tamayo MD  [facility]  04/20/19  12:26 PM

## 2019-04-21 NOTE — DISCHARGE SUMMARY
Primary Care Physician: Chayito Paredes MD    Date of Admission:  4/13/2019  Date of Death:  4/20/2019  Time of Death:  3:00 PM    Cause of Death: End Stage Renal Disease    Final Diagnosis:  Active Hospital Problems    Diagnosis  POA   • **Palliative care encounter [Z51.5]  Not Applicable   • Dementia associated with alcoholism without behavioral disturbance (CMS/HCC) [F10.27]  Yes   • End stage renal disease (CMS/HCC) [N18.6]  Yes   • Hepatic encephalopathy (CMS/HCC) [K72.90]  Yes   • Cirrhosis of liver (CMS/HCC) [K74.60]  Yes      Resolved Hospital Problems   No resolved problems to display.       Presenting Problem/History of Present Illness:  Acute renal failure superimposed on chronic kidney disease, unspecified CKD stage, unspecified acute renal failure type (CMS/HCC) [N17.9, N18.9]      Hospital Course  The Patient was a 84 y.o. 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  Please see admission H&P from 4/3/19 for further details.  She was followed by nephrology and failed to improve with conservative measures.  Much discussion was had with the family on whether or not to initiate dialysis given her advanced dementia and multiple other medical issues. Ultimately it was decided to attempt dialysis and she had a tunneled catheter placed on 4/8/19 with dialysis initiated thereafter.  She had multiple issues with dialysis mainly related to her attempting to climb out of bed as she did not realize what was going on.  Additionally she had some bradycardia and low blood pressure with this.  Further discussions were had with the family regarding the dangers of continued dialysis given her condition as well as the decline in quality of life with this.  The family met with [facility] and they decided to cease dialysis and pursue comfort measures.   She was discharged to a [facility]ospices[facility] bed. She continued to progressively decline and passed away peacefully in the afternoon of April 20th, 2019.     Sourav Tamayo MD  04/21/19  5:17 PM

## 2019-04-22 NOTE — PROGRESS NOTES
Case Management Discharge Note    Final Note: The patient  on 19 @ 15:00. SHREYA Mariano RN, CCP.     Destination - Selection Complete      Service Provider Request Status Selected Services Address Phone Number Fax Number    [facility] [id number] [facility] [phone] [phone]      Durable Medical Equipment      No service has been selected for the patient.      Dialysis/Infusion      No service has been selected for the patient.      Home Medical Care      No service has been selected for the patient.      Therapy      No service has been selected for the patient.      Community Resources      No service has been selected for the patient.             Final Discharge Disposition Code: 41 -  in medical facility

## 2021-08-27 NOTE — DISCHARGE SUMMARY
Date of Admission: 4/3/2019  Date of Discharge:  4/13/2019  Primary Care Physician: Chayito Paredes MD     Discharge Diagnosis:  Active Hospital Problems    Diagnosis  POA   • **Acute renal failure superimposed on chronic kidney disease (CMS/HCC) [N17.9, N18.9]  Yes   • Atrial fibrillation (CMS/HCC) [I48.91]  Yes   • Iron deficiency [E61.1]  Yes   • 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       Presenting Problem/History of Present Illness:  Generalized weakness [R53.1]  Elevated lactic acid level [R79.89]  Acute renal failure superimposed on chronic kidney disease, unspecified CKD stage, unspecified acute renal failure type (CMS/HCC) [N17.9, N18.9]     Hospital Course:  The patient is a 84 y.o. 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  Please see admission H&P from 4/3/19 for further details.  She was followed by nephrology and failed to improve with conservative measures.  Much discussion was had with the family on whether or not to initiate dialysis given her advanced dementia and multiple other medical issues. Ultimately it was decided to attempt dialysis and she had a tunneled catheter placed on 4/8/19 with dialysis initiated thereafter.  She had multiple issues with dialysis mainly related to her attempting to climb out of bed as she did not realize what was going on.  Additionally she had some  "bradycardia and low blood pressure with this.  Further discussions were had with the family regarding the dangers of continued dialysis given her condition as well as the decline in quality of life with this.  The family met with [facility] and they decided to cease dialysis and pursue comfort measures.  She will be discharged to a Hospice scattered bed.      Exam Today:  Blood pressure 126/49, pulse 66, temperature 98 °F (36.7 °C), temperature source Oral, resp. rate 16, height 160 cm (63\"), weight 94 kg (207 lb 2.1 oz), SpO2 95 %.  Constitutional: No distress.   Head: Normocephalic and atraumatic.   Mouth/Throat: Oropharynx is clear and moist.   Eyes: Conjunctivae are normal. Pupils are equal, round, and reactive to light.   Neck: Normal range of motion. Neck supple. No JVD present.   Cardiovascular: Normal rate, regular rhythm and intact distal pulses.   Pulmonary/Chest: Effort normal and breath sounds normal.   Abdominal: Soft. Bowel sounds are normal. There is no tenderness.   Musculoskeletal: She exhibits edema (3+ BLE). She exhibits no tenderness.   Neurological: She is alert.   Skin: Skin is warm and dry. She is not diaphoretic.   Psychiatric: She has a normal mood and affect. Her behavior is normal. Cognition and memory are impaired (known dementia).   Nursing note and vitals reviewed.    Procedures Performed:  Procedure(s):  HEMODIALYSIS CATHETER INSERTION    Consults:   Consults     Date and Time Order Name Status Description    4/7/2019 1434 Inpatient Vascular Surgery Consult Completed     4/3/2019 1922 Inpatient Infectious Diseases Consult Completed     4/3/2019 1922 Inpatient Nephrology Consult Completed     4/3/2019 2275 [facility] (on-call MD unless specified) Completed            Discharge Disposition:  Hospice/Medical Facility ([facility])    Discharge Medications:     Discharge Medications      ASK your doctor about these medications      Instructions Start Date   acetaminophen 650 MG 8 hr " tablet  Commonly known as:  TYLENOL   650 mg, Oral, Every 8 Hours PRN      allopurinol 100 MG tablet  Commonly known as:  ZYLOPRIM   100 mg, Oral, 2 Times Daily      bumetanide 2 MG tablet  Commonly known as:  BUMEX   2 mg, Oral, 2 Times Daily      cholecalciferol 1000 units tablet  Commonly known as:  VITAMIN D3   1,000 Units, Oral, Daily      ferrous sulfate 325 (65 FE) MG tablet   325 mg, Oral, Daily      guaiFENesin 600 MG 12 hr tablet  Commonly known as:  MUCINEX   600 mg, Oral, Nightly      insulin NPH-insulin regular (70-30) 100 UNIT/ML injection  Commonly known as:  humuLIN 70/30,novoLIN 70/30   40 Units, Subcutaneous, Daily With Breakfast & Lunch      lactulose 10 GM/15ML solution  Commonly known as:  CHRONULAC   30 g, Oral, Daily      lidocaine 5 %  Commonly known as:  LIDODERM   1 patch, Transdermal, As Needed, Remove & Discard patch within 12 hours or as directed by MD      Melatonin 10 MG tablet   10 mg, Oral, Nightly      metoprolol tartrate 25 MG tablet  Commonly known as:  LOPRESSOR   12.5 mg, Oral, Nightly      multivitamin with minerals tablet tablet   1 tablet, Oral, Daily      potassium chloride 10 MEQ CR tablet  Commonly known as:  K-DUR   10 mEq, Oral, 2 Times Daily      PROBIOTIC DAILY capsule   1 capsule, Oral, Daily      QUEtiapine 25 MG tablet  Commonly known as:  SEROquel   25 mg, Oral, Daily With Lunch      QUEtiapine 25 MG tablet  Commonly known as:  SEROquel   50 mg, Oral, Nightly      sertraline 25 MG tablet  Commonly known as:  ZOLOFT   25 mg, Oral, Daily      traMADol 50 MG tablet  Commonly known as:  ULTRAM   50 mg, Oral, Nightly      vitamin B-12 1000 MCG tablet  Commonly known as:  CYANOCOBALAMIN   1,000 mcg, Oral, Daily      XIFAXAN 550 MG tablet  Generic drug:  rifaximin   550 mg, Oral, Daily With Lunch & Dinner             Discharge Diet: As tolerated    Activity at Discharge: As tolerated    Sourav Tamayo MD  04/13/19  12:48 PM    Time Spent on Discharge Activities:  Greater than 30 minutes.          Gen: NAD, alert, oriented to self and location only.  HEENT: Normocephalic and atraumatic. No nasal discharge, mucous membranes moist, no scleral icterus.  CV: Regular rate and rhythm, +S1/S2, no M/R/G. No significant lower extremity edema. Radial and DP pulses present and symmetrical. Capillary refill less than 2 seconds.  Resp: CTAB, no rales, rhonchi, or wheezes.  GI: Abdomen soft non-distended, non tender to palpation. No masses appreciated. + bowel sounds.  MSK: Moving extremities spontaneously. No ulcers, open wounds, or excessive bruising.   Neuro: CN2-12 grossly intact. Motor strength +5/5 throughout upper and lower extremities. Sensation intact throughout right upper and lower extremities, however pt reports decreased sensation throughout left upper and lower extremities. Finger to nose smooth and coordinated. No pronator drift.   Psych: Appropriate mood. Confused.

## 2022-02-04 NOTE — CONSULTS
Name: Charu Bowens ADMIT: 4/3/2019   : 1935  PCP: Chayito Paredes MD    MRN: [id number] LOS: 4 days   AGE/SEX: 84 y.o. female  ROOM: E567/1         CHIEF COMPLAINT: Initiation of hemodialysis  HPI: Charu Bowens is a 84 y.o. female whose previous medical records I have reviewed and summarize below in addition to the findings from today's exam.  She was admitted to the hospital with generalized weakness and an increased creatinine from her baseline.  She has a history of chronic kidney disease stage IV, cirrhosis associated with Hoang, congestive heart failure, diabetes type 2, hemochromatosis, splenomegaly and chronic thrombocytopenia, hypertension.  She has diabetic nephropathy and has progressed to stage V kidney failure.  Initially she was reluctant to undergo hemodialysis but she now desires to initiate this if this will help her.    She complains of significant fatigue and a decrease from her prior energy levels to me.  No complaints of chest pain, shortness of breath currently.    No history of hemodialysis in the past, no pacemakers    PAST MEDICAL HISTORY:   Past Medical History:   Diagnosis Date   • Anxiety    • Arthritis    • C. difficile colitis    • CHF (congestive heart failure) (CMS/HCC)    • Chronic kidney disease     Stage III   • Dementia    • Diabetes mellitus (CMS/HCC)     Type 2   • Diskitis 2015    L4-L5 w/abscess formation   • Hemochromatosis    • History of anemia    • History of blood transfusion    • History of pneumonia    • History of sepsis    • Hypertension    • Liver disease     Cirrhosis likely secondary to HOANG   • HOANG (nonalcoholic steatohepatitis)    • Pancytopenia (CMS/HCC)    • Spinal stenosis    • Splenomegaly    • Thrombocytopenia, chronic    • TIA (transient ischemic attack)       PAST SURGICAL HISTORY:   Past Surgical History:   Procedure Laterality Date   • ABDOMINAL SURGERY     • KNEE SURGERY     • RENAL BIOPSY  10/2015   • TONSILLECTOMY     • WISDOM  TOOTH EXTRACTION        FAMILY HISTORY:   Family History   Problem Relation Age of Onset   • Heart disease Mother    • Diabetes Mother    • Heart disease Father    • Breast cancer Sister 45   • Hypertension Sister    • Diabetes Sister    • Colon cancer Brother 50   • Diabetes Brother       SOCIAL HISTORY:   Social History     Tobacco Use   • Smoking status: Former Smoker     Types: Cigarettes     Last attempt to quit: 1985     Years since quittin.4   • Smokeless tobacco: Never Used   • Tobacco comment: Heavy in past, but quit   Substance Use Topics   • Alcohol use: No   • Drug use: No      MEDICATIONS:   No current facility-administered medications on file prior to encounter.      Current Outpatient Medications on File Prior to Encounter   Medication Sig Dispense Refill   • acetaminophen (TYLENOL) 650 MG 8 hr tablet Take 650 mg by mouth Every 8 (Eight) Hours As Needed for Mild Pain (1-3).     • allopurinol (ZYLOPRIM) 100 MG tablet Take 100 mg by mouth 2 (Two) Times a Day.     • bumetanide (BUMEX) 2 MG tablet Take 1 tablet by mouth 2 (Two) Times a Day. 60 tablet 0   • cholecalciferol (VITAMIN D3) 1000 units tablet Take 1,000 Units by mouth Daily.     • ferrous sulfate 325 (65 FE) MG tablet Take 325 mg by mouth Daily.     • guaiFENesin (MUCINEX) 600 MG 12 hr tablet Take 600 mg by mouth Every Night.     • insulin NPH-insulin regular (Novolin 70/30) (70-30) 100 UNIT/ML injection Inject 40 Units under the skin into the appropriate area as directed Daily With Breakfast & Lunch.     • lactulose (CHRONULAC) 10 GM/15ML solution Take 30 g by mouth Daily.     • lidocaine (LIDODERM) 5 % Place 1 patch on the skin as directed by provider As Needed. Remove & Discard patch within 12 hours or as directed by MD      • Melatonin 10 MG tablet Take 10 mg by mouth Every Night.     • metoprolol tartrate (LOPRESSOR) 25 MG tablet Take 12.5 mg by mouth Every Night.     • Multiple Vitamins-Minerals (MULTIVITAMIN WITH MINERALS)  tablet tablet Take 1 tablet by mouth Daily.     • potassium chloride (K-DUR) 10 MEQ CR tablet Take 10 mEq by mouth 2 (Two) Times a Day.     • Probiotic Product (PROBIOTIC DAILY) capsule Take 1 capsule by mouth Daily.     • QUEtiapine (SEROquel) 25 MG tablet Take 25 mg by mouth Daily With Lunch.     • QUEtiapine (SEROquel) 25 MG tablet Take 50 mg by mouth Every Night.     • rifaximin (XIFAXAN) 550 MG tablet Take 550 mg by mouth Daily With Lunch & Dinner.     • sertraline (ZOLOFT) 25 MG tablet Take 25 mg by mouth Daily.     • traMADol (ULTRAM) 50 MG tablet Take 50 mg by mouth Every Night.     • vitamin B-12 (CYANOCOBALAMIN) 1000 MCG tablet Take 1,000 mcg by mouth Daily.               ALLERGIES: Ciprofloxacin; Levaquin [levofloxacin]; and Promethazine hcl     COMPLETE REVIEW OF SYSTEMS:     Review of Systems   Constitutional: Positive for fatigue. Negative for chills, diaphoresis and fever.   HENT: Negative for hearing loss, nosebleeds, sore throat and trouble swallowing.    Eyes: Negative for pain and visual disturbance.   Respiratory: Negative for cough, shortness of breath, wheezing and stridor.    Cardiovascular: Positive for leg swelling. Negative for chest pain and palpitations.   Gastrointestinal: Negative for abdominal distention, abdominal pain, blood in stool, constipation, diarrhea, nausea and vomiting.   Endocrine: Negative for polydipsia and polyphagia.   Genitourinary: Negative for flank pain and pelvic pain.   Musculoskeletal: Negative for arthralgias, back pain, joint swelling and neck pain.   Skin: Negative for color change, pallor, rash and wound.   Neurological: Negative for dizziness, seizures, syncope and headaches.   Psychiatric/Behavioral: Negative for behavioral problems, confusion, hallucinations and suicidal ideas.         PHYSICAL EXAM:   Patient Vitals for the past 24 hrs:   BP Temp Temp src Pulse Resp SpO2   04/07/19 2026 133/51 97.9 °F (36.6 °C) Oral 73 16 97 %   04/07/19 1525 164/78 98 °F  (36.7 °C) Oral 80 16 97 %   04/07/19 1100 152/61 97.7 °F (36.5 °C) Oral 73 16 (!) 87 %   04/07/19 0735 144/60 97.7 °F (36.5 °C) Oral 70 16 98 %   04/06/19 2319 106/94 98.1 °F (36.7 °C) Oral 68 18 96 %        Physical Exam   Constitutional: She is oriented to person, place, and time. She appears well-developed and well-nourished.   Elderly, pleasant   HENT:   Head: Normocephalic and atraumatic.   Eyes: Pupils are equal, round, and reactive to light. No scleral icterus.   Neck: Normal range of motion. Neck supple. No tracheal deviation present.   Cardiovascular: Normal rate and regular rhythm.   Pulses:       Carotid pulses are 2+ on the right side, and 2+ on the left side.       Radial pulses are 2+ on the right side, and 2+ on the left side.        Femoral pulses are 2+ on the right side, and 2+ on the left side.       Popliteal pulses are 2+ on the right side, and 2+ on the left side.        Dorsalis pedis pulses are 2+ on the right side, and 2+ on the left side.        Posterior tibial pulses are 2+ on the right side, and 2+ on the left side.   Pulmonary/Chest: Effort normal. No respiratory distress. She exhibits no tenderness.   Abdominal: Soft. Bowel sounds are normal. There is no tenderness.   Musculoskeletal: Normal range of motion. She exhibits edema.   Neurological: She is alert and oriented to person, place, and time. No cranial nerve deficit.   Skin: Skin is warm and dry.   Psychiatric: She has a normal mood and affect. Her behavior is normal.   No foot wounds          LABS:      Recent Results (from the past 24 hour(s))   Renal Function Panel    Collection Time: 04/07/19  6:15 AM   Result Value Ref Range    Glucose 96 65 - 99 mg/dL    BUN 78 (H) 8 - 23 mg/dL    Creatinine 5.04 (H) 0.57 - 1.00 mg/dL    Sodium 140 136 - 145 mmol/L    Potassium 3.9 3.5 - 5.2 mmol/L    Chloride 111 (H) 98 - 107 mmol/L    CO2 13.3 (L) 22.0 - 29.0 mmol/L    Calcium 8.0 (L) 8.6 - 10.5 mg/dL    Albumin 2.40 (L) 3.50 - 5.20 g/dL     Phosphorus 6.2 (H) 2.5 - 4.5 mg/dL    Anion Gap 15.7 mmol/L    BUN/Creatinine Ratio 15.5 7.0 - 25.0    eGFR Non African Amer 8 (L) >60 mL/min/1.73    eGFR  African Amer  >60 mL/min/1.73   Uric Acid    Collection Time: 04/07/19  6:15 AM   Result Value Ref Range    Uric Acid 4.9 2.4 - 5.7 mg/dL   Magnesium    Collection Time: 04/07/19  6:15 AM   Result Value Ref Range    Magnesium 2.6 (H) 1.6 - 2.4 mg/dL   CBC Auto Differential    Collection Time: 04/07/19  6:15 AM   Result Value Ref Range    WBC 2.73 (L) 3.40 - 10.80 10*3/mm3    RBC 3.06 (L) 3.77 - 5.28 10*6/mm3    Hemoglobin 10.7 (L) 12.0 - 15.9 g/dL    Hematocrit 33.8 (L) 34.0 - 46.6 %    .5 (H) 79.0 - 97.0 fL    MCH 35.0 (H) 26.6 - 33.0 pg    MCHC 31.7 31.5 - 35.7 g/dL    RDW 18.8 (H) 12.3 - 15.4 %    RDW-SD 75.1 (H) 37.0 - 54.0 fl    MPV 12.2 (H) 6.0 - 12.0 fL    Platelets 80 (L) 140 - 450 10*3/mm3    Neutrophil % 57.8 42.7 - 76.0 %    Lymphocyte % 22.0 19.6 - 45.3 %    Monocyte % 12.1 (H) 5.0 - 12.0 %    Eosinophil % 7.0 (H) 0.3 - 6.2 %    Basophil % 0.4 0.0 - 1.5 %    Immature Grans % 0.7 (H) 0.0 - 0.5 %    Neutrophils, Absolute 1.58 1.40 - 7.00 10*3/mm3    Lymphocytes, Absolute 0.60 (L) 0.70 - 3.10 10*3/mm3    Monocytes, Absolute 0.33 0.10 - 0.90 10*3/mm3    Eosinophils, Absolute 0.19 0.00 - 0.40 10*3/mm3    Basophils, Absolute 0.01 0.00 - 0.20 10*3/mm3    Immature Grans, Absolute 0.02 0.00 - 0.05 10*3/mm3    nRBC 0.4 (H) 0.0 - 0.0 /100 WBC   POC Glucose Once    Collection Time: 04/07/19  6:43 AM   Result Value Ref Range    Glucose 87 70 - 130 mg/dL   POC Glucose Once    Collection Time: 04/07/19 11:37 AM   Result Value Ref Range    Glucose 123 70 - 130 mg/dL   Hepatitis B Surface Antigen    Collection Time: 04/07/19  3:30 PM   Result Value Ref Range    Hepatitis B Surface Ag Non-Reactive Non-Reactive   POC Glucose Once    Collection Time: 04/07/19  4:17 PM   Result Value Ref Range    Glucose 113 70 - 130 mg/dL   POC Glucose Once    Collection Time:  04/07/19  8:49 PM   Result Value Ref Range    Glucose 150 (H) 70 - 130 mg/dL   ]    The following radiologic or non-invasive studies including the images have been independently reviewed by me and my impressions are as follows: Chest x-ray clear without pacemakers or Mediports       ASSESSMENT/PLAN: 84 y.o. female with chronic kidney disease stage V who now needs to initiate hemodialysis.  I have discussed with her the placement of a tunneled dialysis catheter.  She is interested in getting this performed although she is not sure she wants to commit to dialysis long-term.  We had a discussion regarding her options.  I think given her age and her uncertainty she will get a tunneled dialysis catheter placed tomorrow and then any further workup for permanent dialysis access will depend on her clinical course and her wishes.    I will make her n.p.o. after midnight and plan for tunneled dialysis catheter placement tomorrow.    Thank you for this consult.    I have discussed with the patient the following five points:  1-  I have been asked to see Charu Bowens for the treatment of the diagnosis of: End-stage renal disease  2- The planned treatment of this diagnosis is: Tunneled dialysis catheter placement  3- The risks of a procedure include but are not limited to the following: bleeding, thrombosis, damage to adjacent anatomical structures including nerves or blood vessels, infections, wound healing problems, the need for further procedures, whether planned or emergent. The benefits of a procedure include but are not limited to the following: Ability to have hemodialysis  4- Alternatives to the planned procedure include: Conservative medical treatment of chronic kidney disease  5- The natural history of the diagnosis if no treatment is given is: Increased likelihood of volume disturbances or electrolyte imbalance    Problem Points:  4:  Patient has a new problem, with additional work-up planned  Total problem points:4  or more    Data Points:  1:  I have reviewed or order clinical lab test  1:  I have reviewed or order radiology test (except heart catheterization or echo)  2:  I have personally and independently review of image, tracing, or specimen  2:  I have reviewed and summation of old records and/or discussed the patients care with another health care provider  Total data points:4 or more    Risk Points:  High:  Chronic illness with severe exacerbation or progression    MDM requires 2/3 (Problem points, Data points and Risk)  MDM Prob point Data point Risk   SF 1 1 Minimal   Low 2 2 Low   Mod 3 3 Moderate   High 4 4 High     Code requires 3/3 (MDM, History and Exam)  Code MDM History Exam Time   45943 SF/Low Detailed Detailed 30   61985 Mod Comprehensive Comprehensive 50   43945 High Comprehensive Comprehensive 70     Detailed history:  4 elements HPI or status of 3 chronic problems; 2-9 system ROS  Comprehensive:  4 elements HPI or status of 3 chronic problems;  10 system ROS    Detailed Exam:  12 findings from any organ system  Comprehensive Exam:  2 findings from each of 9 systems.     [place]illin    Assessment/Plan       Acute renal failure superimposed on chronic kidney disease (CMS/HCC)    Cirrhosis of liver (CMS/HCC)    Hypersplenism    Chronic renal disease, stage 4, severely decreased glomerular filtration rate between 15-29 mL/min/1.73 square meter (CMS/HCC)    Anemia of chronic renal failure, stage 4 (severe) (CMS/HCC)    Thrombocytopenia (CMS/HCC)    DM2 (diabetes mellitus, type 2) (CMS/HCC)    Iron deficiency    Atrial fibrillation (CMS/HCC)      Vincent Jean-Baptiste MD   19      detailed exam

## 2022-07-01 NOTE — ED PROVIDER NOTES
EMERGENCY DEPARTMENT ENCOUNTER    CHIEF COMPLAINT  Chief Complaint: generalized weakness  History given by: patient, spouse  History limited by: dementia  Room Number: 18/18  PMD: Chayito Paredes MD      HPI:  Pt is a 84 y.o. female who presents with reported generalized weakness and fatigue over the last several days. Pt's spouse states that he was unable to help the pt off of the cough today and has noticed that the pt has been falling to the left while sitting on the couch over the last several days. Pt's spouse states that the pt uses a walker to ambulate at baseline but that he has needed to help the pt ambulate with her walker over the last several days. Pt denies HA, blurred vision, N/V, change in appetite or urinary symptoms but the pt's spouse states that the pt has had decreased urination. Pt's spouse states that the pt did not take her lactulose today.    Duration:  Several days  Onset: gradual  Timing: constant  Location: generalized  Radiation: N/A  Quality: weakness  Intensity/Severity: moderate to severe  Progression: worsening  Associated Symptoms: fatigue, decreased urination  Aggravating Factors: none  Alleviating Factors: none  Previous Episodes: none mentioned  Treatment before arrival: none    PAST MEDICAL HISTORY  Active Ambulatory Problems     Diagnosis Date Noted   • Cirrhosis of liver (CMS/HCC) 08/15/2016   • Hypersplenism 08/15/2016   • Splenic pancytopenia syndrome (CMS/HCC) 08/15/2016   • Chronic renal disease, stage 4, severely decreased glomerular filtration rate between 15-29 mL/min/1.73 square meter (CMS/HCC) 08/15/2016   • Anemia of chronic renal failure, stage 4 (severe) (CMS/HCC) 09/08/2016   • Hepatic encephalopathy (CMS/HCC) 11/16/2016   • Thrombocytopenia (CMS/HCC) 11/17/2016   • Leukopenia 11/17/2016   • Acute kidney failure (CMS/HCC) 11/17/2016   • Acute hepatic encephalopathy 11/28/2016   • DM2 (diabetes mellitus, type 2) (CMS/HCC) 11/29/2016   • Iron deficiency 03/30/2017    • Acute idiopathic gout of right ankle 2018   • Decreased mobility 2018   • Fall 2018   • Urinary tract infection associated with indwelling urethral catheter (CMS/[facility]) 2018     Resolved Ambulatory Problems     Diagnosis Date Noted   • Hemochromatosis 08/15/2016   • CKD (chronic kidney disease) stage 3, GFR 30-59 ml/min (CMS/[facility]) 2016     Past Medical History:   Diagnosis Date   • Anxiety    • Arthritis    • C. difficile colitis    • CHF (congestive heart failure) (CMS/[facility])    • Chronic kidney disease    • Dementia    • Diabetes mellitus (CMS/[facility])    • Diskitis 2015   • Hemochromatosis    • History of anemia    • History of blood transfusion    • History of pneumonia    • History of sepsis    • Hypertension    • Liver disease    • HOANG (nonalcoholic steatohepatitis)    • Pancytopenia (CMS/[facility])    • Spinal stenosis    • Splenomegaly    • Thrombocytopenia, chronic    • TIA (transient ischemic attack)        PAST SURGICAL HISTORY  Past Surgical History:   Procedure Laterality Date   • ABDOMINAL SURGERY     • KNEE SURGERY     • RENAL BIOPSY  10/2015   • TONSILLECTOMY     • WISDOM TOOTH EXTRACTION         FAMILY HISTORY  Family History   Problem Relation Age of Onset   • Heart disease Mother    • Diabetes Mother    • Heart disease Father    • Breast cancer Sister 45   • Hypertension Sister    • Diabetes Sister    • Colon cancer Brother 50   • Diabetes Brother        SOCIAL HISTORY  Social History     Socioeconomic History   • Marital status:      Spouse name: Kirby   • Number of children: 2   • Years of education: High School   • Highest education level: Not on file   Occupational History     Employer: RETIRED   Tobacco Use   • Smoking status: Former Smoker     Types: Cigarettes     Last attempt to quit: 1985     Years since quittin.3   • Smokeless tobacco: Never Used   • Tobacco comment: Heavy in past, but quit   Substance and Sexual Activity   • Alcohol  use: No   • Drug use: No   • Sexual activity: Defer       ALLERGIES  Ciprofloxacin; Levaquin [levofloxacin]; and Promethazine hcl    REVIEW OF SYSTEMS  Review of Systems   Unable to perform ROS: Dementia   Constitutional: Positive for fatigue. Negative for appetite change.   Eyes: Negative for visual disturbance.   Gastrointestinal: Negative for vomiting.   Genitourinary: Positive for decreased urine volume.   Neurological: Positive for weakness (generalized). Negative for headaches.       PHYSICAL EXAM  ED Triage Vitals   Temp Pulse Resp BP SpO2   -- -- -- -- --      Temp src Heart Rate Source Patient Position BP Location FiO2 (%)   -- -- -- -- --       Physical Exam   Constitutional: No distress.   HENT:   Head: Normocephalic and atraumatic.   Mouth/Throat: Mucous membranes are dry.   Eyes: EOM are normal. Pupils are equal, round, and reactive to light.   Neck: Normal range of motion. Neck supple.   Cardiovascular: Normal rate, regular rhythm and normal heart sounds.   Pulmonary/Chest: Effort normal and breath sounds normal. No respiratory distress.   Abdominal: Soft. There is no tenderness. There is no rebound and no guarding.   obese   Musculoskeletal: Normal range of motion. She exhibits edema (BLE).   Neurological: She is alert. She has normal sensation.   Pt is pleasantly confused but follows commands. Pt has symmetric BLE weakness and equal  strength   Skin: Skin is warm and dry. No rash noted.   Psychiatric: Mood and affect normal.   Nursing note and vitals reviewed.      LAB RESULTS  Lab Results (last 24 hours)     Procedure Component Value Units Date/Time    Ammonia [[id number]]  (Normal) Collected:  04/03/19 1540    Specimen:  Blood Updated:  04/03/19 1641     Ammonia 42 umol/L     Lactic Acid, Plasma [[id number]]  (Abnormal) Collected:  04/03/19 1540    Specimen:  Blood Updated:  04/03/19 1627     Lactate 3.5 mmol/L     Lactic Acid, Reflex Timer (This will reflex a repeat order 3-3:15 hours after  ordered.) [[id number]] Collected:  04/03/19 1540    Specimen:  Blood Updated:  04/03/19 1627    Comprehensive Metabolic Panel [[id number]]  (Abnormal) Collected:  04/03/19 1541    Specimen:  Blood Updated:  04/03/19 1648     Glucose 295 mg/dL      BUN 84 mg/dL      Creatinine 5.14 mg/dL      Sodium 137 mmol/L      Potassium 4.8 mmol/L      Chloride 105 mmol/L      CO2 16.5 mmol/L      Calcium 9.6 mg/dL      Total Protein 6.5 g/dL      Albumin 2.40 g/dL      ALT (SGPT) 23 U/L      AST (SGOT) 100 U/L      Alkaline Phosphatase 129 U/L      Total Bilirubin 3.2 mg/dL      eGFR Non African Amer 8 mL/min/1.73      Comment: <15 Indicative of kidney failure.        eGFR   Amer -- mL/min/1.73      Comment: <15 Indicative of kidney failure.        Globulin 4.1 gm/dL      A/G Ratio 0.6 g/dL      BUN/Creatinine Ratio 16.3     Anion Gap 15.5 mmol/L     Narrative:       GFR Normal >60  Chronic Kidney Disease <60  Kidney Failure <15    Troponin [[id number]]  (Normal) Collected:  04/03/19 1541    Specimen:  Blood Updated:  04/03/19 1648     Troponin T 0.029 ng/mL     Narrative:       Troponin T Reference Range:  <= 0.03 ng/mL-   Negative for AMI  >0.03 ng/mL-     Abnormal for myocardial necrosis.  Clinicians would have to utilize clinical acumen, EKG, Troponin and serial changes to determine if it is an Acute Myocardial Infarction or myocardial injury due to an underlying chronic condition.     Procalcitonin [[id number]]  (Abnormal) Collected:  04/03/19 1541    Specimen:  Blood Updated:  04/03/19 1648     Procalcitonin 1.76 ng/mL     Narrative:       As a Marker for Sepsis (Non-Neonates):   1. <0.5 ng/mL represents a low risk of severe sepsis and/or septic shock.  1. >2 ng/mL represents a high risk of severe sepsis and/or septic shock.    As a Marker for Lower Respiratory Tract Infections that require antibiotic therapy:  PCT on Admission     Antibiotic Therapy             6-12 Hrs later  > 0.5                Strongly Recommended  "           >0.25 - <0.5         Recommended  0.1 - 0.25           Discouraged                   Remeasure/reassess PCT  <0.1                 Strongly Discouraged          Remeasure/reassess PCT      As 28 day mortality risk marker: \"Change in Procalcitonin Result\" (> 80 % or <=80 %) if Day 0 (or Day 1) and Day 4 values are available. Refer to [email or web address]-[email or web address]   Change in PCT <=80 %   A decrease of PCT levels below or equal to 80 % defines a positive change in PCT test result representing a higher risk for 28-day all-cause mortality of patients diagnosed with severe sepsis or septic shock.  Change in PCT > 80 %   A decrease of PCT levels of more than 80 % defines a negative change in PCT result representing a lower risk for 28-day all-cause mortality of patients diagnosed with severe sepsis or septic shock.                BNP [[id number]]  (Normal) Collected:  04/03/19 1541    Specimen:  Blood Updated:  04/03/19 1646     proBNP 1,030.0 pg/mL     Narrative:       Among patients with dyspnea, NT-proBNP is highly sensitive for the detection of acute congestive heart failure. In addition NT-proBNP of <300 pg/ml effectively rules out acute congestive heart failure with 99% negative predictive value.    Protime-INR [[id number]]  (Abnormal) Collected:  04/03/19 1541    Specimen:  Blood Updated:  04/03/19 1617     Protime 21.3 Seconds      INR 1.88    CBC & Differential [[id number]] Collected:  04/03/19 1716    Specimen:  Blood Updated:  04/03/19 1737    Narrative:       The following orders were created for panel order CBC & Differential.  Procedure                               Abnormality         Status                     ---------                               -----------         ------                     CBC Auto Differential[[id number]]                            In process                   Please view results for these tests on the individual orders.    CBC Auto Differential [[id number]] Collected:  " 04/03/19 1716    Specimen:  Blood Updated:  04/03/19 1737    Blood Culture - Blood, Hand, Right [[id number]] Collected:  04/03/19 1723    Specimen:  Blood from Hand, Right Updated:  04/03/19 1739          I ordered the above labs and reviewed the results    RADIOLOGY  CT Head Without Contrast   Preliminary Result   Remote biparietal infarcts with no evidence of acute   infarction or hemorrhage. Further evaluation could be performed with an   MRI examination of the brain as indicated.               Radiation dose reduction techniques were utilized, including automated   exposure control and exposure modulation based on body size.              XR Chest 1 View   Final Result         CT Head shows nothing acute.    I ordered the above noted radiological studies. Interpreted by radiologist. Reviewed by me in PACS.       PROCEDURES  Procedures  EKG          EKG time: 1614  Rhythm/Rate: NSR, 78  P waves and UT: normal  QRS, axis: normal axis, anterior PRWP   ST and T waves: non-specific ST and T wave changes    Interpreted Contemporaneously by me, independently viewed  unchanged compared to prior on 11/4/18    PROGRESS AND CONSULTS     1515- Discussed the plan to order lab and imaging studies for further evaluation. Pt understands and agrees with the plan, all questions answered.    1519- Ordered blood work, UA, ammonia level, procalcitonin, lactic acid, troponin, BNP, PT with INR, EKG, CXR and CT Head for further evaluation. Ordered IVF for hydration.    1715- Placed call to [facility] for admission.    1727- Ordered IVF for hydration.    1735- Rechecked pt. Pt is resting comfortably. Notified pt and family of the pt's lab and imaging results, including the pt's worsening renal failure. Discussed the plan to admit the pt for further evaluation and treatment. Pt and family agree with the plan and all questions were addressed.    1736-  Discussed the pt's case with Dr. Le ([facility]), who agrees to admit the pt to a telemetry  bed.    MEDICAL DECISION MAKING  Results were reviewed/discussed with the patient and they were also made aware of online access. Pt also made aware that some labs, such as cultures, will not be resulted during ER visit and follow up with PMD is necessary.     MDM  Number of Diagnoses or Management Options  Acute renal failure superimposed on chronic kidney disease, unspecified CKD stage, unspecified acute renal failure type (CMS/HCC):   Elevated lactic acid level:   Generalized weakness:      Amount and/or Complexity of Data Reviewed  Clinical lab tests: ordered and reviewed (Creatinine=5.14, BUN=84)  Tests in the radiology section of CPT®: ordered and reviewed (CT head shows nothing acute)  Tests in the medicine section of CPT®: ordered and reviewed (See EKG procedure note)  Decide to obtain previous medical records or to obtain history from someone other than the patient: yes  Obtain history from someone other than the patient: yes (spouse)  Review and summarize past medical records: yes (Pt's BUN=32 and Creatinine=3.26 two months ago)  Discuss the patient with other providers: yes (Dr. Le ([facility]))  Independent visualization of images, tracings, or specimens: yes           DIAGNOSIS  Final diagnoses:   Acute renal failure superimposed on chronic kidney disease, unspecified CKD stage, unspecified acute renal failure type (CMS/HCC)   Generalized weakness   Elevated lactic acid level       DISPOSITION  ADMISSION    Discussed treatment plan and reason for admission with pt/family and admitting physician.  Pt/family voiced understanding of the plan for admission for further testing/treatment as needed.       Latest Documented Vital Signs:  As of 5:39 PM  BP- 149/56 HR- 76 Temp- 96.5 °F (35.8 °C) (Tympanic) O2 sat- 98%    --  Documentation assistance provided by colby Danielson for Dr. Lerner.  Information recorded by the colby was done at my direction and has been verified and validated by me.      Marie Danielson  04/03/19 1734       Michael Lerner MD  04/03/19 2266     Normal for race

## (undated) DEVICE — GLV SURG BIOGEL M LTX PF 7 1/2

## (undated) DEVICE — LOU MINOR PROCEDURE: Brand: MEDLINE INDUSTRIES, INC.

## (undated) DEVICE — KT CATH TAL PALINDROME SAPPHIRE 14.5F23CM

## (undated) DEVICE — SYR LUERLOK 5CC

## (undated) DEVICE — ANTIBACTERIAL UNDYED BRAIDED (POLYGLACTIN 910), SYNTHETIC ABSORBABLE SUTURE: Brand: COATED VICRYL

## (undated) DEVICE — ADHS SKIN DERMABOND TOP ADVANCED

## (undated) DEVICE — APPL CHLORAPREP W/TINT 10.5ML PERC STRL

## (undated) DEVICE — DECANT BG O JET

## (undated) DEVICE — ST. SORBAVIEW ULTIMATE IJ SYSTEM A,C: Brand: CENTURION

## (undated) DEVICE — BIOPATCH™ ANTIMICROBIAL DRESSING WITH CHLORHEXIDINE GLUCONATE IS A HYDROPHILLIC POLYURETHANE ABSORPTIVE FOAM WITH CHLORHEXIDINE GLUCONATE (CHG) WHICH INHIBITS BACTERIAL GROWTH UNDER THE DRESSING. THE DRESSING IS INTENDED TO BE USED TO ABSORB EXUDATE, COVER A WOUND CAUSED BY VASCULAR AND NONVASCULAR PERCUTANEOUS MEDICAL DEVICES DURING SURGERY, AS WELL AS REDUCE LOCAL INFECTION AND COLONIZATION OF MICROORGANISMS.: Brand: BIOPATCH

## (undated) DEVICE — Device

## (undated) DEVICE — INTENDED FOR TISSUE SEPARATION, AND OTHER PROCEDURES THAT REQUIRE A SHARP SURGICAL BLADE TO PUNCTURE OR CUT.: Brand: BARD-PARKER ® CARBON RIB-BACK BLADES

## (undated) DEVICE — CVR PROB 96IN LF STRL

## (undated) DEVICE — NDL HYPO PRECISIONGLIDE REG 25G 1 1/2

## (undated) DEVICE — DRSNG SURESITE WNDW 4X4.5

## (undated) DEVICE — GOWN,NON-REINFORCED,SIRUS,SET IN SLV,XXL: Brand: MEDLINE

## (undated) DEVICE — SYR LUERLOK 20CC